# Patient Record
Sex: FEMALE | Race: WHITE | NOT HISPANIC OR LATINO | Employment: OTHER | URBAN - METROPOLITAN AREA
[De-identification: names, ages, dates, MRNs, and addresses within clinical notes are randomized per-mention and may not be internally consistent; named-entity substitution may affect disease eponyms.]

---

## 2019-10-15 ENCOUNTER — OFFICE VISIT (OUTPATIENT)
Dept: PULMONOLOGY | Facility: CLINIC | Age: 64
End: 2019-10-15
Payer: MEDICAID

## 2019-10-15 VITALS
OXYGEN SATURATION: 95 % | DIASTOLIC BLOOD PRESSURE: 81 MMHG | BODY MASS INDEX: 37.61 KG/M2 | HEIGHT: 61 IN | WEIGHT: 199.19 LBS | SYSTOLIC BLOOD PRESSURE: 143 MMHG | HEART RATE: 62 BPM

## 2019-10-15 DIAGNOSIS — J45.40 MODERATE PERSISTENT ASTHMA WITHOUT COMPLICATION: ICD-10-CM

## 2019-10-15 DIAGNOSIS — J44.89 ASTHMA-COPD OVERLAP SYNDROME: Primary | ICD-10-CM

## 2019-10-15 PROCEDURE — 99999 PR PBB SHADOW E&M-NEW PATIENT-LVL IV: CPT | Mod: PBBFAC,,, | Performed by: INTERNAL MEDICINE

## 2019-10-15 PROCEDURE — 99204 OFFICE O/P NEW MOD 45 MIN: CPT | Mod: PBBFAC,PO | Performed by: INTERNAL MEDICINE

## 2019-10-15 PROCEDURE — 99205 OFFICE O/P NEW HI 60 MIN: CPT | Mod: S$PBB,,, | Performed by: INTERNAL MEDICINE

## 2019-10-15 PROCEDURE — 99205 PR OFFICE/OUTPT VISIT, NEW, LEVL V, 60-74 MIN: ICD-10-PCS | Mod: S$PBB,,, | Performed by: INTERNAL MEDICINE

## 2019-10-15 PROCEDURE — 99999 PR PBB SHADOW E&M-NEW PATIENT-LVL IV: ICD-10-PCS | Mod: PBBFAC,,, | Performed by: INTERNAL MEDICINE

## 2019-10-15 RX ORDER — ISOSORBIDE MONONITRATE 30 MG/1
30 TABLET, EXTENDED RELEASE ORAL DAILY
Refills: 2 | Status: ON HOLD | COMMUNITY
Start: 2019-10-01 | End: 2020-01-03 | Stop reason: SDUPTHER

## 2019-10-15 RX ORDER — MAGNESIUM HYDROXIDE 400 MG/5ML
1 SUSPENSION, ORAL (FINAL DOSE FORM) ORAL DAILY
Status: ON HOLD | COMMUNITY
End: 2021-02-15 | Stop reason: HOSPADM

## 2019-10-15 RX ORDER — OXYCODONE AND ACETAMINOPHEN 10; 325 MG/1; MG/1
1 TABLET ORAL EVERY 6 HOURS PRN
Refills: 0 | COMMUNITY
Start: 2019-10-01 | End: 2022-08-08

## 2019-10-15 RX ORDER — MONTELUKAST SODIUM 10 MG/1
10 TABLET ORAL NIGHTLY
Qty: 30 TABLET | Refills: 11 | Status: SHIPPED | OUTPATIENT
Start: 2019-10-15 | End: 2021-04-15

## 2019-10-15 RX ORDER — BUDESONIDE AND FORMOTEROL FUMARATE DIHYDRATE 160; 4.5 UG/1; UG/1
2 AEROSOL RESPIRATORY (INHALATION) EVERY 12 HOURS
Qty: 1 INHALER | Refills: 11 | Status: SHIPPED | OUTPATIENT
Start: 2019-10-15 | End: 2020-10-01

## 2019-10-15 RX ORDER — OMEPRAZOLE 40 MG/1
40 CAPSULE, DELAYED RELEASE ORAL EVERY MORNING
COMMUNITY
Start: 2019-08-05 | End: 2021-12-31

## 2019-10-15 RX ORDER — OMEPRAZOLE 20 MG/1
TABLET, DELAYED RELEASE ORAL
COMMUNITY
End: 2020-01-03 | Stop reason: CLARIF

## 2019-10-15 RX ORDER — AZITHROMYCIN 500 MG/1
TABLET, FILM COATED ORAL
Qty: 3 TABLET | Refills: 3 | Status: ON HOLD | OUTPATIENT
Start: 2019-10-15 | End: 2020-01-03 | Stop reason: HOSPADM

## 2019-10-15 RX ORDER — ALLOPURINOL 300 MG/1
300 TABLET ORAL DAILY
Refills: 5 | COMMUNITY
Start: 2019-10-01 | End: 2024-02-26

## 2019-10-15 RX ORDER — PREDNISONE 20 MG/1
TABLET ORAL
Qty: 36 TABLET | Refills: 1 | Status: SHIPPED | OUTPATIENT
Start: 2019-10-15 | End: 2019-11-29 | Stop reason: SDUPTHER

## 2019-10-15 RX ORDER — ALBUTEROL SULFATE 90 UG/1
AEROSOL, METERED RESPIRATORY (INHALATION)
Qty: 1 INHALER | Refills: 11 | Status: SHIPPED | OUTPATIENT
Start: 2019-10-15 | End: 2022-01-10

## 2019-10-15 RX ORDER — ACETAMINOPHEN 500 MG
500 TABLET ORAL EVERY 4 HOURS PRN
Status: ON HOLD | COMMUNITY
End: 2024-02-29

## 2019-10-15 RX ORDER — LEVALBUTEROL INHALATION SOLUTION 0.63 MG/3ML
1 SOLUTION RESPIRATORY (INHALATION) EVERY 6 HOURS PRN
Qty: 270 ML | Refills: 11 | Status: ON HOLD | OUTPATIENT
Start: 2019-10-15 | End: 2021-02-15 | Stop reason: HOSPADM

## 2019-10-15 RX ORDER — MULTIVIT WITH MINERALS/HERBS
1 TABLET ORAL DAILY
COMMUNITY
End: 2021-12-31

## 2019-10-15 NOTE — PATIENT INSTRUCTIONS
Asthma, copd with chronic disease?    Preventive - use regular- sinuglair daily, symbicort 2 twice daily    Rescue medication - use albuterol inhaler or xopenex nebulizer up to every 4 hrs or so.    Action plan - prednisone one daily for 3 May be adequate, may need 3 for 3 and taper if severe.  Er if sickly    If yellow mucous - use azithromycin.    Spacer will help inhaled medication.    Breathing test and follow up a yr.

## 2019-10-15 NOTE — PROGRESS NOTES
"10/15/2019    Sakshi Hess  New Patient Consult    Chief Complaint   Patient presents with    COPD       HPI: pt had cough/wheezes as child, never outgrew.  Had cough all life- mucous clear. occ green mucous, uses combivent. Has neb with xopenex- albuterol ppt shakes.  Uses steroids once every 5 yrs- had to 3 wks prednisone.  Er visits once yr - may get steroid shot.  Lives [place],  No sinus, has dog and cat.  Breathing seems stable- breathing gets bad 4 month a yr    Uses oxycodone tid for last 1.5 yrs.      Has sleep apnea - cpap broke and not followed up.      The chief compliant  problem is new to me",    PFSH:  Past Medical History:   Diagnosis Date    Arthritis     Coronary artery disease     Hypertension          Past Surgical History:   Procedure Laterality Date    HYSTERECTOMY       Social History     Tobacco Use    Smoking status: Never Smoker   Substance Use Topics    Alcohol use: No    Drug use: No     History reviewed. No pertinent family history.  Review of patient's allergies indicates:   Allergen Reactions    Metronidazole Other (See Comments)     DISORIENTED       Statins-hmg-coa reductase inhibitors Tinitus     Joint pain       Performance Status:The patient's activity level is no limits with regular activity.      Review of Systems:  a review of eleven systems covering constitutional, Eye, HEENT, Psych, Respiratory, Cardiac, GI, , Musculoskeletal, Endocrine, Dermatologic was negative except for pertinent findings as listed ABOVE and below: night sweats,   pertinent positive as above, rest is good      Exam:Comprehensive exam done. BP (!) 143/81 (BP Location: Right arm, Patient Position: Sitting)   Pulse 62   Ht 5' 1" (1.549 m)   Wt 90.4 kg (199 lb 3 oz)   LMP 09/01/1986   SpO2 95% Comment: on room air  BMI 37.64 kg/m²   Exam included Vitals as listed, and patient's appearance and affect and alertness and mood, oral exam for yeast and hygiene and pharynx lesions and " Mallapatti (M) score, neck with inspection for jvd and masses and thyroid abnormalities and lymph nodes (supraclavicular and infraclavicular nodes and axillary also examined and noted if abn), chest exam included symmetry and effort and fremitus and percussion and auscultation, cardiac exam included rhythm and gallops and murmur and rubs and jvd and edema, abdominal exam for mass and hepatosplenomegaly and tenderness and hernias and bowel sounds, Musculoskeletal exam with muscle tone and posture and mobility/gait and  strength, and skin for rashes and cyanosis and pallor and turgor, extremity for clubbing.  Findings were normal except for pertinent findings listed below:   M2, chest is symmetric, no distress, normal percussion, normal fremitus and good normal breath sounds  No clubbing nor edema    Radiographs (ct chest and cxr) reviewed: view by direct vision  March 15 , 2019 nad, post cabg    Labs reviewed            PFT will be done and results to be reviewed         Results for SAKSHI BALLARD (MRN [id number]) as of 10/15/2019 09:59   Ref. Range 6/11/2012 04:51   Eosinophils Relative Latest Ref Range: 0.0 - 3.0 % 5.5 (H)   Basophil% Latest Ref Range: 0.0 - 3.0 % 0.5       Plan:  Clinical impression is apparently straight forward and impression with management as below.     Sakshi was seen today for copd.    Diagnoses and all orders for this visit:    Asthma-COPD overlap syndrome  -     Complete PFT with bronchodilator; Future    Moderate persistent asthma without complication  -     montelukast (SINGULAIR) 10 mg tablet; Take 1 tablet (10 mg total) by mouth every evening.  -     budesonide-formoterol 160-4.5 mcg (SYMBICORT) 160-4.5 mcg/actuation HFAA; Inhale 2 puffs into the lungs every 12 (twelve) hours. Controller  -     predniSONE (DELTASONE) 20 MG tablet; 3 for 3 days then 2 for 3 days then one for 3 days and repeat for breathing problems  -     levalbuterol (XOPENEX) 0.63 mg/3 mL nebulizer solution; Take 3  mLs (0.63 mg total) by nebulization every 6 (six) hours as needed.  -     albuterol (PROVENTIL/VENTOLIN HFA) 90 mcg/actuation inhaler; 2 puffs every 4 hours as needed for cough, wheeze, or shortness of breath  -     azithromycin (ZITHROMAX) 500 MG tablet; One daily for yellow mucous, repeat if needed  -     Complete PFT with bronchodilator; Future        Follow up in about 1 year (around 10/15/2020), or if symptoms worsen or fail to improve.    Discussed with patient above for education the following:      Patient Instructions   Asthma, copd with chronic disease?    Preventive - use regular- sinuglair daily, symbicort 2 twice daily    Rescue medication - use albuterol inhaler or xopenex nebulizer up to every 4 hrs or so.    Action plan - prednisone one daily for 3 May be adequate, may need 3 for 3 and taper if severe.  Er if sickly    If yellow mucous - use azithromycin.    Spacer will help inhaled medication.    Breathing test and follow up a yr.

## 2019-11-29 DIAGNOSIS — J45.40 MODERATE PERSISTENT ASTHMA WITHOUT COMPLICATION: ICD-10-CM

## 2019-11-29 RX ORDER — PREDNISONE 20 MG/1
TABLET ORAL
Qty: 36 TABLET | Refills: 1 | Status: ON HOLD | OUTPATIENT
Start: 2019-11-29 | End: 2020-01-03 | Stop reason: HOSPADM

## 2020-01-03 ENCOUNTER — HOSPITAL ENCOUNTER (OUTPATIENT)
Facility: HOSPITAL | Age: 65
Discharge: HOME | End: 2020-01-03
Attending: EMERGENCY MEDICINE | Admitting: INTERNAL MEDICINE
Payer: MEDICAID

## 2020-01-03 VITALS
SYSTOLIC BLOOD PRESSURE: 159 MMHG | DIASTOLIC BLOOD PRESSURE: 74 MMHG | BODY MASS INDEX: 37.88 KG/M2 | HEIGHT: 61 IN | OXYGEN SATURATION: 96 % | WEIGHT: 200.63 LBS | HEART RATE: 72 BPM | TEMPERATURE: 98 F | RESPIRATION RATE: 21 BRPM

## 2020-01-03 DIAGNOSIS — R06.02 SOB (SHORTNESS OF BREATH): ICD-10-CM

## 2020-01-03 DIAGNOSIS — R07.9 CHEST PAIN: ICD-10-CM

## 2020-01-03 DIAGNOSIS — I20.0 UNSTABLE ANGINA: Primary | ICD-10-CM

## 2020-01-03 DIAGNOSIS — I10 ESSENTIAL HYPERTENSION: ICD-10-CM

## 2020-01-03 PROBLEM — R07.89 MUSCULOSKELETAL CHEST PAIN: Status: ACTIVE | Noted: 2020-01-03

## 2020-01-03 PROBLEM — N18.30 CKD (CHRONIC KIDNEY DISEASE), STAGE III: Chronic | Status: RESOLVED | Noted: 2020-01-03 | Resolved: 2020-01-03

## 2020-01-03 PROBLEM — N18.30 CKD (CHRONIC KIDNEY DISEASE), STAGE III: Chronic | Status: ACTIVE | Noted: 2020-01-03

## 2020-01-03 PROBLEM — J44.9 COPD (CHRONIC OBSTRUCTIVE PULMONARY DISEASE): Status: ACTIVE | Noted: 2020-01-03

## 2020-01-03 PROBLEM — Z91.199 NON-COMPLIANCE: Chronic | Status: ACTIVE | Noted: 2020-01-03

## 2020-01-03 LAB
ALBUMIN SERPL BCP-MCNC: 3.9 G/DL (ref 3.5–5.2)
ALP SERPL-CCNC: 59 U/L (ref 55–135)
ALT SERPL W/O P-5'-P-CCNC: 11 U/L (ref 10–44)
ANION GAP SERPL CALC-SCNC: 11 MMOL/L (ref 8–16)
APTT PPP: 25.8 SEC (ref 23.6–33.3)
AST SERPL-CCNC: 15 U/L (ref 10–40)
BASOPHILS # BLD AUTO: 0.07 K/UL (ref 0–0.2)
BASOPHILS NFR BLD: 0.8 % (ref 0–1.9)
BILIRUB SERPL-MCNC: 0.6 MG/DL (ref 0.1–1)
BNP SERPL-MCNC: 79 PG/ML (ref 0–99)
BUN SERPL-MCNC: 22 MG/DL (ref 8–23)
CALCIUM SERPL-MCNC: 9.2 MG/DL (ref 8.7–10.5)
CHLORIDE SERPL-SCNC: 102 MMOL/L (ref 95–110)
CO2 SERPL-SCNC: 27 MMOL/L (ref 23–29)
CREAT SERPL-MCNC: 1.1 MG/DL (ref 0.5–1.4)
DIFFERENTIAL METHOD: NORMAL
EOSINOPHIL # BLD AUTO: 0.2 K/UL (ref 0–0.5)
EOSINOPHIL NFR BLD: 1.8 % (ref 0–8)
ERYTHROCYTE [DISTWIDTH] IN BLOOD BY AUTOMATED COUNT: 14.3 % (ref 11.5–14.5)
EST. GFR  (AFRICAN AMERICAN): >60 ML/MIN/1.73 M^2
EST. GFR  (NON AFRICAN AMERICAN): 53.2 ML/MIN/1.73 M^2
GLUCOSE SERPL-MCNC: 115 MG/DL (ref 70–110)
HCT VFR BLD AUTO: 40 % (ref 37–48.5)
HGB BLD-MCNC: 13.2 G/DL (ref 12–16)
IMM GRANULOCYTES # BLD AUTO: 0.04 K/UL (ref 0–0.04)
IMM GRANULOCYTES NFR BLD AUTO: 0.4 % (ref 0–0.5)
INR PPP: 0.9
LYMPHOCYTES # BLD AUTO: 3.8 K/UL (ref 1–4.8)
LYMPHOCYTES NFR BLD: 42.8 % (ref 18–48)
MCH RBC QN AUTO: 29.5 PG (ref 27–31)
MCHC RBC AUTO-ENTMCNC: 33 G/DL (ref 32–36)
MCV RBC AUTO: 90 FL (ref 82–98)
MONOCYTES # BLD AUTO: 1 K/UL (ref 0.3–1)
MONOCYTES NFR BLD: 10.9 % (ref 4–15)
NEUTROPHILS # BLD AUTO: 3.9 K/UL (ref 1.8–7.7)
NEUTROPHILS NFR BLD: 43.3 % (ref 38–73)
NRBC BLD-RTO: 0 /100 WBC
PLATELET # BLD AUTO: 268 K/UL (ref 150–350)
PMV BLD AUTO: 9.3 FL (ref 9.2–12.9)
POTASSIUM SERPL-SCNC: 4 MMOL/L (ref 3.5–5.1)
PROT SERPL-MCNC: 6.8 G/DL (ref 6–8.4)
PROTHROMBIN TIME: 12.2 SEC (ref 10.6–14.8)
RBC # BLD AUTO: 4.47 M/UL (ref 4–5.4)
SODIUM SERPL-SCNC: 140 MMOL/L (ref 136–145)
TROPONIN I SERPL DL<=0.01 NG/ML-MCNC: <0.03 NG/ML
WBC # BLD AUTO: 8.92 K/UL (ref 3.9–12.7)

## 2020-01-03 PROCEDURE — 84484 ASSAY OF TROPONIN QUANT: CPT | Mod: 91

## 2020-01-03 PROCEDURE — 80053 COMPREHEN METABOLIC PANEL: CPT

## 2020-01-03 PROCEDURE — 25000003 PHARM REV CODE 250: Performed by: STUDENT IN AN ORGANIZED HEALTH CARE EDUCATION/TRAINING PROGRAM

## 2020-01-03 PROCEDURE — 93005 ELECTROCARDIOGRAM TRACING: CPT

## 2020-01-03 PROCEDURE — G0378 HOSPITAL OBSERVATION PER HR: HCPCS

## 2020-01-03 PROCEDURE — 94640 AIRWAY INHALATION TREATMENT: CPT

## 2020-01-03 PROCEDURE — 25000003 PHARM REV CODE 250: Performed by: NURSE PRACTITIONER

## 2020-01-03 PROCEDURE — 84484 ASSAY OF TROPONIN QUANT: CPT

## 2020-01-03 PROCEDURE — 63600175 PHARM REV CODE 636 W HCPCS: Performed by: NURSE PRACTITIONER

## 2020-01-03 PROCEDURE — 83880 ASSAY OF NATRIURETIC PEPTIDE: CPT

## 2020-01-03 PROCEDURE — 85610 PROTHROMBIN TIME: CPT

## 2020-01-03 PROCEDURE — 99900035 HC TECH TIME PER 15 MIN (STAT)

## 2020-01-03 PROCEDURE — 96374 THER/PROPH/DIAG INJ IV PUSH: CPT

## 2020-01-03 PROCEDURE — 99285 EMERGENCY DEPT VISIT HI MDM: CPT | Mod: 25

## 2020-01-03 PROCEDURE — 85025 COMPLETE CBC W/AUTO DIFF WBC: CPT

## 2020-01-03 PROCEDURE — 36415 COLL VENOUS BLD VENIPUNCTURE: CPT

## 2020-01-03 PROCEDURE — 25000003 PHARM REV CODE 250: Performed by: INTERNAL MEDICINE

## 2020-01-03 PROCEDURE — 94761 N-INVAS EAR/PLS OXIMETRY MLT: CPT

## 2020-01-03 PROCEDURE — 85730 THROMBOPLASTIN TIME PARTIAL: CPT

## 2020-01-03 PROCEDURE — 25000242 PHARM REV CODE 250 ALT 637 W/ HCPCS: Performed by: STUDENT IN AN ORGANIZED HEALTH CARE EDUCATION/TRAINING PROGRAM

## 2020-01-03 RX ORDER — ONDANSETRON 2 MG/ML
4 INJECTION INTRAMUSCULAR; INTRAVENOUS EVERY 8 HOURS PRN
Status: DISCONTINUED | OUTPATIENT
Start: 2020-01-03 | End: 2020-01-03 | Stop reason: HOSPADM

## 2020-01-03 RX ORDER — ISOSORBIDE MONONITRATE 30 MG/1
30 TABLET, EXTENDED RELEASE ORAL DAILY
Qty: 30 TABLET | Refills: 0 | Status: SHIPPED | OUTPATIENT
Start: 2020-01-03 | End: 2022-04-07

## 2020-01-03 RX ORDER — BUDESONIDE AND FORMOTEROL FUMARATE DIHYDRATE 160; 4.5 UG/1; UG/1
2 AEROSOL RESPIRATORY (INHALATION) EVERY 12 HOURS
Status: DISCONTINUED | OUTPATIENT
Start: 2020-01-03 | End: 2020-01-03 | Stop reason: HOSPADM

## 2020-01-03 RX ORDER — NAPROXEN SODIUM 220 MG/1
81 TABLET, FILM COATED ORAL DAILY
Status: DISCONTINUED | OUTPATIENT
Start: 2020-01-03 | End: 2020-01-03 | Stop reason: SDUPTHER

## 2020-01-03 RX ORDER — NAPROXEN SODIUM 220 MG/1
324 TABLET, FILM COATED ORAL
Status: COMPLETED | OUTPATIENT
Start: 2020-01-03 | End: 2020-01-03

## 2020-01-03 RX ORDER — ISOSORBIDE MONONITRATE 60 MG/1
60 TABLET, EXTENDED RELEASE ORAL DAILY
Status: DISCONTINUED | OUTPATIENT
Start: 2020-01-04 | End: 2020-01-03 | Stop reason: HOSPADM

## 2020-01-03 RX ORDER — ALBUTEROL SULFATE 90 UG/1
1 AEROSOL, METERED RESPIRATORY (INHALATION) EVERY 6 HOURS PRN
Status: DISCONTINUED | OUTPATIENT
Start: 2020-01-03 | End: 2020-01-03 | Stop reason: HOSPADM

## 2020-01-03 RX ORDER — HYDRALAZINE HYDROCHLORIDE 20 MG/ML
10 INJECTION INTRAMUSCULAR; INTRAVENOUS EVERY 6 HOURS PRN
Status: DISCONTINUED | OUTPATIENT
Start: 2020-01-03 | End: 2020-01-03 | Stop reason: HOSPADM

## 2020-01-03 RX ORDER — IPRATROPIUM BROMIDE AND ALBUTEROL SULFATE 2.5; .5 MG/3ML; MG/3ML
3 SOLUTION RESPIRATORY (INHALATION)
Status: COMPLETED | OUTPATIENT
Start: 2020-01-03 | End: 2020-01-03

## 2020-01-03 RX ORDER — ASPIRIN 81 MG/1
81 TABLET ORAL DAILY
Qty: 30 TABLET | Refills: 0 | Status: ON HOLD | OUTPATIENT
Start: 2020-01-03 | End: 2024-02-29

## 2020-01-03 RX ORDER — BISACODYL 10 MG
10 SUPPOSITORY, RECTAL RECTAL DAILY PRN
Status: DISCONTINUED | OUTPATIENT
Start: 2020-01-03 | End: 2020-01-03 | Stop reason: HOSPADM

## 2020-01-03 RX ORDER — MONTELUKAST SODIUM 10 MG/1
10 TABLET ORAL NIGHTLY
Status: DISCONTINUED | OUTPATIENT
Start: 2020-01-03 | End: 2020-01-03 | Stop reason: HOSPADM

## 2020-01-03 RX ORDER — IBUPROFEN 400 MG/1
400 TABLET ORAL 3 TIMES DAILY
Qty: 15 TABLET | Refills: 0 | Status: SHIPPED | OUTPATIENT
Start: 2020-01-03 | End: 2020-01-08

## 2020-01-03 RX ORDER — ISOSORBIDE MONONITRATE 30 MG/1
30 TABLET, EXTENDED RELEASE ORAL DAILY
Status: DISCONTINUED | OUTPATIENT
Start: 2020-01-03 | End: 2020-01-03

## 2020-01-03 RX ORDER — SODIUM CHLORIDE 0.9 % (FLUSH) 0.9 %
10 SYRINGE (ML) INJECTION
Status: DISCONTINUED | OUTPATIENT
Start: 2020-01-03 | End: 2020-01-03 | Stop reason: HOSPADM

## 2020-01-03 RX ORDER — ACETAMINOPHEN 325 MG/1
650 TABLET ORAL EVERY 4 HOURS PRN
Status: DISCONTINUED | OUTPATIENT
Start: 2020-01-03 | End: 2020-01-03 | Stop reason: HOSPADM

## 2020-01-03 RX ORDER — ALBUTEROL SULFATE 0.83 MG/ML
1.25 SOLUTION RESPIRATORY (INHALATION) EVERY 6 HOURS PRN
Status: DISCONTINUED | OUTPATIENT
Start: 2020-01-03 | End: 2020-01-03 | Stop reason: HOSPADM

## 2020-01-03 RX ORDER — NITROGLYCERIN 0.4 MG/1
0.4 TABLET SUBLINGUAL EVERY 5 MIN PRN
Status: DISCONTINUED | OUTPATIENT
Start: 2020-01-03 | End: 2020-01-03 | Stop reason: HOSPADM

## 2020-01-03 RX ORDER — ALLOPURINOL 300 MG/1
300 TABLET ORAL DAILY
Status: DISCONTINUED | OUTPATIENT
Start: 2020-01-03 | End: 2020-01-03 | Stop reason: HOSPADM

## 2020-01-03 RX ORDER — PREDNISONE 20 MG/1
20 TABLET ORAL DAILY
Status: DISCONTINUED | OUTPATIENT
Start: 2020-01-03 | End: 2020-01-03 | Stop reason: HOSPADM

## 2020-01-03 RX ORDER — ASPIRIN 81 MG/1
81 TABLET ORAL DAILY
Status: DISCONTINUED | OUTPATIENT
Start: 2020-01-04 | End: 2020-01-03 | Stop reason: HOSPADM

## 2020-01-03 RX ORDER — OXYCODONE AND ACETAMINOPHEN 10; 325 MG/1; MG/1
1 TABLET ORAL EVERY 6 HOURS PRN
Status: DISCONTINUED | OUTPATIENT
Start: 2020-01-03 | End: 2020-01-03 | Stop reason: HOSPADM

## 2020-01-03 RX ORDER — NITROGLYCERIN 0.4 MG/1
0.4 TABLET SUBLINGUAL EVERY 5 MIN PRN
Qty: 30 TABLET | Refills: 0 | Status: SHIPPED | OUTPATIENT
Start: 2020-01-03 | End: 2024-02-26 | Stop reason: ALTCHOICE

## 2020-01-03 RX ORDER — CARVEDILOL 6.25 MG/1
6.25 TABLET ORAL 2 TIMES DAILY
Qty: 60 TABLET | Refills: 0 | Status: SHIPPED | OUTPATIENT
Start: 2020-01-03 | End: 2021-12-31

## 2020-01-03 RX ORDER — ISOSORBIDE MONONITRATE 30 MG/1
30 TABLET, EXTENDED RELEASE ORAL DAILY
Qty: 30 TABLET | Refills: 0 | Status: SHIPPED | OUTPATIENT
Start: 2020-01-03 | End: 2020-01-03 | Stop reason: SDUPTHER

## 2020-01-03 RX ORDER — ENOXAPARIN SODIUM 100 MG/ML
40 INJECTION SUBCUTANEOUS EVERY 24 HOURS
Status: DISCONTINUED | OUTPATIENT
Start: 2020-01-03 | End: 2020-01-03 | Stop reason: HOSPADM

## 2020-01-03 RX ORDER — PANTOPRAZOLE SODIUM 40 MG/1
40 TABLET, DELAYED RELEASE ORAL DAILY
Status: DISCONTINUED | OUTPATIENT
Start: 2020-01-03 | End: 2020-01-03 | Stop reason: HOSPADM

## 2020-01-03 RX ADMIN — PANTOPRAZOLE SODIUM 40 MG: 40 TABLET, DELAYED RELEASE ORAL at 06:01

## 2020-01-03 RX ADMIN — IPRATROPIUM BROMIDE AND ALBUTEROL SULFATE 3 ML: .5; 3 SOLUTION RESPIRATORY (INHALATION) at 03:01

## 2020-01-03 RX ADMIN — ASPIRIN 81 MG 81 MG: 81 TABLET ORAL at 04:01

## 2020-01-03 RX ADMIN — OXYCODONE HYDROCHLORIDE AND ACETAMINOPHEN 1 TABLET: 10; 325 TABLET ORAL at 08:01

## 2020-01-03 RX ADMIN — HYDRALAZINE HYDROCHLORIDE 10 MG: 20 INJECTION INTRAMUSCULAR; INTRAVENOUS at 08:01

## 2020-01-03 RX ADMIN — ALLOPURINOL 300 MG: 300 TABLET ORAL at 08:01

## 2020-01-03 RX ADMIN — ISOSORBIDE MONONITRATE 30 MG: 30 TABLET, EXTENDED RELEASE ORAL at 08:01

## 2020-01-03 RX ADMIN — PREDNISONE 20 MG: 20 TABLET ORAL at 08:01

## 2020-01-03 RX ADMIN — NITROGLYCERIN 0.4 MG: 0.4 TABLET, ORALLY DISINTEGRATING SUBLINGUAL at 08:01

## 2020-01-03 RX ADMIN — OXYCODONE HYDROCHLORIDE AND ACETAMINOPHEN 1 TABLET: 10; 325 TABLET ORAL at 03:01

## 2020-01-03 RX ADMIN — ASPIRIN 81 MG 324 MG: 81 TABLET ORAL at 03:01

## 2020-01-03 NOTE — NURSING
Discharge instructions given to patient and daughter. Patient verbalized understanding of follow up appointments and medication changes. Patient IV removed without difficulty, catheter tip intact. Tele removed. Patient discharged home via private vehicle.

## 2020-01-03 NOTE — ED NOTES
Attempted to call report.  Nurse will call back. VS stable.  SR on monitor.   Resps regular and not labored

## 2020-01-03 NOTE — ASSESSMENT & PLAN NOTE
Expiratory wheezes as reported by a respiratory therapist  Few crackles in bases after respiratory treatment  Continue inhalers/nebulizers  Continue oral steroids

## 2020-01-03 NOTE — SUBJECTIVE & OBJECTIVE
Past Medical History:   Diagnosis Date    Arthritis     Coronary artery disease     Hypertension        Past Surgical History:   Procedure Laterality Date    HYSTERECTOMY         Review of patient's allergies indicates:   Allergen Reactions    Metronidazole Other (See Comments)     DISORIENTED       Statins-hmg-coa reductase inhibitors Tinitus     Joint pain       No current facility-administered medications on file prior to encounter.      Current Outpatient Medications on File Prior to Encounter   Medication Sig    acetaminophen (TYLENOL) 500 MG tablet Take 500 mg by mouth.    albuterol (ACCUNEB) 1.25 mg/3 mL nebulizer solution Take 1 ampule by nebulization every 8 (eight) hours as needed.      albuterol (PROVENTIL/VENTOLIN HFA) 90 mcg/actuation inhaler 2 puffs every 4 hours as needed for cough, wheeze, or shortness of breath    allopurinol (ZYLOPRIM) 300 MG tablet Take 300 mg by mouth once daily.    aspirin (ECOTRIN) 81 MG EC tablet Take 81 mg by mouth once daily.      azithromycin (ZITHROMAX) 500 MG tablet One daily for yellow mucous, repeat if needed    b complex vitamins (B COMPLEX 1) tablet Take 1 tablet by mouth.    budesonide-formoterol 160-4.5 mcg (SYMBICORT) 160-4.5 mcg/actuation HFAA Inhale 2 puffs into the lungs every 12 (twelve) hours. Controller    carvedilol (COREG) 6.25 MG tablet Take 6.25 mg by mouth once daily.      clopidogrel (PLAVIX) 75 mg tablet Take 75 mg by mouth once daily.      diphenhydrAMINE (BENADRYL) 25 mg capsule Take 25 mg by mouth every evening.      evolocumab (REPATHA SURECLICK) 140 mg/mL PnIj 140 mg.    furosemide (LASIX) 20 MG tablet Take 10 mg by mouth daily as needed.      isosorbide dinitrate (ISORDIL) 30 MG tablet Take 60 mg by mouth once daily.      isosorbide mononitrate (IMDUR) 30 MG 24 hr tablet Take 30 mg by mouth once daily.    levalbuterol (XOPENEX) 0.63 mg/3 mL nebulizer solution Take 3 mLs (0.63 mg total) by nebulization every 6 (six) hours as  needed.    lisinopril 10 MG tablet Take 10 mg by mouth once daily.      montelukast (SINGULAIR) 10 mg tablet Take 1 tablet (10 mg total) by mouth every evening.    omeprazole (PRILOSEC OTC) 20 MG tablet esomeprazole magnesium 20 mg capsule,delayed release    omeprazole (PRILOSEC) 40 MG capsule omeprazole 40 mg capsule,delayed release    oxyCODONE-acetaminophen (PERCOCET)  mg per tablet TAKE ONE TABLET BY MOUTH 4 TIMES A DAY (1 TABLET AT 6AM, 1 TABLET AT 12 NOON, 1 TABLET AT 6 PM, AND 1 TABLET AT 12 MIDNIGHT)    potassium gluconate 595 mg (99 mg) Tab Take 1 tablet by mouth.    predniSONE (DELTASONE) 20 MG tablet 3 for 3 days then 2 for 3 days then one for 3 days and repeat for breathing problems    simvastatin (ZOCOR) 40 MG tablet Take 40 mg by mouth every evening.       Family History     None        Tobacco Use    Smoking status: Never Smoker   Substance and Sexual Activity    Alcohol use: No    Drug use: No    Sexual activity: Not on file     Review of Systems   All other systems reviewed and are negative.    Objective:     Vital Signs (Most Recent):  Temp: 98.2 °F (36.8 °C) (01/03/20 0300)  Pulse: 65 (01/03/20 0325)  Resp: 18 (01/03/20 0325)  BP: (!) 199/94 (01/03/20 0300)  SpO2: 97 % (01/03/20 0325) Vital Signs (24h Range):  Temp:  [98.2 °F (36.8 °C)-98.4 °F (36.9 °C)] 98.2 °F (36.8 °C)  Pulse:  [61-74] 65  Resp:  [16-28] 18  SpO2:  [94 %-97 %] 97 %  BP: (158-199)/(58-94) 199/94     Weight: 93 kg (205 lb)  Body mass index is 38.73 kg/m².    Physical Exam   Constitutional: She is oriented to person, place, and time. No distress.   obese   HENT:   Head: Normocephalic and atraumatic.   Eyes: Right eye exhibits no discharge. Left eye exhibits no discharge. No scleral icterus.   Neck: Neck supple.   Cardiovascular: Normal rate and regular rhythm. Exam reveals no gallop.   No murmur heard.  Pulmonary/Chest: Effort normal. Wheezes: at bases. She has rales (at bases). She exhibits tenderness (Left  chest).   Abdominal: Soft. Bowel sounds are normal.   Genitourinary:   Genitourinary Comments: No CVA tenderness   Musculoskeletal: She exhibits no edema, tenderness or deformity.   Neurological: She is alert and oriented to person, place, and time.   Skin: Skin is warm and dry. Capillary refill takes less than 2 seconds. She is not diaphoretic.   Vitals reviewed.          Significant Labs:   CBC:   Recent Labs   Lab 01/03/20 0113   WBC 8.92   HGB 13.2   HCT 40.0        CMP:   Recent Labs   Lab 01/03/20 0113      K 4.0      CO2 27   *   BUN 22   CREATININE 1.1   CALCIUM 9.2   PROT 6.8   ALBUMIN 3.9   BILITOT 0.6   ALKPHOS 59   AST 15   ALT 11   ANIONGAP 11   EGFRNONAA 53.2*     Coagulation:   Recent Labs   Lab 01/03/20 0113   INR 0.9   APTT 25.8     Troponin:   Recent Labs   Lab 01/03/20 0113   TROPONINI <0.030        Significant Imaging: I have reviewed all pertinent imaging results/findings within the past 24 hours.

## 2020-01-03 NOTE — CARE UPDATE
01/03/20 0320   Patient Assessment/Suction   Level of Consciousness (AVPU) alert   Respiratory Effort Unlabored   Expansion/Accessory Muscles/Retractions expansion symmetric;no retractions;no use of accessory muscles   LLL Breath Sounds wheezes, expiratory   RLL Breath Sounds wheezes, expiratory   Cough Type good;nonproductive   PRE-TX-O2   O2 Device (Oxygen Therapy) room air   SpO2 96 %   Pulse Oximetry Type Intermittent   Pulse 61   Resp 20   Aerosol Therapy   $ Aerosol Therapy Charges Aerosol Treatment;Mouth rinsed post treatment   Daily Review of Necessity (SVN) completed   Respiratory Treatment Status (SVN) given   Treatment Route (SVN) air;mask   Patient Position (SVN) Portillo's   Post Treatment Assessment (SVN) increased aeration   Signs of Intolerance (SVN) none   Breath Sounds Post-Respiratory Treatment   Throughout All Fields Post-Treatment All Fields   Throughout All Fields Post-Treatment aeration increased   Post-treatment Heart Rate (beats/min) 65   Post-treatment Resp Rate (breaths/min) 18   Respiratory Interventions   Cough And Deep Breathing done with encouragement   Breathing Techniques/Airway Clearance deep/controlled cough encouraged;diaphragmatic breathing promoted

## 2020-01-03 NOTE — H&P
[facility]  History & Physical  Date of Service:1/3/2020  At 0320    Patient Name: Sakshi Hess  MRN: [id number]  Admission Date: 1/3/2020  Attending Physician: Russel Oneal MD   Primary Care Provider: Kirill Boyle MD (Inactive)         Patient information was obtained from patient, past medical records and ER records.     Subjective:     Principal Problem:Chest pain    Chief Complaint:   Chief Complaint   Patient presents with    Chest Pain        HPI: The patient is a 64 year old female with multiple medical problems including coronary disease - s/p PCI and s/p CABG in , hypertension, dyslipidemia, COPD, and obesity. She presented to the ED with chest pain. She reports intermittent moderate to severe pressure/squeezing/heaviness in the left chest, associated with shortness of breath/dry heaves/diaphoresis, lasting 15 minutes to an hour, several times/day for the past 2 weeks. Pain occurs at rest and not related to meals. She states that pain usually resolved after resting. There are no aggravating factors. She reports persistent pain, not relieved after resting since last night. She reports subjective fever with occasional cough with white mucus. She denies abdominal pain or urinary symptoms.    Work up in the ED was unremarkable. Troponin was negative. EKG, personally reviewed, showed SR with no ST elevation. Chest radiograph, personally reviewed, showed no obvious infiltrates or overt heart failure.    Family history: Mother  at age 46 with PE. She does not know her father's medical history.      Past Medical History:   Diagnosis Date    Arthritis     Coronary artery disease     Hypertension        Past Surgical History:   Procedure Laterality Date    HYSTERECTOMY         Review of patient's allergies indicates:   Allergen Reactions    Metronidazole Other (See Comments)     DISORIENTED       Statins-hmg-coa reductase inhibitors Tinitus     Joint pain        No current facility-administered medications on file prior to encounter.      Current Outpatient Medications on File Prior to Encounter   Medication Sig    acetaminophen (TYLENOL) 500 MG tablet Take 500 mg by mouth.    albuterol (ACCUNEB) 1.25 mg/3 mL nebulizer solution Take 1 ampule by nebulization every 8 (eight) hours as needed.      albuterol (PROVENTIL/VENTOLIN HFA) 90 mcg/actuation inhaler 2 puffs every 4 hours as needed for cough, wheeze, or shortness of breath    allopurinol (ZYLOPRIM) 300 MG tablet Take 300 mg by mouth once daily.    aspirin (ECOTRIN) 81 MG EC tablet Take 81 mg by mouth once daily.      azithromycin (ZITHROMAX) 500 MG tablet One daily for yellow mucous, repeat if needed    b complex vitamins (B COMPLEX 1) tablet Take 1 tablet by mouth.    budesonide-formoterol 160-4.5 mcg (SYMBICORT) 160-4.5 mcg/actuation HFAA Inhale 2 puffs into the lungs every 12 (twelve) hours. Controller    carvedilol (COREG) 6.25 MG tablet Take 6.25 mg by mouth once daily.      clopidogrel (PLAVIX) 75 mg tablet Take 75 mg by mouth once daily.      diphenhydrAMINE (BENADRYL) 25 mg capsule Take 25 mg by mouth every evening.      evolocumab (REPATHA SURECLICK) 140 mg/mL PnIj 140 mg.    furosemide (LASIX) 20 MG tablet Take 10 mg by mouth daily as needed.      isosorbide dinitrate (ISORDIL) 30 MG tablet Take 60 mg by mouth once daily.      isosorbide mononitrate (IMDUR) 30 MG 24 hr tablet Take 30 mg by mouth once daily.    levalbuterol (XOPENEX) 0.63 mg/3 mL nebulizer solution Take 3 mLs (0.63 mg total) by nebulization every 6 (six) hours as needed.    lisinopril 10 MG tablet Take 10 mg by mouth once daily.      montelukast (SINGULAIR) 10 mg tablet Take 1 tablet (10 mg total) by mouth every evening.    omeprazole (PRILOSEC OTC) 20 MG tablet esomeprazole magnesium 20 mg capsule,delayed release    omeprazole (PRILOSEC) 40 MG capsule omeprazole 40 mg capsule,delayed release    oxyCODONE-acetaminophen  (PERCOCET)  mg per tablet TAKE ONE TABLET BY MOUTH 4 TIMES A DAY (1 TABLET AT 6AM, 1 TABLET AT 12 NOON, 1 TABLET AT 6 PM, AND 1 TABLET AT 12 MIDNIGHT)    potassium gluconate 595 mg (99 mg) Tab Take 1 tablet by mouth.    predniSONE (DELTASONE) 20 MG tablet 3 for 3 days then 2 for 3 days then one for 3 days and repeat for breathing problems    simvastatin (ZOCOR) 40 MG tablet Take 40 mg by mouth every evening.       Family History     None        Tobacco Use    Smoking status: Never Smoker   Substance and Sexual Activity    Alcohol use: No    Drug use: No    Sexual activity: Not on file     Review of Systems   All other systems reviewed and are negative.    Objective:     Vital Signs (Most Recent):  Temp: 98.2 °F (36.8 °C) (01/03/20 0300)  Pulse: 65 (01/03/20 0325)  Resp: 18 (01/03/20 0325)  BP: (!) 199/94 (01/03/20 0300)  SpO2: 97 % (01/03/20 0325) Vital Signs (24h Range):  Temp:  [98.2 °F (36.8 °C)-98.4 °F (36.9 °C)] 98.2 °F (36.8 °C)  Pulse:  [61-74] 65  Resp:  [16-28] 18  SpO2:  [94 %-97 %] 97 %  BP: (158-199)/(58-94) 199/94     Weight: 93 kg (205 lb)  Body mass index is 38.73 kg/m².    Physical Exam   Constitutional: She is oriented to person, place, and time. No distress.   obese   HENT:   Head: Normocephalic and atraumatic.   Eyes: Right eye exhibits no discharge. Left eye exhibits no discharge. No scleral icterus.   Neck: Neck supple.   Cardiovascular: Normal rate and regular rhythm. Exam reveals no gallop.   No murmur heard.  Pulmonary/Chest: Effort normal. Wheezes: at bases. She has rales (at bases). She exhibits tenderness (Left chest).   Abdominal: Soft. Bowel sounds are normal.   Genitourinary:   Genitourinary Comments: No CVA tenderness   Musculoskeletal: She exhibits no edema, tenderness or deformity.   Neurological: She is alert and oriented to person, place, and time.   Skin: Skin is warm and dry. Capillary refill takes less than 2 seconds. She is not diaphoretic.   Vitals reviewed.           Significant Labs:   CBC:   Recent Labs   Lab 01/03/20 0113   WBC 8.92   HGB 13.2   HCT 40.0        CMP:   Recent Labs   Lab 01/03/20 0113      K 4.0      CO2 27   *   BUN 22   CREATININE 1.1   CALCIUM 9.2   PROT 6.8   ALBUMIN 3.9   BILITOT 0.6   ALKPHOS 59   AST 15   ALT 11   ANIONGAP 11   EGFRNONAA 53.2*     Coagulation:   Recent Labs   Lab 01/03/20 0113   INR 0.9   APTT 25.8     Troponin:   Recent Labs   Lab 01/03/20 0113   TROPONINI <0.030        Significant Imaging: I have reviewed all pertinent imaging results/findings within the past 24 hours.    Assessment/Plan:     * Chest pain  History of PCI and CABG  EKG showed no ST elevation  States that she had OP stress test recently  Cardiac monitor  Trend troponin  Aspirin and NTG  Continue nitrates  Consult cardiology      COPD (chronic obstructive pulmonary disease)  Expiratory wheezes as reported by a respiratory therapist  Few crackles in bases after respiratory treatment  Continue inhalers/nebulizers  Continue oral steroids      Hypertension  Monitor  Resume home medication  Hydralazine IV PRN      VTE Risk Mitigation (From admission, onward)         Ordered     enoxaparin injection 40 mg  Daily      01/03/20 0355     IP VTE HIGH RISK PATIENT  Once      01/03/20 0355                   GILSON Vergara  Department of Hospital Medicine   [facility]

## 2020-01-03 NOTE — CONSULTS
[facility]  Cardiology  Consult Note    Patient Name: Sakshi Hess  MRN: [id number]  Admission Date: 1/3/2020  Hospital Length of Stay: 0 days  Code Status: Full Code   Attending Provider: Randa Tejada MD   Consulting Provider: CATHY Zapata  Primary Care Physician: Kirill Boyle MD (Inactive)  Principal Problem:Chest pain    Patient information was obtained from patient, past medical records and ER records.     Inpatient consult to Cardiology  Consult performed by: CATHY Zapata  Consult ordered by: GILSON Sorto        Subjective:     Chief Complaint:  cp     HPI: 63 y/o female PMHx CAD MI PCI CABG 2012 admitted with chest pain.  Pt. Has had constant left side chest pressure/soreness radiates to left shoulder lasting hours.  Different from prior angina.  Ming and EKGs negative.     Past Medical History:   Diagnosis Date    Arthritis     Coronary artery disease     Hypertension        Past Surgical History:   Procedure Laterality Date    HYSTERECTOMY         Review of patient's allergies indicates:   Allergen Reactions    Metronidazole Other (See Comments)     DISORIENTED       Statins-hmg-coa reductase inhibitors Tinitus     Joint pain       No current facility-administered medications on file prior to encounter.      Current Outpatient Medications on File Prior to Encounter   Medication Sig    acetaminophen (TYLENOL) 500 MG tablet Take 500 mg by mouth.    albuterol (ACCUNEB) 1.25 mg/3 mL nebulizer solution Take 1 ampule by nebulization every 8 (eight) hours as needed.      albuterol (PROVENTIL/VENTOLIN HFA) 90 mcg/actuation inhaler 2 puffs every 4 hours as needed for cough, wheeze, or shortness of breath    aspirin (ECOTRIN) 81 MG EC tablet Take 81 mg by mouth once daily.      b complex vitamins (B COMPLEX 1) tablet Take 1 tablet by mouth.    budesonide-formoterol 160-4.5 mcg (SYMBICORT) 160-4.5 mcg/actuation HFAA Inhale 2 puffs into the lungs every 12  (twelve) hours. Controller    diphenhydrAMINE (BENADRYL) 25 mg capsule Take 25 mg by mouth every evening.      evolocumab (REPATHA SURECLICK) 140 mg/mL PnIj 140 mg.    isosorbide mononitrate (IMDUR) 30 MG 24 hr tablet Take 30 mg by mouth once daily.    levalbuterol (XOPENEX) 0.63 mg/3 mL nebulizer solution Take 3 mLs (0.63 mg total) by nebulization every 6 (six) hours as needed.    montelukast (SINGULAIR) 10 mg tablet Take 1 tablet (10 mg total) by mouth every evening.    omeprazole (PRILOSEC) 40 MG capsule omeprazole 40 mg capsule,delayed release    oxyCODONE-acetaminophen (PERCOCET)  mg per tablet TAKE ONE TABLET BY MOUTH 4 TIMES A DAY (1 TABLET AT 6AM, 1 TABLET AT 12 NOON, 1 TABLET AT 6 PM, AND 1 TABLET AT 12 MIDNIGHT)    potassium gluconate 595 mg (99 mg) Tab Take 1 tablet by mouth.    predniSONE (DELTASONE) 20 MG tablet 3 for 3 days then 2 for 3 days then one for 3 days and repeat for breathing problems    [DISCONTINUED] isosorbide dinitrate (ISORDIL) 30 MG tablet Take 60 mg by mouth once daily.      allopurinol (ZYLOPRIM) 300 MG tablet Take 300 mg by mouth once daily.    azithromycin (ZITHROMAX) 500 MG tablet One daily for yellow mucous, repeat if needed    carvedilol (COREG) 6.25 MG tablet Take 6.25 mg by mouth once daily.      [DISCONTINUED] clopidogrel (PLAVIX) 75 mg tablet Take 75 mg by mouth once daily.      [DISCONTINUED] furosemide (LASIX) 20 MG tablet Take 10 mg by mouth daily as needed.      [DISCONTINUED] lisinopril 10 MG tablet Take 10 mg by mouth once daily.      [DISCONTINUED] omeprazole (PRILOSEC OTC) 20 MG tablet esomeprazole magnesium 20 mg capsule,delayed release    [DISCONTINUED] simvastatin (ZOCOR) 40 MG tablet Take 40 mg by mouth every evening.       Family History     Problem Relation (Age of Onset)    Pulmonary embolism Mother        Tobacco Use    Smoking status: Never Smoker   Substance and Sexual Activity    Alcohol use: No    Drug use: No    Sexual  activity: Not on file     Review of Systems   Cardiovascular: Positive for chest pain and dyspnea on exertion. Negative for claudication, cyanosis, irregular heartbeat, leg swelling, near-syncope, orthopnea, palpitations, paroxysmal nocturnal dyspnea and syncope.     Objective:     Vital Signs (Most Recent):  Temp: 98.4 °F (36.9 °C) (01/03/20 0747)  Pulse: 76 (01/03/20 0800)  Resp: 20 (01/03/20 0800)  BP: (!) 179/86 (01/03/20 0747)  SpO2: 96 % (01/03/20 0800) Vital Signs (24h Range):  Temp:  [98 °F (36.7 °C)-98.4 °F (36.9 °C)] 98.4 °F (36.9 °C)  Pulse:  [61-90] 76  Resp:  [16-28] 20  SpO2:  [94 %-98 %] 96 %  BP: (134-199)/(58-94) 179/86     Weight: 91 kg (200 lb 9.9 oz)  Body mass index is 37.91 kg/m².    SpO2: 96 %  O2 Device (Oxygen Therapy): room air    No intake or output data in the 24 hours ending 01/03/20 1021    Lines/Drains/Airways     Peripheral Intravenous Line                 Peripheral IV - Single Lumen 01/03/20 0113 20 G Right Forearm less than 1 day                Physical Exam   Cardiovascular: Normal rate, regular rhythm and normal heart sounds. Exam reveals no gallop and no friction rub.   No murmur heard.      Significant Labs:   Recent Lab Results       01/03/20  0730   01/03/20  0113        Albumin   3.9     Alkaline Phosphatase   59     ALT   11     Anion Gap   11     aPTT   25.8  Comment:  Therapeutic Range of 67.5-105.1 secs.  Equivalent to Heparin Concentration of anti-Xa 0.3-0.7 IU/ml.       AST   15     Baso #   0.07     Basophil%   0.8     BILIRUBIN TOTAL   0.6  Comment:  For infants and newborns, interpretation of results should be based  on gestational age, weight and in agreement with clinical  observations.  Premature Infant recommended reference ranges:  Up to 24 hours.............<8.0 mg/dL  Up to 48 hours............<12.0 mg/dL  3-5 days..................<15.0 mg/dL  6-29 days.................<15.0 mg/dL       BNP   79  Comment:  Values of less than 100 pg/ml are consistent with  non-CHF populations.     BUN, Bld   22     Calcium   9.2     Chloride   102     CO2   27     Creatinine   1.1     Differential Method   Automated     eGFR if    >60.0     eGFR if non    53.2  Comment:  Calculation used to obtain the estimated glomerular filtration  rate (eGFR) is the CKD-EPI equation.        Eos #   0.2     Eosinophil%   1.8     Glucose   115     Gran # (ANC)   3.9     Gran%   43.3     Hematocrit   40.0     Hemoglobin   13.2     Immature Grans (Abs)   0.04  Comment:  Mild elevation in immature granulocytes is non specific and   can be seen in a variety of conditions including stress response,   acute inflammation, trauma and pregnancy. Correlation with other   laboratory and clinical findings is essential.       Immature Granulocytes   0.4     Coumadin Monitoring INR   0.9  Comment:  Coumadin Therapy:  INR: 2.0-3.0 conventional anticoagulation  INR: 2.5-3.5 intensive anticoagulation       Lymph #   3.8     Lymph%   42.8     MCH   29.5     MCHC   33.0     MCV   90     Mono #   1.0     Mono%   10.9     MPV   9.3     nRBC   0     Platelets   268     Potassium   4.0     PROTEIN TOTAL   6.8     PT   12.2     RBC   4.47     RDW   14.3     Sodium   140     Troponin I <0.030 <0.030     WBC   8.92           Significant Imaging:   Assessment and Plan:     Active Diagnoses:    Diagnosis Date Noted POA    PRINCIPAL PROBLEM:  Chest pain [R07.9] 01/03/2020 Yes    COPD (chronic obstructive pulmonary disease) [J44.9] 01/03/2020 Yes    Hypertension [I10]  Yes      Problems Resolved During this Admission:       VTE Risk Mitigation (From admission, onward)         Ordered     enoxaparin injection 40 mg  Daily      01/03/20 0355     IP VTE HIGH RISK PATIENT  Once      01/03/20 0355              Plan    Lexiscan myoview if nuclear portion is negative for ischemia then OK for discharge from a cardiac stand point.     Thank you for your consult.     CATHY Zapata  Cardiology    [facility]

## 2020-01-03 NOTE — ASSESSMENT & PLAN NOTE
Was not compliant with medical care as well as outpatient follow-up.  Extensive counseling provided during this admission.

## 2020-01-03 NOTE — HOSPITAL COURSE
Patient was admitted under observation status to cardiac telemetry for further evaluation of chest pain and possible ACS.  She was monitored on cardiac telemetry and maintained sinus rhythm. EKG without any ST elevation.  Will serial cardiac biomarkers were negative.  Stated she had recent stress test which was reported as normal.  She admits she was not compliant with any of her chronic medications besides amlodipine and did have high blood pressures at home.  She states Imdur was giving her headache.  Previously on Ranexa however due to insurance coverage this was discontinued.  Denies any history of chest wall trauma.  Reproducible chest wall tenderness to palpation on exam concerning for musculoskeletal etiology.  Seen by Cardiology in consultation and cleared for discharge. Counseled patient and family member extensively at bedside about need to be compliant with all of her medications as it was noted blood pressure was elevated initially on arrival, better controlled on resuming home medications.  She stated also she did not follow up with Cardiology as previously recommended but is now going to schedule a follow-up appointment.  Medically stable for discharge. Discussed musculoskeletal etiology of chest pain, possible trial of short course NSAIDs versus lidocaine patch given she stated Percocet was not effective.  She opted for the former, counseled about the potential risk.  Discharge plan including medication changes, follow-up as well as return precautions explained to the patient.  Prescriptions for her cardiac medications were given on discharge.    Discharge examination  Obese female patient, sitting in bed, no apparent distress, cooperative  Reproducible left-sided chest wall tenderness to palpation  Regular with few extra beats, no significant lower extremity edema  Not on supplemental oxygen

## 2020-01-03 NOTE — PROGRESS NOTES
Unable to do stress test - pt. Ate and had caffeine.  Check 1 more troponin if negative and pt. Asymptomatic she can be d/savage home and f/u next week in office.

## 2020-01-03 NOTE — HPI
The patient is a 64 year old female with multiple medical problems including coronary disease - s/p PCI and s/p CABG in 2012, hypertension, dyslipidemia, COPD, and obesity. She presented to the ED with chest pain. She reports intermittent moderate to severe pressure/squeezing/heaviness in the left chest, associated with shortness of breath/dry heaves/diaphoresis, lasting 15 minutes to an hour, several times/day for the past 2 weeks. Pain occurs at rest and not related to meals. She states that pain usually resolved after resting. There are no aggravating factors. She reports persistent pain, not relieved after resting since last night. She reports subjective fever with occasional cough with white mucus. She denies abdominal pain or urinary symptoms.    Work up in the ED was unremarkable. Troponin was negative. EKG, personally reviewed, showed SR with no ST elevation. Chest radiograph, personally reviewed, showed no obvious infiltrates or overt heart failure.    Family history: Mother  at age 46 with PE. She does not know her father's medical history.     Detail Level: Zone

## 2020-01-03 NOTE — CARE UPDATE
01/03/20 0315   Patient Assessment/Suction   Level of Consciousness (AVPU) alert   Respiratory Effort Unlabored   Expansion/Accessory Muscles/Retractions expansion symmetric;no retractions;no use of accessory muscles   LLL Breath Sounds wheezes, expiratory   RLL Breath Sounds wheezes, expiratory   Cough Type good;nonproductive   PRE-TX-O2   O2 Device (Oxygen Therapy) room air   SpO2 96 %   Pulse Oximetry Type Intermittent   $ Pulse Oximetry - Multiple Charge Pulse Oximetry - Multiple   Pulse 66   Resp 20   Aerosol Therapy   $ Aerosol Therapy Charges Aerosol Treatment;Mouth rinsed post treatment   Daily Review of Necessity (SVN) completed   Respiratory Treatment Status (SVN) given   Treatment Route (SVN) air;mask   Patient Position (SVN) Portillo's   Post Treatment Assessment (SVN) breath sounds unchanged   Signs of Intolerance (SVN) none   Breath Sounds Post-Respiratory Treatment   Throughout All Fields Post-Treatment All Fields   Throughout All Fields Post-Treatment no change   Post-treatment Heart Rate (beats/min) 61   Post-treatment Resp Rate (breaths/min) 18   Equipment Change   $ RT Equipment Continuous nebulizer   Respiratory Interventions   Cough And Deep Breathing done with encouragement   Breathing Techniques/Airway Clearance deep/controlled cough encouraged;diaphragmatic breathing promoted   Respiratory Evaluation   $ Care Plan Tech Time 15 min   Evaluation For New Orders   Admitting Diagnosis SOB   Cardiac Diagnosis CHF   Pulmonary Diagnosis COPD   Home Oxygen   Has Home Oxygen? No   Home Aerosol, MDI, DPI, and Other Treatments/Therapies   Home Respiratory Therapy Per Patient/Review of Chart Yes   Aerosol Home Meds/Freq Xopenex/prn   DPI Home Meds/Freq Symbicort/bid

## 2020-01-03 NOTE — ASSESSMENT & PLAN NOTE
Elevated on admission likely secondary to noncompliance with medication.  Counseling provided.  Prescriptions done.

## 2020-01-03 NOTE — CARE UPDATE
01/03/20 0325   Patient Assessment/Suction   Level of Consciousness (AVPU) alert   Respiratory Effort Unlabored   Expansion/Accessory Muscles/Retractions expansion symmetric;no retractions;no use of accessory muscles   LLL Breath Sounds crackles, fine   RLL Breath Sounds crackles, fine   Cough Type good;nonproductive   PRE-TX-O2   O2 Device (Oxygen Therapy) room air   SpO2 97 %   Pulse 65   Resp 18   Aerosol Therapy   $ Aerosol Therapy Charges Aerosol Treatment;Mouth rinsed post treatment   Daily Review of Necessity (SVN) completed   Respiratory Treatment Status (SVN) given   Treatment Route (SVN) air;mask   Patient Position (SVN) Portillo's   Post Treatment Assessment (SVN) breath sounds unchanged   Signs of Intolerance (SVN) none   Breath Sounds Post-Respiratory Treatment   Throughout All Fields Post-Treatment All Fields   Throughout All Fields Post-Treatment no change   Post-treatment Heart Rate (beats/min) 66   Post-treatment Resp Rate (breaths/min) 18   Respiratory Interventions   Cough And Deep Breathing done with encouragement   Breathing Techniques/Airway Clearance deep/controlled cough encouraged;diaphragmatic breathing promoted

## 2020-01-03 NOTE — PLAN OF CARE
01/03/20 0800   Patient Assessment/Suction   Level of Consciousness (AVPU) alert   Respiratory Effort Normal;Unlabored   Expansion/Accessory Muscles/Retractions no use of accessory muscles   PRE-TX-O2   O2 Device (Oxygen Therapy) room air   SpO2 96 %   Pulse Oximetry Type Intermittent   $ Pulse Oximetry - Multiple Charge Pulse Oximetry - Multiple   Pulse 76   Resp 20

## 2020-01-03 NOTE — ASSESSMENT & PLAN NOTE
History of PCI and CABG  EKG showed no ST elevation  States that she had OP stress test recently  Cardiac monitor  Trend troponin  Aspirin and NTG  Continue nitrates  Consult cardiology

## 2020-01-03 NOTE — PROGRESS NOTES
Cardiac Rehab     Sakshi Hess   [id number]   1/3/2020         Cardiac Rehab Phase Taught: Phase 1    Teaching Method: Verbal    Handouts: Pulmonary Rehab Tear Sheet    Educational Videos: None    Understanding:  Learning indicated by feedback and Verbalize understanding    Comments: Education on COPD and pulmonary rehab provided. Questions answered.    Total Time Spent: 15 mins            Amarilys Bazzi RN

## 2020-01-03 NOTE — CARE UPDATE
01/03/20 0315   Patient Assessment/Suction   Level of Consciousness (AVPU) alert   Respiratory Effort Unlabored   Expansion/Accessory Muscles/Retractions expansion symmetric;no retractions;no use of accessory muscles   LLL Breath Sounds wheezes, expiratory   RLL Breath Sounds wheezes, expiratory   Cough Type good;nonproductive   PRE-TX-O2   O2 Device (Oxygen Therapy) room air   SpO2 96 %   Pulse Oximetry Type Intermittent   $ Pulse Oximetry - Multiple Charge Pulse Oximetry - Multiple   Pulse 66   Resp 20   Aerosol Therapy   $ Aerosol Therapy Charges Aerosol Treatment;Mouth rinsed post treatment   Daily Review of Necessity (SVN) completed   Respiratory Treatment Status (SVN) given   Treatment Route (SVN) air;mask   Patient Position (SVN) Portillo's   Post Treatment Assessment (SVN) breath sounds unchanged   Signs of Intolerance (SVN) none   Breath Sounds Post-Respiratory Treatment   Throughout All Fields Post-Treatment All Fields   Throughout All Fields Post-Treatment no change   Post-treatment Heart Rate (beats/min) 61   Post-treatment Resp Rate (breaths/min) 18   Respiratory Interventions   Cough And Deep Breathing done with encouragement   Breathing Techniques/Airway Clearance deep/controlled cough encouraged;diaphragmatic breathing promoted   Respiratory Evaluation   $ Care Plan Tech Time 15 min   Evaluation For New Orders   Admitting Diagnosis SOB   Cardiac Diagnosis CHF   Pulmonary Diagnosis COPD

## 2020-01-03 NOTE — DISCHARGE SUMMARY
Formerly [facility], [facility] Medicine  Discharge Summary      Patient Name: Sakshi Hess  MRN: [id number]  Admission Date: 1/3/2020  Hospital Length of Stay: 0 days  Discharge Date and Time:  2020 3:07 PM  Attending Physician: Randa Tejada MD   Discharging Provider: Randa Tejada MD  Primary Care Provider: Kirill Boyle MD (Inactive)      HPI:   The patient is a 64 year old female with multiple medical problems including coronary disease - s/p PCI and s/p CABG in 2012, hypertension, dyslipidemia, COPD, and obesity. She presented to the ED with chest pain. She reports intermittent moderate to severe pressure/squeezing/heaviness in the left chest, associated with shortness of breath/dry heaves/diaphoresis, lasting 15 minutes to an hour, several times/day for the past 2 weeks. Pain occurs at rest and not related to meals. She states that pain usually resolved after resting. There are no aggravating factors. She reports persistent pain, not relieved after resting since last night. She reports subjective fever with occasional cough with white mucus. She denies abdominal pain or urinary symptoms.    Work up in the ED was unremarkable. Troponin was negative. EKG, personally reviewed, showed SR with no ST elevation. Chest radiograph, personally reviewed, showed no obvious infiltrates or overt heart failure.    Family history: Mother  at age 46 with PE. She does not know her father's medical history.      * No surgery found *      Hospital Course:   Patient was admitted under observation status to cardiac telemetry for further evaluation of chest pain and possible ACS.  She was monitored on cardiac telemetry and maintained sinus rhythm. EKG without any ST elevation.  Will serial cardiac biomarkers were negative.  Stated she had recent stress test which was reported as normal.  She admits she was not compliant with any of her chronic medications besides amlodipine and did have high blood  pressures at home.  She states Imdur was giving her headache.  Previously on Ranexa however due to insurance coverage this was discontinued.  Denies any history of chest wall trauma.  Reproducible chest wall tenderness to palpation on exam concerning for musculoskeletal etiology.  Seen by Cardiology in consultation and cleared for discharge. Counseled patient and family member extensively at bedside about need to be compliant with all of her medications as it was noted blood pressure was elevated initially on arrival, better controlled on resuming home medications.  She stated also she did not follow up with Cardiology as previously recommended but is now going to schedule a follow-up appointment.  Medically stable for discharge. Discussed musculoskeletal etiology of chest pain, possible trial of short course NSAIDs versus lidocaine patch given she stated Percocet was not effective.  She opted for the former, counseled about the potential risk.  Discharge plan including medication changes, follow-up as well as return precautions explained to the patient.  Prescriptions for her cardiac medications were given on discharge.    Discharge examination  Obese female patient, sitting in bed, no apparent distress, cooperative  Reproducible left-sided chest wall tenderness to palpation  Regular with few extra beats, no significant lower extremity edema  Not on supplemental oxygen     Consults:   Consults (From admission, onward)        Status Ordering Provider     Inpatient consult to Cardiology  Once     Provider:  Remigio Bourne MD    Completed LUCINDA KAT          * Musculoskeletal chest pain  Reproducible on examination.      Non-compliance  Was not compliant with medical care as well as outpatient follow-up.  Extensive counseling provided during this admission.    COPD (chronic obstructive pulmonary disease)  No evidence of acute exacerbation.  Continue home inhalers.    Hypertension  Elevated on admission  likely secondary to noncompliance with medication.  Counseling provided.  Prescriptions done.    Coronary artery disease  History of remote CABG.        Final Active Diagnoses:    Diagnosis Date Noted POA    PRINCIPAL PROBLEM:  Musculoskeletal chest pain [R07.89] 01/03/2020 Yes    COPD (chronic obstructive pulmonary disease) [J44.9] 01/03/2020 Yes    Non-compliance [Z91.19] 01/03/2020 Not Applicable     Chronic    Hypertension [I10]  Yes    Coronary artery disease [I25.10]  Yes      Problems Resolved During this Admission:    Diagnosis Date Noted Date Resolved POA    CKD (chronic kidney disease), stage III [N18.3] 01/03/2020 01/03/2020 Yes     Chronic       Discharged Condition: good    Disposition: Home or Self Care    Follow Up:  Follow-up Information     Remigio Bourne MD In 2 weeks.    Specialty:  Cardiology  Why:  CAD, hypertension  Contact information:  [address] [place] [address]  [address] [facility]  [place] [address]  [phone]                 Patient Instructions:      Diet Cardiac     Activity as tolerated       Significant Diagnostic Studies: Labs:   BMP:   Recent Labs   Lab 01/03/20  0113   *      K 4.0      CO2 27   BUN 22   CREATININE 1.1   CALCIUM 9.2   , CMP   Recent Labs   Lab 01/03/20  0113      K 4.0      CO2 27   *   BUN 22   CREATININE 1.1   CALCIUM 9.2   PROT 6.8   ALBUMIN 3.9   BILITOT 0.6   ALKPHOS 59   AST 15   ALT 11   ANIONGAP 11   ESTGFRAFRICA >60.0   EGFRNONAA 53.2*   , CBC   Recent Labs   Lab 01/03/20  0113   WBC 8.92   HGB 13.2   HCT 40.0      , Lipid Panel   Lab Results   Component Value Date    HDL 35 (L) 06/11/2012    LDLCALC 134 06/11/2012   , Troponin   Recent Labs   Lab 01/03/20  1407   TROPONINI <0.030   , A1C: No results for input(s): HGBA1C in the last 4320 hours. and All labs within the past 24 hours have been reviewed    Pending Diagnostic Studies:     Procedure Component Value Units Date/Time    Troponin I [[id number]]      Order Status:  Sent Lab Status:  No result     Specimen:  Blood       X-ray Chest Ap Portable    Result Date: 1/3/2020  EXAMINATION: XR CHEST AP PORTABLE CLINICAL HISTORY: Acute chest pain, hypertension, coronary artery disease. FINDINGS: Portable chest radiograph at 01:09 hours compared to 03/15/2019 shows median sternotomy wires and mediastinal surgical clips from prior CABG, with the cardiomediastinal silhouette and pulmonary vasculature stable and within normal limits.  There are aortic vascular calcifications. The lungs are normally expanded, with no consolidation, large pleural effusion, evidence of pulmonary edema, or pneumothorax.  There is no acute osseous abnormality.     No evidence of acute cardiopulmonary disease. Electronically signed by: Shaquille Stringer MD Date:    01/03/2020 Time:    07:33  ECG Results          EKG 12-lead (Final result)  Result time 01/03/20 10:52:02    Final result by Interface, Lab In [facility] (01/03/20 10:52:02)                 Narrative:    Test Reason : R07.9,    Vent. Rate : 067 BPM     Atrial Rate : 067 BPM     P-R Int : 156 ms          QRS Dur : 078 ms      QT Int : 356 ms       P-R-T Axes : 032 -13 084 degrees     QTc Int : 376 ms    Normal sinus rhythm  Moderate voltage criteria for LVH, may be normal variant  Cannot rule out Septal infarct (cited on or before 09-JUN-2012)  Abnormal ECG  When compared with ECG of 03-JAN-2020 00:45,  No significant change was found  Confirmed by Sd Schaeffer MD [id number]) on 1/3/2020 10:51:54 AM    Referred By: [email or web address]   SELF           Confirmed By:Sd Schaeffer MD                             EKG 12-lead (Final result)  Result time 01/03/20 10:52:00    Final result by Interface, Lab In [facility] (01/03/20 10:52:00)                 Narrative:    Test Reason : R07.9,    Vent. Rate : 070 BPM     Atrial Rate : 070 BPM     P-R Int : 160 ms          QRS Dur : 078 ms      QT Int : 380 ms       P-R-T Axes : 034 -02 083 degrees     QTc Int : 410  ms    Normal sinus rhythm  Minimal voltage criteria for LVH, may be normal variant  Septal infarct (cited on or before 09-JUN-2012)  Abnormal ECG  When compared with ECG of 10-JAIRO-2012 11:34,  Questionable change in initial forces of Anterior-septal leads  T wave inversion less evident in Anterior leads  Confirmed by Sd Schaeffer MD ([id number]) on 1/3/2020 10:51:52 AM    Referred By: [email or web address]   SELF           Confirmed By:Sd Schaeffer MD                                Medications:  Reconciled Home Medications:      Medication List      START taking these medications    ibuprofen 400 MG tablet  Commonly known as:  ADVIL,MOTRIN  Take 1 tablet (400 mg total) by mouth 3 (three) times daily. for 5 days     isosorbide mononitrate 30 MG 24 hr tablet  Commonly known as:  IMDUR  Take 1 tablet (30 mg total) by mouth once daily.     nitroGLYCERIN 0.4 MG SL tablet  Commonly known as:  NITROSTAT  Place 1 tablet (0.4 mg total) under the tongue every 5 (five) minutes as needed for Chest pain.        CHANGE how you take these medications    carvedilol 6.25 MG tablet  Commonly known as:  COREG  Take 1 tablet (6.25 mg total) by mouth 2 (two) times daily.  What changed:  when to take this        CONTINUE taking these medications    acetaminophen 500 MG tablet  Commonly known as:  TYLENOL  Take 500 mg by mouth.     * albuterol 1.25 mg/3 mL Nebu  Commonly known as:  ACCUNEB  Take 1 ampule by nebulization every 8 (eight) hours as needed.     * albuterol 90 mcg/actuation inhaler  Commonly known as:  PROVENTIL/VENTOLIN HFA  2 puffs every 4 hours as needed for cough, wheeze, or shortness of breath     allopurinol 300 MG tablet  Commonly known as:  ZYLOPRIM  Take 300 mg by mouth once daily.     aspirin 81 MG EC tablet  Commonly known as:  ECOTRIN  Take 1 tablet (81 mg total) by mouth once daily.     B Complex 1 tablet  Generic drug:  b complex vitamins  Take 1 tablet by mouth.     budesonide-formoterol 160-4.5 mcg 160-4.5 mcg/actuation  Hfaa  Commonly known as:  Symbicort  Inhale 2 puffs into the lungs every 12 (twelve) hours. Controller     diphenhydrAMINE 25 mg capsule  Commonly known as:  BENADRYL  Take 25 mg by mouth every evening.     levalbuterol 0.63 mg/3 mL nebulizer solution  Commonly known as:  XOPENEX  Take 3 mLs (0.63 mg total) by nebulization every 6 (six) hours as needed.     montelukast 10 mg tablet  Commonly known as:  SINGULAIR  Take 1 tablet (10 mg total) by mouth every evening.     omeprazole 40 MG capsule  Commonly known as:  PRILOSEC  omeprazole 40 mg capsule,delayed release     oxyCODONE-acetaminophen  mg per tablet  Commonly known as:  PERCOCET  TAKE ONE TABLET BY MOUTH 4 TIMES A DAY (1 TABLET AT 6AM, 1 TABLET AT 12 NOON, 1 TABLET AT 6 PM, AND 1 TABLET AT 12 MIDNIGHT)     potassium gluconate 595 mg (99 mg) Tab  Take 1 tablet by mouth.     Repatha SureClick 140 mg/mL Pnij  Generic drug:  evolocumab  140 mg.         * This list has 2 medication(s) that are the same as other medications prescribed for you. Read the directions carefully, and ask your doctor or other care provider to review them with you.            STOP taking these medications    azithromycin 500 MG tablet  Commonly known as:  ZITHROMAX     predniSONE 20 MG tablet  Commonly known as:  DELTASONE            Indwelling Lines/Drains at time of discharge:   Lines/Drains/Airways     None                 Time spent on the discharge of patient: 31 minutes  Patient was seen and examined on the date of discharge and determined to be suitable for discharge.         Randa Tejada MD  Department of Hospital Medicine  [facility]

## 2020-03-05 RX ORDER — AZITHROMYCIN 500 MG/1
TABLET, FILM COATED ORAL
COMMUNITY
Start: 2020-01-25 | End: 2020-03-05 | Stop reason: SDUPTHER

## 2020-03-05 RX ORDER — AZITHROMYCIN 500 MG/1
TABLET, FILM COATED ORAL
Qty: 3 TABLET | Refills: 2 | Status: SHIPPED | OUTPATIENT
Start: 2020-03-05 | End: 2020-06-29 | Stop reason: SDUPTHER

## 2020-05-29 RX ORDER — PREDNISONE 20 MG/1
TABLET ORAL
COMMUNITY
Start: 2020-03-05 | End: 2020-05-29 | Stop reason: SDUPTHER

## 2020-05-30 RX ORDER — PREDNISONE 20 MG/1
20 TABLET ORAL DAILY
Qty: 36 TABLET | Refills: 0 | Status: SHIPPED | OUTPATIENT
Start: 2020-05-30 | End: 2020-06-29 | Stop reason: SDUPTHER

## 2020-06-29 ENCOUNTER — OFFICE VISIT (OUTPATIENT)
Dept: PULMONOLOGY | Facility: CLINIC | Age: 65
End: 2020-06-29
Payer: MEDICARE

## 2020-06-29 ENCOUNTER — TELEPHONE (OUTPATIENT)
Dept: PULMONOLOGY | Facility: CLINIC | Age: 65
End: 2020-06-29

## 2020-06-29 VITALS
BODY MASS INDEX: 39.73 KG/M2 | SYSTOLIC BLOOD PRESSURE: 142 MMHG | WEIGHT: 210.44 LBS | HEART RATE: 77 BPM | HEIGHT: 61 IN | OXYGEN SATURATION: 96 % | DIASTOLIC BLOOD PRESSURE: 75 MMHG

## 2020-06-29 DIAGNOSIS — G47.33 OBSTRUCTIVE SLEEP APNEA SYNDROME: Primary | ICD-10-CM

## 2020-06-29 DIAGNOSIS — J45.40 MODERATE PERSISTENT ASTHMA WITHOUT COMPLICATION: ICD-10-CM

## 2020-06-29 DIAGNOSIS — G47.33 OBSTRUCTIVE SLEEP APNEA SYNDROME: ICD-10-CM

## 2020-06-29 DIAGNOSIS — Z01.818 OTHER SPECIFIED PRE-OPERATIVE EXAMINATION: Primary | ICD-10-CM

## 2020-06-29 PROCEDURE — 99214 OFFICE O/P EST MOD 30 MIN: CPT | Mod: S$PBB,,, | Performed by: INTERNAL MEDICINE

## 2020-06-29 PROCEDURE — 99999 PR PBB SHADOW E&M-EST. PATIENT-LVL IV: CPT | Mod: PBBFAC,,, | Performed by: INTERNAL MEDICINE

## 2020-06-29 PROCEDURE — 99999 PR PBB SHADOW E&M-EST. PATIENT-LVL IV: ICD-10-PCS | Mod: PBBFAC,,, | Performed by: INTERNAL MEDICINE

## 2020-06-29 PROCEDURE — 99214 PR OFFICE/OUTPT VISIT, EST, LEVL IV, 30-39 MIN: ICD-10-PCS | Mod: S$PBB,,, | Performed by: INTERNAL MEDICINE

## 2020-06-29 PROCEDURE — 99214 OFFICE O/P EST MOD 30 MIN: CPT | Mod: PBBFAC,PO | Performed by: INTERNAL MEDICINE

## 2020-06-29 RX ORDER — LEVALBUTEROL TARTRATE 45 UG/1
1-2 AEROSOL, METERED ORAL EVERY 4 HOURS PRN
Qty: 1 INHALER | Refills: 11 | Status: ON HOLD | OUTPATIENT
Start: 2020-06-29 | End: 2021-02-15 | Stop reason: HOSPADM

## 2020-06-29 RX ORDER — AZITHROMYCIN 500 MG/1
TABLET, FILM COATED ORAL
Qty: 3 TABLET | Refills: 2 | Status: ON HOLD | OUTPATIENT
Start: 2020-06-29 | End: 2021-02-15 | Stop reason: HOSPADM

## 2020-06-29 RX ORDER — TRAZODONE HYDROCHLORIDE 50 MG/1
50 TABLET ORAL NIGHTLY
COMMUNITY
Start: 2020-04-28 | End: 2022-08-08

## 2020-06-29 RX ORDER — PREDNISONE 20 MG/1
20 TABLET ORAL DAILY
Qty: 36 TABLET | Refills: 0 | Status: SHIPPED | OUTPATIENT
Start: 2020-06-29 | End: 2020-10-01 | Stop reason: SDUPTHER

## 2020-06-29 RX ORDER — BUDESONIDE AND FORMOTEROL FUMARATE DIHYDRATE 160; 4.5 UG/1; UG/1
2 AEROSOL RESPIRATORY (INHALATION) EVERY 12 HOURS
Qty: 1 INHALER | Refills: 11 | Status: SHIPPED | OUTPATIENT
Start: 2020-06-29 | End: 2021-04-15

## 2020-06-29 NOTE — TELEPHONE ENCOUNTER
----- Message from Sapna Alex sent at 6/29/2020 11:00 AM CDT -----  Regarding: COVID ORDERS NEEDED  Contact: [phone]  Dr. Bowen and Staff,    It has been decided that each in-lab Sleep Study Order should be accompanied with a COVID Screening Order.   I have an in-lab order for a PSG for this PT.  Please add the screening order to Epic.  Use the [id number]/Covid Screening & DX code: Z01.818 to order the Covid Screen.  Home Sleep Studies do not require the Covid Screen order, only in-lab Sleep Studies, such as PSG; CPAP & SPLIT nights.      Thank you,Joy

## 2020-06-29 NOTE — PROGRESS NOTES
"6/29/2020    Sakshi Hess  New Patient Consult    Chief Complaint   Patient presents with    Apnea    Snoring       HPI:       6/29/2020 - no prednisone last 3 wks, takes once every 3 wks, was not using symbicort less than daily. Brittaney's mom.  Sinuses good.      You had prior sleep apnea, up to 40/hr?  Used cpap for 3 yrs but stopped 10 yrs ago for multiple reasons.    10/15/2019pt had cough/wheezes as child, never outgrew.  Had cough all life- mucous clear. occ green mucous, uses combivent. Has neb with xopenex- albuterol ppt shakes.  Uses steroids once every 5 yrs- had to 3 wks prednisone.  Er visits once yr - may get steroid shot.  [place],  No sinus, has dog and cat.  Breathing seems stable- breathing gets bad 4 month a yr    Uses oxycodone tid for last 1.5 yrs.      Has sleep apnea - cpap broke and not followed up.    Patient Instructions   Asthma, copd with chronic disease?    Preventive - use regular- sinuglair daily, symbicort 2 twice daily    Rescue medication - use albuterol inhaler or xopenex nebulizer up to every 4 hrs or so.    Action plan - prednisone one daily for 3 May be adequate, may need 3 for 3 and taper if severe.  Er if sickly    If yellow mucous - use azithromycin.    Spacer will help inhaled medication.    Breathing test and follow up a yr.  The chief compliant  problem is new to me",    PFSH:  Past Medical History:   Diagnosis Date    Arthritis     Coronary artery disease     Hypertension          Past Surgical History:   Procedure Laterality Date    HYSTERECTOMY       Social History     Tobacco Use    Smoking status: Never Smoker   Substance Use Topics    Alcohol use: No    Drug use: No     Family History   Problem Relation Age of Onset    Pulmonary embolism Mother      Review of patient's allergies indicates:   Allergen Reactions    Metronidazole Other (See Comments)     DISORIENTED       Statins-hmg-coa reductase inhibitors Tinitus     Joint pain       Performance " "Status:The patient's activity level is no limits with regular activity.      Review of Systems:  a review of eleven systems covering constitutional, Eye, HEENT, Psych, Respiratory, Cardiac, GI, , Musculoskeletal, Endocrine, Dermatologic was negative except for pertinent findings as listed ABOVE and below: night sweats,   pertinent positive as above, rest is good      Exam:Comprehensive exam done. BP (!) 142/75 (BP Location: Right arm, Patient Position: Sitting)   Pulse 77   Ht 5' 1" (1.549 m)   Wt 95.5 kg (210 lb 6.9 oz)   LMP 09/01/1986   SpO2 96% Comment: on room air  BMI 39.76 kg/m²   Exam included Vitals as listed, and patient's appearance and affect and alertness and mood, oral exam for yeast and hygiene and pharynx lesions and Mallapatti (M) score, neck with inspection for jvd and masses and thyroid abnormalities and lymph nodes (supraclavicular and infraclavicular nodes and axillary also examined and noted if abn), chest exam included symmetry and effort and fremitus and percussion and auscultation, cardiac exam included rhythm and gallops and murmur and rubs and jvd and edema, abdominal exam for mass and hepatosplenomegaly and tenderness and hernias and bowel sounds, Musculoskeletal exam with muscle tone and posture and mobility/gait and  strength, and skin for rashes and cyanosis and pallor and turgor, extremity for clubbing.  Findings were normal except for pertinent findings listed below:   M2, chest is symmetric, no distress, normal percussion, normal fremitus and good normal breath sounds  No clubbing nor edema    Radiographs (ct chest and cxr) reviewed: view by direct vision  March 15 , 2019 nad, post cabg    Labs reviewed            PFT will be done and results to be reviewed         Results for SCOOBYANGELA RASHAWN IRVIN (MRN [id number]) as of 10/15/2019 09:59   Ref. Range 6/11/2012 04:51   Eosinophils Relative Latest Ref Range: 0.0 - 3.0 % 5.5 (H)   Basophil% Latest Ref Range: 0.0 - 3.0 % 0.5 "       Plan:  Clinical impression is apparently straight forward and impression with management as below.     Saskhi was seen today for apnea and snoring.    Diagnoses and all orders for this visit:    Obstructive sleep apnea syndrome  -     Polysomnogram (CPAP will be added if patient meets diagnostic criteria.); Future    Moderate persistent asthma without complication  -     predniSONE (DELTASONE) 20 MG tablet; Take 1 tablet (20 mg total) by mouth once daily.  -     budesonide-formoterol 160-4.5 mcg (SYMBICORT) 160-4.5 mcg/actuation HFAA; Inhale 2 puffs into the lungs every 12 (twelve) hours. Controller  -     levalbuterol (XOPENEX HFA) 45 mcg/actuation inhaler; Inhale 1-2 puffs into the lungs every 4 (four) hours as needed for Wheezing. This is a rescue inhaler medication and should be used as needed  -     azithromycin (ZITHROMAX) 500 MG tablet; TAKE ONE TABLET BY MOUTH DAILY FOR YELLOW MUCOS; REPEAT IF NECESSARY        Follow up in about 1 year (around 6/29/2021), or if symptoms worsen or fail to improve.    Discussed with patient above for education the following:      Patient Instructions   symbicort is preventive - use regular 2 puffs twice daily,   singulair daily also.      Use xopenex inhaler, albuterol not as effective for you- as rescue therapy, use 2 puff up to every 4-6 as needed. May use nebulizer.    Use prednisone if needed.  20 mg daily for 2-3 should be adequate.  symbicort should decrease prednisone need.    Sleep apnea - up to 5 /hour is within normal range, study may apply cpap if can diagnose sleep apnea 1st part of night.   May need to see to discuss cpap tolerance post study.

## 2020-06-29 NOTE — TELEPHONE ENCOUNTER
Placed covid order ----- Message from Sapna Alex sent at 6/29/2020 11:00 AM CDT -----  Regarding: COVID ORDERS NEEDED  Contact: [phone]  Dr. Bowen and Staff,    It has been decided that each in-lab Sleep Study Order should be accompanied with a COVID Screening Order.   I have an in-lab order for a PSG for this PT.  Please add the screening order to Epic.  Use the [id number]/Covid Screening & DX code: Z01.818 to order the Covid Screen.  Home Sleep Studies do not require the Covid Screen order, only in-lab Sleep Studies, such as PSG; CPAP & SPLIT nights.      Thank you,Joy

## 2020-06-30 ENCOUNTER — TELEPHONE (OUTPATIENT)
Dept: PULMONOLOGY | Facility: CLINIC | Age: 65
End: 2020-06-30

## 2020-06-30 NOTE — TELEPHONE ENCOUNTER
Informed the patient that someone from [place] will be calling her to schedule her sleep study because she has [place] Medicaid.

## 2020-07-13 ENCOUNTER — TELEPHONE (OUTPATIENT)
Dept: PULMONOLOGY | Facility: CLINIC | Age: 65
End: 2020-07-13

## 2020-08-06 ENCOUNTER — TELEPHONE (OUTPATIENT)
Dept: PULMONOLOGY | Facility: CLINIC | Age: 65
End: 2020-08-06

## 2020-08-06 NOTE — TELEPHONE ENCOUNTER
Pt informed she will here from [facility] [place] as we faxed the signed cpap order back to [place]----- Message from Jo Martinez sent at 8/6/2020  3:41 PM CDT -----  Regarding: call for Shabnam  Contact: Sakshi brewer  Type: Needs Medical Advice  Who Called:  Sakshi brewer  Best Call Back Number: [phone]  Additional Information: Pls call pt regarding the cpap machine

## 2020-08-14 ENCOUNTER — TELEPHONE (OUTPATIENT)
Dept: PULMONOLOGY | Facility: CLINIC | Age: 65
End: 2020-08-14

## 2020-08-14 NOTE — TELEPHONE ENCOUNTER
Spoke with pt and set up 30 day compliance appt  ----- Message from Shoshana Blount sent at 8/14/2020 12:13 PM CDT -----  Contact: Patient [phone]  Patient stated that she picked up her CPAP today and will let you know how it works.    Thank you

## 2020-08-17 ENCOUNTER — TELEPHONE (OUTPATIENT)
Dept: PULMONOLOGY | Facility: CLINIC | Age: 65
End: 2020-08-17

## 2020-08-17 NOTE — TELEPHONE ENCOUNTER
Patient will try the nasal pillow with the chinstrap.      ----- Message from Jacque Neely sent at 8/17/2020 12:35 PM CDT -----  Contact: alberto BLANCO calling wanting to speak to nurse Haque regarding the pt....[phone]

## 2020-10-01 ENCOUNTER — OFFICE VISIT (OUTPATIENT)
Dept: PULMONOLOGY | Facility: CLINIC | Age: 65
End: 2020-10-01
Payer: MEDICARE

## 2020-10-01 VITALS
DIASTOLIC BLOOD PRESSURE: 82 MMHG | HEIGHT: 61 IN | RESPIRATION RATE: 18 BRPM | WEIGHT: 219.69 LBS | OXYGEN SATURATION: 95 % | BODY MASS INDEX: 41.48 KG/M2 | SYSTOLIC BLOOD PRESSURE: 173 MMHG | HEART RATE: 71 BPM

## 2020-10-01 DIAGNOSIS — G47.33 OBSTRUCTIVE SLEEP APNEA SYNDROME: Primary | ICD-10-CM

## 2020-10-01 DIAGNOSIS — J45.40 MODERATE PERSISTENT ASTHMA WITHOUT COMPLICATION: ICD-10-CM

## 2020-10-01 PROCEDURE — 99215 OFFICE O/P EST HI 40 MIN: CPT | Mod: PBBFAC,PO | Performed by: INTERNAL MEDICINE

## 2020-10-01 PROCEDURE — 99213 PR OFFICE/OUTPT VISIT, EST, LEVL III, 20-29 MIN: ICD-10-PCS | Mod: S$PBB,,, | Performed by: INTERNAL MEDICINE

## 2020-10-01 PROCEDURE — 99999 PR PBB SHADOW E&M-EST. PATIENT-LVL V: ICD-10-PCS | Mod: PBBFAC,,, | Performed by: INTERNAL MEDICINE

## 2020-10-01 PROCEDURE — 99999 PR PBB SHADOW E&M-EST. PATIENT-LVL V: CPT | Mod: PBBFAC,,, | Performed by: INTERNAL MEDICINE

## 2020-10-01 PROCEDURE — 99213 OFFICE O/P EST LOW 20 MIN: CPT | Mod: S$PBB,,, | Performed by: INTERNAL MEDICINE

## 2020-10-01 RX ORDER — LEVALBUTEROL TARTRATE 45 UG/1
1-2 AEROSOL, METERED ORAL EVERY 4 HOURS PRN
Qty: 1 INHALER | Refills: 11 | Status: ON HOLD | OUTPATIENT
Start: 2020-10-01 | End: 2021-02-15 | Stop reason: HOSPADM

## 2020-10-01 RX ORDER — PREDNISONE 20 MG/1
20 TABLET ORAL DAILY
Qty: 36 TABLET | Refills: 0 | Status: SHIPPED | OUTPATIENT
Start: 2020-10-01 | End: 2020-12-09 | Stop reason: SDUPTHER

## 2020-10-01 NOTE — PROGRESS NOTES
10/1/2020    Sakshi Hess  New Patient Consult    Chief Complaint   Patient presents with    Follow-up     CPAP compliance follow up       HPI:   10/1/2020 pt uses cpap 6 hrs nightly, has problems with mask, went to nasal with chin strap - needed tape to keep mouth closed.   Pt having good response when mask tolerated well.  No prednisone - stable    6/29/2020 - no prednisone last 3 wks, takes once every 3 wks, was not using symbicort less than daily. Brittaney's mom.  Sinuses good.      You had prior sleep apnea, up to 40/hr?  Used cpap for 3 yrs but stopped 10 yrs ago for multiple reasons.  Patient Instructions   symbicort is preventive - use regular 2 puffs twice daily,   singulair daily also.      Use xopenex inhaler, albuterol not as effective for you- as rescue therapy, use 2 puff up to every 4-6 as needed. May use nebulizer.    Use prednisone if needed.  20 mg daily for 2-3 should be adequate.  symbicort should decrease prednisone need.    Sleep apnea - up to 5 /hour is within normal range, study may apply cpap if can diagnose sleep apnea 1st part of night.   May need to see to discuss cpap tolerance post study.  10/15/2019pt had cough/wheezes as child, never outgrew.  Had cough all life- mucous clear. occ green mucous, uses combivent. Has neb with xopenex- albuterol ppt shakes.  Uses steroids once every 5 yrs- had to 3 wks prednisone.  Er visits once yr - may get steroid shot.  [place],  No sinus, has dog and cat.  Breathing seems stable- breathing gets bad 4 month a yr    Uses oxycodone tid for last 1.5 yrs.      Has sleep apnea - cpap broke and not followed up.    Patient Instructions   Asthma, copd with chronic disease?    Preventive - use regular- sinuglair daily, symbicort 2 twice daily    Rescue medication - use albuterol inhaler or xopenex nebulizer up to every 4 hrs or so.    Action plan - prednisone one daily for 3 May be adequate, may need 3 for 3 and taper if severe.  Er if sickly    If  "yellow mucous - use azithromycin.    Spacer will help inhaled medication.    Breathing test and follow up a yr.  The chief compliant  problem is new to me",    PFSH:  Past Medical History:   Diagnosis Date    Arthritis     Coronary artery disease     Hypertension          Past Surgical History:   Procedure Laterality Date    HYSTERECTOMY       Social History     Tobacco Use    Smoking status: Never Smoker   Substance Use Topics    Alcohol use: No    Drug use: No     Family History   Problem Relation Age of Onset    Pulmonary embolism Mother      Review of patient's allergies indicates:   Allergen Reactions    Metronidazole Other (See Comments)     DISORIENTED       Statins-hmg-coa reductase inhibitors Tinitus     Joint pain       Performance Status:The patient's activity level is no limits with regular activity.      Review of Systems:  a review of eleven systems covering constitutional, Eye, HEENT, Psych, Respiratory, Cardiac, GI, , Musculoskeletal, Endocrine, Dermatologic was negative except for pertinent findings as listed ABOVE and below: night sweats,   pertinent positive as above, rest is good      Exam:Comprehensive exam done. BP (!) 173/82   Pulse 71   Resp 18   Ht 5' 1" (1.549 m)   Wt 99.6 kg (219 lb 11 oz)   LMP 09/01/1986   SpO2 95%   BMI 41.51 kg/m²   Exam included Vitals as listed, and patient's appearance and affect and alertness and mood, oral exam for yeast and hygiene and pharynx lesions and Mallapatti (M) score, neck with inspection for jvd and masses and thyroid abnormalities and lymph nodes (supraclavicular and infraclavicular nodes and axillary also examined and noted if abn), chest exam included symmetry and effort and fremitus and percussion and auscultation, cardiac exam included rhythm and gallops and murmur and rubs and jvd and edema, abdominal exam for mass and hepatosplenomegaly and tenderness and hernias and bowel sounds, Musculoskeletal exam with muscle tone and " posture and mobility/gait and  strength, and skin for rashes and cyanosis and pallor and turgor, extremity for clubbing.  Findings were normal except for pertinent findings listed below:   M2, chest is symmetric, no distress, normal percussion, normal fremitus and good normal breath sounds  No clubbing nor edema    Radiographs (ct chest and cxr) reviewed: view by direct vision  March 15 , 2019 nad, post cabg    Labs reviewed            PFT will be done and results to be reviewed         Results for SAKSHI BALLARD (MRN [id number]) as of 10/15/2019 09:59   Ref. Range 6/11/2012 04:51   Eosinophils Relative Latest Ref Range: 0.0 - 3.0 % 5.5 (H)   Basophil% Latest Ref Range: 0.0 - 3.0 % 0.5       Plan:  Clinical impression is apparently straight forward and impression with management as below.     Sakshi was seen today for follow-up.    Diagnoses and all orders for this visit:    Obstructive sleep apnea syndrome  -     CPAP/BIPAP SUPPLIES    Moderate persistent asthma without complication  -     predniSONE (DELTASONE) 20 MG tablet; Take 1 tablet (20 mg total) by mouth once daily.  -     levalbuterol (XOPENEX HFA) 45 mcg/actuation inhaler; Inhale 1-2 puffs into the lungs every 4 (four) hours as needed for Wheezing or Shortness of Breath. This is a rescue inhaler medication and should be used as needed        Follow up in about 6 months (around 4/1/2021), or if symptoms worsen or fail to improve.    Discussed with patient above for education the following:      Patient Instructions   You are tolerating cpap well - you should do better with airfit f30 mask than nasal mask.    Continue symbicort as control lungs has been good.      You should use xopenex in place albuterol as you have palpitations with albuterol.

## 2020-10-01 NOTE — PATIENT INSTRUCTIONS
You are tolerating cpap well - you should do better with airfit f30 mask than nasal mask.    Continue symbicort as control lungs has been good.      You should use xopenex in place albuterol as you have palpitations with albuterol.

## 2020-11-12 ENCOUNTER — TELEPHONE (OUTPATIENT)
Dept: PULMONOLOGY | Facility: CLINIC | Age: 65
End: 2020-11-12

## 2020-11-12 NOTE — TELEPHONE ENCOUNTER
Refaxed       ----- Message from Lisa Kendrick sent at 11/12/2020 10:26 AM CST -----  Contact: [employer]  Type: Needs Medical Advice  Who Called:  Temitope with [employer]  Symptoms (please be specific):    How long has patient had these symptoms:    Pharmacy name and phone #:  3  Best Call Back Number:  option 3 ref#[phone]  Additional Information: Stated request was faxed over request need clinical questions answered fax back to

## 2020-11-13 DIAGNOSIS — J44.9 CHRONIC OBSTRUCTIVE PULMONARY DISEASE, UNSPECIFIED COPD TYPE: Primary | ICD-10-CM

## 2020-11-13 RX ORDER — FLUTICASONE PROPIONATE AND SALMETEROL XINAFOATE 230; 21 UG/1; UG/1
2 AEROSOL, METERED RESPIRATORY (INHALATION) 2 TIMES DAILY
Qty: 12 G | Refills: 11 | Status: SHIPPED | OUTPATIENT
Start: 2020-11-13 | End: 2021-11-22 | Stop reason: SDUPTHER

## 2020-11-13 RX ORDER — METOPROLOL SUCCINATE 25 MG/1
25 TABLET, EXTENDED RELEASE ORAL DAILY
Status: ON HOLD | COMMUNITY
Start: 2020-11-09 | End: 2024-02-29

## 2020-11-13 RX ORDER — HYDROCHLOROTHIAZIDE 12.5 MG/1
12.5 CAPSULE ORAL DAILY
COMMUNITY
Start: 2020-11-09 | End: 2024-02-26

## 2020-11-13 RX ORDER — NITROGLYCERIN 40 MG/1
PATCH TRANSDERMAL
Status: ON HOLD | COMMUNITY
Start: 2020-11-09 | End: 2021-02-15 | Stop reason: HOSPADM

## 2020-12-09 DIAGNOSIS — J45.40 MODERATE PERSISTENT ASTHMA WITHOUT COMPLICATION: ICD-10-CM

## 2020-12-09 RX ORDER — PREDNISONE 20 MG/1
20 TABLET ORAL DAILY
Qty: 36 TABLET | Refills: 2 | Status: ON HOLD | OUTPATIENT
Start: 2020-12-09 | End: 2021-02-15 | Stop reason: HOSPADM

## 2021-02-11 DIAGNOSIS — Z95.1 PRESENCE OF AORTOCORONARY BYPASS GRAFT: Primary | ICD-10-CM

## 2021-02-14 ENCOUNTER — HOSPITAL ENCOUNTER (OUTPATIENT)
Facility: HOSPITAL | Age: 66
Discharge: HOME | End: 2021-02-15
Attending: EMERGENCY MEDICINE | Admitting: INTERNAL MEDICINE
Payer: MEDICARE

## 2021-02-14 DIAGNOSIS — I25.10 CORONARY ARTERY DISEASE, ANGINA PRESENCE UNSPECIFIED, UNSPECIFIED VESSEL OR LESION TYPE, UNSPECIFIED WHETHER NATIVE OR TRANSPLANTED HEART: ICD-10-CM

## 2021-02-14 DIAGNOSIS — R07.9 CHEST PAIN: Primary | ICD-10-CM

## 2021-02-14 PROBLEM — E66.812 CLASS 2 OBESITY IN ADULT: Chronic | Status: ACTIVE | Noted: 2021-02-14

## 2021-02-14 PROBLEM — R55 SYNCOPE: Status: ACTIVE | Noted: 2021-02-14

## 2021-02-14 PROBLEM — K21.9 GERD (GASTROESOPHAGEAL REFLUX DISEASE): Chronic | Status: ACTIVE | Noted: 2021-02-14

## 2021-02-14 PROBLEM — N18.30 CKD (CHRONIC KIDNEY DISEASE), STAGE III: Chronic | Status: ACTIVE | Noted: 2021-02-14

## 2021-02-14 PROBLEM — E66.9 CLASS 2 OBESITY IN ADULT: Chronic | Status: ACTIVE | Noted: 2021-02-14

## 2021-02-14 LAB
ALBUMIN SERPL BCP-MCNC: 4.5 G/DL (ref 3.5–5.2)
ALP SERPL-CCNC: 67 U/L (ref 55–135)
ALT SERPL W/O P-5'-P-CCNC: 15 U/L (ref 10–44)
ANION GAP SERPL CALC-SCNC: 13 MMOL/L (ref 8–16)
AST SERPL-CCNC: 18 U/L (ref 10–40)
BASOPHILS # BLD AUTO: 0.07 K/UL (ref 0–0.2)
BASOPHILS NFR BLD: 0.9 % (ref 0–1.9)
BILIRUB SERPL-MCNC: 0.6 MG/DL (ref 0.1–1)
BNP SERPL-MCNC: 49 PG/ML (ref 0–99)
BUN SERPL-MCNC: 23 MG/DL (ref 8–23)
CALCIUM SERPL-MCNC: 9.8 MG/DL (ref 8.7–10.5)
CHLORIDE SERPL-SCNC: 99 MMOL/L (ref 95–110)
CO2 SERPL-SCNC: 28 MMOL/L (ref 23–29)
CREAT SERPL-MCNC: 1.1 MG/DL (ref 0.5–1.4)
DIFFERENTIAL METHOD: ABNORMAL
EOSINOPHIL # BLD AUTO: 0.6 K/UL (ref 0–0.5)
EOSINOPHIL NFR BLD: 6.9 % (ref 0–8)
ERYTHROCYTE [DISTWIDTH] IN BLOOD BY AUTOMATED COUNT: 13.8 % (ref 11.5–14.5)
EST. GFR  (AFRICAN AMERICAN): >60 ML/MIN/1.73 M^2
EST. GFR  (NON AFRICAN AMERICAN): 52.8 ML/MIN/1.73 M^2
GLUCOSE SERPL-MCNC: 121 MG/DL (ref 70–110)
HCT VFR BLD AUTO: 42.7 % (ref 37–48.5)
HGB BLD-MCNC: 14 G/DL (ref 12–16)
IMM GRANULOCYTES # BLD AUTO: 0.02 K/UL (ref 0–0.04)
IMM GRANULOCYTES NFR BLD AUTO: 0.2 % (ref 0–0.5)
INR PPP: 1
LYMPHOCYTES # BLD AUTO: 2.8 K/UL (ref 1–4.8)
LYMPHOCYTES NFR BLD: 34.5 % (ref 18–48)
MAGNESIUM SERPL-MCNC: 1.8 MG/DL (ref 1.6–2.6)
MCH RBC QN AUTO: 29.5 PG (ref 27–31)
MCHC RBC AUTO-ENTMCNC: 32.8 G/DL (ref 32–36)
MCV RBC AUTO: 90 FL (ref 82–98)
MONOCYTES # BLD AUTO: 0.9 K/UL (ref 0.3–1)
MONOCYTES NFR BLD: 11.1 % (ref 4–15)
NEUTROPHILS # BLD AUTO: 3.7 K/UL (ref 1.8–7.7)
NEUTROPHILS NFR BLD: 46.4 % (ref 38–73)
NRBC BLD-RTO: 0 /100 WBC
PLATELET # BLD AUTO: 249 K/UL (ref 150–350)
PMV BLD AUTO: 9.5 FL (ref 9.2–12.9)
POTASSIUM SERPL-SCNC: 4 MMOL/L (ref 3.5–5.1)
PROT SERPL-MCNC: 7.1 G/DL (ref 6–8.4)
PROTHROMBIN TIME: 12.8 SEC (ref 10.6–14.8)
RBC # BLD AUTO: 4.74 M/UL (ref 4–5.4)
SARS-COV-2 RDRP RESP QL NAA+PROBE: NEGATIVE
SODIUM SERPL-SCNC: 140 MMOL/L (ref 136–145)
TROPONIN I SERPL DL<=0.01 NG/ML-MCNC: <0.03 NG/ML
WBC # BLD AUTO: 8.08 K/UL (ref 3.9–12.7)

## 2021-02-14 PROCEDURE — G0378 HOSPITAL OBSERVATION PER HR: HCPCS

## 2021-02-14 PROCEDURE — 25000003 PHARM REV CODE 250: Performed by: INTERNAL MEDICINE

## 2021-02-14 PROCEDURE — U0002 COVID-19 LAB TEST NON-CDC: HCPCS

## 2021-02-14 PROCEDURE — 93010 ELECTROCARDIOGRAM REPORT: CPT | Mod: ,,, | Performed by: INTERNAL MEDICINE

## 2021-02-14 PROCEDURE — 63600175 PHARM REV CODE 636 W HCPCS: Performed by: INTERNAL MEDICINE

## 2021-02-14 PROCEDURE — 93005 ELECTROCARDIOGRAM TRACING: CPT | Performed by: INTERNAL MEDICINE

## 2021-02-14 PROCEDURE — 83735 ASSAY OF MAGNESIUM: CPT

## 2021-02-14 PROCEDURE — 96375 TX/PRO/DX INJ NEW DRUG ADDON: CPT

## 2021-02-14 PROCEDURE — 25000242 PHARM REV CODE 250 ALT 637 W/ HCPCS: Performed by: EMERGENCY MEDICINE

## 2021-02-14 PROCEDURE — 93010 EKG 12-LEAD: ICD-10-PCS | Mod: ,,, | Performed by: INTERNAL MEDICINE

## 2021-02-14 PROCEDURE — 84484 ASSAY OF TROPONIN QUANT: CPT

## 2021-02-14 PROCEDURE — 25000003 PHARM REV CODE 250: Performed by: EMERGENCY MEDICINE

## 2021-02-14 PROCEDURE — 80053 COMPREHEN METABOLIC PANEL: CPT

## 2021-02-14 PROCEDURE — 96374 THER/PROPH/DIAG INJ IV PUSH: CPT

## 2021-02-14 PROCEDURE — 83880 ASSAY OF NATRIURETIC PEPTIDE: CPT

## 2021-02-14 PROCEDURE — 84484 ASSAY OF TROPONIN QUANT: CPT | Mod: 91

## 2021-02-14 PROCEDURE — 85610 PROTHROMBIN TIME: CPT

## 2021-02-14 PROCEDURE — 36415 COLL VENOUS BLD VENIPUNCTURE: CPT

## 2021-02-14 PROCEDURE — 85025 COMPLETE CBC W/AUTO DIFF WBC: CPT

## 2021-02-14 PROCEDURE — 99285 EMERGENCY DEPT VISIT HI MDM: CPT | Mod: 25

## 2021-02-14 RX ORDER — ONDANSETRON 2 MG/ML
4 INJECTION INTRAMUSCULAR; INTRAVENOUS EVERY 8 HOURS PRN
Status: DISCONTINUED | OUTPATIENT
Start: 2021-02-14 | End: 2021-02-15 | Stop reason: HOSPADM

## 2021-02-14 RX ORDER — ALBUTEROL SULFATE 90 UG/1
2 AEROSOL, METERED RESPIRATORY (INHALATION) EVERY 6 HOURS PRN
Status: DISCONTINUED | OUTPATIENT
Start: 2021-02-14 | End: 2021-02-15 | Stop reason: HOSPADM

## 2021-02-14 RX ORDER — HYDROCHLOROTHIAZIDE 12.5 MG/1
12.5 TABLET ORAL DAILY
Status: DISCONTINUED | OUTPATIENT
Start: 2021-02-15 | End: 2021-02-15 | Stop reason: HOSPADM

## 2021-02-14 RX ORDER — OXYCODONE AND ACETAMINOPHEN 10; 325 MG/1; MG/1
1 TABLET ORAL EVERY 6 HOURS PRN
Status: DISCONTINUED | OUTPATIENT
Start: 2021-02-14 | End: 2021-02-15 | Stop reason: HOSPADM

## 2021-02-14 RX ORDER — TALC
6 POWDER (GRAM) TOPICAL NIGHTLY PRN
Status: DISCONTINUED | OUTPATIENT
Start: 2021-02-14 | End: 2021-02-15 | Stop reason: HOSPADM

## 2021-02-14 RX ORDER — ALLOPURINOL 300 MG/1
300 TABLET ORAL DAILY
Status: DISCONTINUED | OUTPATIENT
Start: 2021-02-15 | End: 2021-02-15 | Stop reason: HOSPADM

## 2021-02-14 RX ORDER — METHYLPREDNISOLONE SOD SUCC 125 MG
60 VIAL (EA) INJECTION ONCE
Status: COMPLETED | OUTPATIENT
Start: 2021-02-14 | End: 2021-02-14

## 2021-02-14 RX ORDER — ISOSORBIDE MONONITRATE 30 MG/1
30 TABLET, EXTENDED RELEASE ORAL DAILY
Status: DISCONTINUED | OUTPATIENT
Start: 2021-02-15 | End: 2021-02-15 | Stop reason: HOSPADM

## 2021-02-14 RX ORDER — PANTOPRAZOLE SODIUM 40 MG/1
40 TABLET, DELAYED RELEASE ORAL DAILY
Status: DISCONTINUED | OUTPATIENT
Start: 2021-02-15 | End: 2021-02-15 | Stop reason: HOSPADM

## 2021-02-14 RX ORDER — ASPIRIN 325 MG
325 TABLET ORAL
Status: COMPLETED | OUTPATIENT
Start: 2021-02-14 | End: 2021-02-14

## 2021-02-14 RX ORDER — FLUTICASONE FUROATE AND VILANTEROL 100; 25 UG/1; UG/1
1 POWDER RESPIRATORY (INHALATION) DAILY
Status: DISCONTINUED | OUTPATIENT
Start: 2021-02-15 | End: 2021-02-15 | Stop reason: HOSPADM

## 2021-02-14 RX ORDER — AMOXICILLIN 250 MG
1 CAPSULE ORAL 2 TIMES DAILY PRN
Status: DISCONTINUED | OUTPATIENT
Start: 2021-02-14 | End: 2021-02-15 | Stop reason: HOSPADM

## 2021-02-14 RX ORDER — SODIUM CHLORIDE 0.9 % (FLUSH) 0.9 %
10 SYRINGE (ML) INJECTION EVERY 6 HOURS PRN
Status: DISCONTINUED | OUTPATIENT
Start: 2021-02-14 | End: 2021-02-15 | Stop reason: HOSPADM

## 2021-02-14 RX ORDER — MONTELUKAST SODIUM 10 MG/1
10 TABLET ORAL NIGHTLY
Status: DISCONTINUED | OUTPATIENT
Start: 2021-02-14 | End: 2021-02-15 | Stop reason: HOSPADM

## 2021-02-14 RX ORDER — ASPIRIN 81 MG/1
81 TABLET ORAL DAILY
Status: DISCONTINUED | OUTPATIENT
Start: 2021-02-15 | End: 2021-02-15 | Stop reason: HOSPADM

## 2021-02-14 RX ORDER — TRAZODONE HYDROCHLORIDE 50 MG/1
50 TABLET ORAL NIGHTLY
Status: DISCONTINUED | OUTPATIENT
Start: 2021-02-14 | End: 2021-02-15 | Stop reason: HOSPADM

## 2021-02-14 RX ORDER — NITROGLYCERIN 0.4 MG/1
0.4 TABLET SUBLINGUAL
Status: COMPLETED | OUTPATIENT
Start: 2021-02-14 | End: 2021-02-14

## 2021-02-14 RX ORDER — NITROGLYCERIN 0.4 MG/1
0.4 TABLET SUBLINGUAL EVERY 5 MIN PRN
Status: DISCONTINUED | OUTPATIENT
Start: 2021-02-14 | End: 2021-02-15 | Stop reason: HOSPADM

## 2021-02-14 RX ORDER — METOPROLOL SUCCINATE 25 MG/1
25 TABLET, EXTENDED RELEASE ORAL DAILY
Status: DISCONTINUED | OUTPATIENT
Start: 2021-02-15 | End: 2021-02-15 | Stop reason: HOSPADM

## 2021-02-14 RX ORDER — ACETAMINOPHEN 325 MG/1
650 TABLET ORAL EVERY 4 HOURS PRN
Status: DISCONTINUED | OUTPATIENT
Start: 2021-02-14 | End: 2021-02-15 | Stop reason: HOSPADM

## 2021-02-14 RX ADMIN — NITROGLYCERIN 0.4 MG: 0.4 TABLET SUBLINGUAL at 05:02

## 2021-02-14 RX ADMIN — ASPIRIN 325 MG ORAL TABLET 325 MG: 325 PILL ORAL at 05:02

## 2021-02-14 RX ADMIN — METHYLPREDNISOLONE SODIUM SUCCINATE 60 MG: 125 INJECTION, POWDER, FOR SOLUTION INTRAMUSCULAR; INTRAVENOUS at 09:02

## 2021-02-14 RX ADMIN — ONDANSETRON 4 MG: 2 INJECTION INTRAMUSCULAR; INTRAVENOUS at 09:02

## 2021-02-14 RX ADMIN — OXYCODONE HYDROCHLORIDE AND ACETAMINOPHEN 1 TABLET: 10; 325 TABLET ORAL at 09:02

## 2021-02-14 RX ADMIN — MONTELUKAST 10 MG: 10 TABLET, FILM COATED ORAL at 09:02

## 2021-02-14 RX ADMIN — TRAZODONE HYDROCHLORIDE 50 MG: 50 TABLET ORAL at 09:02

## 2021-02-15 VITALS
HEIGHT: 61 IN | HEART RATE: 64 BPM | BODY MASS INDEX: 39.59 KG/M2 | SYSTOLIC BLOOD PRESSURE: 149 MMHG | WEIGHT: 209.69 LBS | TEMPERATURE: 98 F | OXYGEN SATURATION: 98 % | DIASTOLIC BLOOD PRESSURE: 84 MMHG | RESPIRATION RATE: 17 BRPM

## 2021-02-15 LAB
ANION GAP SERPL CALC-SCNC: 9 MMOL/L (ref 8–16)
BUN SERPL-MCNC: 20 MG/DL (ref 8–23)
CALCIUM SERPL-MCNC: 9.3 MG/DL (ref 8.7–10.5)
CATH EF ESTIMATED: 60 %
CHLORIDE SERPL-SCNC: 101 MMOL/L (ref 95–110)
CO2 SERPL-SCNC: 28 MMOL/L (ref 23–29)
CREAT SERPL-MCNC: 0.9 MG/DL (ref 0.5–1.4)
ERYTHROCYTE [DISTWIDTH] IN BLOOD BY AUTOMATED COUNT: 13.6 % (ref 11.5–14.5)
EST. GFR  (AFRICAN AMERICAN): >60 ML/MIN/1.73 M^2
EST. GFR  (NON AFRICAN AMERICAN): >60 ML/MIN/1.73 M^2
GLUCOSE SERPL-MCNC: 198 MG/DL (ref 70–110)
HCT VFR BLD AUTO: 43.3 % (ref 37–48.5)
HGB BLD-MCNC: 14.3 G/DL (ref 12–16)
MAGNESIUM SERPL-MCNC: 1.8 MG/DL (ref 1.6–2.6)
MCH RBC QN AUTO: 29.3 PG (ref 27–31)
MCHC RBC AUTO-ENTMCNC: 33 G/DL (ref 32–36)
MCV RBC AUTO: 89 FL (ref 82–98)
PHOSPHATE SERPL-MCNC: 3 MG/DL (ref 2.7–4.5)
PLATELET # BLD AUTO: 237 K/UL (ref 150–350)
PMV BLD AUTO: 9.3 FL (ref 9.2–12.9)
POTASSIUM SERPL-SCNC: 4.2 MMOL/L (ref 3.5–5.1)
RBC # BLD AUTO: 4.88 M/UL (ref 4–5.4)
SODIUM SERPL-SCNC: 138 MMOL/L (ref 136–145)
TROPONIN I SERPL DL<=0.01 NG/ML-MCNC: <0.03 NG/ML
WBC # BLD AUTO: 6.08 K/UL (ref 3.9–12.7)

## 2021-02-15 PROCEDURE — 25500020 PHARM REV CODE 255: Performed by: INTERNAL MEDICINE

## 2021-02-15 PROCEDURE — C1894 INTRO/SHEATH, NON-LASER: HCPCS | Performed by: INTERNAL MEDICINE

## 2021-02-15 PROCEDURE — 25000003 PHARM REV CODE 250: Performed by: INTERNAL MEDICINE

## 2021-02-15 PROCEDURE — 94640 AIRWAY INHALATION TREATMENT: CPT

## 2021-02-15 PROCEDURE — 36415 COLL VENOUS BLD VENIPUNCTURE: CPT

## 2021-02-15 PROCEDURE — 99900035 HC TECH TIME PER 15 MIN (STAT)

## 2021-02-15 PROCEDURE — 93459 L HRT ART/GRFT ANGIO: CPT

## 2021-02-15 PROCEDURE — 85027 COMPLETE CBC AUTOMATED: CPT

## 2021-02-15 PROCEDURE — 84484 ASSAY OF TROPONIN QUANT: CPT | Mod: 91

## 2021-02-15 PROCEDURE — 80048 BASIC METABOLIC PNL TOTAL CA: CPT

## 2021-02-15 PROCEDURE — C1769 GUIDE WIRE: HCPCS | Performed by: INTERNAL MEDICINE

## 2021-02-15 PROCEDURE — 63600175 PHARM REV CODE 636 W HCPCS: Performed by: INTERNAL MEDICINE

## 2021-02-15 PROCEDURE — 83735 ASSAY OF MAGNESIUM: CPT

## 2021-02-15 PROCEDURE — 99152 MOD SED SAME PHYS/QHP 5/>YRS: CPT | Performed by: INTERNAL MEDICINE

## 2021-02-15 PROCEDURE — 25000242 PHARM REV CODE 250 ALT 637 W/ HCPCS: Performed by: INTERNAL MEDICINE

## 2021-02-15 PROCEDURE — C1725 CATH, TRANSLUMIN NON-LASER: HCPCS | Performed by: INTERNAL MEDICINE

## 2021-02-15 PROCEDURE — C1887 CATHETER, GUIDING: HCPCS | Performed by: INTERNAL MEDICINE

## 2021-02-15 PROCEDURE — 27201423 OPTIME MED/SURG SUP & DEVICES STERILE SUPPLY: Performed by: INTERNAL MEDICINE

## 2021-02-15 PROCEDURE — 94761 N-INVAS EAR/PLS OXIMETRY MLT: CPT | Mod: 59

## 2021-02-15 PROCEDURE — 99153 MOD SED SAME PHYS/QHP EA: CPT | Performed by: INTERNAL MEDICINE

## 2021-02-15 PROCEDURE — C1760 CLOSURE DEV, VASC: HCPCS | Performed by: INTERNAL MEDICINE

## 2021-02-15 PROCEDURE — 25000003 PHARM REV CODE 250: Performed by: PHYSICIAN ASSISTANT

## 2021-02-15 PROCEDURE — 84484 ASSAY OF TROPONIN QUANT: CPT

## 2021-02-15 PROCEDURE — 84100 ASSAY OF PHOSPHORUS: CPT

## 2021-02-15 PROCEDURE — G0378 HOSPITAL OBSERVATION PER HR: HCPCS

## 2021-02-15 DEVICE — IMPLANTABLE DEVICE: Type: IMPLANTABLE DEVICE | Site: GROIN | Status: FUNCTIONAL

## 2021-02-15 RX ORDER — SODIUM CHLORIDE 9 MG/ML
75 INJECTION, SOLUTION INTRAVENOUS CONTINUOUS
Status: ACTIVE | OUTPATIENT
Start: 2021-02-15 | End: 2021-02-15

## 2021-02-15 RX ORDER — DIPHENHYDRAMINE HCL 25 MG
50 CAPSULE ORAL
Status: DISCONTINUED | OUTPATIENT
Start: 2021-02-15 | End: 2021-02-15 | Stop reason: HOSPADM

## 2021-02-15 RX ORDER — MIDAZOLAM HYDROCHLORIDE 1 MG/ML
INJECTION INTRAMUSCULAR; INTRAVENOUS
Status: DISCONTINUED | OUTPATIENT
Start: 2021-02-15 | End: 2021-02-15 | Stop reason: HOSPADM

## 2021-02-15 RX ORDER — SODIUM CHLORIDE 9 MG/ML
INJECTION, SOLUTION INTRAVENOUS ONCE
Status: DISCONTINUED | OUTPATIENT
Start: 2021-02-15 | End: 2021-02-15 | Stop reason: HOSPADM

## 2021-02-15 RX ORDER — IODIXANOL 320 MG/ML
INJECTION, SOLUTION INTRAVASCULAR
Status: DISCONTINUED | OUTPATIENT
Start: 2021-02-15 | End: 2021-02-15 | Stop reason: HOSPADM

## 2021-02-15 RX ORDER — LIDOCAINE HYDROCHLORIDE 10 MG/ML
INJECTION, SOLUTION EPIDURAL; INFILTRATION; INTRACAUDAL; PERINEURAL
Status: DISCONTINUED | OUTPATIENT
Start: 2021-02-15 | End: 2021-02-15 | Stop reason: HOSPADM

## 2021-02-15 RX ORDER — FENTANYL CITRATE 50 UG/ML
INJECTION, SOLUTION INTRAMUSCULAR; INTRAVENOUS
Status: DISCONTINUED | OUTPATIENT
Start: 2021-02-15 | End: 2021-02-15 | Stop reason: HOSPADM

## 2021-02-15 RX ADMIN — HYDROCHLOROTHIAZIDE 12.5 MG: 12.5 TABLET ORAL at 10:02

## 2021-02-15 RX ADMIN — ISOSORBIDE MONONITRATE 30 MG: 30 TABLET, EXTENDED RELEASE ORAL at 09:02

## 2021-02-15 RX ADMIN — FLUTICASONE FUROATE AND VILANTEROL TRIFENATATE 1 PUFF: 100; 25 POWDER RESPIRATORY (INHALATION) at 07:02

## 2021-02-15 RX ADMIN — METOPROLOL SUCCINATE 25 MG: 25 TABLET, EXTENDED RELEASE ORAL at 09:02

## 2021-02-15 RX ADMIN — ASPIRIN 81 MG: 81 TABLET, DELAYED RELEASE ORAL at 09:02

## 2021-02-15 RX ADMIN — ONDANSETRON 4 MG: 2 INJECTION INTRAMUSCULAR; INTRAVENOUS at 05:02

## 2021-02-15 RX ADMIN — PANTOPRAZOLE SODIUM 40 MG: 40 TABLET, DELAYED RELEASE ORAL at 05:02

## 2021-02-15 RX ADMIN — OXYCODONE HYDROCHLORIDE AND ACETAMINOPHEN 1 TABLET: 10; 325 TABLET ORAL at 04:02

## 2021-02-15 RX ADMIN — OXYCODONE HYDROCHLORIDE AND ACETAMINOPHEN 1 TABLET: 10; 325 TABLET ORAL at 10:02

## 2021-02-15 RX ADMIN — OXYCODONE HYDROCHLORIDE AND ACETAMINOPHEN 1 TABLET: 10; 325 TABLET ORAL at 03:02

## 2021-02-15 RX ADMIN — SODIUM CHLORIDE: 0.9 INJECTION, SOLUTION INTRAVENOUS at 11:02

## 2021-04-15 ENCOUNTER — OFFICE VISIT (OUTPATIENT)
Dept: PULMONOLOGY | Facility: CLINIC | Age: 66
End: 2021-04-15
Payer: MEDICARE

## 2021-04-15 VITALS
OXYGEN SATURATION: 95 % | HEART RATE: 82 BPM | HEIGHT: 61 IN | WEIGHT: 220.44 LBS | SYSTOLIC BLOOD PRESSURE: 170 MMHG | DIASTOLIC BLOOD PRESSURE: 85 MMHG | BODY MASS INDEX: 41.62 KG/M2

## 2021-04-15 DIAGNOSIS — J45.40 MODERATE PERSISTENT ASTHMA WITHOUT COMPLICATION: ICD-10-CM

## 2021-04-15 DIAGNOSIS — G47.33 OBSTRUCTIVE SLEEP APNEA SYNDROME: Primary | ICD-10-CM

## 2021-04-15 PROCEDURE — 99213 OFFICE O/P EST LOW 20 MIN: CPT | Mod: PBBFAC,PO | Performed by: INTERNAL MEDICINE

## 2021-04-15 PROCEDURE — 99213 OFFICE O/P EST LOW 20 MIN: CPT | Mod: S$GLB,,, | Performed by: INTERNAL MEDICINE

## 2021-04-15 PROCEDURE — 99999 PR PBB SHADOW E&M-EST. PATIENT-LVL III: ICD-10-PCS | Mod: PBBFAC,,, | Performed by: INTERNAL MEDICINE

## 2021-04-15 PROCEDURE — 99213 PR OFFICE/OUTPT VISIT, EST, LEVL III, 20-29 MIN: ICD-10-PCS | Mod: S$GLB,,, | Performed by: INTERNAL MEDICINE

## 2021-04-15 PROCEDURE — 99999 PR PBB SHADOW E&M-EST. PATIENT-LVL III: CPT | Mod: PBBFAC,,, | Performed by: INTERNAL MEDICINE

## 2021-04-15 RX ORDER — LEVALBUTEROL TARTRATE 45 UG/1
1-2 AEROSOL, METERED ORAL EVERY 4 HOURS PRN
Qty: 15 G | Refills: 11 | Status: SHIPPED | OUTPATIENT
Start: 2021-04-15 | End: 2022-11-29 | Stop reason: SDUPTHER

## 2021-04-15 RX ORDER — HYDROGEN PEROXIDE 3 %
20 SOLUTION, NON-ORAL MISCELLANEOUS
COMMUNITY
End: 2022-04-07

## 2021-04-15 RX ORDER — PREDNISONE 20 MG/1
TABLET ORAL
Qty: 36 TABLET | Refills: 1 | Status: SHIPPED | OUTPATIENT
Start: 2021-04-15 | End: 2021-08-16 | Stop reason: SDUPTHER

## 2021-04-15 RX ORDER — LEVALBUTEROL TARTRATE 45 UG/1
1-2 AEROSOL, METERED ORAL
COMMUNITY
End: 2021-04-15 | Stop reason: SDUPTHER

## 2021-04-15 RX ORDER — BUSPIRONE HYDROCHLORIDE 7.5 MG/1
7.5 TABLET ORAL 2 TIMES DAILY
COMMUNITY
Start: 2021-04-09 | End: 2024-02-26

## 2021-04-15 RX ORDER — VALSARTAN 160 MG/1
160 TABLET ORAL DAILY
COMMUNITY
Start: 2021-04-05 | End: 2021-12-31

## 2021-04-15 RX ORDER — EVOLOCUMAB 140 MG/ML
140 INJECTION, SOLUTION SUBCUTANEOUS
Status: ON HOLD | COMMUNITY
Start: 2021-04-05 | End: 2024-02-29

## 2021-04-15 RX ORDER — CLOPIDOGREL BISULFATE 75 MG/1
75 TABLET ORAL DAILY
COMMUNITY
Start: 2021-04-05 | End: 2022-04-07

## 2021-05-28 ENCOUNTER — TELEPHONE (OUTPATIENT)
Dept: PULMONOLOGY | Facility: CLINIC | Age: 66
End: 2021-05-28

## 2021-06-14 DIAGNOSIS — J45.40 MODERATE PERSISTENT ASTHMA WITHOUT COMPLICATION: ICD-10-CM

## 2021-06-14 RX ORDER — AZITHROMYCIN 500 MG/1
TABLET, FILM COATED ORAL
Qty: 3 TABLET | Refills: 2 | Status: ON HOLD | OUTPATIENT
Start: 2021-06-14 | End: 2022-01-02 | Stop reason: HOSPADM

## 2021-07-01 RX ORDER — BUDESONIDE AND FORMOTEROL FUMARATE DIHYDRATE 160; 4.5 UG/1; UG/1
2 AEROSOL RESPIRATORY (INHALATION) EVERY 12 HOURS
COMMUNITY
Start: 2021-06-02 | End: 2022-01-10

## 2021-08-16 DIAGNOSIS — J45.40 MODERATE PERSISTENT ASTHMA WITHOUT COMPLICATION: ICD-10-CM

## 2021-08-16 RX ORDER — PREDNISONE 20 MG/1
TABLET ORAL
Qty: 36 TABLET | Refills: 1 | Status: SHIPPED | OUTPATIENT
Start: 2021-08-16 | End: 2022-04-07 | Stop reason: SDUPTHER

## 2021-09-17 DIAGNOSIS — J20.5: ICD-10-CM

## 2021-09-17 DIAGNOSIS — R06.2 WHEEZING: Primary | ICD-10-CM

## 2021-10-29 ENCOUNTER — HOSPITAL ENCOUNTER (OUTPATIENT)
Dept: RADIOLOGY | Facility: HOSPITAL | Age: 66
Discharge: HOME | End: 2021-10-29
Attending: INTERNAL MEDICINE
Payer: MEDICARE

## 2021-10-29 DIAGNOSIS — J20.5: Primary | ICD-10-CM

## 2021-10-29 DIAGNOSIS — J20.5: ICD-10-CM

## 2021-10-29 PROCEDURE — 71046 X-RAY EXAM CHEST 2 VIEWS: CPT | Mod: TC,PO

## 2021-11-22 DIAGNOSIS — J44.9 CHRONIC OBSTRUCTIVE PULMONARY DISEASE, UNSPECIFIED COPD TYPE: ICD-10-CM

## 2021-11-22 RX ORDER — FLUTICASONE PROPIONATE AND SALMETEROL XINAFOATE 230; 21 UG/1; UG/1
2 AEROSOL, METERED RESPIRATORY (INHALATION) 2 TIMES DAILY
Qty: 12 G | Refills: 11 | Status: SHIPPED | OUTPATIENT
Start: 2021-11-22 | End: 2022-01-10

## 2021-12-31 ENCOUNTER — HOSPITAL ENCOUNTER (INPATIENT)
Facility: HOSPITAL | Age: 66
LOS: 2 days | Discharge: HOME | DRG: 871 | End: 2022-01-02
Attending: EMERGENCY MEDICINE | Admitting: FAMILY MEDICINE
Payer: MEDICARE

## 2021-12-31 DIAGNOSIS — R07.9 CHEST PAIN: ICD-10-CM

## 2021-12-31 DIAGNOSIS — R06.02 SHORTNESS OF BREATH: ICD-10-CM

## 2021-12-31 DIAGNOSIS — R50.9 FEVER: ICD-10-CM

## 2021-12-31 DIAGNOSIS — N39.0 URINARY TRACT INFECTION WITHOUT HEMATURIA, SITE UNSPECIFIED: Primary | ICD-10-CM

## 2021-12-31 DIAGNOSIS — R09.02 HYPOXIA: ICD-10-CM

## 2021-12-31 DIAGNOSIS — J18.9 PNEUMONIA DUE TO INFECTIOUS ORGANISM, UNSPECIFIED LATERALITY, UNSPECIFIED PART OF LUNG: ICD-10-CM

## 2021-12-31 PROBLEM — E66.812 CLASS 2 OBESITY IN ADULT: Chronic | Status: RESOLVED | Noted: 2021-02-14 | Resolved: 2021-12-31

## 2021-12-31 PROBLEM — E66.9 CLASS 2 OBESITY IN ADULT: Chronic | Status: RESOLVED | Noted: 2021-02-14 | Resolved: 2021-12-31

## 2021-12-31 PROBLEM — D72.829 LEUKOCYTOSIS: Status: ACTIVE | Noted: 2021-12-31

## 2021-12-31 PROBLEM — R55 SYNCOPE: Status: RESOLVED | Noted: 2021-02-14 | Resolved: 2021-12-31

## 2021-12-31 PROBLEM — J96.01 ACUTE HYPOXEMIC RESPIRATORY FAILURE: Status: ACTIVE | Noted: 2021-12-31

## 2021-12-31 PROBLEM — A41.9 SEPSIS: Status: ACTIVE | Noted: 2021-12-31

## 2021-12-31 LAB
ALBUMIN SERPL BCP-MCNC: 3.8 G/DL (ref 3.5–5.2)
ALP SERPL-CCNC: 73 U/L (ref 55–135)
ALT SERPL W/O P-5'-P-CCNC: 9 U/L (ref 10–44)
ANION GAP SERPL CALC-SCNC: 13 MMOL/L (ref 8–16)
AST SERPL-CCNC: 18 U/L (ref 10–40)
BACTERIA #/AREA URNS HPF: ABNORMAL /HPF
BASOPHILS # BLD AUTO: 0.03 K/UL (ref 0–0.2)
BASOPHILS NFR BLD: 0.2 % (ref 0–1.9)
BILIRUB SERPL-MCNC: 1.4 MG/DL (ref 0.1–1)
BILIRUB UR QL STRIP: NEGATIVE
BNP SERPL-MCNC: 176 PG/ML (ref 0–99)
BUN SERPL-MCNC: 15 MG/DL (ref 8–23)
CALCIUM SERPL-MCNC: 9 MG/DL (ref 8.7–10.5)
CHLORIDE SERPL-SCNC: 102 MMOL/L (ref 95–110)
CLARITY UR: ABNORMAL
CO2 SERPL-SCNC: 23 MMOL/L (ref 23–29)
COLOR UR: YELLOW
CREAT SERPL-MCNC: 0.9 MG/DL (ref 0.5–1.4)
CRP SERPL-MCNC: 18.12 MG/DL
DIFFERENTIAL METHOD: ABNORMAL
EOSINOPHIL # BLD AUTO: 0 K/UL (ref 0–0.5)
EOSINOPHIL NFR BLD: 0.2 % (ref 0–8)
ERYTHROCYTE [DISTWIDTH] IN BLOOD BY AUTOMATED COUNT: 13 % (ref 11.5–14.5)
ERYTHROCYTE [SEDIMENTATION RATE] IN BLOOD BY WESTERGREN METHOD: 35 MM/HR (ref 0–20)
EST. GFR  (AFRICAN AMERICAN): >60 ML/MIN/1.73 M^2
EST. GFR  (NON AFRICAN AMERICAN): >60 ML/MIN/1.73 M^2
FERRITIN SERPL-MCNC: 84 NG/ML (ref 20–300)
GLUCOSE SERPL-MCNC: 144 MG/DL (ref 70–110)
GLUCOSE SERPL-MCNC: 149 MG/DL (ref 70–110)
GLUCOSE SERPL-MCNC: 149 MG/DL (ref 70–110)
GLUCOSE UR QL STRIP: NEGATIVE
HCT VFR BLD AUTO: 37.5 % (ref 37–48.5)
HGB BLD-MCNC: 12.4 G/DL (ref 12–16)
HGB UR QL STRIP: ABNORMAL
HYALINE CASTS #/AREA URNS LPF: 5 /LPF
IMM GRANULOCYTES # BLD AUTO: 0.07 K/UL (ref 0–0.04)
IMM GRANULOCYTES NFR BLD AUTO: 0.5 % (ref 0–0.5)
INFLUENZA A, MOLECULAR: NEGATIVE
INFLUENZA B, MOLECULAR: NEGATIVE
KETONES UR QL STRIP: NEGATIVE
LACTATE SERPL-SCNC: 1 MMOL/L (ref 0.5–1.9)
LDH SERPL L TO P-CCNC: 279 U/L (ref 110–260)
LEUKOCYTE ESTERASE UR QL STRIP: ABNORMAL
LYMPHOCYTES # BLD AUTO: 0.5 K/UL (ref 1–4.8)
LYMPHOCYTES NFR BLD: 4.2 % (ref 18–48)
MCH RBC QN AUTO: 28.6 PG (ref 27–31)
MCHC RBC AUTO-ENTMCNC: 33.1 G/DL (ref 32–36)
MCV RBC AUTO: 87 FL (ref 82–98)
MICROSCOPIC COMMENT: ABNORMAL
MONOCYTES # BLD AUTO: 1.1 K/UL (ref 0.3–1)
MONOCYTES NFR BLD: 8.5 % (ref 4–15)
NEUTROPHILS # BLD AUTO: 11 K/UL (ref 1.8–7.7)
NEUTROPHILS NFR BLD: 86.4 % (ref 38–73)
NITRITE UR QL STRIP: POSITIVE
NRBC BLD-RTO: 0 /100 WBC
PH UR STRIP: 6 [PH] (ref 5–8)
PLATELET # BLD AUTO: 254 K/UL (ref 150–450)
PMV BLD AUTO: 9.5 FL (ref 9.2–12.9)
POTASSIUM SERPL-SCNC: 3.8 MMOL/L (ref 3.5–5.1)
PROCALCITONIN SERPL IA-MCNC: 1.45 NG/ML (ref 0–0.5)
PROT SERPL-MCNC: 6.9 G/DL (ref 6–8.4)
PROT UR QL STRIP: ABNORMAL
RBC # BLD AUTO: 4.33 M/UL (ref 4–5.4)
RBC #/AREA URNS HPF: 6 /HPF (ref 0–4)
SARS-COV-2 RDRP RESP QL NAA+PROBE: NEGATIVE
SODIUM SERPL-SCNC: 138 MMOL/L (ref 136–145)
SP GR UR STRIP: 1.01 (ref 1–1.03)
SPECIMEN SOURCE: NORMAL
SQUAMOUS #/AREA URNS HPF: 0 /HPF
URN SPEC COLLECT METH UR: ABNORMAL
UROBILINOGEN UR STRIP-ACNC: NEGATIVE EU/DL
WBC # BLD AUTO: 12.76 K/UL (ref 3.9–12.7)
WBC #/AREA URNS HPF: >100 /HPF (ref 0–5)

## 2021-12-31 PROCEDURE — 86140 C-REACTIVE PROTEIN: CPT | Performed by: EMERGENCY MEDICINE

## 2021-12-31 PROCEDURE — 94761 N-INVAS EAR/PLS OXIMETRY MLT: CPT

## 2021-12-31 PROCEDURE — 94660 CPAP INITIATION&MGMT: CPT

## 2021-12-31 PROCEDURE — 25000003 PHARM REV CODE 250: Performed by: FAMILY MEDICINE

## 2021-12-31 PROCEDURE — 25000003 PHARM REV CODE 250: Performed by: NURSE PRACTITIONER

## 2021-12-31 PROCEDURE — 93010 EKG 12-LEAD: ICD-10-PCS | Mod: ,,, | Performed by: INTERNAL MEDICINE

## 2021-12-31 PROCEDURE — 25000242 PHARM REV CODE 250 ALT 637 W/ HCPCS: Performed by: NURSE PRACTITIONER

## 2021-12-31 PROCEDURE — 83615 LACTATE (LD) (LDH) ENZYME: CPT | Performed by: NURSE PRACTITIONER

## 2021-12-31 PROCEDURE — 94799 UNLISTED PULMONARY SVC/PX: CPT

## 2021-12-31 PROCEDURE — 96374 THER/PROPH/DIAG INJ IV PUSH: CPT

## 2021-12-31 PROCEDURE — 94640 AIRWAY INHALATION TREATMENT: CPT

## 2021-12-31 PROCEDURE — 25000003 PHARM REV CODE 250: Performed by: EMERGENCY MEDICINE

## 2021-12-31 PROCEDURE — 27000221 HC OXYGEN, UP TO 24 HOURS

## 2021-12-31 PROCEDURE — 87077 CULTURE AEROBIC IDENTIFY: CPT | Performed by: NURSE PRACTITIONER

## 2021-12-31 PROCEDURE — 85651 RBC SED RATE NONAUTOMATED: CPT | Performed by: EMERGENCY MEDICINE

## 2021-12-31 PROCEDURE — 84145 PROCALCITONIN (PCT): CPT | Performed by: EMERGENCY MEDICINE

## 2021-12-31 PROCEDURE — 36415 COLL VENOUS BLD VENIPUNCTURE: CPT | Performed by: NURSE PRACTITIONER

## 2021-12-31 PROCEDURE — 82728 ASSAY OF FERRITIN: CPT | Performed by: EMERGENCY MEDICINE

## 2021-12-31 PROCEDURE — 87040 BLOOD CULTURE FOR BACTERIA: CPT | Performed by: NURSE PRACTITIONER

## 2021-12-31 PROCEDURE — 63600175 PHARM REV CODE 636 W HCPCS: Performed by: NURSE PRACTITIONER

## 2021-12-31 PROCEDURE — 85025 COMPLETE CBC W/AUTO DIFF WBC: CPT | Performed by: EMERGENCY MEDICINE

## 2021-12-31 PROCEDURE — 99285 EMERGENCY DEPT VISIT HI MDM: CPT | Mod: 25

## 2021-12-31 PROCEDURE — 87502 INFLUENZA DNA AMP PROBE: CPT | Performed by: EMERGENCY MEDICINE

## 2021-12-31 PROCEDURE — 80053 COMPREHEN METABOLIC PANEL: CPT | Performed by: EMERGENCY MEDICINE

## 2021-12-31 PROCEDURE — 12000002 HC ACUTE/MED SURGE SEMI-PRIVATE ROOM

## 2021-12-31 PROCEDURE — 87086 URINE CULTURE/COLONY COUNT: CPT | Performed by: NURSE PRACTITIONER

## 2021-12-31 PROCEDURE — 83605 ASSAY OF LACTIC ACID: CPT | Performed by: EMERGENCY MEDICINE

## 2021-12-31 PROCEDURE — 81001 URINALYSIS AUTO W/SCOPE: CPT | Performed by: NURSE PRACTITIONER

## 2021-12-31 PROCEDURE — 93010 ELECTROCARDIOGRAM REPORT: CPT | Mod: ,,, | Performed by: INTERNAL MEDICINE

## 2021-12-31 PROCEDURE — 36415 COLL VENOUS BLD VENIPUNCTURE: CPT | Performed by: EMERGENCY MEDICINE

## 2021-12-31 PROCEDURE — 93005 ELECTROCARDIOGRAM TRACING: CPT | Performed by: INTERNAL MEDICINE

## 2021-12-31 PROCEDURE — 99900035 HC TECH TIME PER 15 MIN (STAT)

## 2021-12-31 PROCEDURE — 99900031 HC PATIENT EDUCATION (STAT)

## 2021-12-31 PROCEDURE — 63600175 PHARM REV CODE 636 W HCPCS: Performed by: EMERGENCY MEDICINE

## 2021-12-31 PROCEDURE — 83880 ASSAY OF NATRIURETIC PEPTIDE: CPT | Performed by: EMERGENCY MEDICINE

## 2021-12-31 PROCEDURE — 63600175 PHARM REV CODE 636 W HCPCS: Performed by: FAMILY MEDICINE

## 2021-12-31 PROCEDURE — 87186 SC STD MICRODIL/AGAR DIL: CPT | Performed by: NURSE PRACTITIONER

## 2021-12-31 PROCEDURE — U0002 COVID-19 LAB TEST NON-CDC: HCPCS | Performed by: EMERGENCY MEDICINE

## 2021-12-31 RX ORDER — NITROGLYCERIN 0.4 MG/1
0.4 TABLET SUBLINGUAL EVERY 5 MIN PRN
Status: DISCONTINUED | OUTPATIENT
Start: 2021-12-31 | End: 2022-01-02 | Stop reason: HOSPADM

## 2021-12-31 RX ORDER — SODIUM CHLORIDE, SODIUM LACTATE, POTASSIUM CHLORIDE, CALCIUM CHLORIDE 600; 310; 30; 20 MG/100ML; MG/100ML; MG/100ML; MG/100ML
INJECTION, SOLUTION INTRAVENOUS CONTINUOUS
Status: DISCONTINUED | OUTPATIENT
Start: 2021-12-31 | End: 2022-01-01

## 2021-12-31 RX ORDER — LOSARTAN POTASSIUM 25 MG/1
25 TABLET ORAL NIGHTLY
Status: ON HOLD | COMMUNITY
End: 2024-02-29

## 2021-12-31 RX ORDER — FLUTICASONE FUROATE AND VILANTEROL 200; 25 UG/1; UG/1
1 POWDER RESPIRATORY (INHALATION) DAILY
Status: DISCONTINUED | OUTPATIENT
Start: 2021-12-31 | End: 2022-01-02 | Stop reason: HOSPADM

## 2021-12-31 RX ORDER — POTASSIUM CHLORIDE 20 MEQ/1
20 TABLET, EXTENDED RELEASE ORAL
Status: DISCONTINUED | OUTPATIENT
Start: 2021-12-31 | End: 2022-01-02 | Stop reason: HOSPADM

## 2021-12-31 RX ORDER — ONDANSETRON 4 MG/1
1 TABLET, FILM COATED ORAL EVERY 8 HOURS PRN
COMMUNITY
Start: 2021-12-10 | End: 2022-12-10

## 2021-12-31 RX ORDER — POTASSIUM CHLORIDE 7.45 MG/ML
40 INJECTION INTRAVENOUS
Status: DISCONTINUED | OUTPATIENT
Start: 2021-12-31 | End: 2022-01-02 | Stop reason: HOSPADM

## 2021-12-31 RX ORDER — DICLOFENAC SODIUM 10 MG/G
1 GEL TOPICAL 4 TIMES DAILY
COMMUNITY
End: 2022-08-08

## 2021-12-31 RX ORDER — BUSPIRONE HYDROCHLORIDE 7.5 MG/1
7.5 TABLET ORAL 2 TIMES DAILY
Status: DISCONTINUED | OUTPATIENT
Start: 2021-12-31 | End: 2021-12-31

## 2021-12-31 RX ORDER — METFORMIN HYDROCHLORIDE 500 MG/1
500 TABLET, EXTENDED RELEASE ORAL DAILY
COMMUNITY
Start: 2021-08-03 | End: 2022-11-30

## 2021-12-31 RX ORDER — ISOSORBIDE MONONITRATE 30 MG/1
30 TABLET, EXTENDED RELEASE ORAL DAILY
Status: DISCONTINUED | OUTPATIENT
Start: 2021-12-31 | End: 2022-01-02 | Stop reason: HOSPADM

## 2021-12-31 RX ORDER — ASPIRIN 81 MG/1
81 TABLET ORAL NIGHTLY
Status: DISCONTINUED | OUTPATIENT
Start: 2021-12-31 | End: 2022-01-02 | Stop reason: HOSPADM

## 2021-12-31 RX ORDER — POTASSIUM CHLORIDE 7.45 MG/ML
20 INJECTION INTRAVENOUS
Status: DISCONTINUED | OUTPATIENT
Start: 2021-12-31 | End: 2022-01-02 | Stop reason: HOSPADM

## 2021-12-31 RX ORDER — LANOLIN ALCOHOL/MO/W.PET/CERES
800 CREAM (GRAM) TOPICAL
Status: DISCONTINUED | OUTPATIENT
Start: 2021-12-31 | End: 2022-01-02 | Stop reason: HOSPADM

## 2021-12-31 RX ORDER — LEVALBUTEROL 1.25 MG/.5ML
1.25 SOLUTION, CONCENTRATE RESPIRATORY (INHALATION)
Status: DISCONTINUED | OUTPATIENT
Start: 2021-12-31 | End: 2022-01-02 | Stop reason: HOSPADM

## 2021-12-31 RX ORDER — OXYCODONE AND ACETAMINOPHEN 10; 325 MG/1; MG/1
1 TABLET ORAL EVERY 6 HOURS PRN
Status: DISCONTINUED | OUTPATIENT
Start: 2021-12-31 | End: 2021-12-31

## 2021-12-31 RX ORDER — METHOCARBAMOL 750 MG/1
1500 TABLET, FILM COATED ORAL 3 TIMES DAILY
COMMUNITY
End: 2023-08-03

## 2021-12-31 RX ORDER — AMOXICILLIN 250 MG
1 CAPSULE ORAL 2 TIMES DAILY
Status: DISCONTINUED | OUTPATIENT
Start: 2021-12-31 | End: 2022-01-02 | Stop reason: HOSPADM

## 2021-12-31 RX ORDER — IBUPROFEN 400 MG/1
400 TABLET ORAL EVERY 6 HOURS PRN
Status: DISCONTINUED | OUTPATIENT
Start: 2021-12-31 | End: 2022-01-02 | Stop reason: HOSPADM

## 2021-12-31 RX ORDER — IBUPROFEN 200 MG
16 TABLET ORAL
Status: DISCONTINUED | OUTPATIENT
Start: 2021-12-31 | End: 2022-01-02 | Stop reason: HOSPADM

## 2021-12-31 RX ORDER — POLYETHYLENE GLYCOL 3350 17 G/17G
17 POWDER, FOR SOLUTION ORAL 2 TIMES DAILY PRN
Status: DISCONTINUED | OUTPATIENT
Start: 2021-12-31 | End: 2022-01-02 | Stop reason: HOSPADM

## 2021-12-31 RX ORDER — ONDANSETRON 2 MG/ML
4 INJECTION INTRAMUSCULAR; INTRAVENOUS EVERY 8 HOURS PRN
Status: DISCONTINUED | OUTPATIENT
Start: 2021-12-31 | End: 2022-01-02 | Stop reason: HOSPADM

## 2021-12-31 RX ORDER — CLOPIDOGREL BISULFATE 75 MG/1
75 TABLET ORAL DAILY
Status: DISCONTINUED | OUTPATIENT
Start: 2021-12-31 | End: 2022-01-02 | Stop reason: HOSPADM

## 2021-12-31 RX ORDER — INSULIN ASPART 100 [IU]/ML
0-5 INJECTION, SOLUTION INTRAVENOUS; SUBCUTANEOUS
Status: DISCONTINUED | OUTPATIENT
Start: 2021-12-31 | End: 2022-01-02 | Stop reason: HOSPADM

## 2021-12-31 RX ORDER — METOPROLOL SUCCINATE 25 MG/1
25 TABLET, EXTENDED RELEASE ORAL DAILY
Status: DISCONTINUED | OUTPATIENT
Start: 2021-12-31 | End: 2022-01-02 | Stop reason: HOSPADM

## 2021-12-31 RX ORDER — SODIUM CHLORIDE 9 MG/ML
INJECTION, SOLUTION INTRAVENOUS
Status: COMPLETED | OUTPATIENT
Start: 2021-12-31 | End: 2021-12-31

## 2021-12-31 RX ORDER — CYCLOBENZAPRINE HCL 5 MG
5 TABLET ORAL 2 TIMES DAILY PRN
COMMUNITY
Start: 2021-09-27 | End: 2021-12-31

## 2021-12-31 RX ORDER — MORPHINE SULFATE 4 MG/ML
4 INJECTION, SOLUTION INTRAMUSCULAR; INTRAVENOUS ONCE
Status: COMPLETED | OUTPATIENT
Start: 2021-12-31 | End: 2021-12-31

## 2021-12-31 RX ORDER — OXYCODONE HYDROCHLORIDE 15 MG/1
15 TABLET ORAL EVERY 6 HOURS PRN
Status: ON HOLD | COMMUNITY
Start: 2021-10-23 | End: 2024-02-29

## 2021-12-31 RX ORDER — TRAZODONE HYDROCHLORIDE 50 MG/1
50 TABLET ORAL NIGHTLY
Status: DISCONTINUED | OUTPATIENT
Start: 2021-12-31 | End: 2022-01-02 | Stop reason: HOSPADM

## 2021-12-31 RX ORDER — PANTOPRAZOLE SODIUM 40 MG/1
40 TABLET, DELAYED RELEASE ORAL DAILY
Status: DISCONTINUED | OUTPATIENT
Start: 2022-01-01 | End: 2022-01-02 | Stop reason: HOSPADM

## 2021-12-31 RX ORDER — TALC
6 POWDER (GRAM) TOPICAL NIGHTLY PRN
Status: DISCONTINUED | OUTPATIENT
Start: 2021-12-31 | End: 2022-01-02 | Stop reason: HOSPADM

## 2021-12-31 RX ORDER — ISOPROPYL ALCOHOL IN GLYCERIN 95 %-5 %
1 DROPS OTIC (EAR)
Status: ON HOLD | COMMUNITY
End: 2024-02-29

## 2021-12-31 RX ORDER — IBUPROFEN 200 MG
24 TABLET ORAL
Status: DISCONTINUED | OUTPATIENT
Start: 2021-12-31 | End: 2022-01-02 | Stop reason: HOSPADM

## 2021-12-31 RX ORDER — METHOCARBAMOL 500 MG/1
1000 TABLET, FILM COATED ORAL 3 TIMES DAILY PRN
Status: DISCONTINUED | OUTPATIENT
Start: 2021-12-31 | End: 2022-01-02 | Stop reason: HOSPADM

## 2021-12-31 RX ORDER — MAGNESIUM SULFATE HEPTAHYDRATE 40 MG/ML
2 INJECTION, SOLUTION INTRAVENOUS
Status: DISCONTINUED | OUTPATIENT
Start: 2021-12-31 | End: 2022-01-02 | Stop reason: HOSPADM

## 2021-12-31 RX ORDER — MORPHINE SULFATE 2 MG/ML
2 INJECTION, SOLUTION INTRAMUSCULAR; INTRAVENOUS
Status: COMPLETED | OUTPATIENT
Start: 2021-12-31 | End: 2021-12-31

## 2021-12-31 RX ORDER — OXYCODONE HYDROCHLORIDE 5 MG/1
15 TABLET ORAL EVERY 4 HOURS PRN
Status: DISCONTINUED | OUTPATIENT
Start: 2021-12-31 | End: 2022-01-02 | Stop reason: HOSPADM

## 2021-12-31 RX ORDER — POTASSIUM CHLORIDE 20 MEQ/1
40 TABLET, EXTENDED RELEASE ORAL
Status: DISCONTINUED | OUTPATIENT
Start: 2021-12-31 | End: 2022-01-02 | Stop reason: HOSPADM

## 2021-12-31 RX ORDER — MAGNESIUM SULFATE HEPTAHYDRATE 40 MG/ML
4 INJECTION, SOLUTION INTRAVENOUS
Status: DISCONTINUED | OUTPATIENT
Start: 2021-12-31 | End: 2022-01-02 | Stop reason: HOSPADM

## 2021-12-31 RX ORDER — NALOXONE HCL 0.4 MG/ML
0.02 VIAL (ML) INJECTION
Status: DISCONTINUED | OUTPATIENT
Start: 2021-12-31 | End: 2022-01-02 | Stop reason: HOSPADM

## 2021-12-31 RX ORDER — ACETAMINOPHEN 325 MG/1
650 TABLET ORAL EVERY 4 HOURS PRN
Status: DISCONTINUED | OUTPATIENT
Start: 2021-12-31 | End: 2022-01-02 | Stop reason: HOSPADM

## 2021-12-31 RX ORDER — GLUCAGON 1 MG
1 KIT INJECTION
Status: DISCONTINUED | OUTPATIENT
Start: 2021-12-31 | End: 2022-01-02 | Stop reason: HOSPADM

## 2021-12-31 RX ORDER — ACETAMINOPHEN 325 MG/1
650 TABLET ORAL
Status: COMPLETED | OUTPATIENT
Start: 2021-12-31 | End: 2021-12-31

## 2021-12-31 RX ORDER — SODIUM CHLORIDE 0.9 % (FLUSH) 0.9 %
10 SYRINGE (ML) INJECTION EVERY 12 HOURS PRN
Status: DISCONTINUED | OUTPATIENT
Start: 2021-12-31 | End: 2022-01-02 | Stop reason: HOSPADM

## 2021-12-31 RX ORDER — MAGNESIUM SULFATE 1 G/100ML
1 INJECTION INTRAVENOUS
Status: DISCONTINUED | OUTPATIENT
Start: 2021-12-31 | End: 2022-01-02 | Stop reason: HOSPADM

## 2021-12-31 RX ORDER — LOSARTAN POTASSIUM 25 MG/1
25 TABLET ORAL NIGHTLY
Status: DISCONTINUED | OUTPATIENT
Start: 2021-12-31 | End: 2022-01-01

## 2021-12-31 RX ORDER — BUSPIRONE HYDROCHLORIDE 5 MG/1
5 TABLET ORAL 2 TIMES DAILY
Status: DISCONTINUED | OUTPATIENT
Start: 2021-12-31 | End: 2022-01-02 | Stop reason: HOSPADM

## 2021-12-31 RX ORDER — ALLOPURINOL 300 MG/1
300 TABLET ORAL DAILY
Status: DISCONTINUED | OUTPATIENT
Start: 2021-12-31 | End: 2022-01-02 | Stop reason: HOSPADM

## 2021-12-31 RX ADMIN — BUSPIRONE HYDROCHLORIDE 2.5 MG: 5 TABLET ORAL at 09:12

## 2021-12-31 RX ADMIN — ASPIRIN 81 MG: 81 TABLET, COATED ORAL at 08:12

## 2021-12-31 RX ADMIN — LOSARTAN POTASSIUM 25 MG: 25 TABLET, FILM COATED ORAL at 08:12

## 2021-12-31 RX ADMIN — BUSPIRONE HYDROCHLORIDE 5 MG: 5 TABLET ORAL at 08:12

## 2021-12-31 RX ADMIN — SODIUM CHLORIDE: 0.9 INJECTION, SOLUTION INTRAVENOUS at 11:12

## 2021-12-31 RX ADMIN — PIPERACILLIN AND TAZOBACTAM 4.5 G: 4; .5 INJECTION, POWDER, LYOPHILIZED, FOR SOLUTION INTRAVENOUS; PARENTERAL at 02:12

## 2021-12-31 RX ADMIN — TRAZODONE HYDROCHLORIDE 50 MG: 50 TABLET ORAL at 08:12

## 2021-12-31 RX ADMIN — CEFTRIAXONE 2 G: 2 INJECTION, SOLUTION INTRAVENOUS at 08:12

## 2021-12-31 RX ADMIN — SENNOSIDES AND DOCUSATE SODIUM 1 TABLET: 8.6; 5 TABLET ORAL at 09:12

## 2021-12-31 RX ADMIN — BUSPIRONE HYDROCHLORIDE 2.5 MG: 5 TABLET ORAL at 08:12

## 2021-12-31 RX ADMIN — MORPHINE SULFATE 2 MG: 2 INJECTION, SOLUTION INTRAMUSCULAR; INTRAVENOUS at 01:12

## 2021-12-31 RX ADMIN — MORPHINE SULFATE 4 MG: 4 INJECTION, SOLUTION INTRAMUSCULAR; INTRAVENOUS at 08:12

## 2021-12-31 RX ADMIN — METOPROLOL SUCCINATE 25 MG: 25 TABLET, EXTENDED RELEASE ORAL at 04:12

## 2021-12-31 RX ADMIN — OXYCODONE HYDROCHLORIDE 15 MG: 5 TABLET ORAL at 04:12

## 2021-12-31 RX ADMIN — AZITHROMYCIN 500 MG: 500 INJECTION, POWDER, LYOPHILIZED, FOR SOLUTION INTRAVENOUS at 08:12

## 2021-12-31 RX ADMIN — ACETAMINOPHEN 650 MG: 325 TABLET, FILM COATED ORAL at 11:12

## 2021-12-31 RX ADMIN — CEFTRIAXONE 2 G: 2 INJECTION, SOLUTION INTRAVENOUS at 07:12

## 2021-12-31 RX ADMIN — LEVALBUTEROL HYDROCHLORIDE 1.25 MG: 1.25 SOLUTION, CONCENTRATE RESPIRATORY (INHALATION) at 08:12

## 2021-12-31 RX ADMIN — SODIUM CHLORIDE, SODIUM LACTATE, POTASSIUM CHLORIDE, AND CALCIUM CHLORIDE: .6; .31; .03; .02 INJECTION, SOLUTION INTRAVENOUS at 06:12

## 2021-12-31 NOTE — ED PROVIDER NOTES
Encounter Date: 12/31/2021       History     Chief Complaint   Patient presents with    Fever    Cough    COVID-19 Concerns     Patient presents complaining of fever, cough, congestion.  Symptoms started yesterday.  At the worst symptoms are moderate.  Nothing really makes it better or worse.        Review of patient's allergies indicates:   Allergen Reactions    Metronidazole Other (See Comments)     DISORIENTED       Statins-hmg-coa reductase inhibitors Tinitus     Joint pain     Past Medical History:   Diagnosis Date    Arthritis     COPD (chronic obstructive pulmonary disease)     Coronary artery disease     Hypertension     Sleep apnea     use CPAP at night      Past Surgical History:   Procedure Laterality Date    CORONARY ANGIOGRAPHY N/A 2/15/2021    Procedure: ANGIOGRAM, CORONARY ARTERY;  Surgeon: Aidan Buchanan MD;  Location: [facility] CATH/EP LAB;  Service: Cardiology;  Laterality: N/A;    CORONARY ARTERY BYPASS GRAFT      CORONARY BYPASS GRAFT ANGIOGRAPHY  2/15/2021    Procedure: Bypass graft study;  Surgeon: Aidan Buchanan MD;  Location: [facility] CATH/EP LAB;  Service: Cardiology;;    HYSTERECTOMY      JOINT REPLACEMENT      bilateral knee replacement     LEFT HEART CATHETERIZATION Left 2/15/2021    Procedure: Left heart cath;  Surgeon: Aidan Buchanan MD;  Location: [facility] CATH/EP LAB;  Service: Cardiology;  Laterality: Left;     Family History   Problem Relation Age of Onset    Pulmonary embolism Mother      Social History     Tobacco Use    Smoking status: Never Smoker   Substance Use Topics    Alcohol use: No    Drug use: No     Review of Systems   All other systems reviewed and are negative.      Physical Exam     Initial Vitals [12/31/21 1131]   BP Pulse Resp Temp SpO2   (!) 142/72 108 20 (!) 102.9 °F (39.4 °C) (!) 91 %      MAP       --         Physical Exam    Nursing note and vitals reviewed.  Constitutional: She appears well-developed and well-nourished.   Pleasant, polite, increased  BMI   HENT:   Head: Normocephalic and atraumatic.   Eyes: EOM are normal.   Neck: Neck supple.   Normal range of motion.  Cardiovascular: Normal rate, regular rhythm, normal heart sounds and intact distal pulses.   Pulmonary/Chest: Breath sounds normal. No respiratory distress.   Abdominal: Abdomen is soft.   Musculoskeletal:      Cervical back: Normal range of motion and neck supple.      Comments: Patient holds the right shoulder in a sling secondary to recent surgery     Neurological: She is alert and oriented to person, place, and time.   Skin: Skin is warm and dry. Capillary refill takes less than 2 seconds.   Psychiatric: She has a normal mood and affect. Her behavior is normal. Judgment and thought content normal.         ED Course   Procedures  Labs Reviewed   CBC W/ AUTO DIFFERENTIAL - Abnormal; Notable for the following components:       Result Value    WBC 12.76 (*)     Gran # (ANC) 11.0 (*)     Immature Grans (Abs) 0.07 (*)     Lymph # 0.5 (*)     Mono # 1.1 (*)     Gran % 86.4 (*)     Lymph % 4.2 (*)     All other components within normal limits   URINALYSIS, REFLEX TO URINE CULTURE - Abnormal; Notable for the following components:    Appearance, UA Hazy (*)     Protein, UA 2+ (*)     Occult Blood UA 2+ (*)     Nitrite, UA Positive (*)     Leukocytes, UA 3+ (*)     All other components within normal limits    Narrative:     Specimen Source->Urine   COMPREHENSIVE METABOLIC PANEL - Abnormal; Notable for the following components:    Glucose 149 (*)     Total Bilirubin 1.4 (*)     ALT 9 (*)     All other components within normal limits   B-TYPE NATRIURETIC PEPTIDE - Abnormal; Notable for the following components:     (*)     All other components within normal limits   URINALYSIS MICROSCOPIC - Abnormal; Notable for the following components:    RBC, UA 6 (*)     WBC, UA >100 (*)     Bacteria Many (*)     Hyaline Casts, UA 5 (*)     All other components within normal limits    Narrative:     Specimen  Source->Urine   SEDIMENTATION RATE - Abnormal; Notable for the following components:    Sed Rate 35 (*)     All other components within normal limits   C-REACTIVE PROTEIN - Abnormal; Notable for the following components:    CRP 18.12 (*)     All other components within normal limits   CULTURE, BLOOD   CULTURE, BLOOD   RESPIRATORY INFECTION PANEL (PCR), NASOPHARYNGEAL   CULTURE, RESPIRATORY   CULTURE, URINE   SARS-COV-2 RNA AMPLIFICATION, QUAL   INFLUENZA A AND B ANTIGEN    Narrative:     Specimen Source->Nasopharyngeal Swab   LACTIC ACID, PLASMA   FERRITIN   PROCALCITONIN   SEDIMENTATION RATE   C-REACTIVE PROTEIN   FERRITIN   URINALYSIS, REFLEX TO URINE CULTURE   POCT GLUCOSE MONITORING CONTINUOUS        ECG Results          EKG 12-lead (In process)  Result time 12/31/21 13:19:36    In process by Interface, Lab In [facility] (12/31/21 13:19:36)                 Narrative:    Test Reason : R50.9,    Vent. Rate : 089 BPM     Atrial Rate : 089 BPM     P-R Int : 186 ms          QRS Dur : 082 ms      QT Int : 344 ms       P-R-T Axes : 042 -27 083 degrees     QTc Int : 418 ms    Normal sinus rhythm  Possible Left atrial enlargement  LVH  T wave abnormality, consider lateral ischemia  Abnormal ECG  When compared with ECG of 14-FEB-2021 16:25,  No significant change was found    Referred By: [email or web address]   SELF           Confirmed By:                             Imaging Results          X-Ray Chest AP Portable (Final result)  Result time 12/31/21 12:08:50    Final result by Armaan Quezada MD (12/31/21 12:08:50)                 Narrative:    XR CHEST 1 VIEW    CLINICAL HISTORY:  66 years Female R/O COVID . COUGH ,SOB. PATIENT STATED UNABLE TO MOVE RIGHT ARM ,IN SLING DUE TO ROTATOR CUFF SURGERY.    COMPARISON: October 29, 2021    FINDINGS: Cardiomediastinal silhouette is stable from prior. Atherosclerotic calcification in the aorta. Status post median sternotomy with several fractured sternotomy wires.    Faint groundglass  opacities involving the lower lung zones bilaterally. No large pleural effusion. No pneumothorax. No acute osseous abnormality.    IMPRESSION:    Faint groundglass opacities involving the lower lung zones bilaterally.  Please correlate with Covid testing.    Electronically signed by:  Armaan Quezada MD  12/31/2021 12:08 PM [facility] Workstation: [id number]                               Medications   lactated ringers infusion (has no administration in time range)   allopurinoL tablet 300 mg (300 mg Oral Not Given 12/31/21 1652)   aspirin EC tablet 81 mg (has no administration in time range)   pantoprazole EC tablet 40 mg (has no administration in time range)   clopidogreL tablet 75 mg (75 mg Oral Not Given 12/31/21 1653)   fluticasone furoate-vilanteroL 200-25 mcg/dose diskus inhaler 1 puff ( Inhalation Canceled Entry 12/31/21 1500)   isosorbide mononitrate 24 hr tablet 30 mg (30 mg Oral Not Given 12/31/21 1652)   losartan tablet 25 mg (has no administration in time range)   methocarbamoL tablet 1,000 mg (has no administration in time range)   metoprolol succinate (TOPROL-XL) 24 hr tablet 25 mg (25 mg Oral Given 12/31/21 1652)   nitroGLYCERIN SL tablet 0.4 mg (has no administration in time range)   traZODone tablet 50 mg (has no administration in time range)   sodium chloride 0.9% flush 10 mL (has no administration in time range)   naloxone 0.4 mg/mL injection 0.02 mg (has no administration in time range)   glucose chewable tablet 16 g (has no administration in time range)   glucose chewable tablet 24 g (has no administration in time range)   dextrose 50% injection 12.5 g (has no administration in time range)   dextrose 50% injection 25 g (has no administration in time range)   glucagon (human recombinant) injection 1 mg (has no administration in time range)   acetaminophen tablet 650 mg (has no administration in time range)   ondansetron injection 4 mg (has no administration in time range)   senna-docusate 8.6-50 mg per  tablet 1 tablet (has no administration in time range)   polyethylene glycol packet 17 g (has no administration in time range)   insulin aspart U-100 pen 0-5 Units (has no administration in time range)   ibuprofen tablet 400 mg (has no administration in time range)   melatonin tablet 6 mg (has no administration in time range)   potassium chloride SA CR tablet 20 mEq (has no administration in time range)   potassium chloride SA CR tablet 40 mEq (has no administration in time range)   potassium chloride SA CR tablet 20 mEq (has no administration in time range)   potassium chloride SA CR tablet 40 mEq (has no administration in time range)   potassium chloride 10 mEq in 100 mL IVPB (has no administration in time range)   potassium chloride 10 mEq in 100 mL IVPB (has no administration in time range)   potassium chloride 10 mEq in 100 mL IVPB (has no administration in time range)   potassium chloride 10 mEq in 100 mL IVPB (has no administration in time range)   magnesium oxide tablet 800 mg (has no administration in time range)   magnesium sulfate 2g in water 50mL IVPB (premix) (has no administration in time range)   magnesium sulfate in dextrose IVPB (premix) 1 g (has no administration in time range)   magnesium sulfate 2g in water 50mL IVPB (premix) (has no administration in time range)   magnesium sulfate 2g in water 50mL IVPB (premix) (has no administration in time range)   calcium chloride 1 g in dextrose 5 % 100 mL IVPB (has no administration in time range)   calcium chloride 1 g in dextrose 5 % 100 mL IVPB (has no administration in time range)   calcium chloride 1 g in dextrose 5 % 100 mL IVPB (has no administration in time range)   levalbuterol nebulizer solution 1.25 mg ( Nebulization Canceled Entry 12/31/21 1415)   cefTRIAXone (ROCEPHIN) 2 g/50 mL D5W IVPB (has no administration in time range)   azithromycin 500 mg in dextrose 5 % 250 mL IVPB (ready to mix system) (has no administration in time range)   busPIRone  split tablet 2.5 mg (has no administration in time range)     And   busPIRone tablet 5 mg (has no administration in time range)   oxyCODONE immediate release tablet 15 mg (15 mg Oral Given 12/31/21 1648)   acetaminophen tablet 650 mg (650 mg Oral Given 12/31/21 1157)   0.9%  NaCl infusion ( Intravenous New Bag 12/31/21 1158)   morphine injection 2 mg (2 mg Intravenous Given 12/31/21 1305)     Medical Decision Making:   Initial Assessment:   The patient appears uncomfortable  Differential Diagnosis:   Considerations include infection, sepsis, influenza, COVID, pneumonia  Independently Interpreted Test(s):   I have ordered and independently interpreted EKG Reading(s) - see summary below       <> Summary of EKG Reading(s): Regular rate and rhythm without ST elevation  Clinical Tests:   Lab Tests: Reviewed and Ordered  Radiological Study: Ordered and Reviewed  Medical Tests: Reviewed and Ordered  ED Management:  EKG is without acute abnormality, patient is febrile the emergency department.  Pulse oximetry satting 91%.  Patient has been on chronic steroid therapy for the last few weeks and is immunocompromised.  She indeed is at risk for sepsis.  She did receive sepsis fluid protocol as well as broad-spectrum antibiotics in the emergency department.  Chest x-ray does show some evidence of pneumonia.  Patient's COVID test is negative.  Patient has been consulted to Hospital Medicine.  She remains clinically stable.                      Clinical Impression:   Final diagnoses:  [R06.02] Shortness of breath  [R50.9] Fever  [J18.9] Pneumonia due to infectious organism, unspecified laterality, unspecified part of lung (Primary)  [R09.02] Hypoxia          ED Disposition Condition    Admit               Clarence Rae MD  12/31/21 2538

## 2021-12-31 NOTE — HPI
"Ms. Hess is a 66 year old female with a history of HTN, CAD, NIDDM2, COPD, MIRA with CPAP, and recent rt shoulder rotator cuff repair about 2 weeks ago who presents today with complaints of fever, Tmax 102.9, nonproductive cough, headache, myalgias, arthralgias, nausea, urinary frequency, and flank pain. She denies sore throat, sinus congestion, chest pain, V/D, dizziness, or LOC. She states she has been taking azo as she feels a UTI coming on. She is unsure if she required a santana during her recent surgery. She has been doing PT at home d/t insurance and transportation restraints. She states her surgeon recommended she stop drinking sodas and start drinking more water to encourage better healing of her shoulder. She then tells me that the last time she drank a lot of water "I went into kidney failure. I feel like like my kidneys and lungs hurt, and I know it's because I've been drinking a lot of water." She reports her oxygen level has been running in the mid to upper 80s at home. She is not vaccinated for covid and has never tested positive for covid in the past. She lives with her daughter who is a nurse. In the ED, she was febrile to 102.9, Tachycardic 105, and mildly hypoxic at 91% on room air that improved with O2@2L PNC. She was negative for flu and covid, but CXR shows bilat ground glass opacities typical of viral vs. Atypical pna.   "

## 2021-12-31 NOTE — SUBJECTIVE & OBJECTIVE
Past Medical History:   Diagnosis Date    Arthritis     COPD (chronic obstructive pulmonary disease)     Coronary artery disease     Hypertension     Sleep apnea     use CPAP at night        Past Surgical History:   Procedure Laterality Date    CORONARY ANGIOGRAPHY N/A 2/15/2021    Procedure: ANGIOGRAM, CORONARY ARTERY;  Surgeon: Aidan Buchanan MD;  Location: [facility] CATH/EP LAB;  Service: Cardiology;  Laterality: N/A;    CORONARY ARTERY BYPASS GRAFT      CORONARY BYPASS GRAFT ANGIOGRAPHY  2/15/2021    Procedure: Bypass graft study;  Surgeon: Aidan Buchanan MD;  Location: [facility] CATH/EP LAB;  Service: Cardiology;;    HYSTERECTOMY      JOINT REPLACEMENT      bilateral knee replacement     LEFT HEART CATHETERIZATION Left 2/15/2021    Procedure: Left heart cath;  Surgeon: Aidan Buchanan MD;  Location: [facility] CATH/EP LAB;  Service: Cardiology;  Laterality: Left;       Review of patient's allergies indicates:   Allergen Reactions    Metronidazole Other (See Comments)     DISORIENTED       Statins-hmg-coa reductase inhibitors Tinitus     Joint pain       No current facility-administered medications on file prior to encounter.     Current Outpatient Medications on File Prior to Encounter   Medication Sig    acetaminophen (TYLENOL) 500 MG tablet Take 500 mg by mouth as needed.     allopurinol (ZYLOPRIM) 300 MG tablet Take 300 mg by mouth once daily.    aspirin (ECOTRIN) 81 MG EC tablet Take 1 tablet (81 mg total) by mouth once daily.    aspirin-acetaminophen-caffeine (GOODY'S EXTRA STRENGTH) 500-325-65 mg PwPk Take 1 Dose by mouth as needed.    diclofenac sodium (VOLTAREN) 1 % Gel Apply 1 application topically 4 (four) times daily.    esomeprazole (NEXIUM) 20 MG capsule Take 20 mg by mouth before breakfast.    hydroCHLOROthiazide (MICROZIDE) 12.5 mg capsule Take 12.5 mg by mouth once daily.    losartan (COZAAR) 25 MG tablet Take 25 mg by mouth once daily.    metFORMIN (GLUCOPHAGE-XR) 500 MG ER 24hr tablet  Take 500 mg by mouth once daily.    methocarbamoL (ROBAXIN) 750 MG Tab Take 1,500 mg by mouth 3 (three) times daily.    ondansetron (ZOFRAN) 4 MG tablet Take 1 tablet by mouth every 8 (eight) hours as needed.    oxyCODONE (ROXICODONE) 15 MG Tab Take 15 mg by mouth every 6 (six) hours as needed.    traZODone (DESYREL) 50 MG tablet Take 50 mg by mouth nightly.    albuterol (ACCUNEB) 1.25 mg/3 mL nebulizer solution Take 1 ampule by nebulization every 8 (eight) hours as needed.    albuterol (PROVENTIL/VENTOLIN HFA) 90 mcg/actuation inhaler 2 puffs every 4 hours as needed for cough, wheeze, or shortness of breath (Patient taking differently: Inhale 2 puffs into the lungs every 4 (four) hours as needed. 2 puffs every 4 hours as needed for cough, wheeze, or shortness of breath)    azithromycin (ZITHROMAX) 500 MG tablet TAKE ONE TABLET BY MOUTH DAILY FOR YELLOW MUCOS; REPEAT IF NECESSARY (Patient taking differently: Take 500 mg by mouth once daily. TAKE ONE TABLET BY MOUTH DAILY FOR YELLOW MUCOS; REPEAT IF NECESSARY)    busPIRone (BUSPAR) 7.5 MG tablet Take 7.5 mg by mouth 2 (two) times a day.    clopidogreL (PLAVIX) 75 mg tablet Take 75 mg by mouth once daily.    fluticasone-salmeterol 230-21 mcg/dose (ADVAIR HFA) 230-21 mcg/actuation HFAA inhaler Inhale 2 puffs into the lungs 2 (two) times daily. Controller    isosorbide mononitrate (IMDUR) 30 MG 24 hr tablet Take 1 tablet (30 mg total) by mouth once daily.    levalbuterol (XOPENEX HFA) 45 mcg/actuation inhaler Inhale 1-2 puffs into the lungs every 4 (four) hours as needed for Wheezing or Shortness of Breath.    metoprolol succinate (TOPROL-XL) 25 MG 24 hr tablet Take 25 mg by mouth once daily.    nitroGLYCERIN (NITROSTAT) 0.4 MG SL tablet Place 1 tablet (0.4 mg total) under the tongue every 5 (five) minutes as needed for Chest pain.    oxyCODONE-acetaminophen (PERCOCET)  mg per tablet Take 1 tablet by mouth every 6 (six) hours as needed.      predniSONE (DELTASONE) 20 MG tablet 3 for 3 days then 2 for 3 days then one for 3 days and repeat for breathing problems (Patient taking differently: Take by mouth once daily. 3 for 3 days then 2 for 3 days then one for 3 days and repeat for breathing problems)    REPATHA SURECLICK 140 mg/mL PnIj Inject 140 mg into the skin every 14 (fourteen) days.    SYMBICORT 160-4.5 mcg/actuation HFAA Inhale 2 puffs into the lungs every 12 (twelve) hours.    [DISCONTINUED] b complex vitamins (B COMPLEX 1) tablet Take 1 tablet by mouth once daily.     [DISCONTINUED] carvedilol (COREG) 6.25 MG tablet Take 1 tablet (6.25 mg total) by mouth 2 (two) times daily.    [DISCONTINUED] cyclobenzaprine (FLEXERIL) 5 MG tablet Take 5 mg by mouth 2 (two) times daily as needed.    [DISCONTINUED] omeprazole (PRILOSEC) 40 MG capsule Take 40 mg by mouth every morning.     [DISCONTINUED] valsartan (DIOVAN) 160 MG tablet Take 160 mg by mouth once daily.     Family History     Problem Relation (Age of Onset)    Pulmonary embolism Mother        Tobacco Use    Smoking status: Never Smoker    Smokeless tobacco: Not on file   Substance and Sexual Activity    Alcohol use: No    Drug use: No    Sexual activity: Not Currently     Review of Systems   Constitutional: Positive for chills, diaphoresis, fatigue and fever. Negative for activity change, appetite change and unexpected weight change.   HENT: Negative for congestion, ear pain, facial swelling, hearing loss, sore throat and trouble swallowing.    Eyes: Negative for pain and discharge.   Respiratory: Positive for cough, chest tightness and shortness of breath. Negative for wheezing.    Cardiovascular: Negative for chest pain, palpitations and leg swelling.   Gastrointestinal: Positive for nausea. Negative for abdominal pain, blood in stool, diarrhea and vomiting.   Endocrine: Negative for polydipsia, polyphagia and polyuria.   Genitourinary: Positive for frequency. Negative for difficulty  urinating, dysuria, flank pain and urgency.   Musculoskeletal: Positive for back pain. Negative for arthralgias, joint swelling, neck pain and neck stiffness.   Skin: Negative for rash and wound.   Allergic/Immunologic: Negative for environmental allergies and immunocompromised state.   Neurological: Positive for headaches. Negative for dizziness, seizures, syncope, speech difficulty, weakness, light-headedness and numbness.   Hematological: Negative for adenopathy.   Psychiatric/Behavioral: Negative for sleep disturbance and suicidal ideas. The patient is not nervous/anxious.    All other systems reviewed and are negative.    Objective:     Vital Signs (Most Recent):  Temp: (!) 102.9 °F (39.4 °C) (12/31/21 1157)  Pulse: 102 (12/31/21 1201)  Resp: 19 (12/31/21 1305)  BP: 136/62 (12/31/21 1201)  SpO2: 97 % (12/31/21 1201) Vital Signs (24h Range):  Temp:  [102.9 °F (39.4 °C)] 102.9 °F (39.4 °C)  Pulse:  [102-108] 102  Resp:  [19-20] 19  SpO2:  [91 %-97 %] 97 %  BP: (136-142)/(62-72) 136/62        There is no height or weight on file to calculate BMI.    Physical Exam  Vitals and nursing note reviewed.   Constitutional:       Appearance: She is obese.   HENT:      Head: Normocephalic and atraumatic.      Nose: Nose normal.      Mouth/Throat:      Mouth: Mucous membranes are moist.      Pharynx: Oropharynx is clear.   Eyes:      Extraocular Movements: Extraocular movements intact.      Pupils: Pupils are equal, round, and reactive to light.   Cardiovascular:      Rate and Rhythm: Normal rate and regular rhythm.      Pulses: Normal pulses.   Pulmonary:      Effort: Pulmonary effort is normal.      Breath sounds: Wheezing and rhonchi present.      Comments: Mild inspiratory wheezes throughout with rhonchi  Abdominal:      General: Bowel sounds are normal.      Palpations: Abdomen is soft.   Musculoskeletal:      Cervical back: Normal range of motion and neck supple.      Comments: Rt shoulder immobilized    Skin:      General: Skin is warm and dry.      Capillary Refill: Capillary refill takes less than 2 seconds.   Neurological:      General: No focal deficit present.      Mental Status: She is alert and oriented to person, place, and time.   Psychiatric:         Mood and Affect: Mood normal.         Behavior: Behavior normal.           CRANIAL NERVES     CN III, IV, VI   Pupils are equal, round, and reactive to light.       Significant Labs:   All pertinent labs within the past 24 hours have been reviewed.  CBC:   Recent Labs   Lab 12/31/21  1200   WBC 12.76*   HGB 12.4   HCT 37.5        CMP:   Recent Labs   Lab 12/31/21  1200      K 3.8      CO2 23   *   BUN 15   CREATININE 0.9   CALCIUM 9.0   PROT 6.9   ALBUMIN 3.8   BILITOT 1.4*   ALKPHOS 73   AST 18   ALT 9*   ANIONGAP 13   EGFRNONAA >60.0     Cardiac Markers:   Recent Labs   Lab 12/31/21  1200   *     Lactic Acid:   Recent Labs   Lab 12/31/21  1340   LACTATE 1.0     Urine Studies:   Recent Labs   Lab 12/31/21  1420   COLORU Yellow   APPEARANCEUA Hazy*   PHUR 6.0   SPECGRAV 1.010   PROTEINUA 2+*   GLUCUA Negative   KETONESU Negative   BILIRUBINUA Negative   OCCULTUA 2+*   NITRITE Positive*   UROBILINOGEN Negative   LEUKOCYTESUR 3+*   RBCUA 6*   WBCUA >100*   BACTERIA Many*   SQUAMEPITHEL 0   HYALINECASTS 5*       Significant Imaging: I have reviewed all pertinent imaging results/findings within the past 24 hours.  EKG: I have reviewed all pertinent results/findings within the past 24 hours and my personal findings are: NSR, LVH, Lateral T wave abnormality, no acute changes from previous ekg

## 2021-12-31 NOTE — H&P
"[facility] Medicine  History & Physical    DOS: 12/31/2021  2:17 PM      Patient Name: Sakshi Hess  MRN: [id number]  Patient Class: IP- Inpatient  Admission Date: 12/31/2021  Attending Physician: Dr. Perez  Primary Care Provider: Wenceslao Deras NP         Patient information was obtained from patient and ER records.     Subjective:     Principal Problem:Acute hypoxemic respiratory failure    Chief Complaint:   Chief Complaint   Patient presents with    Fever    Cough    COVID-19 Concerns        HPI: Ms. Hess is a 66 year old female with a history of HTN, CAD, NIDDM2, COPD, MIRA with CPAP, and recent rt shoulder rotator cuff repair about 2 weeks ago who presents today with complaints of fever, Tmax 102.9, nonproductive cough, headache, myalgias, arthralgias, nausea, urinary frequency, and flank pain. She denies sore throat, sinus congestion, chest pain, V/D, dizziness, or LOC. She states she has been taking azo as she feels a UTI coming on. She is unsure if she required a santana during her recent surgery. She has been doing PT at home d/t insurance and transportation restraints. She states her surgeon recommended she stop drinking sodas and start drinking more water to encourage better healing of her shoulder. She then tells me that the last time she drank a lot of water "I went into kidney failure. I feel like like my kidneys and lungs hurt, and I know it's because I've been drinking a lot of water." She reports her oxygen level has been running in the mid to upper 80s at home. She is not vaccinated for covid and has never tested positive for covid in the past. She lives with her daughter who is a nurse. In the ED, she was febrile to 102.9, Tachycardic 105, and mildly hypoxic at 91% on room air that improved with O2@2L PNC. She was negative for flu and covid, but CXR shows bilat ground glass opacities typical of viral vs. Atypical pna.       Past Medical History:   Diagnosis " Date    Arthritis     COPD (chronic obstructive pulmonary disease)     Coronary artery disease     Hypertension     Sleep apnea     use CPAP at night        Past Surgical History:   Procedure Laterality Date    CORONARY ANGIOGRAPHY N/A 2/15/2021    Procedure: ANGIOGRAM, CORONARY ARTERY;  Surgeon: Aidan Buchanan MD;  Location: [facility] CATH/EP LAB;  Service: Cardiology;  Laterality: N/A;    CORONARY ARTERY BYPASS GRAFT      CORONARY BYPASS GRAFT ANGIOGRAPHY  2/15/2021    Procedure: Bypass graft study;  Surgeon: Aidan Buchanan MD;  Location: [facility] CATH/EP LAB;  Service: Cardiology;;    HYSTERECTOMY      JOINT REPLACEMENT      bilateral knee replacement     LEFT HEART CATHETERIZATION Left 2/15/2021    Procedure: Left heart cath;  Surgeon: Aidan Buchanan MD;  Location: [facility] CATH/EP LAB;  Service: Cardiology;  Laterality: Left;       Review of patient's allergies indicates:   Allergen Reactions    Metronidazole Other (See Comments)     DISORIENTED       Statins-hmg-coa reductase inhibitors Tinitus     Joint pain       No current facility-administered medications on file prior to encounter.     Current Outpatient Medications on File Prior to Encounter   Medication Sig    acetaminophen (TYLENOL) 500 MG tablet Take 500 mg by mouth as needed.     allopurinol (ZYLOPRIM) 300 MG tablet Take 300 mg by mouth once daily.    aspirin (ECOTRIN) 81 MG EC tablet Take 1 tablet (81 mg total) by mouth once daily.    aspirin-acetaminophen-caffeine (GOODY'S EXTRA STRENGTH) 500-325-65 mg PwPk Take 1 Dose by mouth as needed.    diclofenac sodium (VOLTAREN) 1 % Gel Apply 1 application topically 4 (four) times daily.    esomeprazole (NEXIUM) 20 MG capsule Take 20 mg by mouth before breakfast.    hydroCHLOROthiazide (MICROZIDE) 12.5 mg capsule Take 12.5 mg by mouth once daily.    losartan (COZAAR) 25 MG tablet Take 25 mg by mouth once daily.    metFORMIN (GLUCOPHAGE-XR) 500 MG ER 24hr tablet Take 500 mg by mouth once daily.     methocarbamoL (ROBAXIN) 750 MG Tab Take 1,500 mg by mouth 3 (three) times daily.    ondansetron (ZOFRAN) 4 MG tablet Take 1 tablet by mouth every 8 (eight) hours as needed.    oxyCODONE (ROXICODONE) 15 MG Tab Take 15 mg by mouth every 6 (six) hours as needed.    traZODone (DESYREL) 50 MG tablet Take 50 mg by mouth nightly.    albuterol (ACCUNEB) 1.25 mg/3 mL nebulizer solution Take 1 ampule by nebulization every 8 (eight) hours as needed.    albuterol (PROVENTIL/VENTOLIN HFA) 90 mcg/actuation inhaler 2 puffs every 4 hours as needed for cough, wheeze, or shortness of breath (Patient taking differently: Inhale 2 puffs into the lungs every 4 (four) hours as needed. 2 puffs every 4 hours as needed for cough, wheeze, or shortness of breath)    azithromycin (ZITHROMAX) 500 MG tablet TAKE ONE TABLET BY MOUTH DAILY FOR YELLOW MUCOS; REPEAT IF NECESSARY (Patient taking differently: Take 500 mg by mouth once daily. TAKE ONE TABLET BY MOUTH DAILY FOR YELLOW MUCOS; REPEAT IF NECESSARY)    busPIRone (BUSPAR) 7.5 MG tablet Take 7.5 mg by mouth 2 (two) times a day.    clopidogreL (PLAVIX) 75 mg tablet Take 75 mg by mouth once daily.    fluticasone-salmeterol 230-21 mcg/dose (ADVAIR HFA) 230-21 mcg/actuation HFAA inhaler Inhale 2 puffs into the lungs 2 (two) times daily. Controller    isosorbide mononitrate (IMDUR) 30 MG 24 hr tablet Take 1 tablet (30 mg total) by mouth once daily.    levalbuterol (XOPENEX HFA) 45 mcg/actuation inhaler Inhale 1-2 puffs into the lungs every 4 (four) hours as needed for Wheezing or Shortness of Breath.    metoprolol succinate (TOPROL-XL) 25 MG 24 hr tablet Take 25 mg by mouth once daily.    nitroGLYCERIN (NITROSTAT) 0.4 MG SL tablet Place 1 tablet (0.4 mg total) under the tongue every 5 (five) minutes as needed for Chest pain.    oxyCODONE-acetaminophen (PERCOCET)  mg per tablet Take 1 tablet by mouth every 6 (six) hours as needed.     predniSONE (DELTASONE) 20 MG tablet 3  for 3 days then 2 for 3 days then one for 3 days and repeat for breathing problems (Patient taking differently: Take by mouth once daily. 3 for 3 days then 2 for 3 days then one for 3 days and repeat for breathing problems)    REPATHA SURECLICK 140 mg/mL PnIj Inject 140 mg into the skin every 14 (fourteen) days.    SYMBICORT 160-4.5 mcg/actuation HFAA Inhale 2 puffs into the lungs every 12 (twelve) hours.    [DISCONTINUED] b complex vitamins (B COMPLEX 1) tablet Take 1 tablet by mouth once daily.     [DISCONTINUED] carvedilol (COREG) 6.25 MG tablet Take 1 tablet (6.25 mg total) by mouth 2 (two) times daily.    [DISCONTINUED] cyclobenzaprine (FLEXERIL) 5 MG tablet Take 5 mg by mouth 2 (two) times daily as needed.    [DISCONTINUED] omeprazole (PRILOSEC) 40 MG capsule Take 40 mg by mouth every morning.     [DISCONTINUED] valsartan (DIOVAN) 160 MG tablet Take 160 mg by mouth once daily.     Family History     Problem Relation (Age of Onset)    Pulmonary embolism Mother        Tobacco Use    Smoking status: Never Smoker    Smokeless tobacco: Not on file   Substance and Sexual Activity    Alcohol use: No    Drug use: No    Sexual activity: Not Currently     Review of Systems   Constitutional: Positive for chills, diaphoresis, fatigue and fever. Negative for activity change, appetite change and unexpected weight change.   HENT: Negative for congestion, ear pain, facial swelling, hearing loss, sore throat and trouble swallowing.    Eyes: Negative for pain and discharge.   Respiratory: Positive for cough, chest tightness and shortness of breath. Negative for wheezing.    Cardiovascular: Negative for chest pain, palpitations and leg swelling.   Gastrointestinal: Positive for nausea. Negative for abdominal pain, blood in stool, diarrhea and vomiting.   Endocrine: Negative for polydipsia, polyphagia and polyuria.   Genitourinary: Positive for frequency. Negative for difficulty urinating, dysuria, flank pain and  urgency.   Musculoskeletal: Positive for back pain. Negative for arthralgias, joint swelling, neck pain and neck stiffness.   Skin: Negative for rash and wound.   Allergic/Immunologic: Negative for environmental allergies and immunocompromised state.   Neurological: Positive for headaches. Negative for dizziness, seizures, syncope, speech difficulty, weakness, light-headedness and numbness.   Hematological: Negative for adenopathy.   Psychiatric/Behavioral: Negative for sleep disturbance and suicidal ideas. The patient is not nervous/anxious.    All other systems reviewed and are negative.    Objective:     Vital Signs (Most Recent):  Temp: (!) 102.9 °F (39.4 °C) (12/31/21 1157)  Pulse: 102 (12/31/21 1201)  Resp: 19 (12/31/21 1305)  BP: 136/62 (12/31/21 1201)  SpO2: 97 % (12/31/21 1201) Vital Signs (24h Range):  Temp:  [102.9 °F (39.4 °C)] 102.9 °F (39.4 °C)  Pulse:  [102-108] 102  Resp:  [19-20] 19  SpO2:  [91 %-97 %] 97 %  BP: (136-142)/(62-72) 136/62        There is no height or weight on file to calculate BMI.    Physical Exam  Vitals and nursing note reviewed.   Constitutional:       Appearance: She is obese.   HENT:      Head: Normocephalic and atraumatic.      Nose: Nose normal.      Mouth/Throat:      Mouth: Mucous membranes are moist.      Pharynx: Oropharynx is clear.   Eyes:      Extraocular Movements: Extraocular movements intact.      Pupils: Pupils are equal, round, and reactive to light.   Cardiovascular:      Rate and Rhythm: Normal rate and regular rhythm.      Pulses: Normal pulses.   Pulmonary:      Effort: Pulmonary effort is normal.      Breath sounds: Wheezing and rhonchi present.      Comments: Mild inspiratory wheezes throughout with rhonchi  Abdominal:      General: Bowel sounds are normal.      Palpations: Abdomen is soft.   Musculoskeletal:      Cervical back: Normal range of motion and neck supple.      Comments: Rt shoulder immobilized    Skin:     General: Skin is warm and dry.       Capillary Refill: Capillary refill takes less than 2 seconds.   Neurological:      General: No focal deficit present.      Mental Status: She is alert and oriented to person, place, and time.   Psychiatric:         Mood and Affect: Mood normal.         Behavior: Behavior normal.           CRANIAL NERVES     CN III, IV, VI   Pupils are equal, round, and reactive to light.       Significant Labs:   All pertinent labs within the past 24 hours have been reviewed.  CBC:   Recent Labs   Lab 12/31/21  1200   WBC 12.76*   HGB 12.4   HCT 37.5        CMP:   Recent Labs   Lab 12/31/21  1200      K 3.8      CO2 23   *   BUN 15   CREATININE 0.9   CALCIUM 9.0   PROT 6.9   ALBUMIN 3.8   BILITOT 1.4*   ALKPHOS 73   AST 18   ALT 9*   ANIONGAP 13   EGFRNONAA >60.0     Cardiac Markers:   Recent Labs   Lab 12/31/21  1200   *     Lactic Acid:   Recent Labs   Lab 12/31/21  1340   LACTATE 1.0     Urine Studies:   Recent Labs   Lab 12/31/21  1420   COLORU Yellow   APPEARANCEUA Hazy*   PHUR 6.0   SPECGRAV 1.010   PROTEINUA 2+*   GLUCUA Negative   KETONESU Negative   BILIRUBINUA Negative   OCCULTUA 2+*   NITRITE Positive*   UROBILINOGEN Negative   LEUKOCYTESUR 3+*   RBCUA 6*   WBCUA >100*   BACTERIA Many*   SQUAMEPITHEL 0   HYALINECASTS 5*       Significant Imaging: I have reviewed all pertinent imaging results/findings within the past 24 hours.  EKG: I have reviewed all pertinent results/findings within the past 24 hours and my personal findings are: NSR, LVH, Lateral T wave abnormality, no acute changes from previous ekg    Assessment/Plan:     Active Hospital Problems    Diagnosis    *Acute hypoxemic respiratory failure    Multifocal pneumonia    Sepsis    UTI (urinary tract infection)    Leukocytosis    Obstructive sleep apnea syndrome    CKD (chronic kidney disease), stage III    GERD (gastroesophageal reflux disease)    COPD (chronic obstructive pulmonary disease)    Moderate persistent asthma  without complication    Coronary artery disease    Hypertension     PLAN:  Admit to med/tele  Rocephin/azithro for now  Check procal   Resp infection panel PCR  Get inflammatory markers, she is negative for covid, but CXR is suspicious for covid pna   Nebs  IS  Blood/urine/resp culture  IV fluids at 130mL/hr, no hypotension at this time  Antipyretics   CPAP qhs  Med rec with multiple duplicates from the ED, I have held meds that are in the same class.   Hold metformin   accuchecks ACHS with low dose ISS  Consider adding steroids if procal negative/blood cultures negative given COPD/asthma  VTE Risk Mitigation (From admission, onward)         Ordered     IP VTE HIGH RISK PATIENT  Once         12/31/21 1404     Place sequential compression device  Until discontinued         12/31/21 1404                   Alicia Lyons NP  Department of Hospital Medicine   [facility] [place]

## 2022-01-01 PROBLEM — D72.829 LEUKOCYTOSIS: Status: RESOLVED | Noted: 2021-12-31 | Resolved: 2022-01-01

## 2022-01-01 PROBLEM — J96.01 ACUTE HYPOXEMIC RESPIRATORY FAILURE: Status: RESOLVED | Noted: 2021-12-31 | Resolved: 2022-01-01

## 2022-01-01 LAB
ANION GAP SERPL CALC-SCNC: 12 MMOL/L (ref 8–16)
BASOPHILS # BLD AUTO: 0.03 K/UL (ref 0–0.2)
BASOPHILS NFR BLD: 0.3 % (ref 0–1.9)
BUN SERPL-MCNC: 17 MG/DL (ref 8–23)
CALCIUM SERPL-MCNC: 8.6 MG/DL (ref 8.7–10.5)
CHLORIDE SERPL-SCNC: 104 MMOL/L (ref 95–110)
CO2 SERPL-SCNC: 26 MMOL/L (ref 23–29)
CREAT SERPL-MCNC: 1 MG/DL (ref 0.5–1.4)
DIFFERENTIAL METHOD: ABNORMAL
EOSINOPHIL # BLD AUTO: 0.1 K/UL (ref 0–0.5)
EOSINOPHIL NFR BLD: 0.6 % (ref 0–8)
ERYTHROCYTE [DISTWIDTH] IN BLOOD BY AUTOMATED COUNT: 13.3 % (ref 11.5–14.5)
EST. GFR  (AFRICAN AMERICAN): >60 ML/MIN/1.73 M^2
EST. GFR  (NON AFRICAN AMERICAN): 58.8 ML/MIN/1.73 M^2
GLUCOSE SERPL-MCNC: 121 MG/DL (ref 70–110)
GLUCOSE SERPL-MCNC: 131 MG/DL (ref 70–110)
GLUCOSE SERPL-MCNC: 143 MG/DL (ref 70–110)
GLUCOSE SERPL-MCNC: 145 MG/DL (ref 70–110)
GLUCOSE SERPL-MCNC: 150 MG/DL (ref 70–110)
HCT VFR BLD AUTO: 33.3 % (ref 37–48.5)
HGB BLD-MCNC: 10.6 G/DL (ref 12–16)
IMM GRANULOCYTES # BLD AUTO: 0.04 K/UL (ref 0–0.04)
IMM GRANULOCYTES NFR BLD AUTO: 0.3 % (ref 0–0.5)
LYMPHOCYTES # BLD AUTO: 1.9 K/UL (ref 1–4.8)
LYMPHOCYTES NFR BLD: 16.4 % (ref 18–48)
MAGNESIUM SERPL-MCNC: 1.7 MG/DL (ref 1.6–2.6)
MCH RBC QN AUTO: 27.9 PG (ref 27–31)
MCHC RBC AUTO-ENTMCNC: 31.8 G/DL (ref 32–36)
MCV RBC AUTO: 88 FL (ref 82–98)
MONOCYTES # BLD AUTO: 1.3 K/UL (ref 0.3–1)
MONOCYTES NFR BLD: 11.3 % (ref 4–15)
NEUTROPHILS # BLD AUTO: 8.2 K/UL (ref 1.8–7.7)
NEUTROPHILS NFR BLD: 71.1 % (ref 38–73)
NRBC BLD-RTO: 0 /100 WBC
PLATELET # BLD AUTO: 206 K/UL (ref 150–450)
PMV BLD AUTO: 9.4 FL (ref 9.2–12.9)
POTASSIUM SERPL-SCNC: 3.3 MMOL/L (ref 3.5–5.1)
RBC # BLD AUTO: 3.8 M/UL (ref 4–5.4)
SODIUM SERPL-SCNC: 142 MMOL/L (ref 136–145)
WBC # BLD AUTO: 11.51 K/UL (ref 3.9–12.7)

## 2022-01-01 PROCEDURE — 25000242 PHARM REV CODE 250 ALT 637 W/ HCPCS: Performed by: NURSE PRACTITIONER

## 2022-01-01 PROCEDURE — 83735 ASSAY OF MAGNESIUM: CPT | Performed by: NURSE PRACTITIONER

## 2022-01-01 PROCEDURE — 99900031 HC PATIENT EDUCATION (STAT)

## 2022-01-01 PROCEDURE — 63600175 PHARM REV CODE 636 W HCPCS: Performed by: NURSE PRACTITIONER

## 2022-01-01 PROCEDURE — 94660 CPAP INITIATION&MGMT: CPT

## 2022-01-01 PROCEDURE — 36415 COLL VENOUS BLD VENIPUNCTURE: CPT | Performed by: NURSE PRACTITIONER

## 2022-01-01 PROCEDURE — 25000003 PHARM REV CODE 250: Performed by: NURSE PRACTITIONER

## 2022-01-01 PROCEDURE — 94799 UNLISTED PULMONARY SVC/PX: CPT

## 2022-01-01 PROCEDURE — 12000002 HC ACUTE/MED SURGE SEMI-PRIVATE ROOM

## 2022-01-01 PROCEDURE — 63600175 PHARM REV CODE 636 W HCPCS: Performed by: HOSPITALIST

## 2022-01-01 PROCEDURE — 94761 N-INVAS EAR/PLS OXIMETRY MLT: CPT

## 2022-01-01 PROCEDURE — 94640 AIRWAY INHALATION TREATMENT: CPT

## 2022-01-01 PROCEDURE — 25000003 PHARM REV CODE 250: Performed by: FAMILY MEDICINE

## 2022-01-01 PROCEDURE — 99900035 HC TECH TIME PER 15 MIN (STAT)

## 2022-01-01 PROCEDURE — 25000003 PHARM REV CODE 250: Performed by: HOSPITALIST

## 2022-01-01 PROCEDURE — 80048 BASIC METABOLIC PNL TOTAL CA: CPT | Performed by: NURSE PRACTITIONER

## 2022-01-01 PROCEDURE — 85025 COMPLETE CBC W/AUTO DIFF WBC: CPT | Performed by: NURSE PRACTITIONER

## 2022-01-01 PROCEDURE — 63700000 PHARM REV CODE 250 ALT 637 W/O HCPCS: Performed by: HOSPITALIST

## 2022-01-01 RX ORDER — HYDROCODONE BITARTRATE AND ACETAMINOPHEN 5; 325 MG/1; MG/1
1 TABLET ORAL ONCE
Status: COMPLETED | OUTPATIENT
Start: 2022-01-01 | End: 2022-01-01

## 2022-01-01 RX ORDER — AZITHROMYCIN 250 MG/1
250 TABLET, FILM COATED ORAL DAILY
Status: DISCONTINUED | OUTPATIENT
Start: 2022-01-01 | End: 2022-01-02 | Stop reason: HOSPADM

## 2022-01-01 RX ORDER — HYDROCODONE BITARTRATE AND ACETAMINOPHEN 5; 325 MG/1; MG/1
1 TABLET ORAL ONCE
Status: DISCONTINUED | OUTPATIENT
Start: 2022-01-01 | End: 2022-01-02 | Stop reason: HOSPADM

## 2022-01-01 RX ORDER — MORPHINE SULFATE 2 MG/ML
2 INJECTION, SOLUTION INTRAMUSCULAR; INTRAVENOUS EVERY 6 HOURS PRN
Status: DISCONTINUED | OUTPATIENT
Start: 2022-01-01 | End: 2022-01-02

## 2022-01-01 RX ORDER — POTASSIUM CHLORIDE 20 MEQ/1
40 TABLET, EXTENDED RELEASE ORAL ONCE
Status: COMPLETED | OUTPATIENT
Start: 2022-01-01 | End: 2022-01-01

## 2022-01-01 RX ORDER — LANOLIN ALCOHOL/MO/W.PET/CERES
800 CREAM (GRAM) TOPICAL ONCE
Status: DISCONTINUED | OUTPATIENT
Start: 2022-01-01 | End: 2022-01-02 | Stop reason: HOSPADM

## 2022-01-01 RX ADMIN — ASPIRIN 81 MG: 81 TABLET, COATED ORAL at 09:01

## 2022-01-01 RX ADMIN — HYDROCODONE BITARTRATE AND ACETAMINOPHEN 1 TABLET: 5; 325 TABLET ORAL at 03:01

## 2022-01-01 RX ADMIN — OXYCODONE HYDROCHLORIDE 15 MG: 5 TABLET ORAL at 06:01

## 2022-01-01 RX ADMIN — ONDANSETRON 4 MG: 2 INJECTION INTRAMUSCULAR; INTRAVENOUS at 03:01

## 2022-01-01 RX ADMIN — LEVALBUTEROL HYDROCHLORIDE 1.25 MG: 1.25 SOLUTION, CONCENTRATE RESPIRATORY (INHALATION) at 01:01

## 2022-01-01 RX ADMIN — TRAZODONE HYDROCHLORIDE 50 MG: 50 TABLET ORAL at 09:01

## 2022-01-01 RX ADMIN — BUSPIRONE HYDROCHLORIDE 2.5 MG: 5 TABLET ORAL at 09:01

## 2022-01-01 RX ADMIN — CEFTRIAXONE 2 G: 2 INJECTION, SOLUTION INTRAVENOUS at 09:01

## 2022-01-01 RX ADMIN — MORPHINE SULFATE 2 MG: 2 INJECTION, SOLUTION INTRAMUSCULAR; INTRAVENOUS at 04:01

## 2022-01-01 RX ADMIN — ACETAMINOPHEN 650 MG: 325 TABLET, FILM COATED ORAL at 03:01

## 2022-01-01 RX ADMIN — OXYCODONE HYDROCHLORIDE 15 MG: 5 TABLET ORAL at 12:01

## 2022-01-01 RX ADMIN — LEVALBUTEROL HYDROCHLORIDE 1.25 MG: 1.25 SOLUTION, CONCENTRATE RESPIRATORY (INHALATION) at 07:01

## 2022-01-01 RX ADMIN — PANTOPRAZOLE SODIUM 40 MG: 40 TABLET, DELAYED RELEASE ORAL at 05:01

## 2022-01-01 RX ADMIN — BUSPIRONE HYDROCHLORIDE 5 MG: 5 TABLET ORAL at 09:01

## 2022-01-01 RX ADMIN — POTASSIUM CHLORIDE 40 MEQ: 20 TABLET, EXTENDED RELEASE ORAL at 09:01

## 2022-01-01 RX ADMIN — SENNOSIDES AND DOCUSATE SODIUM 1 TABLET: 8.6; 5 TABLET ORAL at 09:01

## 2022-01-01 RX ADMIN — FLUTICASONE FUROATE AND VILANTEROL TRIFENATATE 1 PUFF: 200; 25 POWDER RESPIRATORY (INHALATION) at 07:01

## 2022-01-01 RX ADMIN — MORPHINE SULFATE 2 MG: 2 INJECTION, SOLUTION INTRAMUSCULAR; INTRAVENOUS at 10:01

## 2022-01-01 RX ADMIN — ISOSORBIDE MONONITRATE 30 MG: 30 TABLET, EXTENDED RELEASE ORAL at 09:01

## 2022-01-01 RX ADMIN — AZITHROMYCIN MONOHYDRATE 250 MG: 250 TABLET ORAL at 09:01

## 2022-01-01 NOTE — RESPIRATORY THERAPY
12/31/21 2028   Patient Assessment/Suction   Level of Consciousness (AVPU) alert   Respiratory Effort Normal;Unlabored   Expansion/Accessory Muscles/Retractions no use of accessory muscles   All Lung Fields Breath Sounds equal bilaterally;clear   Rhythm/Pattern, Respiratory unlabored;pattern regular   Cough Frequency no cough   PRE-TX-O2   O2 Device (Oxygen Therapy) nasal cannula   $ Is the patient on Low Flow Oxygen? Yes   Oxygen Concentration (%) 2   SpO2 98 %   Pulse Oximetry Type Intermittent   $ Pulse Oximetry - Multiple Charge Pulse Oximetry - Multiple   Pulse 101   Resp 18   Aerosol Therapy   $ Aerosol Therapy Charges Aerosol Treatment   Daily Review of Necessity (SVN) completed   Respiratory Treatment Status (SVN) given   Treatment Route (SVN) mask   Patient Position (SVN) HOB elevated   Post Treatment Assessment (SVN) breath sounds unchanged   Signs of Intolerance (SVN) none   Breath Sounds Post-Respiratory Treatment   Post-treatment Heart Rate (beats/min) 105   Post-treatment Resp Rate (breaths/min) 18   Ready to Wean/Extubation Screen   FIO2<=50 (chart decimal) 0.02   Education   $ Education Bronchodilator;15 min   Respiratory Evaluation   $ Care Plan Tech Time 15 min   $ Eval/Re-eval Charges Evaluation   Evaluation For New Orders

## 2022-01-01 NOTE — CARE UPDATE
01/01/22 0713   Patient Assessment/Suction   Level of Consciousness (AVPU) alert   All Lung Fields Breath Sounds wheezes, expiratory   PRE-TX-O2   O2 Device (Oxygen Therapy) room air   SpO2 95 %   Pulse Oximetry Type Intermittent   $ Pulse Oximetry - Multiple Charge Pulse Oximetry - Multiple   Pulse 61   Resp 15   Aerosol Therapy   $ Aerosol Therapy Charges Aerosol Treatment   Daily Review of Necessity (SVN) completed   Respiratory Treatment Status (SVN) given   Treatment Route (SVN) mask   Patient Position (SVN) semi-Portillo's   Post Treatment Assessment (SVN) increased aeration   Signs of Intolerance (SVN) none   Inhaler   $ Inhaler Charges MDI (Metered Dose Inahler) Treatment   Daily Review of Necessity (Inhaler) completed   Respiratory Treatment Status (Inhaler) given   Treatment Route (Inhaler) mouthpiece   Patient Position (Inhaler) semi-Portillo's   Post Treatment Assessment (Inhaler) breath sounds unchanged   Signs of Intolerance (Inhaler) none   Breath Sounds Post-Respiratory Treatment   Post-treatment Heart Rate (beats/min) 60   Post-treatment Resp Rate (breaths/min) 15   Skin Integrity   $ Wound Care Tech Time 15 min   Area Observed Bridge of nose   Skin Appearance without discoloration   Preset CPAP/BiPAP Settings   $ CPAP/BiPAP Daily Charge BiPAP/CPAP Daily   Education   $ Education Bronchodilator;15 min   Respiratory Evaluation   $ Care Plan Tech Time 15 min

## 2022-01-01 NOTE — PLAN OF CARE
[facility]  Initial Discharge Assessment       Primary Care Provider: Wenceslao Deras NP    Admission Diagnosis: Pneumonia due to infectious organism, unspecified laterality, unspecified part of lung [J18.9]    Admission Date: 12/31/2021  Expected Discharge Date: 1/2/2022    Discharge Barriers Identified: None    Payor: [employer] MANAGED MEDICARE / Plan: [employer] MEDICARE PPO / Product Type: Medicare Advantage /     Extended Emergency Contact Information  Primary Emergency Contact: Gabriella Valladares   [place]  Mobile Phone: [phone]  Relation: Daughter    Discharge Plan A: Home with family  Discharge Plan B: Home with family      [facility]. - [place], [place] - [address]  [address]  [place] [address]  Phone: [phone] Fax: [phone]      Initial Assessment (most recent)     Adult Discharge Assessment - 01/01/22 1120        Discharge Assessment    Assessment Type Discharge Planning Assessment     Confirmed/corrected address, phone number and insurance Yes     Confirmed Demographics Correct on Facesheet     Source of Information patient;health record     Reason For Admission Acute hypoxemic respiratory failure     Lives With sibling(s)     Facility Arrived From: Home     Do you expect to return to your current living situation? Yes     Do you have help at home or someone to help you manage your care at home? Yes     Who are your caregiver(s) and their phone number(s)? Gabriella Valladares (Daughter)   [phone] (Mobile)     Prior to hospitilization cognitive status: Alert/Oriented     Current cognitive status: Alert/Oriented     Walking or Climbing Stairs Difficulty ambulation difficulty, requires equipment     Mobility Management cane     Dressing/Bathing Difficulty none     Home Accessibility wheelchair accessible     Home Layout Able to live on 1st floor     Equipment Currently Used at Home cane, quad;walker, rolling;CPAP;raised toilet;grab bar     Readmission  within 30 days? No     Patient currently being followed by outpatient case management? No     Do you currently have service(s) that help you manage your care at home? No     Do you take prescription medications? Yes     Do you have prescription coverage? Yes     Coverage [employer]     Do you have any problems affording any of your prescribed medications? No     Is the patient taking medications as prescribed? yes     Who is going to help you get home at discharge? Gabriella Valladares (Daughter)   [phone] (Mobile)     How do you get to doctors appointments? car, drives self     Are you on dialysis? No     Do you take coumadin? No     Discharge Plan A Home with family     Discharge Plan B Home with family     DME Needed Upon Discharge  none     Discharge Plan discussed with: Patient     Discharge Barriers Identified None

## 2022-01-01 NOTE — PROGRESS NOTES
[facility] Medicine  Progress Note    Patient name: Sakshi Hess  MRN: [id number]  Admit Date: 12/31/2021   LOS: 1 day     SUBJECTIVE:     Principal problem: UTI (urinary tract infection)    Interval History:  Patient was seen and examined bedside.  Reviewed admitting provider's note and plan.  She complains of bilateral lower back pain and pain at recent surgical site.  Afebrile for last 24 hours.  Urine culture is growing Gram-negative organism.  Denies any cough, shortness of breath.    Scheduled Meds:   allopurinoL  300 mg Oral Daily    aspirin  81 mg Oral QHS    azithromycin  250 mg Oral Daily    busPIRone  2.5 mg Oral BID    And    busPIRone  5 mg Oral BID    cefTRIAXone (ROCEPHIN) IVPB  2 g Intravenous Q24H    clopidogreL  75 mg Oral Daily    fluticasone furoate-vilanteroL  1 puff Inhalation Daily    HYDROcodone-acetaminophen  1 tablet Oral Once    isosorbide mononitrate  30 mg Oral Daily    levalbuterol  1.25 mg Nebulization TID WAKE    magnesium oxide  800 mg Oral Once    metoprolol succinate  25 mg Oral Daily    pantoprazole  40 mg Oral Daily    senna-docusate 8.6-50 mg  1 tablet Oral BID    traZODone  50 mg Oral Nightly     Continuous Infusions:    PRN Meds:acetaminophen, calcium chloride IVPB, calcium chloride IVPB, calcium chloride IVPB, dextrose 50%, dextrose 50%, glucagon (human recombinant), glucose, glucose, ibuprofen, insulin aspart U-100, magnesium oxide, magnesium sulfate IVPB, magnesium sulfate IVPB, magnesium sulfate IVPB, magnesium sulfate IVPB, melatonin, methocarbamoL, morphine, naloxone, nitroGLYCERIN, ondansetron, oxyCODONE, polyethylene glycol, potassium chloride in water, potassium chloride in water, potassium chloride in water, potassium chloride in water, potassium chloride, potassium chloride, potassium chloride, potassium chloride, sodium chloride 0.9%    Review of patient's allergies indicates:   Allergen Reactions    Metronidazole Other  (See Comments)     DISORIENTED       Statins-hmg-coa reductase inhibitors Tinitus     Joint pain       Review of Systems: As per interval history    OBJECTIVE:     Vital Signs (Most Recent)  Temp: 97.9 °F (36.6 °C) (01/01/22 1130)  Pulse: 82 (01/01/22 1329)  Resp: 15 (01/01/22 1329)  BP: 107/61 (01/01/22 1130)  SpO2: 96 % (01/01/22 1329)    Vital Signs Range (Last 24H):  Temp:  [97.9 °F (36.6 °C)-98.7 °F (37.1 °C)]   Pulse:  []   Resp:  [15-20]   BP: ()/(59-85)   SpO2:  [94 %-98 %]     I & O (Last 24H):No intake or output data in the 24 hours ending 01/01/22 1434    Physical Exam:  General: Patient resting comfortably in no acute distress. Appears as stated age. Calm  Eyes: No conjunctival injection. No scleral icterus.  ENT: Hearing grossly intact. No discharge from ears. No nasal discharge.   Neck: Supple, trachea midline. No JVD  CVS: RRR. No LE edema BL  Lungs: CTA BL, no wheezing or crackles. Good breath sounds. No accessory muscle use. No acute respiratory distress  Abdomen:  Soft, nontender and nondistended.  No organomegaly  Neuro: AOx3. Moves all extremities. Follows commands. Responds appropriately   Skin:  No rash or erythema noted  Musculoskeletal:  Right shoulder immobilized    Laboratory:  I have reviewed all pertinent lab results within the past 24 hours.    Diagnostic Results:  Reviewed all imaging    ASSESSMENT/PLAN:     66-year-old lady with numerous medical problems and recent right shoulder rotator cuff repair 2 weeks ago presented with fever, myalgia and urinary frequency found to have UTI.  Clinically patient does not appear to have pneumonia.     Assessment:  Active Hospital Problems    Diagnosis  POA    *UTI (urinary tract infection) [N39.0]  Yes    Sepsis [A41.9]  Yes    Obstructive sleep apnea syndrome [G47.33]  Yes    CKD (chronic kidney disease), stage III [N18.30]  Yes     Chronic    GERD (gastroesophageal reflux disease) [K21.9]  Yes     Chronic    COPD (chronic  obstructive pulmonary disease) [J44.9]  Yes    Moderate persistent asthma without complication [J45.40]  Yes    Coronary artery disease [I25.10]  Yes    Hypertension [I10]  Yes      Resolved Hospital Problems    Diagnosis Date Resolved POA    Acute hypoxemic respiratory failure [J96.01] 01/01/2022 Yes    Leukocytosis [D72.829] 01/01/2022 Yes         Plan:   Overall presentation is concerning for UTI.  Patient has back pain.  Will obtain CT to rule out pyelonephritis.  Urine culture is growing Gram-negative organism  Deescalate antibiotics to Rocephin as clinically patient does not appear to have pneumonia  Stop IV fluids, encourage p.o. hydration  P.r.n. analgesia  Out of bed to chair  Incentive spirometer  Electrolyte replacement protocol  Continue current orders  Follow-up on cultures      VTE Risk Mitigation (From admission, onward)         Ordered     IP VTE HIGH RISK PATIENT  Once         12/31/21 1404     Place sequential compression device  Until discontinued         12/31/21 1404                  Department Hospital Medicine  [facility]  Matilda Aaron MD  Date of service: 01/01/2022

## 2022-01-01 NOTE — PLAN OF CARE
01/01/22 1120   Discharge Assessment   Assessment Type Discharge Planning Assessment   Confirmed/corrected address, phone number and insurance Yes   Confirmed Demographics Correct on Facesheet   Source of Information patient;health record   Reason For Admission Acute hypoxemic respiratory failure   Lives With sibling(s)   Facility Arrived From: Home   Do you expect to return to your current living situation? Yes   Do you have help at home or someone to help you manage your care at home? Yes   Who are your caregiver(s) and their phone number(s)? Gabriella Valladares (Daughter)   [phone] (Mobile)   Prior to hospitilization cognitive status: Alert/Oriented   Current cognitive status: Alert/Oriented   Walking or Climbing Stairs Difficulty ambulation difficulty, requires equipment   Mobility Management cane   Dressing/Bathing Difficulty none   Home Accessibility wheelchair accessible   Home Layout Able to live on 1st floor   Equipment Currently Used at Home cane, quad;walker, rolling;CPAP;raised toilet;grab bar   Readmission within 30 days? No   Patient currently being followed by outpatient case management? No   Do you currently have service(s) that help you manage your care at home? No   Do you take prescription medications? Yes   Do you have prescription coverage? Yes   Coverage [employer]   Do you have any problems affording any of your prescribed medications? No   Is the patient taking medications as prescribed? yes   Who is going to help you get home at discharge? Gabriella Valladares (Daughter)   [phone] (Mobile)   How do you get to doctors appointments? car, drives self   Are you on dialysis? No   Do you take coumadin? No   Discharge Plan A Home with family   Discharge Plan B Home with family   DME Needed Upon Discharge  none   Discharge Plan discussed with: Patient   Discharge Barriers Identified None

## 2022-01-01 NOTE — PLAN OF CARE
Problem: Adult Inpatient Plan of Care  Goal: Plan of Care Review  Outcome: Ongoing, Progressing  Goal: Patient-Specific Goal (Individualized)  Outcome: Ongoing, Progressing  Goal: Absence of Hospital-Acquired Illness or Injury  Outcome: Ongoing, Progressing  Goal: Optimal Comfort and Wellbeing  Outcome: Ongoing, Progressing  Goal: Readiness for Transition of Care  Outcome: Ongoing, Progressing     Problem: Bariatric Environmental Safety  Goal: Safety Maintained with Care  Outcome: Ongoing, Progressing     Problem: Adjustment to Illness (Sepsis/Septic Shock)  Goal: Optimal Coping  Outcome: Ongoing, Progressing     Problem: Bleeding (Sepsis/Septic Shock)  Goal: Absence of Bleeding  Outcome: Ongoing, Progressing     Problem: Glycemic Control Impaired (Sepsis/Septic Shock)  Goal: Blood Glucose Level Within Desired Range  Outcome: Ongoing, Progressing     Problem: Infection Progression (Sepsis/Septic Shock)  Goal: Absence of Infection Signs and Symptoms  Outcome: Ongoing, Progressing     Problem: Nutrition Impaired (Sepsis/Septic Shock)  Goal: Optimal Nutrition Intake  Outcome: Ongoing, Progressing     Problem: Fluid Imbalance (Pneumonia)  Goal: Fluid Balance  Outcome: Ongoing, Progressing     Problem: Infection (Pneumonia)  Goal: Resolution of Infection Signs and Symptoms  Outcome: Ongoing, Progressing     Problem: Respiratory Compromise (Pneumonia)  Goal: Effective Oxygenation and Ventilation  Outcome: Ongoing, Progressing

## 2022-01-02 VITALS
OXYGEN SATURATION: 97 % | TEMPERATURE: 99 F | RESPIRATION RATE: 18 BRPM | BODY MASS INDEX: 38.71 KG/M2 | HEART RATE: 78 BPM | SYSTOLIC BLOOD PRESSURE: 147 MMHG | HEIGHT: 61 IN | WEIGHT: 205 LBS | DIASTOLIC BLOOD PRESSURE: 94 MMHG

## 2022-01-02 PROBLEM — A41.9 SEPSIS: Status: RESOLVED | Noted: 2021-12-31 | Resolved: 2022-01-02

## 2022-01-02 PROBLEM — J45.40 MODERATE PERSISTENT ASTHMA WITHOUT COMPLICATION: Status: RESOLVED | Noted: 2019-10-15 | Resolved: 2022-01-02

## 2022-01-02 LAB
ANION GAP SERPL CALC-SCNC: 13 MMOL/L (ref 8–16)
BACTERIA UR CULT: ABNORMAL
BASOPHILS # BLD AUTO: 0.02 K/UL (ref 0–0.2)
BASOPHILS NFR BLD: 0.3 % (ref 0–1.9)
BUN SERPL-MCNC: 16 MG/DL (ref 8–23)
CALCIUM SERPL-MCNC: 8.8 MG/DL (ref 8.7–10.5)
CHLORIDE SERPL-SCNC: 104 MMOL/L (ref 95–110)
CO2 SERPL-SCNC: 24 MMOL/L (ref 23–29)
CREAT SERPL-MCNC: 1.1 MG/DL (ref 0.5–1.4)
DIFFERENTIAL METHOD: ABNORMAL
EOSINOPHIL # BLD AUTO: 0.1 K/UL (ref 0–0.5)
EOSINOPHIL NFR BLD: 1.6 % (ref 0–8)
ERYTHROCYTE [DISTWIDTH] IN BLOOD BY AUTOMATED COUNT: 13.3 % (ref 11.5–14.5)
EST. GFR  (AFRICAN AMERICAN): >60 ML/MIN/1.73 M^2
EST. GFR  (NON AFRICAN AMERICAN): 52.4 ML/MIN/1.73 M^2
GLUCOSE SERPL-MCNC: 115 MG/DL (ref 70–110)
GLUCOSE SERPL-MCNC: 123 MG/DL (ref 70–110)
GLUCOSE SERPL-MCNC: 125 MG/DL (ref 70–110)
HCT VFR BLD AUTO: 34.1 % (ref 37–48.5)
HGB BLD-MCNC: 11 G/DL (ref 12–16)
IMM GRANULOCYTES # BLD AUTO: 0.03 K/UL (ref 0–0.04)
IMM GRANULOCYTES NFR BLD AUTO: 0.4 % (ref 0–0.5)
LYMPHOCYTES # BLD AUTO: 1.2 K/UL (ref 1–4.8)
LYMPHOCYTES NFR BLD: 15.5 % (ref 18–48)
MAGNESIUM SERPL-MCNC: 1.8 MG/DL (ref 1.6–2.6)
MCH RBC QN AUTO: 28.7 PG (ref 27–31)
MCHC RBC AUTO-ENTMCNC: 32.3 G/DL (ref 32–36)
MCV RBC AUTO: 89 FL (ref 82–98)
MONOCYTES # BLD AUTO: 0.9 K/UL (ref 0.3–1)
MONOCYTES NFR BLD: 12.6 % (ref 4–15)
NEUTROPHILS # BLD AUTO: 5.2 K/UL (ref 1.8–7.7)
NEUTROPHILS NFR BLD: 69.6 % (ref 38–73)
NRBC BLD-RTO: 0 /100 WBC
PLATELET # BLD AUTO: 206 K/UL (ref 150–450)
PMV BLD AUTO: 9.5 FL (ref 9.2–12.9)
POTASSIUM SERPL-SCNC: 4.2 MMOL/L (ref 3.5–5.1)
RBC # BLD AUTO: 3.83 M/UL (ref 4–5.4)
SODIUM SERPL-SCNC: 141 MMOL/L (ref 136–145)
WBC # BLD AUTO: 7.49 K/UL (ref 3.9–12.7)

## 2022-01-02 PROCEDURE — 25000242 PHARM REV CODE 250 ALT 637 W/ HCPCS: Performed by: NURSE PRACTITIONER

## 2022-01-02 PROCEDURE — 85025 COMPLETE CBC W/AUTO DIFF WBC: CPT | Performed by: NURSE PRACTITIONER

## 2022-01-02 PROCEDURE — 25000003 PHARM REV CODE 250: Performed by: FAMILY MEDICINE

## 2022-01-02 PROCEDURE — 63700000 PHARM REV CODE 250 ALT 637 W/O HCPCS: Performed by: HOSPITALIST

## 2022-01-02 PROCEDURE — 83735 ASSAY OF MAGNESIUM: CPT | Performed by: NURSE PRACTITIONER

## 2022-01-02 PROCEDURE — 99900031 HC PATIENT EDUCATION (STAT)

## 2022-01-02 PROCEDURE — 63600175 PHARM REV CODE 636 W HCPCS: Performed by: HOSPITALIST

## 2022-01-02 PROCEDURE — 36415 COLL VENOUS BLD VENIPUNCTURE: CPT | Performed by: NURSE PRACTITIONER

## 2022-01-02 PROCEDURE — 94761 N-INVAS EAR/PLS OXIMETRY MLT: CPT

## 2022-01-02 PROCEDURE — 25500020 PHARM REV CODE 255: Performed by: HOSPITALIST

## 2022-01-02 PROCEDURE — 94640 AIRWAY INHALATION TREATMENT: CPT

## 2022-01-02 PROCEDURE — 27000221 HC OXYGEN, UP TO 24 HOURS

## 2022-01-02 PROCEDURE — 25000003 PHARM REV CODE 250: Performed by: NURSE PRACTITIONER

## 2022-01-02 PROCEDURE — 99900035 HC TECH TIME PER 15 MIN (STAT)

## 2022-01-02 PROCEDURE — 80048 BASIC METABOLIC PNL TOTAL CA: CPT | Performed by: NURSE PRACTITIONER

## 2022-01-02 PROCEDURE — 94660 CPAP INITIATION&MGMT: CPT

## 2022-01-02 RX ORDER — LEVOFLOXACIN 500 MG/1
500 TABLET, FILM COATED ORAL DAILY
Qty: 5 TABLET | Refills: 0 | Status: SHIPPED | OUTPATIENT
Start: 2022-01-03 | End: 2022-01-08

## 2022-01-02 RX ORDER — LEVOFLOXACIN 500 MG/1
500 TABLET, FILM COATED ORAL DAILY
Status: DISCONTINUED | OUTPATIENT
Start: 2022-01-02 | End: 2022-01-02 | Stop reason: HOSPADM

## 2022-01-02 RX ADMIN — AZITHROMYCIN MONOHYDRATE 250 MG: 250 TABLET ORAL at 10:01

## 2022-01-02 RX ADMIN — BUSPIRONE HYDROCHLORIDE 5 MG: 5 TABLET ORAL at 10:01

## 2022-01-02 RX ADMIN — LEVALBUTEROL HYDROCHLORIDE 1.25 MG: 1.25 SOLUTION, CONCENTRATE RESPIRATORY (INHALATION) at 07:01

## 2022-01-02 RX ADMIN — LEVALBUTEROL HYDROCHLORIDE 1.25 MG: 1.25 SOLUTION, CONCENTRATE RESPIRATORY (INHALATION) at 01:01

## 2022-01-02 RX ADMIN — BUSPIRONE HYDROCHLORIDE 2.5 MG: 5 TABLET ORAL at 10:01

## 2022-01-02 RX ADMIN — MORPHINE SULFATE 2 MG: 2 INJECTION, SOLUTION INTRAMUSCULAR; INTRAVENOUS at 11:01

## 2022-01-02 RX ADMIN — OXYCODONE HYDROCHLORIDE 15 MG: 5 TABLET ORAL at 07:01

## 2022-01-02 RX ADMIN — IOHEXOL 100 ML: 350 INJECTION, SOLUTION INTRAVENOUS at 09:01

## 2022-01-02 RX ADMIN — SENNOSIDES AND DOCUSATE SODIUM 1 TABLET: 8.6; 5 TABLET ORAL at 10:01

## 2022-01-02 RX ADMIN — OXYCODONE HYDROCHLORIDE 15 MG: 5 TABLET ORAL at 12:01

## 2022-01-02 RX ADMIN — MORPHINE SULFATE 2 MG: 2 INJECTION, SOLUTION INTRAMUSCULAR; INTRAVENOUS at 05:01

## 2022-01-02 RX ADMIN — FLUTICASONE FUROATE AND VILANTEROL TRIFENATATE 1 PUFF: 200; 25 POWDER RESPIRATORY (INHALATION) at 07:01

## 2022-01-02 RX ADMIN — CLOPIDOGREL BISULFATE 75 MG: 75 TABLET, FILM COATED ORAL at 10:01

## 2022-01-02 NOTE — CARE UPDATE
01/02/22 0717   Patient Assessment/Suction   Level of Consciousness (AVPU) alert   Respiratory Effort Normal;Unlabored   Expansion/Accessory Muscles/Retractions no use of accessory muscles;no retractions;expansion symmetric   All Lung Fields Breath Sounds clear;equal bilaterally   Rhythm/Pattern, Respiratory unlabored;pattern regular;depth regular;chest wiggle adequate;no shortness of breath reported   Cough Frequency no cough   PRE-TX-O2   O2 Device (Oxygen Therapy) nasal cannula   $ Is the patient on Low Flow Oxygen? Yes   Oxygen Concentration (%) 2   SpO2 97 %   Pulse Oximetry Type Intermittent   $ Pulse Oximetry - Multiple Charge Pulse Oximetry - Multiple   Pulse 78   Resp 18   Aerosol Therapy   $ Aerosol Therapy Charges Aerosol Treatment   Daily Review of Necessity (SVN) completed   Respiratory Treatment Status (SVN) given   Treatment Route (SVN) oxygen;mask   Patient Position (SVN) HOB elevated   Post Treatment Assessment (SVN) increased aeration   Signs of Intolerance (SVN) none   Inhaler   $ Inhaler Charges MDI (Metered Dose Inahler) Treatment   Daily Review of Necessity (Inhaler) completed   Respiratory Treatment Status (Inhaler) given   Treatment Route (Inhaler) mouthpiece   Patient Position (Inhaler) semi-Portillo's   Post Treatment Assessment (Inhaler) increased aeration   Signs of Intolerance (Inhaler) none   Breath Sounds Post-Respiratory Treatment   Throughout All Fields Post-Treatment All Fields   Throughout All Fields Post-Treatment aeration increased   Post-treatment Heart Rate (beats/min) 85   Post-treatment Resp Rate (breaths/min) 18   Education   $ Education 15 min;Bronchodilator   Respiratory Evaluation   $ Care Plan Tech Time 15 min

## 2022-01-02 NOTE — PLAN OF CARE
Discharge orders reviewed. Pt discharged home with no needs.     Jina Villalobos LMSW  Case Management  Ext [phone]       01/02/22 1138   Final Note   Assessment Type Final Discharge Note   Anticipated Discharge Disposition Home   What phone number can be called within the next 1-3 days to see how you are doing after discharge? [phone]   Post-Acute Status   Discharge Delays None known at this time

## 2022-01-02 NOTE — RESPIRATORY THERAPY
01/01/22 1937   Patient Assessment/Suction   Level of Consciousness (AVPU) alert   Respiratory Effort Normal;Unlabored   Expansion/Accessory Muscles/Retractions no use of accessory muscles   All Lung Fields Breath Sounds equal bilaterally;clear   Rhythm/Pattern, Respiratory unlabored   Cough Frequency no cough   PRE-TX-O2   O2 Device (Oxygen Therapy) room air   SpO2 95 %   Pulse Oximetry Type Intermittent   $ Pulse Oximetry - Multiple Charge Pulse Oximetry - Multiple   Pulse 90   Resp 18   Aerosol Therapy   $ Aerosol Therapy Charges Aerosol Treatment   Daily Review of Necessity (SVN) completed   Respiratory Treatment Status (SVN) given   Treatment Route (SVN) mask   Patient Position (SVN) HOB elevated   Post Treatment Assessment (SVN) breath sounds unchanged   Signs of Intolerance (SVN) none   Breath Sounds Post-Respiratory Treatment   Post-treatment Heart Rate (beats/min) 83   Post-treatment Resp Rate (breaths/min) 18   Preset CPAP/BiPAP Settings   Mode Of Delivery Standby   Education   $ Education Bronchodilator;15 min   Respiratory Evaluation   $ Care Plan Tech Time 15 min   $ Eval/Re-eval Charges Re-evaluation   Evaluation For Re-Eval 3 day

## 2022-01-02 NOTE — DISCHARGE SUMMARY
"[facility] Medicine  Discharge Summary      Patient Name: Sakshi Hess  MRN: [id number]  Patient Class: IP- Inpatient  Admission Date: 12/31/2021  Hospital Length of Stay: 2 days  Discharge Date and Time:  01/02/2022 4:14 PM  Attending Physician: Matilda Aaron MD   Discharging Provider: Matilda Aaron MD  Primary Care Provider: Wenceslao Deras NP      HPI:   Ms. Hess is a 66 year old female with a history of HTN, CAD, NIDDM2, COPD, MIRA with CPAP, and recent rt shoulder rotator cuff repair about 2 weeks ago who presents today with complaints of fever, Tmax 102.9, nonproductive cough, headache, myalgias, arthralgias, nausea, urinary frequency, and flank pain. She denies sore throat, sinus congestion, chest pain, V/D, dizziness, or LOC. She states she has been taking azo as she feels a UTI coming on. She is unsure if she required a santana during her recent surgery. She has been doing PT at home d/t insurance and transportation restraints. She states her surgeon recommended she stop drinking sodas and start drinking more water to encourage better healing of her shoulder. She then tells me that the last time she drank a lot of water "I went into kidney failure. I feel like like my kidneys and lungs hurt, and I know it's because I've been drinking a lot of water." She reports her oxygen level has been running in the mid to upper 80s at home. She is not vaccinated for covid and has never tested positive for covid in the past. She lives with her daughter who is a nurse. In the ED, she was febrile to 102.9, Tachycardic 105, and mildly hypoxic at 91% on room air that improved with O2@2L PNC. She was negative for flu and covid, but CXR shows bilat ground glass opacities typical of viral vs. Atypical pna.       * No surgery found *      Hospital Course:   During her hospital stay patient was treated for UTI and possible atypical pneumonia.  She improved remarkably with IV antibiotics, " urine cultures finalize with pansensitive E coli.  She was keep complaining of lower back pain and we obtained CT abdomen and pelvis today which ruled out any pyelonephritis.  She was discharged home in hemodynamically stable condition with prescription of 5 more days of Levaquin.  She was advised to follow-up with her PCP.    Instructions provided to follow up with primary care physician as outpatient. Patient verbalized understanding and is aware to contact primary care physician or return to ED if new or worsening symptoms.    Physical exam on the day of discharge:  General: Patient resting comfortably in no acute distress.  Lungs: CTA. Good air entry.  CVS: Regular rate and rhythm. No murmurs. No pedal edema.  Abd: Soft. Nontender. Non-distended.  Neuro: A&O x3. Moving all 4 extremities equally    Vital signs reviewed.  Nursing notes reviewed         Goals of Care Treatment Preferences:  Code Status: Full Code      Consults:     No new Assessment & Plan notes have been filed under this hospital service since the last note was generated.  Service: Hospital Medicine    Final Active Diagnoses:    Diagnosis Date Noted POA    PRINCIPAL PROBLEM:  UTI (urinary tract infection) [N39.0] 12/31/2021 Yes    Obstructive sleep apnea syndrome [G47.33] 04/15/2021 Yes    CKD (chronic kidney disease), stage III [N18.30] 02/14/2021 Yes     Chronic    GERD (gastroesophageal reflux disease) [K21.9] 02/14/2021 Yes     Chronic    COPD (chronic obstructive pulmonary disease) [J44.9] 01/03/2020 Yes    Coronary artery disease [I25.10]  Yes    Hypertension [I10]  Yes      Problems Resolved During this Admission:    Diagnosis Date Noted Date Resolved POA    Acute hypoxemic respiratory failure [J96.01] 12/31/2021 01/01/2022 Yes    Sepsis [A41.9] 12/31/2021 01/02/2022 Yes    Leukocytosis [D72.829] 12/31/2021 01/01/2022 Yes    Moderate persistent asthma without complication [J45.40] 10/15/2019 01/02/2022 Yes       Discharged  Condition: good    Disposition: Home or Self Care    Follow Up:   Follow-up Information     Wenceslao Deras NP In 1 week.    Specialty: Family Medicine  Contact information:  [address] [place] [id number] [phone]                       Patient Instructions:      Diet Cardiac     Notify your health care provider if you experience any of the following:  temperature >100.4     Activity as tolerated       Significant Diagnostic Studies: Labs: All labs within the past 24 hours have been reviewed    Pending Diagnostic Studies:     None         Medications:  Reconciled Home Medications:      Medication List      START taking these medications    levoFLOXacin 500 MG tablet  Commonly known as: LEVAQUIN  Take 1 tablet (500 mg total) by mouth once daily. for 5 days  Start taking on: January 3, 2022        CHANGE how you take these medications    * albuterol 1.25 mg/3 mL Nebu  Commonly known as: ACCUNEB  Take 1 ampule by nebulization every 8 (eight) hours as needed.  What changed: Another medication with the same name was changed. Make sure you understand how and when to take each.     * albuterol 90 mcg/actuation inhaler  Commonly known as: PROVENTIL/VENTOLIN HFA  2 puffs every 4 hours as needed for cough, wheeze, or shortness of breath  What changed:   · how much to take  · how to take this  · when to take this  · reasons to take this     predniSONE 20 MG tablet  Commonly known as: DELTASONE  3 for 3 days then 2 for 3 days then one for 3 days and repeat for breathing problems  What changed:   · how to take this  · when to take this         * This list has 2 medication(s) that are the same as other medications prescribed for you. Read the directions carefully, and ask your doctor or other care provider to review them with you.            CONTINUE taking these medications    acetaminophen 500 MG tablet  Commonly known as: TYLENOL  Take 500 mg by mouth as needed.     allopurinoL 300 MG tablet  Commonly known as:  ZYLOPRIM  Take 300 mg by mouth once daily.     aspirin 81 MG EC tablet  Commonly known as: ECOTRIN  Take 1 tablet (81 mg total) by mouth once daily.     busPIRone 7.5 MG tablet  Commonly known as: BUSPAR  Take 7.5 mg by mouth 2 (two) times a day.     clopidogreL 75 mg tablet  Commonly known as: PLAVIX  Take 75 mg by mouth once daily.     diclofenac sodium 1 % Gel  Commonly known as: VOLTAREN  Apply 1 application topically 4 (four) times daily.     esomeprazole 20 MG capsule  Commonly known as: NEXIUM  Take 20 mg by mouth before breakfast.     fluticasone-salmeterol 230-21 mcg/dose 230-21 mcg/actuation Hfaa inhaler  Commonly known as: ADVAIR HFA  Inhale 2 puffs into the lungs 2 (two) times daily. Controller     GOODY'S EXTRA STRENGTH 500-325-65 mg Pwpk  Generic drug: aspirin-acetaminophen-caffeine  Take 1 Dose by mouth as needed.     hydroCHLOROthiazide 12.5 mg capsule  Commonly known as: MICROZIDE  Take 12.5 mg by mouth once daily.     isosorbide mononitrate 30 MG 24 hr tablet  Commonly known as: IMDUR  Take 1 tablet (30 mg total) by mouth once daily.     levalbuterol 45 mcg/actuation inhaler  Commonly known as: XOPENEX HFA  Inhale 1-2 puffs into the lungs every 4 (four) hours as needed for Wheezing or Shortness of Breath.     losartan 25 MG tablet  Commonly known as: COZAAR  Take 25 mg by mouth once daily.     metFORMIN 500 MG ER 24hr tablet  Commonly known as: GLUCOPHAGE-XR  Take 500 mg by mouth once daily.     methocarbamoL 750 MG Tab  Commonly known as: ROBAXIN  Take 1,500 mg by mouth 3 (three) times daily.     metoprolol succinate 25 MG 24 hr tablet  Commonly known as: TOPROL-XL  Take 25 mg by mouth once daily.     nitroGLYCERIN 0.4 MG SL tablet  Commonly known as: NITROSTAT  Place 1 tablet (0.4 mg total) under the tongue every 5 (five) minutes as needed for Chest pain.     ondansetron 4 MG tablet  Commonly known as: ZOFRAN  Take 1 tablet by mouth every 8 (eight) hours as needed.     oxyCODONE 15 MG  Tab  Commonly known as: ROXICODONE  Take 15 mg by mouth every 6 (six) hours as needed.     oxyCODONE-acetaminophen  mg per tablet  Commonly known as: PERCOCET  Take 1 tablet by mouth every 6 (six) hours as needed.     REPATHA SURECLICK 140 mg/mL Pnij  Generic drug: evolocumab  Inject 140 mg into the skin every 14 (fourteen) days.     SYMBICORT 160-4.5 mcg/actuation Hfaa  Generic drug: budesonide-formoterol 160-4.5 mcg  Inhale 2 puffs into the lungs every 12 (twelve) hours.     traZODone 50 MG tablet  Commonly known as: DESYREL  Take 50 mg by mouth nightly.        STOP taking these medications    azithromycin 500 MG tablet  Commonly known as: ZITHROMAX            Indwelling Lines/Drains at time of discharge:   Lines/Drains/Airways     None                 Time spent on the discharge of patient: 32 minutes         Matilda Aaron MD  Department of Hospital Medicine  [facility]

## 2022-01-02 NOTE — HOSPITAL COURSE
During her hospital stay patient was treated for UTI and possible atypical pneumonia.  She improved remarkably with IV antibiotics, urine cultures finalize with pansensitive E coli.  She was keep complaining of lower back pain and we obtained CT abdomen and pelvis today which ruled out any pyelonephritis.  She was discharged home in hemodynamically stable condition with prescription of 5 more days of Levaquin.  She was advised to follow-up with her PCP.    Instructions provided to follow up with primary care physician as outpatient. Patient verbalized understanding and is aware to contact primary care physician or return to ED if new or worsening symptoms.    Physical exam on the day of discharge:  General: Patient resting comfortably in no acute distress.  Lungs: CTA. Good air entry.  CVS: Regular rate and rhythm. No murmurs. No pedal edema.  Abd: Soft. Nontender. Non-distended.  Neuro: A&O x3. Moving all 4 extremities equally    Vital signs reviewed.  Nursing notes reviewed

## 2022-01-06 LAB
BACTERIA BLD CULT: NORMAL
BACTERIA BLD CULT: NORMAL

## 2022-01-10 ENCOUNTER — OFFICE VISIT (OUTPATIENT)
Dept: PULMONOLOGY | Facility: CLINIC | Age: 67
End: 2022-01-10
Payer: MEDICARE

## 2022-01-10 VITALS
WEIGHT: 210 LBS | DIASTOLIC BLOOD PRESSURE: 80 MMHG | HEIGHT: 61 IN | SYSTOLIC BLOOD PRESSURE: 183 MMHG | BODY MASS INDEX: 39.65 KG/M2 | OXYGEN SATURATION: 94 % | HEART RATE: 73 BPM

## 2022-01-10 DIAGNOSIS — J44.9 CHRONIC OBSTRUCTIVE PULMONARY DISEASE, UNSPECIFIED COPD TYPE: Primary | ICD-10-CM

## 2022-01-10 DIAGNOSIS — J84.9 ILD (INTERSTITIAL LUNG DISEASE): ICD-10-CM

## 2022-01-10 DIAGNOSIS — G47.33 OBSTRUCTIVE SLEEP APNEA SYNDROME: ICD-10-CM

## 2022-01-10 PROCEDURE — 3079F PR MOST RECENT DIASTOLIC BLOOD PRESSURE 80-89 MM HG: ICD-10-PCS | Mod: CPTII,S$GLB,, | Performed by: INTERNAL MEDICINE

## 2022-01-10 PROCEDURE — 1101F PR PT FALLS ASSESS DOC 0-1 FALLS W/OUT INJ PAST YR: ICD-10-PCS | Mod: CPTII,S$GLB,, | Performed by: INTERNAL MEDICINE

## 2022-01-10 PROCEDURE — 1159F PR MEDICATION LIST DOCUMENTED IN MEDICAL RECORD: ICD-10-PCS | Mod: CPTII,S$GLB,, | Performed by: INTERNAL MEDICINE

## 2022-01-10 PROCEDURE — 3288F PR FALLS RISK ASSESSMENT DOCUMENTED: ICD-10-PCS | Mod: CPTII,S$GLB,, | Performed by: INTERNAL MEDICINE

## 2022-01-10 PROCEDURE — 3077F SYST BP >= 140 MM HG: CPT | Mod: CPTII,S$GLB,, | Performed by: INTERNAL MEDICINE

## 2022-01-10 PROCEDURE — 3008F BODY MASS INDEX DOCD: CPT | Mod: CPTII,S$GLB,, | Performed by: INTERNAL MEDICINE

## 2022-01-10 PROCEDURE — 1125F PR PAIN SEVERITY QUANTIFIED, PAIN PRESENT: ICD-10-PCS | Mod: CPTII,S$GLB,, | Performed by: INTERNAL MEDICINE

## 2022-01-10 PROCEDURE — 3288F FALL RISK ASSESSMENT DOCD: CPT | Mod: CPTII,S$GLB,, | Performed by: INTERNAL MEDICINE

## 2022-01-10 PROCEDURE — 99999 PR PBB SHADOW E&M-EST. PATIENT-LVL IV: ICD-10-PCS | Mod: PBBFAC,,, | Performed by: INTERNAL MEDICINE

## 2022-01-10 PROCEDURE — 3008F PR BODY MASS INDEX (BMI) DOCUMENTED: ICD-10-PCS | Mod: CPTII,S$GLB,, | Performed by: INTERNAL MEDICINE

## 2022-01-10 PROCEDURE — 3077F PR MOST RECENT SYSTOLIC BLOOD PRESSURE >= 140 MM HG: ICD-10-PCS | Mod: CPTII,S$GLB,, | Performed by: INTERNAL MEDICINE

## 2022-01-10 PROCEDURE — 1111F PR DISCHARGE MEDS RECONCILED W/ CURRENT OUTPATIENT MED LIST: ICD-10-PCS | Mod: CPTII,S$GLB,, | Performed by: INTERNAL MEDICINE

## 2022-01-10 PROCEDURE — 99214 PR OFFICE/OUTPT VISIT, EST, LEVL IV, 30-39 MIN: ICD-10-PCS | Mod: S$GLB,,, | Performed by: INTERNAL MEDICINE

## 2022-01-10 PROCEDURE — 1125F AMNT PAIN NOTED PAIN PRSNT: CPT | Mod: CPTII,S$GLB,, | Performed by: INTERNAL MEDICINE

## 2022-01-10 PROCEDURE — 1111F DSCHRG MED/CURRENT MED MERGE: CPT | Mod: CPTII,S$GLB,, | Performed by: INTERNAL MEDICINE

## 2022-01-10 PROCEDURE — 3079F DIAST BP 80-89 MM HG: CPT | Mod: CPTII,S$GLB,, | Performed by: INTERNAL MEDICINE

## 2022-01-10 PROCEDURE — 1101F PT FALLS ASSESS-DOCD LE1/YR: CPT | Mod: CPTII,S$GLB,, | Performed by: INTERNAL MEDICINE

## 2022-01-10 PROCEDURE — 99214 OFFICE O/P EST MOD 30 MIN: CPT | Mod: S$GLB,,, | Performed by: INTERNAL MEDICINE

## 2022-01-10 PROCEDURE — 99999 PR PBB SHADOW E&M-EST. PATIENT-LVL IV: CPT | Mod: PBBFAC,,, | Performed by: INTERNAL MEDICINE

## 2022-01-10 PROCEDURE — 1159F MED LIST DOCD IN RCRD: CPT | Mod: CPTII,S$GLB,, | Performed by: INTERNAL MEDICINE

## 2022-01-10 RX ORDER — AMOXICILLIN 875 MG/1
875 TABLET, FILM COATED ORAL 2 TIMES DAILY
Qty: 20 TABLET | Refills: 2 | Status: SHIPPED | OUTPATIENT
Start: 2022-01-10 | End: 2022-04-07

## 2022-01-10 NOTE — PROGRESS NOTES
1/10/2022    Sakshi Hess  New Patient Consult    Chief Complaint   Patient presents with    Follow-up     Hospital stay  [facility]       HPI:   1/10/2022 uses cpap well -- uses nasal pillars.  Uses over 4 hrs /night.  Had admit [facility]     Sinuses ok    Pt  Reports occasional low ox 90 or so.      Has boxes of inhalers at home-- took prednisone in fall.  No fh fibrosis but mom  44 yo.       4/15/2021 uses cpap - uses over 4 hrs nightly, got call from  [employer] saying needed to purchase or something?  Uses high compliance with great benefit.      10/1/2020 pt uses cpap 6 hrs nightly, has problems with mask, went to nasal with chin strap - needed tape to keep mouth closed.   Pt having good response when mask tolerated well.  No prednisone - stable  Patient Instructions   You are tolerating cpap well - you should do better with airfit f30 mask than nasal mask.    Continue symbicort as control lungs has been good.      You should use xopenex in place albuterol as you have palpitations with albuterol.  2020 - no prednisone last 3 wks, takes once every 3 wks, was not using symbicort less than daily. Brittaney's mom.  Sinuses good.      You had prior sleep apnea, up to 40/hr?  Used cpap for 3 yrs but stopped 10 yrs ago for multiple reasons.  Patient Instructions   symbicort is preventive - use regular 2 puffs twice daily,   singulair daily also.      Use xopenex inhaler, albuterol not as effective for you- as rescue therapy, use 2 puff up to every 4-6 as needed. May use nebulizer.    Use prednisone if needed.  20 mg daily for 2-3 should be adequate.  symbicort should decrease prednisone need.    Sleep apnea - up to 5 /hour is within normal range, study may apply cpap if can diagnose sleep apnea 1st part of night.   May need to see to discuss cpap tolerance post study.  10/15/2019pt had cough/wheezes as child, never outgrew.  Had cough all life- mucous clear. occ green mucous, uses combivent. Has neb with  "xopenex- albuterol ppt shakes.  Uses steroids once every 5 yrs- had to 3 wks prednisone.  Er visits once yr - may get steroid shot.  Lives [place],  No sinus, has dog and cat.  Breathing seems stable- breathing gets bad 4 month a yr    Uses oxycodone tid for last 1.5 yrs.      Has sleep apnea - cpap broke and not followed up.    Patient Instructions   Asthma, copd with chronic disease?    Preventive - use regular- sinuglair daily, symbicort 2 twice daily    Rescue medication - use albuterol inhaler or xopenex nebulizer up to every 4 hrs or so.    Action plan - prednisone one daily for 3 May be adequate, may need 3 for 3 and taper if severe.  Er if sickly    If yellow mucous - use azithromycin.    Spacer will help inhaled medication.    Breathing test and follow up a yr.  The chief compliant  problem is new to me",    PFSH:  Past Medical History:   Diagnosis Date    Arthritis     COPD (chronic obstructive pulmonary disease)     Coronary artery disease     Hypertension     Sleep apnea     use CPAP at night          Past Surgical History:   Procedure Laterality Date    CORONARY ANGIOGRAPHY N/A 2/15/2021    Procedure: ANGIOGRAM, CORONARY ARTERY;  Surgeon: Aidan Buchanan MD;  Location: [facility] CATH/EP LAB;  Service: Cardiology;  Laterality: N/A;    CORONARY ARTERY BYPASS GRAFT      CORONARY BYPASS GRAFT ANGIOGRAPHY  2/15/2021    Procedure: Bypass graft study;  Surgeon: Aidan Buchanan MD;  Location: [facility] CATH/EP LAB;  Service: Cardiology;;    HYSTERECTOMY      JOINT REPLACEMENT      bilateral knee replacement     LEFT HEART CATHETERIZATION Left 2/15/2021    Procedure: Left heart cath;  Surgeon: Aidan Buchanan MD;  Location: [facility] CATH/EP LAB;  Service: Cardiology;  Laterality: Left;     Social History     Tobacco Use    Smoking status: Never Smoker    Smokeless tobacco: Never Used   Substance Use Topics    Alcohol use: No    Drug use: No     Family History   Problem Relation Age of Onset    Pulmonary " "embolism Mother      Review of patient's allergies indicates:   Allergen Reactions    Metronidazole Other (See Comments)     DISORIENTED       Statins-hmg-coa reductase inhibitors Tinitus     Joint pain       Performance Status:The patient's activity level is no limits with regular activity.      Review of Systems:  a review of eleven systems covering constitutional, Eye, HEENT, Psych, Respiratory, Cardiac, GI, , Musculoskeletal, Endocrine, Dermatologic was negative except for pertinent findings as listed ABOVE and below: night sweats,   pertinent positive as above, rest is good      Exam:Comprehensive exam done. BP (!) 183/80 (BP Location: Left arm, Patient Position: Sitting, BP Method: Medium (Automatic))   Pulse 73   Ht 5' 1" (1.549 m)   Wt 95.3 kg (210 lb)   LMP 09/01/1986   SpO2 (!) 94% Comment: on room air at rest  BMI 39.68 kg/m²   Exam included Vitals as listed, and patient's appearance and affect and alertness and mood, oral exam for yeast and hygiene and pharynx lesions and Mallapatti (M) score, neck with inspection for jvd and masses and thyroid abnormalities and lymph nodes (supraclavicular and infraclavicular nodes and axillary also examined and noted if abn), chest exam included symmetry and effort and fremitus and percussion and auscultation, cardiac exam included rhythm and gallops and murmur and rubs and jvd and edema, abdominal exam for mass and hepatosplenomegaly and tenderness and hernias and bowel sounds, Musculoskeletal exam with muscle tone and posture and mobility/gait and  strength, and skin for rashes and cyanosis and pallor and turgor, extremity for clubbing.  Findings were normal except for pertinent findings listed below:   M2, chest is symmetric, no distress, normal percussion, normal fremitus and good normal breath sounds  No clubbing nor edema    Radiographs (ct chest and cxr) reviewed: view by direct vision  March 15 , 2019 nad, post cabg    Ct abd 1/2/2022 ild seen " lower lungs with ? Honeycombing.      Labs reviewed            PFT will be done and results to be reviewed         Results for SAKSHI BALLARD (MRN [id number]) as of 10/15/2019 09:59   Ref. Range 6/11/2012 04:51   Eosinophils Relative Latest Ref Range: 0.0 - 3.0 % 5.5 (H)   Basophil% Latest Ref Range: 0.0 - 3.0 % 0.5       Plan:  Clinical impression is apparently straight forward and impression with management as below.     Sakshi was seen today for follow-up.    Diagnoses and all orders for this visit:    Chronic obstructive pulmonary disease, unspecified COPD type    ILD (interstitial lung disease)  -     amoxicillin (AMOXIL) 875 MG tablet; Take 1 tablet (875 mg total) by mouth 2 (two) times daily.  -     Complete PFT with bronchodilator; Future  -     Stress test, pulmonary; Future  -     Rheumatoid Factor; Future  -     Cyclic Citrullinated Peptide Antibody, IgG; Future  -     BIPIN; Future    Obstructive sleep apnea syndrome        Follow up in about 3 months (around 4/10/2022), or if symptoms worsen or fail to improve.    Discussed with patient above for education the following:      Patient Instructions   You have mild scarring of lungs seen on ct of abdomen.   Rales are heard on exam suggesting scarring of lungs.      Would check oxygen level walking and breathing test.    Ct chest would be good to check-- could do in 6-12 months.    Use amoxil for sinus/lung infection    Will screen joint disease that may be associated with lung disease.

## 2022-01-10 NOTE — PATIENT INSTRUCTIONS
You have mild scarring of lungs seen on ct of abdomen.   Rales are heard on exam suggesting scarring of lungs.      Would check oxygen level walking and breathing test.    Ct chest would be good to check-- could do in 6-12 months.    Use amoxil for sinus/lung infection    Will screen joint disease that may be associated with lung disease.

## 2022-02-04 DIAGNOSIS — Z98.890 S/P ARTHROSCOPY OF RIGHT SHOULDER: Primary | ICD-10-CM

## 2022-02-08 ENCOUNTER — CLINICAL SUPPORT (OUTPATIENT)
Dept: REHABILITATION | Facility: HOSPITAL | Age: 67
End: 2022-02-08
Payer: MEDICARE

## 2022-02-08 DIAGNOSIS — R29.898 SHOULDER WEAKNESS: ICD-10-CM

## 2022-02-08 DIAGNOSIS — Z98.890 S/P ARTHROSCOPY OF RIGHT SHOULDER: Primary | ICD-10-CM

## 2022-02-08 PROCEDURE — 97161 PT EVAL LOW COMPLEX 20 MIN: CPT | Mod: PN

## 2022-02-08 NOTE — PLAN OF CARE
[facility]   Physical Therapy Initial Evaluation     Date: 2/8/2022   Name: Sakshi Hess  [facility] Number: [id number]    Therapy Diagnosis:   Encounter Diagnoses   Name Primary?    S/P arthroscopy of right shoulder Yes    Shoulder weakness      Physician: Tejas Lemon Jr., MD    Physician Orders: PT Eval and Treat   Medical Diagnosis from Referral: S/P arthroscopy of right shoulder.12/10/21  Evaluation Date: 2/8/2022  Authorization Period Expiration: 12/31/22  Plan of Care Expiration: 4/15/22  Progress Note Due: 4/15/22  Visit # / Visits authorized: 1/ 1   FOTO: 36/100    Precautions: Standard and Diabetes     Time In: 1515  Time Out: 1555  Total Appointment Time (timed & untimed codes): 40 minutes      SUBJECTIVE     Date of onset: 2/3/21 DOI, 12/10/21 DOS.    History of current condition - Sakshi reports: she fell at home 2/3/21 and suffered right shoulder injury.She underwent right shoulder arthroscopy,subacromial decompression biceps tenotomy with extensive debridement,RC repair 12/10/21.Pt reports difficulties with some ADLs,self care,home making    Falls: not since injury.    Imaging, none:     Prior Therapy: None.  Social History: In [address] lives with their family  Occupation: Not working  Prior Level of Function: Independent.  Current Level of Function: Requires some assistance with some ADLs, homemaking,self care.    Pain:  Current 5/10, worst 9/10, best 2/10   Location: right shoulder    Description: Aching, Dull, Burning, Throbbing, Sharp and Variable  Aggravating Factors: Bending, Walking, Extension, Flexing, Lifting and Getting out of bed/chair  Easing Factors: relaxation, pain medication, ice and rest    Patients goals: Restore previous VICTOR MANUEL.     Medical History:   Past Medical History:   Diagnosis Date    Arthritis     COPD (chronic obstructive pulmonary disease)     Coronary artery disease     Hypertension     Sleep apnea     use CPAP at night         Surgical History:   Sakshi Hess  has a past surgical history that includes Hysterectomy; Coronary artery bypass graft; Joint replacement; Coronary angiography (N/A, 2/15/2021); Coronary bypass graft angiography (2/15/2021); and Left heart catheterization (Left, 2/15/2021).    Medications:   Sakshi has a current medication list which includes the following prescription(s): acetaminophen, allopurinol, amoxicillin, aspirin, goody's extra strength, buspirone, clopidogrel, diclofenac sodium, esomeprazole, hydrochlorothiazide, isosorbide mononitrate, levalbuterol, losartan, metformin, methocarbamol, metoprolol succinate, nitroglycerin, ondansetron, oxycodone, oxycodone-acetaminophen, prednisone, repatha sureclick, and trazodone.    Allergies:   Review of patient's allergies indicates:   Allergen Reactions    Metronidazole Other (See Comments)     DISORIENTED       Statins-hmg-coa reductase inhibitors Tinitus     Joint pain          OBJECTIVE       Shoulder  Right   Left  Pain/Dysfunction with Movement    AROM PROM MMT AROM PROM MMT    flexion  110 2       extension  35 2       abduction  90 2       adduction          Internal rotation  50 2       ER at 90° abd  45 2       ER at 0° abd             (2) RT;25, LT;43#the patient is right handed.  Reach back test 25 cm.  Palpation;Anterior and lateral aspect of right shoulder joint tender.    Limitation/Restriction for FOTO shoulder Survey    Therapist reviewed FOTO scores for Sakshi Hess on 2/8/2022.   FOTO documents entered into [facility] - see Media section.    Limitation Score: 64%         TREATMENT     Total Treatment time (time-based codes) separate from Evaluation: 0 minutes     PATIENT EDUCATION AND HOME EXERCISES     Education provided:   - Role of PT.POC.    ASSESSMENT     Sakshi is a 66 y.o. female referred to outpatient Physical Therapy with a medical diagnosis of s/p right shoulder arthroscopy. Patient presents with ROM and strength  deficits,decreased functional status due to pain and above listed deficits.    Patient prognosis is Good.   Patientt will benefit from skilled outpatient Physical Therapy to address the deficits stated above and in the chart below, provide patient /family education, and to maximize patientt's level of independence.     Plan of care discussed with patient: Yes  Patient's spiritual, cultural and educational needs considered and patient is agreeable to the plan of care and goals as stated below:     Anticipated Barriers for therapy: NO    Medical Necessity is demonstrated by the following  History  Co-morbidities and personal factors that may impact the plan of care Co-morbidities:   high BMI    Personal Factors:   no deficits     low   Examination  Body Structures and Functions, activity limitations and participation restrictions that may impact the plan of care Body Regions:   upper extremities    Body Systems:    gross symmetry  ROM  strength    Participation Restrictions:   NO    Activity limitations:   Learning and applying knowledge  no deficits    General Tasks and Commands  no deficits    Communication  no deficits    Mobility  lifting and carrying objects    Self care  washing oneself (bathing, drying, washing hands)    Domestic Life  no deficits    Interactions/Relationships  no deficits    Life Areas  no deficits    Community and Social Life  no deficits         low   Clinical Presentation stable and uncomplicated low   Decision Making/ Complexity Score: low     Goals:  Short Term Goals (STG) # weeks Goal Review Date Reviewed Date Met   Pt will demonstrate independence with initial HEP to facilitate therex progression and improvements in functional mobility 4 Initial 2/8/2022    The patient will increase ROM x 20 deg in all planes in order for pt to don and doff belt/bra with more ease 4 Initial 2/8/2022           The patient will increase right upper extremity strength to greater than or equal to 1/3 manual  muscle grade for all deficient areas in order to facilitate placing object on overhead shelf 4 Initial 2/8/2022 2/8/2022 2/8/2022 2/8/2022 2/8/2022 2/8/2022 2/8/2022      Long Term Goals (STG) # weeks Goal Review Date Reviewed Date Met   The patient will improve the Upper Extremity Functional Scale to less than 40% Disability 8 Initial 2/8/2022    The patient will demonstrate increased right shoulder flexion active range of motion to greater than 110 degrees in order to improve the patient's ability to reach into overhead cabinets at home 8 Initial 2/8/2022    The patient will increase right upper extremity strength to greater than or equal to 1/2 manual muscle grade for all deficient areas in order to transfer food trays from counter to refrigerator. 8 Initial 2/8/2022    Pt will be able to wash her hair independently. 8 Initial 2/8/2022    Pt will restore previous LOF 8 Initial. 2/8/2022 2/8/2022 2/8/2022 2/8/2022 2/8/2022 2/8/2022        PLAN   Plan of care Certification: 2/8/2022 to 4/15/22.    Outpatient Physical Therapy 2 times weekly for 8 weeks to include the following interventions: Electrical Stimulation PRN, Moist Heat/ Ice, Patient Education, Therapeutic Activities and Therapeutic Exercise.     Dk Ribeiro, PT      I CERTIFY THE NEED FOR THESE SERVICES FURNISHED UNDER THIS PLAN OF TREATMENT AND WHILE UNDER MY CARE   Physician's comments:     Physician's Signature: ___________________________________________________

## 2022-02-16 ENCOUNTER — CLINICAL SUPPORT (OUTPATIENT)
Dept: REHABILITATION | Facility: HOSPITAL | Age: 67
End: 2022-02-16
Payer: MEDICARE

## 2022-02-16 DIAGNOSIS — R29.898 SHOULDER WEAKNESS: ICD-10-CM

## 2022-02-16 PROCEDURE — 97014 ELECTRIC STIMULATION THERAPY: CPT | Mod: PN

## 2022-02-16 PROCEDURE — 97110 THERAPEUTIC EXERCISES: CPT | Mod: PN

## 2022-02-16 NOTE — PROGRESS NOTES
[facility]   Physical Therapy Treatment Note     [facility]  Cl[facility] Number: [id number]    Therapy Diagnosis:   Encounter Diagnosis   Name Primary?    Shoulder weakness      Physician: Tejas Lemon Jr., MD    Visit Date: 2/16/2022    [copy and paste header from eval here]    Physician Orders: PT Eval and Treat   Medical Diagnosis from Referral: S/P arthroscopy of right shoulder.12/10/21  Evaluation Date: 2/8/2022  Authorization Period Expiration: 12/31/22  Plan of Care Expiration: 4/15/22  Progress Note Due: 4/15/22  Visit # / Visits authorized: 1/ 1   FOTO: 36/100     Precautions: Standard and Diabetes      PTA Visit #: 0/5     Time In: 1545  Time Out: 1630  Total Billable Time: 38 minutes    SUBJECTIVE     Pt reports: increased pain today.  She was compliant with home exercise program.  Response to previous treatment: eval only  Functional change: no    Pain: 7/10  Location: right shoulder      OBJECTIVE     Objective Measures updated at progress report unless specified.     Treatment     Sakshi received the treatments listed below:      therapeutic exercises to develop strength, endurance, ROM, flexibility and posture for 23 minutes including:  OH pulley 5'  Shoulder shrugs/rolls/retr 30 each  Wrist flex/ext,pron/supin 1# 30 each  PROM 3'  cold pack for 15 minutes to right shoulder.    Patient Education and Home Exercises     Home Exercises Provided and Patient Education Provided     Education provided:   - shoulder shrugs/rolls/retr HEP BID.    ASSESSMENT     Pain increasing with movement.  Relief following IFC  Sakshi Is progressing well towards her goals.   Pt prognosis is Good.     Pt will continue to benefit from skilled outpatient physical therapy to address the deficits listed in the problem list box on initial evaluation, provide pt/family education and to maximize pt's level of independence in the home and community environment.     Pt's spiritual, cultural and  educational needs considered and pt agreeable to plan of care and goals.     Anticipated barriers to physical therapy: NO    Goals:   Short Term Goals (STG) # weeks Goal Review Date Reviewed Date Met   Pt will demonstrate independence with initial HEP to facilitate therex progression and improvements in functional mobility 4 Initial 2/8/2022     The patient will increase ROM x 20 deg in all planes in order for pt to don and doff belt/bra with more ease 4 Initial 2/8/2022                 The patient will increase right upper extremity strength to greater than or equal to 1/3 manual muscle grade for all deficient areas in order to facilitate placing object on overhead shelf 4 Initial 2/8/2022           2/8/2022           2/8/2022/8/2022 2/8/2022 2/8/2022 2/8/2022 2/8/2022        Long Term Goals (STG) # weeks Goal Review Date Reviewed Date Met   The patient will improve the Upper Extremity Functional Scale to less than 40% Disability 8 Initial 2/8/2022     The patient will demonstrate increased right shoulder flexion active range of motion to greater than 110 degrees in order to improve the patient's ability to reach into overhead cabinets at home 8 Initial 2/8/2022     The patient will increase right upper extremity strength to greater than or equal to 1/2 manual muscle grade for all deficient areas in order to transfer food trays from counter to refrigerator. 8 Initial 2/8/2022     Pt will be able to wash her hair independently. 8 Initial 2/8/2022     Pt will restore previous LOF 8 Initial. 2/8/2022 2/8/2022                 PLAN     Per POC.    Dk Ribeiro, PT

## 2022-02-18 ENCOUNTER — CLINICAL SUPPORT (OUTPATIENT)
Dept: REHABILITATION | Facility: HOSPITAL | Age: 67
End: 2022-02-18
Payer: MEDICARE

## 2022-02-18 DIAGNOSIS — R29.898 SHOULDER WEAKNESS: ICD-10-CM

## 2022-02-18 PROCEDURE — 97110 THERAPEUTIC EXERCISES: CPT | Mod: PN,CQ

## 2022-02-18 PROCEDURE — 97140 MANUAL THERAPY 1/> REGIONS: CPT | Mod: PN,CQ

## 2022-02-18 NOTE — PROGRESS NOTES
[facility]   Physical Therapy Treatment Note     Name: Sakshi Hess  Clinic Number: [id number]    Therapy Diagnosis:   Encounter Diagnosis   Name Primary?    Shoulder weakness      Physician: Tejas Lemon Jr., MD    Visit Date: 2/18/2022    [copy and paste header from eval here]    Physician Orders: PT Eval and Treat   Medical Diagnosis from Referral: S/P arthroscopy of right shoulder.12/10/21  Evaluation Date: 2/8/2022  Authorization Period Expiration: 12/31/22  Plan of Care Expiration: 4/15/22  Progress Note Due: 4/15/22  Visit # / Visits authorized: 2/20   FOTO: 36/100     Precautions: Standard and Diabetes       Time In: 0915  Time Out: 1013  Total Billable Time: 58 minutes    SUBJECTIVE     Pt reports: pain with active shoulder  movements  She was compliant with home exercise program.  Response to previous treatment: no complaints  Functional change: none    Pain: 7/10  Location: right shoulder      OBJECTIVE     Objective Measures updated at progress report unless specified.     Treatment     Sakshi received the treatments listed below:      therapeutic exercises to develop strength, endurance, ROM, flexibility and posture for 43 minutes including:    Overhead Pulleys flexion, scaption x 3 minutes each    Table wipes flexion, ER x 20 each    Supine shoulder flexion w/dowel x 20  Supine SA punch with dowel x 20  Supine ER with dowel x 20  SL ER w/scapular setting x 20    Corner pec stretch 3 x 30 sec - gentle    Manual therapy x 15 minutes:  Superior/inferior clavicular mobs  Soft Tissue Massage to pec major origin  IASTM R UT region    NOT PERFORMED   Shoulder shrugs/rolls/retr 30 each  Wrist flex/ext  ,pron/supin 1# 30 each  cold pack for 15 minutes to right shoulder.      Patient Education and Home Exercises     Home Exercises Provided and Patient Education Provided     Education provided:   - pec stretch    ASSESSMENT     Sakshi presents with decreased R shoulder pain and  dysfunction.  She reported several points of pain with clavicular mobs and Soft Tissue Mobilization to superior portion of pec major, which decreased after MT was performed.  Increased AAROM shoulder flexion with supine therex.  Pt will benefit from continued therapy to improve posture, decrease pain, improve R UE mobility.  Sakshi Is progressing well towards her goals.     Pt prognosis is Good.     Pt will continue to benefit from skilled outpatient physical therapy to address the deficits listed in the problem list box on initial evaluation, provide pt/family education and to maximize pt's level of independence in the home and community environment.     Pt's spiritual, cultural and educational needs considered and pt agreeable to plan of care and goals.     Anticipated barriers to physical therapy: NO    Goals:   Short Term Goals (STG) # weeks Goal Review Date Reviewed Date Met   Pt will demonstrate independence with initial HEP to facilitate therex progression and improvements in functional mobility 4 progressing 2/18/2022       The patient will increase ROM x 20 deg in all planes in order for pt to don and doff belt/bra with more ease 4 progressing 2/18/2022                   The patient will increase right upper extremity strength to greater than or equal to 1/3 manual muscle grade for all deficient areas in order to facilitate placing object on overhead shelf 4 progressing 2/18/2022          Long Term Goals (STG) # weeks Goal Review Date Reviewed Date Met   The patient will improve the Upper Extremity Functional Scale to less than 40% Disability 8 progressing 2/18/2022       The patient will demonstrate increased right shoulder flexion active range of motion to greater than 110 degrees in order to improve the patient's ability to reach into overhead cabinets at home 8 progressing 2/18/2022       The patient will increase right upper extremity strength to greater than or equal to 1/2 manual muscle grade for all  deficient areas in order to transfer food trays from counter to refrigerator. 8 progressing 2/18/2022       Pt will be able to wash her hair independently. 8 progressing 2/18/2022       Pt will restore previous LOF 8 progressing 2/18/2022           PLAN     Per POC.    Rebekah Oliveros, PTA

## 2022-02-21 ENCOUNTER — CLINICAL SUPPORT (OUTPATIENT)
Dept: REHABILITATION | Facility: HOSPITAL | Age: 67
End: 2022-02-21
Payer: MEDICARE

## 2022-02-21 DIAGNOSIS — R29.898 SHOULDER WEAKNESS: Primary | ICD-10-CM

## 2022-02-21 PROCEDURE — 97110 THERAPEUTIC EXERCISES: CPT | Mod: PN,CQ

## 2022-02-21 PROCEDURE — 97140 MANUAL THERAPY 1/> REGIONS: CPT | Mod: PN,CQ

## 2022-02-21 NOTE — PROGRESS NOTES
"[facility]   Physical Therapy Treatment Note     Name: Sakshi Hess  Clinic Number: [id number]    Therapy Diagnosis:   Encounter Diagnosis   Name Primary?    Shoulder weakness Yes     Physician: Tejas Lemon Jr., MD    Visit Date: 2/21/2022    [copy and paste header from heidi here]    Physician Orders: PT Eval and Treat   Medical Diagnosis from Referral: S/P arthroscopy of right shoulder.12/10/21  Evaluation Date: 2/8/2022  Authorization Period Expiration: 12/31/22  Plan of Care Expiration: 4/15/22  Progress Note Due: 4/15/22  Visit # / Visits authorized: 3/20   FOTO: 36/100     Precautions: Standard and Diabetes       Time In: 1130  Time Out: 1226  Total Billable Time: 56 minutes    SUBJECTIVE     Pt reports: she is feeling much better today, she has improved shoulder ROM and decreased pain, "I am so pleased!"  She was compliant with home exercise program  Response to previous treatment: positive  Functional change: decreased pain, minimal increase in shoulder ROM    Pain: 4/10  Location: right shoulder      OBJECTIVE     Objective Measures updated at progress report unless specified.     Treatment     Sakshi received the treatments listed below:      therapeutic exercises to develop strength, endurance, ROM, flexibility and posture for 48 minutes including:    Overhead Pulleys flexion, scaption x 3 minutes each    Rail wipes flexion x 20  Table wipes flexion, ER x 20 each NOT PERFORMED     Supine shoulder flexion w/dowel x 20  Supine SA punch with dowel x 20  Supine ER with dowel x 20  SL ER w/scapular setting x 20  IR behind back x 10    Corner pec stretch 3 x 30 sec - gentle NOT PERFORMED     Manual therapy x 8 minutes:  PROM all planes  Grade II posterior, inferior GH mobs  Superior/inferior clavicular mobs  Soft Tissue Massage to pec major origin NOT PERFORMED   IASTM R UT region NOT PERFORMED     NOT PERFORMED   Shoulder shrugs/rolls/retr 30 each  Wrist flex/ext  ,pron/supin 1# " 30 each  cold pack for 15 minutes to right shoulder.      Patient Education and Home Exercises     Home Exercises Provided and Patient Education Provided     Education provided:   - HEP issued and reviewed, pt verbalized understanding    ASSESSMENT     Sakshi demonstrates improved AAROM shoulder motions.  She reported she was able to get more motion with brushing her hair this past weekend.  Pt with decreased TP's along pec major region today.  Good tolerance for activity and improvement in ROM today.  Continued posture education, ROM, strength.  Sakshi Is progressing well towards her goals.     Pt prognosis is Good.     Pt will continue to benefit from skilled outpatient physical therapy to address the deficits listed in the problem list box on initial evaluation, provide pt/family education and to maximize pt's level of independence in the home and community environment.     Pt's spiritual, cultural and educational needs considered and pt agreeable to plan of care and goals.     Anticipated barriers to physical therapy: NO    Goals:   Short Term Goals (STG) # weeks Goal Review Date Reviewed Date Met   Pt will demonstrate independence with initial HEP to facilitate therex progression and improvements in functional mobility 4 progressing 2/21/2022       The patient will increase ROM x 20 deg in all planes in order for pt to don and doff belt/bra with more ease 4 progressing 2/21/2022                   The patient will increase right upper extremity strength to greater than or equal to 1/3 manual muscle grade for all deficient areas in order to facilitate placing object on overhead shelf 4 progressing 2/21/2022          Long Term Goals (STG) # weeks Goal Review Date Reviewed Date Met   The patient will improve the Upper Extremity Functional Scale to less than 40% Disability 8 progressing 2/21/2022       The patient will demonstrate increased right shoulder flexion active range of motion to greater than 110 degrees in  order to improve the patient's ability to reach into overhead cabinets at home 8 progressing 2/21/2022       The patient will increase right upper extremity strength to greater than or equal to 1/2 manual muscle grade for all deficient areas in order to transfer food trays from counter to refrigerator. 8 progressing 2/21/2022       Pt will be able to wash her hair independently. 8 progressing 2/21/2022       Pt will restore previous LOF 8 progressing 2/21/2022           PLAN     Per POC.    Rebekah Oliveros, PTA

## 2022-02-24 ENCOUNTER — CLINICAL SUPPORT (OUTPATIENT)
Dept: REHABILITATION | Facility: HOSPITAL | Age: 67
End: 2022-02-24
Payer: MEDICARE

## 2022-02-24 DIAGNOSIS — R29.898 SHOULDER WEAKNESS: Primary | ICD-10-CM

## 2022-02-24 PROCEDURE — 97110 THERAPEUTIC EXERCISES: CPT | Mod: PN

## 2022-02-24 NOTE — PROGRESS NOTES
[facility]   Physical Therapy Treatment Note     [id number]    Therapy Diagnosis:   Encounter Diagnosis   Name Primary?    Shoulder weakness Yes     Physician: Tejas Lemon Jr., MD    Visit Date: 2/24/2022    [copy and paste header from heidi here]    Physician Orders: PT Eval and Treat   Medical Diagnosis from Referral: S/P arthroscopy of right shoulder.12/10/21  Evaluation Date: 2/8/2022  Authorization Period Expiration: 12/31/22  Plan of Care Expiration: 4/15/22  Progress Note Due: 4/15/22  Visit # / Visits authorized: 4/20   FOTO: 36/100     Precautions: Standard and Diabetes       Time In: 0850  Time Out: 0913  Total Billable Time: 23 minutes    SUBJECTIVE     Pt reports: Increased pain in last  She was compliant with home exercise program  Response to previous treatment: positive  Functional change: decreased pain, minimal increase in shoulder ROM    Pain: 6/10  Location: right shoulder      OBJECTIVE     Objective Measures updated at progress report unless specified.     Treatment     Sakshi received the treatments listed below:      therapeutic exercises to develop strength, endurance, ROM, flexibility and posture for 48 minutes including:    Overhead Pulleys flexion, scaption x 4 minutes each  Shoulder shrugs/rolls/retr 30 each  Rail wipes flexion x 30 B  PROM 6' all plane  Table wipes flexion, ER x 20 each NOT PERFORMED   NP  Supine shoulder flexion w/dowel x 20  Supine SA punch with dowel x 20  Supine ER with dowel x 20  SL ER w/scapular setting x 20  IR behind back x 10    Corner pec stretch 3 x 30 sec - gentle NOT PERFORMED     Manual therapy x 8 minutes:  PROM all planes  Grade II posterior, inferior GH mobs  Superior/inferior clavicular mobs  Soft Tissue Massage to pec major origin NOT PERFORMED   IASTM R UT region NOT PERFORMED     NOT PERFORMED     Wrist flex/ext  ,pron/supin 1# 30 each  cold pack for 15 minutes to right shoulder.       Patient Education and Home Exercises     Home Exercises Provided and Patient Education Provided     Education provided:   - HEP issued and reviewed, pt verbalized understanding    ASSESSMENT     Short session today d/t pt late 19 min.  Sakshi demonstrates improved AAROM shoulder motions.  She reported she was able to get more motion with brushing her hair this past weekend.  Continued posture education, ROM, strength.  Sakshi Is progressing well towards her goals.     Pt prognosis is Good.     Pt will continue to benefit from skilled outpatient physical therapy to address the deficits listed in the problem list box on initial evaluation, provide pt/family education and to maximize pt's level of independence in the home and community environment.     Pt's spiritual, cultural and educational needs considered and pt agreeable to plan of care and goals.     Anticipated barriers to physical therapy: NO    Goals:   Short Term Goals (STG) # weeks Goal Review Date Reviewed Date Met   Pt will demonstrate independence with initial HEP to facilitate therex progression and improvements in functional mobility 4 progressing 2/24/2022       The patient will increase ROM x 20 deg in all planes in order for pt to don and doff belt/bra with more ease 4 progressing 2/24/2022                   The patient will increase right upper extremity strength to greater than or equal to 1/3 manual muscle grade for all deficient areas in order to facilitate placing object on overhead shelf 4 progressing 2/24/2022          Long Term Goals (STG) # weeks Goal Review Date Reviewed Date Met   The patient will improve the Upper Extremity Functional Scale to less than 40% Disability 8 progressing 2/24/2022       The patient will demonstrate increased right shoulder flexion active range of motion to greater than 110 degrees in order to improve the patient's ability to reach into overhead cabinets at home 8 progressing 2/24/2022       The patient will  increase right upper extremity strength to greater than or equal to 1/2 manual muscle grade for all deficient areas in order to transfer food trays from counter to refrigerator. 8 progressing 2/24/2022       Pt will be able to wash her hair independently. 8 progressing 2/24/2022       Pt will restore previous LOF 8 progressing 2/24/2022           PLAN     Per POC.    Dk Ribeiro, PT

## 2022-03-01 NOTE — PROGRESS NOTES
SEE POC     BUE/BLE sensation appears to be grossly intact pt able to nod his head yes to feeling sensation./Left UE/Right UE/Left LE/Right LE/Grossly Intact

## 2022-03-02 ENCOUNTER — CLINICAL SUPPORT (OUTPATIENT)
Dept: REHABILITATION | Facility: HOSPITAL | Age: 67
End: 2022-03-02
Payer: MEDICARE

## 2022-03-02 DIAGNOSIS — R29.898 SHOULDER WEAKNESS: Primary | ICD-10-CM

## 2022-03-02 PROCEDURE — 97110 THERAPEUTIC EXERCISES: CPT | Mod: PN

## 2022-03-02 NOTE — PROGRESS NOTES
[facility]   Physical Therapy Treatment Note     [id number]    Therapy Diagnosis:   Encounter Diagnosis   Name Primary?    Shoulder weakness Yes     Physician: Tejas Lemon Jr., MD    Visit Date: 3/2/2022    [copy and paste header from heidi here]    Physician Orders: PT Eval and Treat   Medical Diagnosis from Referral: S/P arthroscopy of right shoulder.12/10/21  Evaluation Date: 2/8/2022  Authorization Period Expiration: 12/31/22  Plan of Care Expiration: 4/15/22  Progress Note Due: 4/15/22  Visit # / Visits authorized: 4/20   FOTO: 36/100     Precautions: Standard and Diabetes       Time In: 1500  Time Out: 1550  Total Billable Time: 40 minutes    SUBJECTIVE     Pt reports: Increased pain after  last session  She was compliant with home exercise program  Response to previous treatment: positive  Functional change: decreased pain today    Pain: 4/10  Location: right shoulder      OBJECTIVE     Objective Measures updated at progress report unless specified.     Treatment     Sakshi received the treatments listed below:      therapeutic exercises to develop strength, endurance, ROM, flexibility and posture for 48 minutes including:    Overhead Pulleys flexion, scaption x 4 minutes each  Shoulder shrugs/rolls/retr 30 each  Isometrics;add,ext 30  Rail wipes flexion x 30 B  NP  PROM 6' all plane  Table wipes flexion, ER x 20 each NOT PERFORMED   Shoulder flexion/EXT/ABD 10 w/dowel   Corner pec stretch 3 x 30 sec - gentle NOT PERFORMED     Manual therapy x 6 minutes:  PROM all planes    Wrist flex/ext  ,pron/supin 1# 30 each  Digiflex;red 3'  cold pack for 10 minutes to right shoulder.      Patient Education and Home Exercises     Home Exercises Provided and Patient Education Provided     Education provided:   - HEP issued and reviewed, pt verbalized understanding    ASSESSMENT     Sakshi demonstrates improved AAROM shoulder motions.  She reported she was  able to get more motion with brushing her hair this past weekend.  Continued posture education, ROM, strength.  Sakshi Is progressing well towards her goals.     Pt prognosis is Good.     Pt will continue to benefit from skilled outpatient physical therapy to address the deficits listed in the problem list box on initial evaluation, provide pt/family education and to maximize pt's level of independence in the home and community environment.     Pt's spiritual, cultural and educational needs considered and pt agreeable to plan of care and goals.     Anticipated barriers to physical therapy: NO    Goals:   Short Term Goals (STG) # weeks Goal Review Date Reviewed Date Met   Pt will demonstrate independence with initial HEP to facilitate therex progression and improvements in functional mobility 4 progressing 3/2/2022       The patient will increase ROM x 20 deg in all planes in order for pt to don and doff belt/bra with more ease 4 progressing 3/2/2022                   The patient will increase right upper extremity strength to greater than or equal to 1/3 manual muscle grade for all deficient areas in order to facilitate placing object on overhead shelf 4 progressing 3/2/2022          Long Term Goals (STG) # weeks Goal Review Date Reviewed Date Met   The patient will improve the Upper Extremity Functional Scale to less than 40% Disability 8 progressing 3/2/2022       The patient will demonstrate increased right shoulder flexion active range of motion to greater than 110 degrees in order to improve the patient's ability to reach into overhead cabinets at home 8 progressing 3/2/2022       The patient will increase right upper extremity strength to greater than or equal to 1/2 manual muscle grade for all deficient areas in order to transfer food trays from counter to refrigerator. 8 progressing 3/2/2022       Pt will be able to wash her hair independently. 8 progressing 3/2/2022       Pt will restore previous LOF 8  progressing 3/2/2022           PLAN     Per POC.    Dk Ribeiro, PT

## 2022-03-08 ENCOUNTER — CLINICAL SUPPORT (OUTPATIENT)
Dept: REHABILITATION | Facility: HOSPITAL | Age: 67
End: 2022-03-08
Payer: MEDICARE

## 2022-03-08 DIAGNOSIS — R29.898 SHOULDER WEAKNESS: Primary | ICD-10-CM

## 2022-03-08 PROCEDURE — 97110 THERAPEUTIC EXERCISES: CPT | Mod: PN

## 2022-03-08 NOTE — PROGRESS NOTES
[facility]   Physical Therapy Treatment Note     [id number]    Therapy Diagnosis:   Encounter Diagnosis   Name Primary?    Shoulder weakness Yes     Physician: Tejas Lemon Jr., MD    Visit Date: 3/8/2022    [copy and paste header from eval here]    Physician Orders: PT Eval and Treat   Medical Diagnosis from Referral: S/P arthroscopy of right shoulder.12/10/21  Evaluation Date: 2/8/2022  Authorization Period Expiration: 12/31/22  Plan of Care Expiration: 4/15/22  Progress Note Due: 4/15/22  Visit # / Visits authorized: 6/20   FOTO: 36/100   FOTO VISIT 6;49/100  Precautions: Standard and Diabetes       Time In: 0933  Time Out: 1013  Total Billable Time: 40 minutes    SUBJECTIVE     Pt reports: Increased pain after  last session  She was compliant with home exercise program  Response to previous treatment: positive  Functional change: decreased pain today    Pain: 4/10  Location: right shoulder      OBJECTIVE     Objective Measures updated at progress report unless specified.     Treatment     Sakshi received the treatments listed below:      therapeutic exercises to develop strength, endurance, ROM, flexibility and posture for 48 minutes including:    Overhead Pulleys flexion, scaption x 4 minutes each  Shoulder shrugs/rolls/retr 30 each  Isometrics;add,ext 30  Rail wipes flexion x 30 B  NP  PROM 6' all plane  Table wipes flexion, ER x 20 each NOT PERFORMED   Shoulder flexion/EXT/ABD 10 w/dowel   Corner pec stretch 3 x 30 sec - gentle NOT PERFORMED     Manual therapy x 6 minutes:  PROM all planes    Wrist flex/ext  ,pron/supin 1# 30 each  Digiflex;red 3'  cold pack for 10 minutes to right shoulder.      Patient Education and Home Exercises     Home Exercises Provided and Patient Education Provided     Education provided:   - HEP issued and reviewed, pt verbalized understanding    ASSESSMENT     Sakshi demonstrates improved AAROM shoulder motions.  She  reported she was able to get more motion with brushing her hair this past weekend.  Continued posture education, ROM, strength.  Sakshi Is progressing well towards her goals.     Pt prognosis is Good.     Pt will continue to benefit from skilled outpatient physical therapy to address the deficits listed in the problem list box on initial evaluation, provide pt/family education and to maximize pt's level of independence in the home and community environment.     Pt's spiritual, cultural and educational needs considered and pt agreeable to plan of care and goals.     Anticipated barriers to physical therapy: NO    Goals:   Short Term Goals (STG) # weeks Goal Review Date Reviewed Date Met   Pt will demonstrate independence with initial HEP to facilitate therex progression and improvements in functional mobility 4 progressing 3/8/2022       The patient will increase ROM x 20 deg in all planes in order for pt to don and doff belt/bra with more ease 4 progressing 3/8/2022                   The patient will increase right upper extremity strength to greater than or equal to 1/3 manual muscle grade for all deficient areas in order to facilitate placing object on overhead shelf 4 progressing 3/8/2022          Long Term Goals (STG) # weeks Goal Review Date Reviewed Date Met   The patient will improve the Upper Extremity Functional Scale to less than 40% Disability 8 progressing 3/8/2022       The patient will demonstrate increased right shoulder flexion active range of motion to greater than 110 degrees in order to improve the patient's ability to reach into overhead cabinets at home 8 progressing 3/8/2022       The patient will increase right upper extremity strength to greater than or equal to 1/2 manual muscle grade for all deficient areas in order to transfer food trays from counter to refrigerator. 8 progressing 3/8/2022       Pt will be able to wash her hair independently. 8 progressing 3/8/2022       Pt will restore  previous LOF 8 progressing 3/8/2022           PLAN     Per POC.    Dk Ribeiro, PT

## 2022-03-15 ENCOUNTER — DOCUMENTATION ONLY (OUTPATIENT)
Dept: REHABILITATION | Facility: HOSPITAL | Age: 67
End: 2022-03-15

## 2022-03-15 ENCOUNTER — CLINICAL SUPPORT (OUTPATIENT)
Dept: REHABILITATION | Facility: HOSPITAL | Age: 67
End: 2022-03-15
Payer: MEDICARE

## 2022-03-15 DIAGNOSIS — R29.898 SHOULDER WEAKNESS: Primary | ICD-10-CM

## 2022-03-15 PROCEDURE — 97110 THERAPEUTIC EXERCISES: CPT | Mod: PN,CQ

## 2022-03-15 PROCEDURE — 97140 MANUAL THERAPY 1/> REGIONS: CPT | Mod: PN,CQ

## 2022-03-15 NOTE — PROGRESS NOTES
30 day visit PT-PTA face-face discussion with Dk irvin: patient status, POC, and plan for progression done 3/15/22.  Rebekah Oliveros, PTA

## 2022-03-15 NOTE — PROGRESS NOTES
[facility]   Physical Therapy Treatment Note     [id number]    Therapy Diagnosis:   Encounter Diagnosis   Name Primary?    Shoulder weakness Yes     Physician: Tejas Lemon Jr., MD    Visit Date: 3/15/2022      Physician Orders: PT Eval and Treat   Medical Diagnosis from Referral: S/P arthroscopy of right shoulder.12/10/21  Evaluation Date: 2/8/2022  Authorization Period Expiration: 12/31/22  Plan of Care Expiration: 4/15/22  Progress Note Due: 4/15/22  Visit # / Visits authorized: 7/20   FOTO: 36/100   FOTO VISIT 6;49/100  Precautions: Standard and Diabetes       Time In: 1046  Time Out: 1131  Total Billable Time: 45 minutes    SUBJECTIVE     Pt reports: she peeled crawfish the other day, she is sore in her wrist extensors and lateral shoulder  She was compliant with home exercise program  Response to previous treatment: positive  Functional change: decreased pain today    Pain: 6/10  Location: right shoulder      OBJECTIVE     Objective Measures updated at progress report unless specified.     Treatment     Sakshi received the treatments listed below:      therapeutic exercises to develop strength, endurance, ROM, flexibility and posture for 37 minutes including:    UBE 2/3    Overhead Pulleys flexion, scaption x 3 minutes each    Corner pec stretch 3 x 30 sec - gentle    Rail wipes flexion x 20    Posterior capsule stretch 3 x 20 sec R  B ER YTB x 20     Supine shoulder flexion w/dowel x 20  Supine SA punch with dowel x 20  Supine ER with dowel x 20  SL ER w/scapular setting x 20  IR behind back x 10    Manual therapy x 8 minutes:  Myofacial Release with therapy bar and ball to R lateral shoulder      Patient Education and Home Exercises     Home Exercises Provided and Patient Education Provided     Education provided:   - HEP issued and reviewed, pt verbalized understanding    ASSESSMENT     Sakshi presented with increased R shoulder pain, probably  from peeling crawfish this past weekend.  She has improved with her AROM shoulder motions, but has not yet achieved full ROM.  She is now able to wash/brush her hair and shift gears in her car, which was a goal of hers.   Continued posture education, balance, ROM.  Sakshi Is progressing well towards her goals.     Pt prognosis is Good.     Pt will continue to benefit from skilled outpatient physical therapy to address the deficits listed in the problem list box on initial evaluation, provide pt/family education and to maximize pt's level of independence in the home and community environment.     Pt's spiritual, cultural and educational needs considered and pt agreeable to plan of care and goals.     Anticipated barriers to physical therapy: NO    Goals:   Short Term Goals (STG) # weeks Goal Review Date Reviewed Date Met   Pt will demonstrate independence with initial HEP to facilitate therex progression and improvements in functional mobility 4 progressing 3/15/2022       The patient will increase ROM x 20 deg in all planes in order for pt to don and doff belt/bra with more ease 4 progressing 3/15/2022                   The patient will increase right upper extremity strength to greater than or equal to 1/3 manual muscle grade for all deficient areas in order to facilitate placing object on overhead shelf 4 progressing 3/15/2022          Long Term Goals (STG) # weeks Goal Review Date Reviewed Date Met   The patient will improve the Upper Extremity Functional Scale to less than 40% Disability 8 progressing 3/15/2022       The patient will demonstrate increased right shoulder flexion active range of motion to greater than 110 degrees in order to improve the patient's ability to reach into overhead cabinets at home 8 progressing 3/15/2022       The patient will increase right upper extremity strength to greater than or equal to 1/2 manual muscle grade for all deficient areas in order to transfer food trays from counter  to refrigerator. 8 progressing 3/15/2022       Pt will be able to wash her hair independently. 8 progressing 3/15/2022       Pt will restore previous LOF 8 progressing 3/15/2022           PLAN     Per POC.    Rebekah Oliveros, PTA

## 2022-03-17 ENCOUNTER — CLINICAL SUPPORT (OUTPATIENT)
Dept: REHABILITATION | Facility: HOSPITAL | Age: 67
End: 2022-03-17
Payer: MEDICARE

## 2022-03-17 DIAGNOSIS — R29.898 SHOULDER WEAKNESS: Primary | ICD-10-CM

## 2022-03-17 PROCEDURE — 97110 THERAPEUTIC EXERCISES: CPT | Mod: PN

## 2022-03-17 NOTE — PROGRESS NOTES
[facility]   Physical Therapy Treatment Note     [id number]    Therapy Diagnosis:   No diagnosis found.  Physician: Tejas Lemon Jr., MD    Visit Date: 3/17/2022      Physician Orders: PT Eval and Treat   Medical Diagnosis from Referral: S/P arthroscopy of right shoulder.12/10/21  Evaluation Date: 2/8/2022  Authorization Period Expiration: 12/31/22  Plan of Care Expiration: 4/15/22  Progress Note Due: 4/15/22  Visit # / Visits authorized: 7/20   FOTO: 36/100   FOTO VISIT 6;49/100  Precautions: Standard and Diabetes       Time In: 0919  Time Out: 0928  Total Billable Time: 39 minutes    SUBJECTIVE     Pt reports:Little sore today after last session  She was compliant with home exercise program  Response to previous treatment: positive  Functional change: decreased pain today    Pain: 5/10  Location: right shoulder      OBJECTIVE     Objective Measures     A/PROM FLEX; 120/150   ABD;115/145.    Treatment     Sakshi received the treatments listed below:      therapeutic exercises to develop strength, endurance, ROM, flexibility and posture for 39 minutes including:    UBE 4/4  WAND FLEX/ABD/EXT 30 each  Overhead Pulleys flexion, scaption x 4 minutes each  PROM 8'  Corner pec stretch 3 x 30 sec - gentle  NP  Rail wipes flexion x 20 NP  Shoulder shrugs/rolls/retr 30 each  Posterior capsule stretch 3 x 20 sec R  B ER YTB x 20     Supine shoulder flexion w/dowel x 20 NP  Supine SA punch with dowel x 20 NP  Supine ER with dowel x 20 NP  SL ER w/scapular setting x 20  IR behind back x 10    Manual therapy x 0 minutes:  Myofacial Release with therapy bar and ball to R lateral shoulder      Patient Education and Home Exercises     Home Exercises Provided and Patient Education Provided     Education provided:   - HEP issued and reviewed, pt verbalized understanding    ASSESSMENT     A/PROM improving  She has improved with her AROM shoulder motions, but has not  yet achieved full ROM.  She is now able to wash/brush her hair and shift gears in her car, which was a goal of hers.   Continued ROM,strength..  Sakshi Is progressing well towards her goals.     Pt prognosis is Good.     Pt will continue to benefit from skilled outpatient physical therapy to address the deficits listed in the problem list box on initial evaluation, provide pt/family education and to maximize pt's level of independence in the home and community environment.     Pt's spiritual, cultural and educational needs considered and pt agreeable to plan of care and goals.     Anticipated barriers to physical therapy: NO    Goals:   Short Term Goals (STG) # weeks Goal Review Date Reviewed Date Met   Pt will demonstrate independence with initial HEP to facilitate therex progression and improvements in functional mobility 4 progressing 3/17/2022       The patient will increase ROM x 20 deg in all planes in order for pt to don and doff belt/bra with more ease 4 progressing 3/17/2022                   The patient will increase right upper extremity strength to greater than or equal to 1/3 manual muscle grade for all deficient areas in order to facilitate placing object on overhead shelf 4 progressing 3/17/2022          Long Term Goals (STG) # weeks Goal Review Date Reviewed Date Met   The patient will improve the Upper Extremity Functional Scale to less than 40% Disability 8 progressing 3/17/2022       The patient will demonstrate increased right shoulder flexion active range of motion to greater than 110 degrees in order to improve the patient's ability to reach into overhead cabinets at home 8 progressing 3/17/2022       The patient will increase right upper extremity strength to greater than or equal to 1/2 manual muscle grade for all deficient areas in order to transfer food trays from counter to refrigerator. 8 progressing 3/17/2022       Pt will be able to wash her hair independently. 8 progressing 3/17/2022        Pt will restore previous LOF 8 progressing 3/17/2022           PLAN     Per POC.    Dk Ribeiro, PT

## 2022-04-07 ENCOUNTER — HOSPITAL ENCOUNTER (OUTPATIENT)
Dept: PULMONOLOGY | Facility: HOSPITAL | Age: 67
Discharge: HOME | End: 2022-04-07
Attending: INTERNAL MEDICINE
Payer: MEDICARE

## 2022-04-07 ENCOUNTER — OFFICE VISIT (OUTPATIENT)
Dept: PULMONOLOGY | Facility: CLINIC | Age: 67
End: 2022-04-07
Payer: MEDICARE

## 2022-04-07 VITALS
HEART RATE: 79 BPM | HEIGHT: 61 IN | BODY MASS INDEX: 37.76 KG/M2 | SYSTOLIC BLOOD PRESSURE: 142 MMHG | WEIGHT: 200 LBS | DIASTOLIC BLOOD PRESSURE: 71 MMHG | OXYGEN SATURATION: 97 %

## 2022-04-07 DIAGNOSIS — M19.90 ARTHRITIS: ICD-10-CM

## 2022-04-07 DIAGNOSIS — J84.9 ILD (INTERSTITIAL LUNG DISEASE): ICD-10-CM

## 2022-04-07 DIAGNOSIS — J45.20 MILD INTERMITTENT ASTHMA WITHOUT COMPLICATION: ICD-10-CM

## 2022-04-07 DIAGNOSIS — J84.9 ILD (INTERSTITIAL LUNG DISEASE): Primary | ICD-10-CM

## 2022-04-07 DIAGNOSIS — J84.112 IPF (IDIOPATHIC PULMONARY FIBROSIS): ICD-10-CM

## 2022-04-07 DIAGNOSIS — G47.33 OBSTRUCTIVE SLEEP APNEA SYNDROME: ICD-10-CM

## 2022-04-07 DIAGNOSIS — J96.11 CHRONIC HYPOXEMIC RESPIRATORY FAILURE: ICD-10-CM

## 2022-04-07 LAB
BR6MWT: NORMAL
BRPFT: NORMAL
DLCO ADJ PRE: 12.91 ML/(MIN*MMHG)
DLCO SINGLE BREATH LLN: 14.14
DLCO SINGLE BREATH PRE REF: 65 %
DLCO SINGLE BREATH REF: 19.87
DLCOC SBVA LLN: 2.84
DLCOC SBVA PRE REF: 99.9 %
DLCOC SBVA REF: 4.48
DLCOC SINGLE BREATH LLN: 14.14
DLCOC SINGLE BREATH PRE REF: 65 %
DLCOC SINGLE BREATH REF: 19.87
DLCOVA LLN: 2.84
DLCOVA PRE REF: 99.9 %
DLCOVA PRE: 4.47 ML/(MIN*MMHG*L)
DLCOVA REF: 4.48
DLVAADJ PRE: 4.47 ML/(MIN*MMHG*L)
ERVN2 LLN: -16449.34
ERVN2 PRE REF: 50.5 %
ERVN2 PRE: 0.33 L
ERVN2 REF: 0.66
FEF 25 75 CHG: -0.3 %
FEF 25 75 LLN: 0.88
FEF 25 75 POST REF: 110.3 %
FEF 25 75 PRE REF: 110.7 %
FEF 25 75 REF: 1.85
FET100 CHG: 18.1 %
FEV1 CHG: -0.7 %
FEV1 FVC CHG: -0.4 %
FEV1 FVC LLN: 66
FEV1 FVC POST REF: 102.3 %
FEV1 FVC PRE REF: 102.7 %
FEV1 FVC REF: 79
FEV1 LLN: 1.53
FEV1 POST REF: 94.6 %
FEV1 PRE REF: 95.3 %
FEV1 REF: 2.07
FRCN2 LLN: 1.72
FRCN2 PRE REF: 55.9 %
FRCN2 REF: 2.54
FVC CHG: -0.3 %
FVC LLN: 1.95
FVC POST REF: 92 %
FVC PRE REF: 92.3 %
FVC REF: 2.64
IVC PRE: 2.07 L
IVC SINGLE BREATH LLN: 1.95
IVC SINGLE BREATH PRE REF: 78.5 %
IVC SINGLE BREATH REF: 2.64
MVV LLN: 63
MVV PRE REF: 88 %
MVV REF: 74
PEF CHG: 0.6 %
PEF LLN: 3.86
PEF POST REF: 85.4 %
PEF PRE REF: 84.9 %
PEF REF: 5.42
POST FEF 25 75: 2.04 L/S
POST FET 100: 7.34 SEC
POST FEV1 FVC: 80.71 %
POST FEV1: 1.96 L
POST FVC: 2.43 L
POST PEF: 4.63 L/S
PRE DLCO: 12.91 ML/(MIN*MMHG)
PRE FEF 25 75: 2.05 L/S
PRE FET 100: 6.22 SEC
PRE FEV1 FVC: 81.01 %
PRE FEV1: 1.98 L
PRE FRC N2: 1.42 L
PRE FVC: 2.44 L
PRE MVV: 65 L/MIN
PRE PEF: 4.6 L/S
RVN2 LLN: 1.3
RVN2 PRE REF: 53.2 %
RVN2 PRE: 1 L
RVN2 REF: 1.88
RVN2TLCN2 LLN: 32.15
RVN2TLCN2 PRE REF: 69.6 %
RVN2TLCN2 PRE: 29.05 %
RVN2TLCN2 REF: 41.74
TLCN2 LLN: 3.45
TLCN2 PRE REF: 77.4 %
TLCN2 PRE: 3.44 L
TLCN2 REF: 4.44
VA PRE: 2.89 L
VA SINGLE BREATH LLN: 4.29
VA SINGLE BREATH PRE REF: 67.3 %
VA SINGLE BREATH REF: 4.29
VCMAXN2 LLN: 1.95
VCMAXN2 PRE REF: 92.3 %
VCMAXN2 PRE: 2.44 L
VCMAXN2 REF: 2.64

## 2022-04-07 PROCEDURE — 99214 OFFICE O/P EST MOD 30 MIN: CPT | Mod: 25,S$GLB,, | Performed by: INTERNAL MEDICINE

## 2022-04-07 PROCEDURE — 94727 PR PULM FUNCTION TEST BY GAS: ICD-10-PCS | Mod: 26,,, | Performed by: INTERNAL MEDICINE

## 2022-04-07 PROCEDURE — 94729 DIFFUSING CAPACITY: CPT | Mod: 26,,, | Performed by: INTERNAL MEDICINE

## 2022-04-07 PROCEDURE — 1159F MED LIST DOCD IN RCRD: CPT | Mod: CPTII,S$GLB,, | Performed by: INTERNAL MEDICINE

## 2022-04-07 PROCEDURE — 94060 PR EVAL OF BRONCHOSPASM: ICD-10-PCS | Mod: 26,,, | Performed by: INTERNAL MEDICINE

## 2022-04-07 PROCEDURE — 1159F PR MEDICATION LIST DOCUMENTED IN MEDICAL RECORD: ICD-10-PCS | Mod: CPTII,S$GLB,, | Performed by: INTERNAL MEDICINE

## 2022-04-07 PROCEDURE — 94729 PR C02/MEMBANE DIFFUSE CAPACITY: ICD-10-PCS | Mod: 26,,, | Performed by: INTERNAL MEDICINE

## 2022-04-07 PROCEDURE — 3008F PR BODY MASS INDEX (BMI) DOCUMENTED: ICD-10-PCS | Mod: CPTII,S$GLB,, | Performed by: INTERNAL MEDICINE

## 2022-04-07 PROCEDURE — 1125F AMNT PAIN NOTED PAIN PRSNT: CPT | Mod: CPTII,S$GLB,, | Performed by: INTERNAL MEDICINE

## 2022-04-07 PROCEDURE — 3008F BODY MASS INDEX DOCD: CPT | Mod: CPTII,S$GLB,, | Performed by: INTERNAL MEDICINE

## 2022-04-07 PROCEDURE — 3077F PR MOST RECENT SYSTOLIC BLOOD PRESSURE >= 140 MM HG: ICD-10-PCS | Mod: CPTII,S$GLB,, | Performed by: INTERNAL MEDICINE

## 2022-04-07 PROCEDURE — 94727 GAS DIL/WSHOT DETER LNG VOL: CPT

## 2022-04-07 PROCEDURE — 94618 PULMONARY STRESS TESTING: CPT

## 2022-04-07 PROCEDURE — 3078F PR MOST RECENT DIASTOLIC BLOOD PRESSURE < 80 MM HG: ICD-10-PCS | Mod: CPTII,S$GLB,, | Performed by: INTERNAL MEDICINE

## 2022-04-07 PROCEDURE — 94060 EVALUATION OF WHEEZING: CPT | Mod: 26,,, | Performed by: INTERNAL MEDICINE

## 2022-04-07 PROCEDURE — 94729 DIFFUSING CAPACITY: CPT

## 2022-04-07 PROCEDURE — 3288F FALL RISK ASSESSMENT DOCD: CPT | Mod: CPTII,S$GLB,, | Performed by: INTERNAL MEDICINE

## 2022-04-07 PROCEDURE — 1101F PR PT FALLS ASSESS DOC 0-1 FALLS W/OUT INJ PAST YR: ICD-10-PCS | Mod: CPTII,S$GLB,, | Performed by: INTERNAL MEDICINE

## 2022-04-07 PROCEDURE — 4010F ACE/ARB THERAPY RXD/TAKEN: CPT | Mod: CPTII,S$GLB,, | Performed by: INTERNAL MEDICINE

## 2022-04-07 PROCEDURE — 3078F DIAST BP <80 MM HG: CPT | Mod: CPTII,S$GLB,, | Performed by: INTERNAL MEDICINE

## 2022-04-07 PROCEDURE — 1101F PT FALLS ASSESS-DOCD LE1/YR: CPT | Mod: CPTII,S$GLB,, | Performed by: INTERNAL MEDICINE

## 2022-04-07 PROCEDURE — 99214 PR OFFICE/OUTPT VISIT, EST, LEVL IV, 30-39 MIN: ICD-10-PCS | Mod: 25,S$GLB,, | Performed by: INTERNAL MEDICINE

## 2022-04-07 PROCEDURE — 99999 PR PBB SHADOW E&M-EST. PATIENT-LVL IV: CPT | Mod: PBBFAC,,, | Performed by: INTERNAL MEDICINE

## 2022-04-07 PROCEDURE — 94618 PULMONARY STRESS TESTING: CPT | Mod: 26,,, | Performed by: INTERNAL MEDICINE

## 2022-04-07 PROCEDURE — 94060 EVALUATION OF WHEEZING: CPT

## 2022-04-07 PROCEDURE — 94618 PULMONARY STRESS TESTING: ICD-10-PCS | Mod: 26,,, | Performed by: INTERNAL MEDICINE

## 2022-04-07 PROCEDURE — 3077F SYST BP >= 140 MM HG: CPT | Mod: CPTII,S$GLB,, | Performed by: INTERNAL MEDICINE

## 2022-04-07 PROCEDURE — 99999 PR PBB SHADOW E&M-EST. PATIENT-LVL IV: ICD-10-PCS | Mod: PBBFAC,,, | Performed by: INTERNAL MEDICINE

## 2022-04-07 PROCEDURE — 4010F PR ACE/ARB THEARPY RXD/TAKEN: ICD-10-PCS | Mod: CPTII,S$GLB,, | Performed by: INTERNAL MEDICINE

## 2022-04-07 PROCEDURE — 1125F PR PAIN SEVERITY QUANTIFIED, PAIN PRESENT: ICD-10-PCS | Mod: CPTII,S$GLB,, | Performed by: INTERNAL MEDICINE

## 2022-04-07 PROCEDURE — 3288F PR FALLS RISK ASSESSMENT DOCUMENTED: ICD-10-PCS | Mod: CPTII,S$GLB,, | Performed by: INTERNAL MEDICINE

## 2022-04-07 PROCEDURE — 94727 GAS DIL/WSHOT DETER LNG VOL: CPT | Mod: 26,,, | Performed by: INTERNAL MEDICINE

## 2022-04-07 RX ORDER — OMEPRAZOLE 40 MG/1
40 CAPSULE, DELAYED RELEASE ORAL
COMMUNITY
Start: 2022-02-24 | End: 2022-11-30

## 2022-04-07 RX ORDER — AZITHROMYCIN 500 MG/1
TABLET, FILM COATED ORAL
Qty: 3 TABLET | Refills: 3 | Status: SHIPPED | OUTPATIENT
Start: 2022-04-07 | End: 2022-07-22 | Stop reason: SDUPTHER

## 2022-04-07 RX ORDER — PREDNISONE 20 MG/1
TABLET ORAL
Qty: 36 TABLET | Refills: 1 | Status: SHIPPED | OUTPATIENT
Start: 2022-04-07 | End: 2022-07-22 | Stop reason: SDUPTHER

## 2022-04-07 RX ORDER — NINTEDANIB 150 MG/1
1 CAPSULE ORAL 2 TIMES DAILY
Qty: 60 CAPSULE | Refills: 11 | Status: SHIPPED | OUTPATIENT
Start: 2022-04-07 | End: 2022-07-22 | Stop reason: SDUPTHER

## 2022-04-07 NOTE — PROGRESS NOTES
2022    Sakshi Hess  New Patient Consult    Chief Complaint   Patient presents with    Follow-up     3 month f/u        HPI:     2022 no abx and breathing stable.  cpap ok.  Has ahi 36 in , cpap auto 4-20.  Notes high pressure with removal mask at night.  Breathing roughly same but good and bad days.  Vague am stiffness of hands attributed to prior shoulder surgery.    No cough now wheezes.  Uses xopenex bid last month-- no help     Pt had 1st feb acute sob with wheezes, considered hospital, ended up reviewed her Facebook, found had prior Feb with similar symptoms and took inhalers with good control.      1/10/2022 uses cpap well -- uses nasal pillars.  Uses over 4 hrs /night.  Had admit h     Sinuses ok    Pt  Reports occasional low ox 90 or so.      Has boxes of inhalers at home-- took prednisone in fall.  No fh fibrosis but mom  46 yo.   Patient Instructions   You have mild scarring of lungs seen on ct of abdomen.   Rales are heard on exam suggesting scarring of lungs.      Would check oxygen level walking and breathing test.    Ct chest would be good to check-- could do in 6-12 months.    Use amoxil for sinus/lung infection    Will screen joint disease that may be associated with lung disease.    4/15/2021 uses cpap - uses over 4 hrs nightly, got call from  [facility] [place] saying needed to purchase or something?  Uses high compliance with great benefit.      10/1/2020 pt uses cpap 6 hrs nightly, has problems with mask, went to nasal with chin strap - needed tape to keep mouth closed.   Pt having good response when mask tolerated well.  No prednisone - stable  Patient Instructions   You are tolerating cpap well - you should do better with airfit f30 mask than nasal mask.    Continue symbicort as control lungs has been good.      You should use xopenex in place albuterol as you have palpitations with albuterol.  2020 - no prednisone last 3 wks, takes once every 3 wks, was not using  "symbicort less than daily. Brittaney's mom.  Sinuses good.      You had prior sleep apnea, up to 40/hr?  Used cpap for 3 yrs but stopped 10 yrs ago for multiple reasons.  Patient Instructions   symbicort is preventive - use regular 2 puffs twice daily,   singulair daily also.      Use xopenex inhaler, albuterol not as effective for you- as rescue therapy, use 2 puff up to every 4-6 as needed. May use nebulizer.    Use prednisone if needed.  20 mg daily for 2-3 should be adequate.  symbicort should decrease prednisone need.    Sleep apnea - up to 5 /hour is within normal range, study may apply cpap if can diagnose sleep apnea 1st part of night.   May need to see to discuss cpap tolerance post study.  10/15/2019pt had cough/wheezes as child, never outgrew.  Had cough all life- mucous clear. occ green mucous, uses combivent. Has neb with xopenex- albuterol ppt shakes.  Uses steroids once every 5 yrs- had to 3 wks prednisone.  Er visits once yr - may get steroid shot.  Lives [place],  No sinus, has dog and cat.  Breathing seems stable- breathing gets bad 4 month a yr    Uses oxycodone tid for last 1.5 yrs.      Has sleep apnea - cpap broke and not followed up.    Patient Instructions   Asthma, copd with chronic disease?    Preventive - use regular- sinuglair daily, symbicort 2 twice daily    Rescue medication - use albuterol inhaler or xopenex nebulizer up to every 4 hrs or so.    Action plan - prednisone one daily for 3 May be adequate, may need 3 for 3 and taper if severe.  Er if sickly    If yellow mucous - use azithromycin.    Spacer will help inhaled medication.    Breathing test and follow up a yr.  The chief compliant  problem is new to me",    PFSH:  Past Medical History:   Diagnosis Date    Arthritis     COPD (chronic obstructive pulmonary disease)     Coronary artery disease     Hypertension     Sleep apnea     use CPAP at night          Past Surgical History:   Procedure Laterality Date    CORONARY " "ANGIOGRAPHY N/A 2/15/2021    Procedure: ANGIOGRAM, CORONARY ARTERY;  Surgeon: Aidan Buchanan MD;  Location: [facility] CATH/EP LAB;  Service: Cardiology;  Laterality: N/A;    CORONARY ARTERY BYPASS GRAFT      CORONARY BYPASS GRAFT ANGIOGRAPHY  2/15/2021    Procedure: Bypass graft study;  Surgeon: Aidan Buchanan MD;  Location: [facility] CATH/EP LAB;  Service: Cardiology;;    HYSTERECTOMY      JOINT REPLACEMENT      bilateral knee replacement     LEFT HEART CATHETERIZATION Left 2/15/2021    Procedure: Left heart cath;  Surgeon: Aidan Buchanan MD;  Location: [facility] CATH/EP LAB;  Service: Cardiology;  Laterality: Left;     Social History     Tobacco Use    Smoking status: Never Smoker    Smokeless tobacco: Never Used   Substance Use Topics    Alcohol use: No    Drug use: No     Family History   Problem Relation Age of Onset    Pulmonary embolism Mother      Review of patient's allergies indicates:   Allergen Reactions    Metronidazole Other (See Comments)     DISORIENTED       Statins-hmg-coa reductase inhibitors Tinitus     Joint pain       Performance Status:The patient's activity level is no limits with regular activity.      Review of Systems:  a review of eleven systems covering constitutional, Eye, HEENT, Psych, Respiratory, Cardiac, GI, , Musculoskeletal, Endocrine, Dermatologic was negative except for pertinent findings as listed ABOVE and below: night sweats,   pertinent positive as above, rest is good      Exam:Comprehensive exam done. BP (!) 142/71 (BP Location: Left arm, Patient Position: Sitting, BP Method: Medium (Automatic)) Comment (BP Method): long cuff  Pulse 79   Ht 5' 1" (1.549 m)   Wt 90.7 kg (200 lb)   LMP 09/01/1986   SpO2 97% Comment: on room air at rest  BMI 37.79 kg/m²   Exam included Vitals as listed, and patient's appearance and affect and alertness and mood, oral exam for yeast and hygiene and pharynx lesions and Mallapatti (M) score, neck with inspection for jvd and masses and " thyroid abnormalities and lymph nodes (supraclavicular and infraclavicular nodes and axillary also examined and noted if abn), chest exam included symmetry and effort and fremitus and percussion and auscultation, cardiac exam included rhythm and gallops and murmur and rubs and jvd and edema, abdominal exam for mass and hepatosplenomegaly and tenderness and hernias and bowel sounds, Musculoskeletal exam with muscle tone and posture and mobility/gait and  strength, and skin for rashes and cyanosis and pallor and turgor, extremity for clubbing.  Findings were normal except for pertinent findings listed below:   M2, chest is symmetric, no distress, normal percussion, normal fremitus and bilat rales lung bases  No clubbing nor edema    Radiographs (ct chest and cxr) reviewed: view by direct vision  March 15 , 2019 nad, post cabg    Ct abd 1/2/2022 ild seen lower lungs with ? Honeycombing.      Labs reviewed            PFT   fvc 92%, tlc 77%, dlco 65% 4/7/2022  Six min walk 93% rest , 88% 2 minutes, and 94% end of walk.        Results for SAKSHI BALLARD (MRN [id number]) as of 10/15/2019 09:59   Ref. Range 6/11/2012 04:51   Eosinophils Relative Latest Ref Range: 0.0 - 3.0 % 5.5 (H)   Basophil% Latest Ref Range: 0.0 - 3.0 % 0.5       Plan:  Clinical impression is apparently straight forward and impression with management as below.     Sakshi was seen today for follow-up.    Diagnoses and all orders for this visit:    ILD (interstitial lung disease)  -     CT Chest Without Contrast; Future  -     OXYGEN FOR HOME USE  -     nintedanib (OFEV) 150 mg Cap; Take 1 capsule (150 mg total) by mouth 2 (two) times a day.  -     Comprehensive Metabolic Panel; Standing    IPF (idiopathic pulmonary fibrosis)  -     CT Chest Without Contrast; Future  -     nintedanib (OFEV) 150 mg Cap; Take 1 capsule (150 mg total) by mouth 2 (two) times a day.    Chronic hypoxemic respiratory failure  -     OXYGEN FOR HOME USE    Mild intermittent  asthma without complication  -     predniSONE (DELTASONE) 20 MG tablet; 3 for 3 days then 2 for 3 days then one for 3 days and repeat for breathing problems  -     azithromycin (ZITHROMAX) 500 MG tablet; One daily for yellow mucous, repeat if needed    Obstructive sleep apnea syndrome    Arthritis        Follow up in about 3 months (around 7/7/2022), or if symptoms worsen or fail to improve.    Discussed with patient above for education the following:      Patient Instructions   Need compliance report  for cpap-- may need to adjust cpap pressure if average high.      Will set up oxygen.    You have honeycombing of lung and subpleural reticular change seen on ct abd seen 1/2/2022.  You have prominent lower lung rales bilaterally.     Pulmonary functions with tlc 77% with low difussion.    Your oxygen saturation falls to 88% after walking 2 minutes-- would recommend using oxygen 24/7.  Bleed in 2 lpm oxygen into cpap.     Would recommend treatment for pulmonary fibrosis with ofev, should have blood work monthly x 3 once starting ofev.  May have loose stools and need imodium.  Dose would be one twice daily.      If prednisone needed-- try one daily for 3 days.  May need inhaler if acute short breath/wheeze.

## 2022-04-07 NOTE — PATIENT INSTRUCTIONS
Need compliance report  for cpap-- may need to adjust cpap pressure if average high.      Will set up oxygen.    You have honeycombing of lung and subpleural reticular change seen on ct abd seen 1/2/2022.  You have prominent lower lung rales bilaterally.     Pulmonary functions with tlc 77% with low difussion.    Your oxygen saturation falls to 88% after walking 2 minutes-- would recommend using oxygen 24/7.  Bleed in 2 lpm oxygen into cpap.     Would recommend treatment for pulmonary fibrosis with ofev, should have blood work monthly x 3 once starting ofev.  May have loose stools and need imodium.  Dose would be one twice daily.      If prednisone needed-- try one daily for 3 days.  May need inhaler if acute short breath/wheeze.

## 2022-04-08 ENCOUNTER — PATIENT MESSAGE (OUTPATIENT)
Dept: PULMONOLOGY | Facility: CLINIC | Age: 67
End: 2022-04-08
Payer: MEDICARE

## 2022-04-08 ENCOUNTER — TELEPHONE (OUTPATIENT)
Dept: PULMONOLOGY | Facility: CLINIC | Age: 67
End: 2022-04-08
Payer: MEDICARE

## 2022-04-12 ENCOUNTER — HOSPITAL ENCOUNTER (OUTPATIENT)
Dept: RADIOLOGY | Facility: HOSPITAL | Age: 67
Discharge: HOME | End: 2022-04-12
Attending: INTERNAL MEDICINE
Payer: MEDICARE

## 2022-04-12 ENCOUNTER — TELEPHONE (OUTPATIENT)
Dept: PULMONOLOGY | Facility: CLINIC | Age: 67
End: 2022-04-12
Payer: MEDICARE

## 2022-04-12 ENCOUNTER — PATIENT MESSAGE (OUTPATIENT)
Dept: PULMONOLOGY | Facility: CLINIC | Age: 67
End: 2022-04-12
Payer: MEDICARE

## 2022-04-12 DIAGNOSIS — E04.2 MULTIPLE THYROID NODULES: Primary | ICD-10-CM

## 2022-04-12 DIAGNOSIS — J84.112 IPF (IDIOPATHIC PULMONARY FIBROSIS): ICD-10-CM

## 2022-04-12 DIAGNOSIS — J84.9 ILD (INTERSTITIAL LUNG DISEASE): ICD-10-CM

## 2022-04-12 PROCEDURE — 71250 CT CHEST WITHOUT CONTRAST: ICD-10-PCS | Mod: 26,,, | Performed by: RADIOLOGY

## 2022-04-12 PROCEDURE — 71250 CT THORAX DX C-: CPT | Mod: TC

## 2022-04-12 PROCEDURE — 71250 CT THORAX DX C-: CPT | Mod: 26,,, | Performed by: RADIOLOGY

## 2022-05-02 ENCOUNTER — HOSPITAL ENCOUNTER (OUTPATIENT)
Dept: RADIOLOGY | Facility: HOSPITAL | Age: 67
Discharge: HOME | End: 2022-05-02
Attending: INTERNAL MEDICINE
Payer: MEDICARE

## 2022-05-02 ENCOUNTER — TELEPHONE (OUTPATIENT)
Dept: PULMONOLOGY | Facility: CLINIC | Age: 67
End: 2022-05-02
Payer: MEDICARE

## 2022-05-02 DIAGNOSIS — E04.2 MULTIPLE THYROID NODULES: ICD-10-CM

## 2022-05-02 PROCEDURE — 76536 US EXAM OF HEAD AND NECK: CPT | Mod: 26,,, | Performed by: RADIOLOGY

## 2022-05-02 PROCEDURE — 76536 US THYROID: ICD-10-PCS | Mod: 26,,, | Performed by: RADIOLOGY

## 2022-05-02 PROCEDURE — 76536 US EXAM OF HEAD AND NECK: CPT | Mod: TC

## 2022-05-03 ENCOUNTER — PATIENT MESSAGE (OUTPATIENT)
Dept: PULMONOLOGY | Facility: CLINIC | Age: 67
End: 2022-05-03
Payer: MEDICARE

## 2022-05-31 ENCOUNTER — TELEPHONE (OUTPATIENT)
Dept: PULMONOLOGY | Facility: CLINIC | Age: 67
End: 2022-05-31
Payer: MEDICARE

## 2022-07-18 ENCOUNTER — PATIENT MESSAGE (OUTPATIENT)
Dept: PULMONOLOGY | Facility: CLINIC | Age: 67
End: 2022-07-18
Payer: MEDICARE

## 2022-07-18 ENCOUNTER — TELEPHONE (OUTPATIENT)
Dept: PULMONOLOGY | Facility: CLINIC | Age: 67
End: 2022-07-18
Payer: MEDICARE

## 2022-07-18 NOTE — TELEPHONE ENCOUNTER
Attempted to contact to reschedule. Call won't connect. Busy signal 4 times.     Sent portal message.

## 2022-07-22 DIAGNOSIS — J45.20 MILD INTERMITTENT ASTHMA WITHOUT COMPLICATION: ICD-10-CM

## 2022-07-22 DIAGNOSIS — J84.112 IPF (IDIOPATHIC PULMONARY FIBROSIS): ICD-10-CM

## 2022-07-22 DIAGNOSIS — J84.9 ILD (INTERSTITIAL LUNG DISEASE): ICD-10-CM

## 2022-07-22 RX ORDER — NINTEDANIB 150 MG/1
1 CAPSULE ORAL 2 TIMES DAILY
Qty: 60 CAPSULE | Refills: 11 | Status: SHIPPED | OUTPATIENT
Start: 2022-07-22 | End: 2022-08-17 | Stop reason: SDUPTHER

## 2022-07-22 RX ORDER — AZITHROMYCIN 500 MG/1
TABLET, FILM COATED ORAL
Qty: 3 TABLET | Refills: 3 | Status: SHIPPED | OUTPATIENT
Start: 2022-07-22 | End: 2022-11-30 | Stop reason: SDUPTHER

## 2022-07-22 RX ORDER — PREDNISONE 20 MG/1
TABLET ORAL
Qty: 36 TABLET | Refills: 1 | Status: SHIPPED | OUTPATIENT
Start: 2022-07-22 | End: 2023-03-14

## 2022-08-08 ENCOUNTER — TELEPHONE (OUTPATIENT)
Dept: PULMONOLOGY | Facility: CLINIC | Age: 67
End: 2022-08-08

## 2022-08-08 ENCOUNTER — OFFICE VISIT (OUTPATIENT)
Dept: PULMONOLOGY | Facility: CLINIC | Age: 67
End: 2022-08-08
Payer: MEDICARE

## 2022-08-08 VITALS
SYSTOLIC BLOOD PRESSURE: 141 MMHG | HEIGHT: 61 IN | WEIGHT: 199.88 LBS | HEART RATE: 67 BPM | DIASTOLIC BLOOD PRESSURE: 67 MMHG | OXYGEN SATURATION: 96 % | BODY MASS INDEX: 37.74 KG/M2

## 2022-08-08 DIAGNOSIS — J96.11 CHRONIC HYPOXEMIC RESPIRATORY FAILURE: ICD-10-CM

## 2022-08-08 DIAGNOSIS — J44.9 CHRONIC OBSTRUCTIVE PULMONARY DISEASE, UNSPECIFIED COPD TYPE: ICD-10-CM

## 2022-08-08 DIAGNOSIS — G47.33 OBSTRUCTIVE SLEEP APNEA SYNDROME: ICD-10-CM

## 2022-08-08 DIAGNOSIS — J84.112 IPF (IDIOPATHIC PULMONARY FIBROSIS): Primary | ICD-10-CM

## 2022-08-08 DIAGNOSIS — G47.10 HYPERSOMNOLENCE DISORDER: ICD-10-CM

## 2022-08-08 DIAGNOSIS — J84.9 ILD (INTERSTITIAL LUNG DISEASE): ICD-10-CM

## 2022-08-08 PROCEDURE — 4010F PR ACE/ARB THEARPY RXD/TAKEN: ICD-10-PCS | Mod: CPTII,S$GLB,, | Performed by: INTERNAL MEDICINE

## 2022-08-08 PROCEDURE — 1126F AMNT PAIN NOTED NONE PRSNT: CPT | Mod: CPTII,S$GLB,, | Performed by: INTERNAL MEDICINE

## 2022-08-08 PROCEDURE — 3288F PR FALLS RISK ASSESSMENT DOCUMENTED: ICD-10-PCS | Mod: CPTII,S$GLB,, | Performed by: INTERNAL MEDICINE

## 2022-08-08 PROCEDURE — 99213 OFFICE O/P EST LOW 20 MIN: CPT | Mod: S$GLB,,, | Performed by: INTERNAL MEDICINE

## 2022-08-08 PROCEDURE — 4010F ACE/ARB THERAPY RXD/TAKEN: CPT | Mod: CPTII,S$GLB,, | Performed by: INTERNAL MEDICINE

## 2022-08-08 PROCEDURE — 99999 PR PBB SHADOW E&M-EST. PATIENT-LVL IV: CPT | Mod: PBBFAC,,, | Performed by: INTERNAL MEDICINE

## 2022-08-08 PROCEDURE — 3008F PR BODY MASS INDEX (BMI) DOCUMENTED: ICD-10-PCS | Mod: CPTII,S$GLB,, | Performed by: INTERNAL MEDICINE

## 2022-08-08 PROCEDURE — 1159F PR MEDICATION LIST DOCUMENTED IN MEDICAL RECORD: ICD-10-PCS | Mod: CPTII,S$GLB,, | Performed by: INTERNAL MEDICINE

## 2022-08-08 PROCEDURE — 3078F PR MOST RECENT DIASTOLIC BLOOD PRESSURE < 80 MM HG: ICD-10-PCS | Mod: CPTII,S$GLB,, | Performed by: INTERNAL MEDICINE

## 2022-08-08 PROCEDURE — 3077F SYST BP >= 140 MM HG: CPT | Mod: CPTII,S$GLB,, | Performed by: INTERNAL MEDICINE

## 2022-08-08 PROCEDURE — 99999 PR PBB SHADOW E&M-EST. PATIENT-LVL IV: ICD-10-PCS | Mod: PBBFAC,,, | Performed by: INTERNAL MEDICINE

## 2022-08-08 PROCEDURE — 99213 PR OFFICE/OUTPT VISIT, EST, LEVL III, 20-29 MIN: ICD-10-PCS | Mod: S$GLB,,, | Performed by: INTERNAL MEDICINE

## 2022-08-08 PROCEDURE — 1126F PR PAIN SEVERITY QUANTIFIED, NO PAIN PRESENT: ICD-10-PCS | Mod: CPTII,S$GLB,, | Performed by: INTERNAL MEDICINE

## 2022-08-08 PROCEDURE — 3077F PR MOST RECENT SYSTOLIC BLOOD PRESSURE >= 140 MM HG: ICD-10-PCS | Mod: CPTII,S$GLB,, | Performed by: INTERNAL MEDICINE

## 2022-08-08 PROCEDURE — 3008F BODY MASS INDEX DOCD: CPT | Mod: CPTII,S$GLB,, | Performed by: INTERNAL MEDICINE

## 2022-08-08 PROCEDURE — 3288F FALL RISK ASSESSMENT DOCD: CPT | Mod: CPTII,S$GLB,, | Performed by: INTERNAL MEDICINE

## 2022-08-08 PROCEDURE — 1101F PT FALLS ASSESS-DOCD LE1/YR: CPT | Mod: CPTII,S$GLB,, | Performed by: INTERNAL MEDICINE

## 2022-08-08 PROCEDURE — 3078F DIAST BP <80 MM HG: CPT | Mod: CPTII,S$GLB,, | Performed by: INTERNAL MEDICINE

## 2022-08-08 PROCEDURE — 1101F PR PT FALLS ASSESS DOC 0-1 FALLS W/OUT INJ PAST YR: ICD-10-PCS | Mod: CPTII,S$GLB,, | Performed by: INTERNAL MEDICINE

## 2022-08-08 PROCEDURE — 1159F MED LIST DOCD IN RCRD: CPT | Mod: CPTII,S$GLB,, | Performed by: INTERNAL MEDICINE

## 2022-08-08 RX ORDER — MODAFINIL 100 MG/1
200 TABLET ORAL DAILY PRN
Qty: 60 TABLET | Refills: 0 | Status: SHIPPED | OUTPATIENT
Start: 2022-08-08 | End: 2022-09-13 | Stop reason: SDUPTHER

## 2022-08-08 NOTE — PATIENT INSTRUCTIONS
Need to get ofev started.    Use prednisone and inhalers as needed.    Use cpap with oxygen for sleep    Trial modafinil for excessive lingering sleepiness to improve alertness and focus.  Try one daily in early morning.  Increase to 2 daily after 3 days if needed.  Call when medications getting low to explain benefit and order more if helpful.

## 2022-08-17 RX ORDER — NINTEDANIB 150 MG/1
1 CAPSULE ORAL 2 TIMES DAILY
Qty: 60 CAPSULE | Refills: 11 | Status: SHIPPED | OUTPATIENT
Start: 2022-08-17 | End: 2023-03-14

## 2022-08-18 ENCOUNTER — SPECIALTY PHARMACY (OUTPATIENT)
Dept: PHARMACY | Facility: CLINIC | Age: 67
End: 2022-08-18
Payer: MEDICARE

## 2022-08-18 NOTE — TELEPHONE ENCOUNTER
Test claim went through $4    Outgoing call to [employer], spoke with tosin Camacho on file:    PA Auth ID: [id number]  Approval Dates: 8/18/2022 - 12/31/2022    Ofev is LDD:   [facility]  (P): [phone]  (F): [phone]

## 2022-08-18 NOTE — TELEPHONE ENCOUNTER
Outgoing call to pt regarding Pratibhaev. PA on file. Medication is LDD, must fill at specific pharmacy. - [facility]  (P): [phone]  (F): [phone]          Julius, this is Dorene Dee with [facility].  We are working on your prescription that your doctor has sent us. We will be working with your insurance to get this approved for you. We will be calling you along the way with updates on your medication.  If you have any questions, you can reach us at [phone].    Welcome call outcome: Left voicemail

## 2022-08-18 NOTE — TELEPHONE ENCOUNTER
Benefit Investigation    Ofev  [employer]  LIS Level 2  Estimated copay: $4  LDD: [facility]

## 2022-08-19 ENCOUNTER — PATIENT MESSAGE (OUTPATIENT)
Dept: PULMONOLOGY | Facility: CLINIC | Age: 67
End: 2022-08-19
Payer: MEDICARE

## 2022-08-22 ENCOUNTER — TELEPHONE (OUTPATIENT)
Dept: PULMONOLOGY | Facility: CLINIC | Age: 67
End: 2022-08-22

## 2022-08-23 ENCOUNTER — TELEPHONE (OUTPATIENT)
Dept: PULMONOLOGY | Facility: CLINIC | Age: 67
End: 2022-08-23

## 2022-09-13 DIAGNOSIS — G47.10 HYPERSOMNOLENCE DISORDER: ICD-10-CM

## 2022-09-13 DIAGNOSIS — G47.33 OBSTRUCTIVE SLEEP APNEA SYNDROME: ICD-10-CM

## 2022-09-14 RX ORDER — MODAFINIL 200 MG/1
200 TABLET ORAL 2 TIMES DAILY PRN
Qty: 60 TABLET | Refills: 1 | Status: SHIPPED | OUTPATIENT
Start: 2022-09-14 | End: 2022-11-29 | Stop reason: SDUPTHER

## 2022-11-14 ENCOUNTER — OFFICE VISIT (OUTPATIENT)
Dept: PULMONOLOGY | Facility: CLINIC | Age: 67
End: 2022-11-14
Payer: MEDICARE

## 2022-11-14 VITALS
BODY MASS INDEX: 36.44 KG/M2 | SYSTOLIC BLOOD PRESSURE: 160 MMHG | OXYGEN SATURATION: 95 % | HEART RATE: 81 BPM | DIASTOLIC BLOOD PRESSURE: 80 MMHG | HEIGHT: 61 IN | WEIGHT: 193 LBS

## 2022-11-14 DIAGNOSIS — J84.112 IPF (IDIOPATHIC PULMONARY FIBROSIS): Primary | ICD-10-CM

## 2022-11-14 DIAGNOSIS — J96.11 CHRONIC HYPOXEMIC RESPIRATORY FAILURE: ICD-10-CM

## 2022-11-14 DIAGNOSIS — G47.33 OBSTRUCTIVE SLEEP APNEA SYNDROME: ICD-10-CM

## 2022-11-14 PROCEDURE — 1159F MED LIST DOCD IN RCRD: CPT | Mod: CPTII,S$GLB,, | Performed by: INTERNAL MEDICINE

## 2022-11-14 PROCEDURE — 1159F PR MEDICATION LIST DOCUMENTED IN MEDICAL RECORD: ICD-10-PCS | Mod: CPTII,S$GLB,, | Performed by: INTERNAL MEDICINE

## 2022-11-14 PROCEDURE — 3008F PR BODY MASS INDEX (BMI) DOCUMENTED: ICD-10-PCS | Mod: CPTII,S$GLB,, | Performed by: INTERNAL MEDICINE

## 2022-11-14 PROCEDURE — 99205 OFFICE O/P NEW HI 60 MIN: CPT | Mod: S$GLB,,, | Performed by: INTERNAL MEDICINE

## 2022-11-14 PROCEDURE — 99205 PR OFFICE/OUTPT VISIT, NEW, LEVL V, 60-74 MIN: ICD-10-PCS | Mod: S$GLB,,, | Performed by: INTERNAL MEDICINE

## 2022-11-14 PROCEDURE — 3077F PR MOST RECENT SYSTOLIC BLOOD PRESSURE >= 140 MM HG: ICD-10-PCS | Mod: CPTII,S$GLB,, | Performed by: INTERNAL MEDICINE

## 2022-11-14 PROCEDURE — 3079F PR MOST RECENT DIASTOLIC BLOOD PRESSURE 80-89 MM HG: ICD-10-PCS | Mod: CPTII,S$GLB,, | Performed by: INTERNAL MEDICINE

## 2022-11-14 PROCEDURE — 3077F SYST BP >= 140 MM HG: CPT | Mod: CPTII,S$GLB,, | Performed by: INTERNAL MEDICINE

## 2022-11-14 PROCEDURE — 4010F ACE/ARB THERAPY RXD/TAKEN: CPT | Mod: CPTII,S$GLB,, | Performed by: INTERNAL MEDICINE

## 2022-11-14 PROCEDURE — 3008F BODY MASS INDEX DOCD: CPT | Mod: CPTII,S$GLB,, | Performed by: INTERNAL MEDICINE

## 2022-11-14 PROCEDURE — 1126F PR PAIN SEVERITY QUANTIFIED, NO PAIN PRESENT: ICD-10-PCS | Mod: CPTII,S$GLB,, | Performed by: INTERNAL MEDICINE

## 2022-11-14 PROCEDURE — 1126F AMNT PAIN NOTED NONE PRSNT: CPT | Mod: CPTII,S$GLB,, | Performed by: INTERNAL MEDICINE

## 2022-11-14 PROCEDURE — 3079F DIAST BP 80-89 MM HG: CPT | Mod: CPTII,S$GLB,, | Performed by: INTERNAL MEDICINE

## 2022-11-14 PROCEDURE — 4010F PR ACE/ARB THEARPY RXD/TAKEN: ICD-10-PCS | Mod: CPTII,S$GLB,, | Performed by: INTERNAL MEDICINE

## 2022-11-14 RX ORDER — NYSTATIN 100000 [USP'U]/G
POWDER TOPICAL
COMMUNITY
Start: 2022-11-04 | End: 2023-11-04

## 2022-11-14 RX ORDER — TRAZODONE HYDROCHLORIDE 50 MG/1
25-50 TABLET ORAL NIGHTLY
Status: ON HOLD | COMMUNITY
Start: 2022-03-14 | End: 2024-02-29

## 2022-11-14 RX ORDER — NYSTATIN 100000 [USP'U]/ML
500000 SUSPENSION ORAL
COMMUNITY
Start: 2022-11-04 | End: 2022-11-18

## 2022-11-14 RX ORDER — FLUTICASONE PROPIONATE AND SALMETEROL XINAFOATE 230; 21 UG/1; UG/1
AEROSOL, METERED RESPIRATORY (INHALATION)
COMMUNITY
Start: 2022-07-30 | End: 2022-11-30

## 2022-11-14 NOTE — PROGRESS NOTES
SUBJECTIVE:    Patient ID: Sakshi Hess is a 67 y.o. female.    Chief Complaint: Establish Care (Second opinion)    HPI the patient is here for a second opinion.  She was told she has pulmonary fibrosis and was suggested Ofev and was told she had 2 years to live.  She was also prescribed oxygen which she is not wearing but she is bleeding it to her CPAP at night.  Past Medical History:   Diagnosis Date    Arthritis     COPD (chronic obstructive pulmonary disease)     Coronary artery disease     Hypertension     Sleep apnea     use CPAP at night      Past Surgical History:   Procedure Laterality Date    CORONARY ANGIOGRAPHY N/A 2/15/2021    Procedure: ANGIOGRAM, CORONARY ARTERY;  Surgeon: Aidan Buchanan MD;  Location: [facility] CATH/EP LAB;  Service: Cardiology;  Laterality: N/A;    CORONARY ARTERY BYPASS GRAFT      CORONARY BYPASS GRAFT ANGIOGRAPHY  2/15/2021    Procedure: Bypass graft study;  Surgeon: Aidan Buchanan MD;  Location: [facility] CATH/EP LAB;  Service: Cardiology;;    HYSTERECTOMY      JOINT REPLACEMENT      bilateral knee replacement     LEFT HEART CATHETERIZATION Left 2/15/2021    Procedure: Left heart cath;  Surgeon: Aidan Buchanan MD;  Location: [facility] CATH/EP LAB;  Service: Cardiology;  Laterality: Left;     Family History   Problem Relation Age of Onset    Pulmonary embolism Mother         Social History:   Marital Status: Legally   Occupation: Data Unavailable  Alcohol History:  reports no history of alcohol use.  Tobacco History:  reports that she has never smoked. She has been exposed to tobacco smoke. She has never used smokeless tobacco.  Drug History:  reports no history of drug use.    Review of patient's allergies indicates:   Allergen Reactions    Metronidazole Other (See Comments)     DISORIENTED       Statins-hmg-coa reductase inhibitors Tinitus     Joint pain       Current Outpatient Medications   Medication Sig Dispense Refill    acetaminophen (TYLENOL) 500 MG tablet Take 500 mg  by mouth as needed.       allopurinol (ZYLOPRIM) 300 MG tablet Take 300 mg by mouth once daily.  5    aspirin-acetaminophen-caffeine (GOODY'S EXTRA STRENGTH) 500-325-65 mg PwPk Take 1 Dose by mouth as needed.      azithromycin (ZITHROMAX) 500 MG tablet One daily for yellow mucous, repeat if needed 3 tablet 3    busPIRone (BUSPAR) 7.5 MG tablet Take 7.5 mg by mouth 2 (two) times a day.      fluticasone-salmeterol 230-21 mcg/dose (ADVAIR HFA) 230-21 mcg/actuation HFAA inhaler INHALE 2 PUFFS BY MOUTH INTO THE LUNGS TWICE DAILY (CONTROLLER)      hydroCHLOROthiazide (MICROZIDE) 12.5 mg capsule Take 12.5 mg by mouth once daily.      levalbuterol (XOPENEX HFA) 45 mcg/actuation inhaler Inhale 1-2 puffs into the lungs every 4 (four) hours as needed for Wheezing or Shortness of Breath. 15 g 11    losartan (COZAAR) 25 MG tablet Take 25 mg by mouth once daily.      metFORMIN (GLUCOPHAGE-XR) 500 MG ER 24hr tablet Take 500 mg by mouth once daily.      methocarbamoL (ROBAXIN) 750 MG Tab Take 1,500 mg by mouth 3 (three) times daily.      modafiniL (PROVIGIL) 200 MG Tab Take 1 tablet (200 mg total) by mouth 2 (two) times daily as needed (excess sleepiness). 60 tablet 1    nitroGLYCERIN (NITROSTAT) 0.4 MG SL tablet Place 1 tablet (0.4 mg total) under the tongue every 5 (five) minutes as needed for Chest pain. 30 tablet 0    nystatin (MYCOSTATIN) 100,000 unit/mL suspension 500,000 Units.      nystatin (MYCOSTATIN) powder Apply sparingly to affected area twice daily for up to 14 days.      omeprazole (PRILOSEC) 40 MG capsule Take 40 mg by mouth.      oxyCODONE (ROXICODONE) 15 MG Tab Take 15 mg by mouth every 6 (six) hours as needed.      predniSONE (DELTASONE) 20 MG tablet 3 for 3 days then 2 for 3 days then one for 3 days and repeat for breathing problems 36 tablet 1    REPATHA SURECLICK 140 mg/mL PnIj Inject 140 mg into the skin every 14 (fourteen) days.      traZODone (DESYREL) 50 MG tablet Take 1 tablet by mouth every evening.       "aspirin (ECOTRIN) 81 MG EC tablet Take 1 tablet (81 mg total) by mouth once daily. 30 tablet 0    metoprolol succinate (TOPROL-XL) 25 MG 24 hr tablet Take 25 mg by mouth once daily.      nintedanib (OFEV) 150 mg Cap Take 1 capsule (150 mg total) by mouth 2 (two) times a day. (Patient not taking: Reported on 11/14/2022) 60 capsule 11    ondansetron (ZOFRAN) 4 MG tablet Take 1 tablet by mouth every 8 (eight) hours as needed.       No current facility-administered medications for this visit.       Alpha-1 Antitrypsin:  Last PFT:   Last CT:    Review of Systems  General: Feeling Well.  Eyes: Vision is good.  ENT:  No sinusitis or pharyngitis.   Heart:: No chest pain or palpitations.  Lungs:  She gets short of breath with exertion.  No cough  GI: No Nausea, vomiting, constipation, diarrhea, or reflux.  : No dysuria, hesitancy, or nocturia.  Musculoskeletal: No joint pain or myalgias.  Skin: No lesions or rashes.  Neuro: No headaches or neuropathy.  Lymph: No edema or adenopathy.  Psych: No anxiety or depression.  Endo: No weight change.    OBJECTIVE:      BP (!) 160/80 (BP Location: Left arm, Patient Position: Sitting, BP Method: Medium (Manual))   Pulse 81   Ht 5' 1" (1.549 m)   Wt 87.5 kg (193 lb)   LMP 09/01/1986   SpO2 95%   BMI 36.47 kg/m²     Physical Exam  GENERAL: Older patient in no distress.  HEENT: Pupils equal and reactive. Extraocular movements intact. Nose intact.      Pharynx moist.  NECK: Supple.   HEART: Regular rate and rhythm. No murmur or gallop auscultated.  LUNGS:  There are dense crackles posteriorly.  Lung excursion symmetrical. No change in fremitus.   ABDOMEN: Bowel sounds present. Non-tender, no masses palpated.  EXTREMITIES: Normal muscle tone and joint movement, no cyanosis or clubbing.   LYMPHATICS: No adenopathy palpated, no edema.  SKIN: Dry, intact, no lesions.   NEURO: Cranial nerves II-XII intact. Motor strength 5/5 bilaterally, upper and lower extremities.  PSYCH: Appropriate " affect.    Assessment:       1. IPF (idiopathic pulmonary fibrosis)    2. Chronic hypoxemic respiratory failure    3. Obstructive sleep apnea syndrome      CT is consistent with usual interstitial pneumonia.  She is apparently hypoxemic with ambulation although I cannot find her pulmonary function test or her 6 minute walk.  Explained the rationale for Ofev.  Explained why it is most appropriate for someone like herself who has early pulmonary fibrosis and hopefully can have an extended period of time before she is significantly symptomatic.  Discussed why the need for the connective tissue workup is important.  Discussed the need for routine follow-up of her AST and ALT.  Discussed why wearing her oxygen is important.    Plan:       IPF (idiopathic pulmonary fibrosis)  -     AST (SGOT); Standing  -     ALT (SGPT); Standing    Chronic hypoxemic respiratory failure    Obstructive sleep apnea syndrome       Follow up in about 3 months (around 2/14/2023).    Start Ofev, use immodium as needed  Wear oxygen to sleep and with exertion,  Keep sats >89%  Get AST and ALT in 1 month and then every 3 months forever  Get CCP, BIPIN, and RF drawn  Call for any issues  Will try to get results of PFT's and 6 minute walk

## 2022-11-14 NOTE — PATIENT INSTRUCTIONS
Follow up in about 3 months (around 2/14/2023).    Start ofev, use immodium as needed  Wear oxygen to sleep and with exertion,  Keep sats >89%  Get AST and ALT in 1 month and then every 3 months forever  Get CCP, BIPIN, and RF drawn

## 2022-11-16 ENCOUNTER — TELEPHONE (OUTPATIENT)
Dept: PULMONOLOGY | Facility: HOSPITAL | Age: 67
End: 2022-11-16

## 2022-11-17 NOTE — TELEPHONE ENCOUNTER
Call patient with results of PFTs which show a mild restriction and mild diffusion defect.  Her 6 minute walk shows that she does in D get hypoxemic and does adequately on 2 L.  The patient states she is taken 3 doses of Ofev since she left the office and is vomited everything in her stomach after each dose.  Instructed her to stop taking the Ofev.

## 2022-11-30 ENCOUNTER — OFFICE VISIT (OUTPATIENT)
Dept: PULMONOLOGY | Facility: CLINIC | Age: 67
End: 2022-11-30
Payer: MEDICARE

## 2022-11-30 VITALS
SYSTOLIC BLOOD PRESSURE: 148 MMHG | HEART RATE: 78 BPM | OXYGEN SATURATION: 97 % | HEIGHT: 61 IN | DIASTOLIC BLOOD PRESSURE: 70 MMHG | BODY MASS INDEX: 36.65 KG/M2 | WEIGHT: 194.13 LBS

## 2022-11-30 DIAGNOSIS — G47.33 OBSTRUCTIVE SLEEP APNEA SYNDROME: ICD-10-CM

## 2022-11-30 DIAGNOSIS — G47.10 HYPERSOMNOLENCE DISORDER: ICD-10-CM

## 2022-11-30 DIAGNOSIS — J84.9 ILD (INTERSTITIAL LUNG DISEASE): ICD-10-CM

## 2022-11-30 DIAGNOSIS — J45.20 MILD INTERMITTENT ASTHMA WITHOUT COMPLICATION: ICD-10-CM

## 2022-11-30 DIAGNOSIS — J84.112 IPF (IDIOPATHIC PULMONARY FIBROSIS): ICD-10-CM

## 2022-11-30 DIAGNOSIS — J47.9 BRONCHIECTASIS WITHOUT COMPLICATION: Primary | ICD-10-CM

## 2022-11-30 DIAGNOSIS — J96.11 CHRONIC HYPOXEMIC RESPIRATORY FAILURE: ICD-10-CM

## 2022-11-30 PROCEDURE — 3078F DIAST BP <80 MM HG: CPT | Mod: CPTII,S$GLB,, | Performed by: INTERNAL MEDICINE

## 2022-11-30 PROCEDURE — 1125F PR PAIN SEVERITY QUANTIFIED, PAIN PRESENT: ICD-10-PCS | Mod: CPTII,S$GLB,, | Performed by: INTERNAL MEDICINE

## 2022-11-30 PROCEDURE — 3008F BODY MASS INDEX DOCD: CPT | Mod: CPTII,S$GLB,, | Performed by: INTERNAL MEDICINE

## 2022-11-30 PROCEDURE — 99999 PR PBB SHADOW E&M-EST. PATIENT-LVL III: ICD-10-PCS | Mod: PBBFAC,,, | Performed by: INTERNAL MEDICINE

## 2022-11-30 PROCEDURE — 1125F AMNT PAIN NOTED PAIN PRSNT: CPT | Mod: CPTII,S$GLB,, | Performed by: INTERNAL MEDICINE

## 2022-11-30 PROCEDURE — 99215 PR OFFICE/OUTPT VISIT, EST, LEVL V, 40-54 MIN: ICD-10-PCS | Mod: S$GLB,,, | Performed by: INTERNAL MEDICINE

## 2022-11-30 PROCEDURE — 3008F PR BODY MASS INDEX (BMI) DOCUMENTED: ICD-10-PCS | Mod: CPTII,S$GLB,, | Performed by: INTERNAL MEDICINE

## 2022-11-30 PROCEDURE — 3077F PR MOST RECENT SYSTOLIC BLOOD PRESSURE >= 140 MM HG: ICD-10-PCS | Mod: CPTII,S$GLB,, | Performed by: INTERNAL MEDICINE

## 2022-11-30 PROCEDURE — 4010F ACE/ARB THERAPY RXD/TAKEN: CPT | Mod: CPTII,S$GLB,, | Performed by: INTERNAL MEDICINE

## 2022-11-30 PROCEDURE — 3288F PR FALLS RISK ASSESSMENT DOCUMENTED: ICD-10-PCS | Mod: CPTII,S$GLB,, | Performed by: INTERNAL MEDICINE

## 2022-11-30 PROCEDURE — 1101F PR PT FALLS ASSESS DOC 0-1 FALLS W/OUT INJ PAST YR: ICD-10-PCS | Mod: CPTII,S$GLB,, | Performed by: INTERNAL MEDICINE

## 2022-11-30 PROCEDURE — 4010F PR ACE/ARB THEARPY RXD/TAKEN: ICD-10-PCS | Mod: CPTII,S$GLB,, | Performed by: INTERNAL MEDICINE

## 2022-11-30 PROCEDURE — 3077F SYST BP >= 140 MM HG: CPT | Mod: CPTII,S$GLB,, | Performed by: INTERNAL MEDICINE

## 2022-11-30 PROCEDURE — 3288F FALL RISK ASSESSMENT DOCD: CPT | Mod: CPTII,S$GLB,, | Performed by: INTERNAL MEDICINE

## 2022-11-30 PROCEDURE — 3078F PR MOST RECENT DIASTOLIC BLOOD PRESSURE < 80 MM HG: ICD-10-PCS | Mod: CPTII,S$GLB,, | Performed by: INTERNAL MEDICINE

## 2022-11-30 PROCEDURE — 99215 OFFICE O/P EST HI 40 MIN: CPT | Mod: S$GLB,,, | Performed by: INTERNAL MEDICINE

## 2022-11-30 PROCEDURE — 1101F PT FALLS ASSESS-DOCD LE1/YR: CPT | Mod: CPTII,S$GLB,, | Performed by: INTERNAL MEDICINE

## 2022-11-30 PROCEDURE — 99999 PR PBB SHADOW E&M-EST. PATIENT-LVL III: CPT | Mod: PBBFAC,,, | Performed by: INTERNAL MEDICINE

## 2022-11-30 RX ORDER — AZITHROMYCIN 500 MG/1
TABLET, FILM COATED ORAL
Qty: 3 TABLET | Refills: 3 | Status: SHIPPED | OUTPATIENT
Start: 2022-11-30 | End: 2023-08-03 | Stop reason: SDUPTHER

## 2022-11-30 RX ORDER — MODAFINIL 200 MG/1
200 TABLET ORAL 2 TIMES DAILY PRN
Qty: 60 TABLET | Refills: 5 | Status: SHIPPED | OUTPATIENT
Start: 2022-11-30 | End: 2023-03-14 | Stop reason: SDUPTHER

## 2022-11-30 RX ORDER — LEVALBUTEROL TARTRATE 45 UG/1
1-2 AEROSOL, METERED ORAL EVERY 4 HOURS PRN
Qty: 15 G | Refills: 11 | Status: SHIPPED | OUTPATIENT
Start: 2022-11-30 | End: 2023-10-03 | Stop reason: SDUPTHER

## 2022-11-30 NOTE — PATIENT INSTRUCTIONS
Will renew provigil -- you have had great benefit with alertness with provigil.      Continue oxygen and cpap.    Would consider going to ofev 100 twice daily call in couple days      May consider stopping ofev and using esbriet  with titrating up as able.      Would check oxygen level walking weekly or so.  If levels fall over few days to weeks -- that pattern is not fibrosis but something else    Albuterol has caused jitteriness but xopenex has worked well with no adverse effect.  Will ask staff to do prior auth.    We review cxr 2013 to 2019 with no ild changes but cxr 2020 to current have ild seen.  Ct chest viewed again today.

## 2022-11-30 NOTE — PROGRESS NOTES
11/30/2022    Sakshi Hess  New Patient Consult    Chief Complaint   Patient presents with    Follow-up         HPI:  11/30/2022 was seen by Dr Macias for 2nd opinion.    Pt ambulates nearly daily with ox monitoring -- upper 90's.  Anxious.  Has used proveigil with good results    Took ofev with severe n/v 1 st pill and recurred with subsequtent events.  May consider esbriet or lower dose ofev.     Becka,rf, ccp not done.    spirometry bronchodilator, lung volumes by gas dilution, diffusion capacity measured April 7, 2022. Spirometry was normal. There was no airflow obstruction. The forced vital capacity was 92% predicted.     Lung volume showed total lung capacity to be 77% predicted and low. Diffusion was low also at 65% predicted but did improve when corrected for lung volume.     Patient has a mild restrictive component. There is no airflow obstruction. The bronchodilator response was not significant. Diffusion was decreased somewhat. Clinical correlation necessary.       6 minute walk study was accomplished April 7, 2022. Baseline room air saturation was 93%. With ambulation O2 sat fell to 88% by 2 minutes. Patient needed 2 L of oxygen maintain sat low 90s subsequently. Patient walked 66% of the reference distance of 255                    8/8/2022 no ofev yet -- may be at local pharmacy.  Some ely usual degree. Uses ox prn.  Uses cpap.  Doing better with cpap and oxygen. Pt feels some excess sleepiness and loss focus despite good cpap compliance. Pt not using trazodone.     Had recent azithromycin. Uses xopenex as needed.        Not using buspar and wishes to stop .  Was seeing psyc in [place]-- psyc left town yrs ago.  [place] to have some add?       Patient Instructions   Need to get ofev started.    Use prednisone and inhalers as needed.    Use cpap with oxygen for sleep    Trial modafinil for excessive lingering sleepiness to improve alertness and focus.  Try one daily in early morning.  Increase to 2  daily after 3 days if needed.  Call when medications getting low to explain benefit and order more if helpful.        2022 compliance report  From 3 months ago--  for cpap shows cpap used 72% of days, average use  on days used  5hr 20 min, pressure ranges were low from 4-10, breathing difficulties decreased from 36 with no cpap in past to about 3.  No issues identified -- no adjustment.      pulm fibrosis found on ct in April - ofev    2022 no abx and breathing stable.  cpap ok.  Has ahi 36 in , cpap auto 4-20.  Notes high pressure with removal mask at night.  Breathing roughly same but good and bad days.  Vague am stiffness of hands attributed to prior shoulder surgery.    No cough now wheezes.  Uses xopenex bid last month-- no help     Pt had  acute sob with wheezes, considered hospital, ended up reviewed her Facebook, found had prior Feb with similar symptoms and took inhalers with good control.    Patient Instructions   Need compliance report  for cpap-- may need to adjust cpap pressure if average high.      Will set up oxygen.    You have honeycombing of lung and subpleural reticular change seen on ct abd seen 2022.  You have prominent lower lung rales bilaterally.     Pulmonary functions with tlc 77% with low difussion.    Your oxygen saturation falls to 88% after walking 2 minutes-- would recommend using oxygen .  Bleed in 2 lpm oxygen into cpap.     Would recommend treatment for pulmonary fibrosis with ofev, should have blood work monthly x 3 once starting ofev.  May have loose stools and need imodium.  Dose would be one twice daily.      If prednisone needed-- try one daily for 3 days.  May need inhaler if acute short breath/wheeze.  1/10/2022 uses cpap well -- uses nasal pillars.  Uses over 4 hrs /night.  Had admit [facility]     Sinuses ok    Pt  Reports occasional low ox 90 or so.      Has boxes of inhalers at home-- took prednisone in fall.  No fh fibrosis but mom  46 yo.    Patient Instructions   You have mild scarring of lungs seen on ct of abdomen.   Rales are heard on exam suggesting scarring of lungs.      Would check oxygen level walking and breathing test.    Ct chest would be good to check-- could do in 6-12 months.    Use amoxil for sinus/lung infection    Will screen joint disease that may be associated with lung disease.    4/15/2021 uses cpap - uses over 4 hrs nightly, got call from  [employer] saying needed to purchase or something?  Uses high compliance with great benefit.      10/1/2020 pt uses cpap 6 hrs nightly, has problems with mask, went to nasal with chin strap - needed tape to keep mouth closed.   Pt having good response when mask tolerated well.  No prednisone - stable  Patient Instructions   You are tolerating cpap well - you should do better with airfit f30 mask than nasal mask.    Continue symbicort as control lungs has been good.      You should use xopenex in place albuterol as you have palpitations with albuterol.  6/29/2020 - no prednisone last 3 wks, takes once every 3 wks, was not using symbicort less than daily. Brittaney's mom.  Sinuses good.      You had prior sleep apnea, up to 40/hr?  Used cpap for 3 yrs but stopped 10 yrs ago for multiple reasons.  Patient Instructions   symbicort is preventive - use regular 2 puffs twice daily,   singulair daily also.      Use xopenex inhaler, albuterol not as effective for you- as rescue therapy, use 2 puff up to every 4-6 as needed. May use nebulizer.    Use prednisone if needed.  20 mg daily for 2-3 should be adequate.  symbicort should decrease prednisone need.    Sleep apnea - up to 5 /hour is within normal range, study may apply cpap if can diagnose sleep apnea 1st part of night.   May need to see to discuss cpap tolerance post study.  10/15/2019pt had cough/wheezes as child, never outgrew.  Had cough all life- mucous clear. occ green mucous, uses combivent. Has neb with xopenex- albuterol ppt shakes.  Uses  "steroids once every 5 yrs- had to 3 wks prednisone.  Er visits once yr - may get steroid shot.  Lives [place],  No sinus, has dog and cat.  Breathing seems stable- breathing gets bad 4 month a yr    Uses oxycodone tid for last 1.5 yrs.      Has sleep apnea - cpap broke and not followed up.    Patient Instructions   Asthma, copd with chronic disease?    Preventive - use regular- sinuglair daily, symbicort 2 twice daily    Rescue medication - use albuterol inhaler or xopenex nebulizer up to every 4 hrs or so.    Action plan - prednisone one daily for 3 May be adequate, may need 3 for 3 and taper if severe.  Er if sickly    If yellow mucous - use azithromycin.    Spacer will help inhaled medication.    Breathing test and follow up a yr.  The chief compliant  problem is new to me",    PFSH:  Past Medical History:   Diagnosis Date    Arthritis     COPD (chronic obstructive pulmonary disease)     Coronary artery disease     Hypertension     Sleep apnea     use CPAP at night          Past Surgical History:   Procedure Laterality Date    CORONARY ANGIOGRAPHY N/A 2/15/2021    Procedure: ANGIOGRAM, CORONARY ARTERY;  Surgeon: Aidan Buchanan MD;  Location: [facility] CATH/EP LAB;  Service: Cardiology;  Laterality: N/A;    CORONARY ARTERY BYPASS GRAFT      CORONARY BYPASS GRAFT ANGIOGRAPHY  2/15/2021    Procedure: Bypass graft study;  Surgeon: Aidan Buchanan MD;  Location: [facility] CATH/EP LAB;  Service: Cardiology;;    HYSTERECTOMY      JOINT REPLACEMENT      bilateral knee replacement     LEFT HEART CATHETERIZATION Left 2/15/2021    Procedure: Left heart cath;  Surgeon: Aidan Buchanan MD;  Location: [facility] CATH/EP LAB;  Service: Cardiology;  Laterality: Left;     Social History     Tobacco Use    Smoking status: Never     Passive exposure: Past    Smokeless tobacco: Never   Substance Use Topics    Alcohol use: No    Drug use: No     Family History   Problem Relation Age of Onset    Pulmonary embolism Mother      Review of patient's " "allergies indicates:   Allergen Reactions    Metronidazole Other (See Comments)     DISORIENTED       Statins-hmg-coa reductase inhibitors Tinitus     Joint pain       Performance Status:The patient's activity level is no limits with regular activity.      Review of Systems:  a review of eleven systems covering constitutional, Eye, HEENT, Psych, Respiratory, Cardiac, GI, , Musculoskeletal, Endocrine, Dermatologic was negative except for pertinent findings as listed ABOVE and below: night sweats,   pertinent positive as above, rest is good      Exam:Comprehensive exam done. BP (!) 148/70 (BP Location: Left arm, Patient Position: Sitting, BP Method: Medium (Automatic))   Pulse 78   Ht 5' 1" (1.549 m)   Wt 88 kg (194 lb 1.8 oz)   LMP 09/01/1986   SpO2 97% Comment: On room air at rest.  BMI 36.68 kg/m²   Exam included Vitals as listed, and patient's appearance and affect and alertness and mood, oral exam for yeast and hygiene and pharynx lesions and Mallapatti (M) score, neck with inspection for jvd and masses and thyroid abnormalities and lymph nodes (supraclavicular and infraclavicular nodes and axillary also examined and noted if abn), chest exam included symmetry and effort and fremitus and percussion and auscultation, cardiac exam included rhythm and gallops and murmur and rubs and jvd and edema, abdominal exam for mass and hepatosplenomegaly and tenderness and hernias and bowel sounds, Musculoskeletal exam with muscle tone and posture and mobility/gait and  strength, and skin for rashes and cyanosis and pallor and turgor, extremity for clubbing.  Findings were normal except for pertinent findings listed below:   M2, chest is symmetric, no distress, normal percussion, normal fremitus and bilat slight rales lung bases  No clubbing nor edema    Radiographs (ct chest and cxr) reviewed: view by direct vision  March 15 , 2019 nad, post cabg    Ct abd 1/2/2022 ild seen lower lungs with ? Honeycombing.  Ct " chest 4/12/2022 ild seen with honeycombing.     Labs reviewed            PFT   fvc 92%, tlc 77%, dlco 65% 4/7/2022  Six min walk 93% rest , 88% 2 minutes, and 94% end of walk.        Results for SAKSHI BALLARD (MRN [id number]) as of 10/15/2019 09:59   Ref. Range 6/11/2012 04:51   Eosinophils Relative Latest Ref Range: 0.0 - 3.0 % 5.5 (H)   Basophil% Latest Ref Range: 0.0 - 3.0 % 0.5       Plan:  Clinical impression is apparently straight forward and impression with management as below.     Sakshi was seen today for follow-up.    Diagnoses and all orders for this visit:    Bronchiectasis without complication  -     levalbuterol (XOPENEX HFA) 45 mcg/actuation inhaler; Inhale 1-2 puffs into the lungs every 4 (four) hours as needed for Wheezing. Rescue    Obstructive sleep apnea syndrome  -     modafiniL (PROVIGIL) 200 MG Tab; Take 1 tablet (200 mg total) by mouth 2 (two) times daily as needed (excess sleepiness).    Hypersomnolence disorder  -     modafiniL (PROVIGIL) 200 MG Tab; Take 1 tablet (200 mg total) by mouth 2 (two) times daily as needed (excess sleepiness).    Mild intermittent asthma without complication  -     azithromycin (ZITHROMAX) 500 MG tablet; One daily for yellow mucous, repeat if needed    ILD (interstitial lung disease)    IPF (idiopathic pulmonary fibrosis)    Chronic hypoxemic respiratory failure        Follow up in about 6 months (around 5/30/2023), or if symptoms worsen or fail to improve.    Discussed with patient above for education the following:      Patient Instructions   Will renew provigil -- you have had great benefit with alertness with provigil.      Continue oxygen and cpap.    Would consider going to ofev 100 twice daily call in couple days      May consider stopping ofev and using esbriet  with titrating up as able.      Would check oxygen level walking weekly or so.  If levels fall over few days to weeks -- that pattern is not fibrosis but something else    Albuterol has caused  alex but xopenex has worked well with no adverse effect.  Will ask staff to do prior auth.    We review cxr 2013 to 2019 with no ild changes but cxr 2020 to current have ild seen.  Ct chest viewed again today.        Eval took 54 min-- ct and cxr viewed from current to 2012

## 2023-01-27 ENCOUNTER — TELEPHONE (OUTPATIENT)
Dept: PULMONOLOGY | Facility: CLINIC | Age: 68
End: 2023-01-27
Payer: COMMERCIAL

## 2023-01-27 NOTE — TELEPHONE ENCOUNTER
Cover my meds  #[id number]     Approvedon January 26  Request Reference Number: [id number]. MODAFINIL TAB 200MG is approved through 07/26/2023. Your patient may now fill this prescription and it will be covered.

## 2023-03-08 ENCOUNTER — TELEPHONE (OUTPATIENT)
Dept: PULMONOLOGY | Facility: CLINIC | Age: 68
End: 2023-03-08
Payer: MEDICARE

## 2023-03-14 ENCOUNTER — OFFICE VISIT (OUTPATIENT)
Dept: PULMONOLOGY | Facility: CLINIC | Age: 68
End: 2023-03-14
Payer: MEDICARE

## 2023-03-14 ENCOUNTER — SPECIALTY PHARMACY (OUTPATIENT)
Dept: PHARMACY | Facility: CLINIC | Age: 68
End: 2023-03-14
Payer: MEDICARE

## 2023-03-14 VITALS
SYSTOLIC BLOOD PRESSURE: 163 MMHG | BODY MASS INDEX: 36.06 KG/M2 | HEIGHT: 61 IN | DIASTOLIC BLOOD PRESSURE: 83 MMHG | WEIGHT: 191 LBS | HEART RATE: 75 BPM | OXYGEN SATURATION: 97 %

## 2023-03-14 DIAGNOSIS — G47.33 OBSTRUCTIVE SLEEP APNEA SYNDROME: ICD-10-CM

## 2023-03-14 DIAGNOSIS — J84.9 ILD (INTERSTITIAL LUNG DISEASE): ICD-10-CM

## 2023-03-14 DIAGNOSIS — J84.9 INTERSTITIAL PULMONARY DISEASE, UNSPECIFIED: Primary | ICD-10-CM

## 2023-03-14 DIAGNOSIS — G47.10 HYPERSOMNOLENCE DISORDER: ICD-10-CM

## 2023-03-14 DIAGNOSIS — J84.112 IPF (IDIOPATHIC PULMONARY FIBROSIS): ICD-10-CM

## 2023-03-14 DIAGNOSIS — L23.7 POISON IVY DERMATITIS: ICD-10-CM

## 2023-03-14 DIAGNOSIS — J47.9 BRONCHIECTASIS WITHOUT COMPLICATION: ICD-10-CM

## 2023-03-14 PROCEDURE — 3079F PR MOST RECENT DIASTOLIC BLOOD PRESSURE 80-89 MM HG: ICD-10-PCS | Mod: CPTII,S$GLB,, | Performed by: INTERNAL MEDICINE

## 2023-03-14 PROCEDURE — 99214 PR OFFICE/OUTPT VISIT, EST, LEVL IV, 30-39 MIN: ICD-10-PCS | Mod: S$GLB,,, | Performed by: INTERNAL MEDICINE

## 2023-03-14 PROCEDURE — 3288F PR FALLS RISK ASSESSMENT DOCUMENTED: ICD-10-PCS | Mod: CPTII,S$GLB,, | Performed by: INTERNAL MEDICINE

## 2023-03-14 PROCEDURE — 1159F PR MEDICATION LIST DOCUMENTED IN MEDICAL RECORD: ICD-10-PCS | Mod: CPTII,S$GLB,, | Performed by: INTERNAL MEDICINE

## 2023-03-14 PROCEDURE — 4010F PR ACE/ARB THEARPY RXD/TAKEN: ICD-10-PCS | Mod: CPTII,S$GLB,, | Performed by: INTERNAL MEDICINE

## 2023-03-14 PROCEDURE — 99999 PR PBB SHADOW E&M-EST. PATIENT-LVL IV: ICD-10-PCS | Mod: PBBFAC,,, | Performed by: INTERNAL MEDICINE

## 2023-03-14 PROCEDURE — 4010F ACE/ARB THERAPY RXD/TAKEN: CPT | Mod: CPTII,S$GLB,, | Performed by: INTERNAL MEDICINE

## 2023-03-14 PROCEDURE — 99214 OFFICE O/P EST MOD 30 MIN: CPT | Mod: S$GLB,,, | Performed by: INTERNAL MEDICINE

## 2023-03-14 PROCEDURE — 3288F FALL RISK ASSESSMENT DOCD: CPT | Mod: CPTII,S$GLB,, | Performed by: INTERNAL MEDICINE

## 2023-03-14 PROCEDURE — 1101F PT FALLS ASSESS-DOCD LE1/YR: CPT | Mod: CPTII,S$GLB,, | Performed by: INTERNAL MEDICINE

## 2023-03-14 PROCEDURE — 3008F BODY MASS INDEX DOCD: CPT | Mod: CPTII,S$GLB,, | Performed by: INTERNAL MEDICINE

## 2023-03-14 PROCEDURE — 3008F PR BODY MASS INDEX (BMI) DOCUMENTED: ICD-10-PCS | Mod: CPTII,S$GLB,, | Performed by: INTERNAL MEDICINE

## 2023-03-14 PROCEDURE — 99999 PR PBB SHADOW E&M-EST. PATIENT-LVL IV: CPT | Mod: PBBFAC,,, | Performed by: INTERNAL MEDICINE

## 2023-03-14 PROCEDURE — 1101F PR PT FALLS ASSESS DOC 0-1 FALLS W/OUT INJ PAST YR: ICD-10-PCS | Mod: CPTII,S$GLB,, | Performed by: INTERNAL MEDICINE

## 2023-03-14 PROCEDURE — 3077F SYST BP >= 140 MM HG: CPT | Mod: CPTII,S$GLB,, | Performed by: INTERNAL MEDICINE

## 2023-03-14 PROCEDURE — 3079F DIAST BP 80-89 MM HG: CPT | Mod: CPTII,S$GLB,, | Performed by: INTERNAL MEDICINE

## 2023-03-14 PROCEDURE — 1159F MED LIST DOCD IN RCRD: CPT | Mod: CPTII,S$GLB,, | Performed by: INTERNAL MEDICINE

## 2023-03-14 PROCEDURE — 1125F AMNT PAIN NOTED PAIN PRSNT: CPT | Mod: CPTII,S$GLB,, | Performed by: INTERNAL MEDICINE

## 2023-03-14 PROCEDURE — 3077F PR MOST RECENT SYSTOLIC BLOOD PRESSURE >= 140 MM HG: ICD-10-PCS | Mod: CPTII,S$GLB,, | Performed by: INTERNAL MEDICINE

## 2023-03-14 PROCEDURE — 1125F PR PAIN SEVERITY QUANTIFIED, PAIN PRESENT: ICD-10-PCS | Mod: CPTII,S$GLB,, | Performed by: INTERNAL MEDICINE

## 2023-03-14 RX ORDER — NINTEDANIB 100 MG/1
100 CAPSULE ORAL 2 TIMES DAILY
Qty: 60 CAPSULE | Refills: 11 | Status: SHIPPED | OUTPATIENT
Start: 2023-03-14 | End: 2023-03-14 | Stop reason: SDUPTHER

## 2023-03-14 RX ORDER — METHYLPREDNISOLONE 4 MG/1
TABLET ORAL
Qty: 21 EACH | Refills: 1 | Status: SHIPPED | OUTPATIENT
Start: 2023-03-14 | End: 2023-04-04

## 2023-03-14 RX ORDER — MODAFINIL 200 MG/1
200 TABLET ORAL 2 TIMES DAILY PRN
Qty: 60 TABLET | Refills: 5 | Status: SHIPPED | OUTPATIENT
Start: 2023-03-14 | End: 2023-08-03 | Stop reason: SDUPTHER

## 2023-03-14 RX ORDER — METFORMIN HYDROCHLORIDE 500 MG/1
500 TABLET, EXTENDED RELEASE ORAL NIGHTLY
Status: ON HOLD | COMMUNITY
Start: 2023-01-23 | End: 2024-02-29

## 2023-03-14 RX ORDER — NINTEDANIB 100 MG/1
100 CAPSULE ORAL 2 TIMES DAILY
Qty: 60 CAPSULE | Refills: 11 | Status: SHIPPED | OUTPATIENT
Start: 2023-03-14 | End: 2023-07-09 | Stop reason: SDUPTHER

## 2023-03-14 NOTE — TELEPHONE ENCOUNTER
Outgoing call to pt regarding Ofev. Pt stated she only received one shipment of Ofev 150mg, felt nauseated, MD changed dose to 100mg. Pt confirmed still fills [facility]

## 2023-03-14 NOTE — TELEPHONE ENCOUNTER
Benefit Investigation    Ofev  Human [employer]  LIS Level 2  Estimated copay: $0   Pt is in initial phase  Ofev is LDD: [facility] ([place], [place])

## 2023-03-14 NOTE — TELEPHONE ENCOUNTER
Ofev rx received. Claim goes through with $0 copay without PA required. CMM states PA required. Attempted to submit via CM. Key: MING, awaiting questionnaires    Outgoing call to plan to confirm if PA is required. Spoke to Heidi, confirmed PA is already on file.     Authorization #: [id number]  Effective dates: 08/18/2022 - 12/31/2023

## 2023-03-14 NOTE — PATIENT INSTRUCTIONS
Ct chest viewed, pattern fibrosis seen-- would repeat    Not on medications for fibrosis as had severe reaction to ofev with projectile vomiting.    Would repeat ct and try ofev 100  -- start once daily and dose twice daily if no problem.      Aviod poison ivy-- may use medrol dose pack for rash or lungs if severe.     Use levalbuterol for cough,zpak    Call for sooner follow up if needed.    Provigil renewed.

## 2023-03-14 NOTE — PROGRESS NOTES
3/14/2023    Sakshi Hess  New Patient Consult    Chief Complaint   Patient presents with    Follow-up     6 month f/u          HPI:    3/14/2923- uses cpap well, uses nasal mask, breathing stable, no ofev re try -fearful of emesis.  Had poison ivy 3 wks ago with lingering rash.      11/30/2022 was seen by Dr Macias for 2nd opinion.    Pt ambulates nearly daily with ox monitoring -- upper 90's.  Anxious.  Has used proveigil with good results  Took ofev with severe n/v 1 st pill and recurred with subsequtent events.  May consider esbriet or lower dose ofev.   Becka,rf, ccp not done.    spirometry bronchodilator, lung volumes by gas dilution, diffusion capacity measured April 7, 2022. Spirometry was normal. There was no airflow obstruction. The forced vital capacity was 92% predicted.   Lung volume showed total lung capacity to be 77% predicted and low. Diffusion was low also at 65% predicted but did improve when corrected for lung volume.   Patient has a mild restrictive component. There is no airflow obstruction. The bronchodilator response was not significant. Diffusion was decreased somewhat. Clinical correlation necessary.       6 minute walk study was accomplished April 7, 2022. Baseline room air saturation was 93%. With ambulation O2 sat fell to 88% by 2 minutes. Patient needed 2 L of oxygen maintain sat low 90s subsequently. Patient walked 66% of the reference distance of 255            Patient Instructions   Will renew provigil -- you have had great benefit with alertness with provigil.    Continue oxygen and cpap.  Would consider going to ofev 100 twice daily call in couple days    May consider stopping ofev and using esbriet  with titrating up as able.    Would check oxygen level walking weekly or so.  If levels fall over few days to weeks -- that pattern is not fibrosis but something else  Albuterol has caused jitteriness but xopenex has worked well with no adverse effect.  Will ask staff to do prior  auth.  We review cxr 2013 to 2019 with no ild changes but cxr 2020 to current have ild seen.  Ct chest viewed again today.          8/8/2022 no ofev yet -- may be at local pharmacy.  Some ely usual degree. Uses ox prn.  Uses cpap.  Doing better with cpap and oxygen. Pt feels some excess sleepiness and loss focus despite good cpap compliance. Pt not using trazodone.     Had recent azithromycin. Uses xopenex as needed.        Not using buspar and wishes to stop .  Was seeing psyc in [place]-- psyc left town yrs ago.  [place] to have some add?       Patient Instructions   Need to get ofev started.    Use prednisone and inhalers as needed.    Use cpap with oxygen for sleep    Trial modafinil for excessive lingering sleepiness to improve alertness and focus.  Try one daily in early morning.  Increase to 2 daily after 3 days if needed.  Call when medications getting low to explain benefit and order more if helpful.        8/8/2022 compliance report  From 3 months ago--  for cpap shows cpap used 72% of days, average use  on days used  5hr 20 min, pressure ranges were low from 4-10, breathing difficulties decreased from 36 with no cpap in past to about 3.  No issues identified -- no adjustment.      pulm fibrosis found on ct in April - ofev    4/8/2022 no abx and breathing stable.  cpap ok.  Has ahi 36 in 2020, cpap auto 4-20.  Notes high pressure with removal mask at night.  Breathing roughly same but good and bad days.  Vague am stiffness of hands attributed to prior shoulder surgery.    No cough now wheezes.  Uses xopenex bid last month-- no help     Pt had 1st feb acute sob with wheezes, considered hospital, ended up reviewed her Facebook, found had prior Feb with similar symptoms and took inhalers with good control.    Patient Instructions   Need compliance report  for cpap-- may need to adjust cpap pressure if average high.      Will set up oxygen.    You have honeycombing of lung and subpleural reticular change seen  on ct abd seen 2022.  You have prominent lower lung rales bilaterally.     Pulmonary functions with tlc 77% with low difussion.    Your oxygen saturation falls to 88% after walking 2 minutes-- would recommend using oxygen .  Bleed in 2 lpm oxygen into cpap.     Would recommend treatment for pulmonary fibrosis with ofev, should have blood work monthly x 3 once starting ofev.  May have loose stools and need imodium.  Dose would be one twice daily.      If prednisone needed-- try one daily for 3 days.  May need inhaler if acute short breath/wheeze.  1/10/2022 uses cpap well -- uses nasal pillars.  Uses over 4 hrs /night.  Had admit h     Sinuses ok    Pt  Reports occasional low ox 90 or so.      Has boxes of inhalers at home-- took prednisone in fall.  No fh fibrosis but mom  44 yo.   Patient Instructions   You have mild scarring of lungs seen on ct of abdomen.   Rales are heard on exam suggesting scarring of lungs.      Would check oxygen level walking and breathing test.    Ct chest would be good to check-- could do in 6-12 months.    Use amoxil for sinus/lung infection    Will screen joint disease that may be associated with lung disease.    4/15/2021 uses cpap - uses over 4 hrs nightly, got call from  [facility] [place] saying needed to purchase or something?  Uses high compliance with great benefit.      10/1/2020 pt uses cpap 6 hrs nightly, has problems with mask, went to nasal with chin strap - needed tape to keep mouth closed.   Pt having good response when mask tolerated well.  No prednisone - stable  Patient Instructions   You are tolerating cpap well - you should do better with airfit f30 mask than nasal mask.    Continue symbicort as control lungs has been good.      You should use xopenex in place albuterol as you have palpitations with albuterol.  2020 - no prednisone last 3 wks, takes once every 3 wks, was not using symbicort less than daily. Brittaney's mom.  Sinuses good.      You had prior  "sleep apnea, up to 40/hr?  Used cpap for 3 yrs but stopped 10 yrs ago for multiple reasons.  Patient Instructions   symbicort is preventive - use regular 2 puffs twice daily,   singulair daily also.      Use xopenex inhaler, albuterol not as effective for you- as rescue therapy, use 2 puff up to every 4-6 as needed. May use nebulizer.    Use prednisone if needed.  20 mg daily for 2-3 should be adequate.  symbicort should decrease prednisone need.    Sleep apnea - up to 5 /hour is within normal range, study may apply cpap if can diagnose sleep apnea 1st part of night.   May need to see to discuss cpap tolerance post study.  10/15/2019pt had cough/wheezes as child, never outgrew.  Had cough all life- mucous clear. occ green mucous, uses combivent. Has neb with xopenex- albuterol ppt shakes.  Uses steroids once every 5 yrs- had to 3 wks prednisone.  Er visits once yr - may get steroid shot.  Lives [place],  No sinus, has dog and cat.  Breathing seems stable- breathing gets bad 4 month a yr    Uses oxycodone tid for last 1.5 yrs.      Has sleep apnea - cpap broke and not followed up.    Patient Instructions   Asthma, copd with chronic disease?    Preventive - use regular- sinuglair daily, symbicort 2 twice daily    Rescue medication - use albuterol inhaler or xopenex nebulizer up to every 4 hrs or so.    Action plan - prednisone one daily for 3 May be adequate, may need 3 for 3 and taper if severe.  Er if sickly    If yellow mucous - use azithromycin.    Spacer will help inhaled medication.    Breathing test and follow up a yr.  The chief compliant  problem is new to me",    PFSH:  Past Medical History:   Diagnosis Date    Arthritis     COPD (chronic obstructive pulmonary disease)     Coronary artery disease     Hypertension     Sleep apnea     use CPAP at night          Past Surgical History:   Procedure Laterality Date    CORONARY ANGIOGRAPHY N/A 2/15/2021    Procedure: ANGIOGRAM, CORONARY ARTERY;  Surgeon: Aidan" "MD Chanel;  Location: [facility] CATH/EP LAB;  Service: Cardiology;  Laterality: N/A;    CORONARY ARTERY BYPASS GRAFT      CORONARY BYPASS GRAFT ANGIOGRAPHY  2/15/2021    Procedure: Bypass graft study;  Surgeon: Aidan Buchanan MD;  Location: [facility] CATH/EP LAB;  Service: Cardiology;;    HYSTERECTOMY      JOINT REPLACEMENT      bilateral knee replacement     LEFT HEART CATHETERIZATION Left 2/15/2021    Procedure: Left heart cath;  Surgeon: Aidan Buchanan MD;  Location: [facility] CATH/EP LAB;  Service: Cardiology;  Laterality: Left;     Social History     Tobacco Use    Smoking status: Never     Passive exposure: Past    Smokeless tobacco: Never   Substance Use Topics    Alcohol use: No    Drug use: No     Family History   Problem Relation Age of Onset    Pulmonary embolism Mother      Review of patient's allergies indicates:   Allergen Reactions    Metronidazole Other (See Comments)     DISORIENTED       Statins-hmg-coa reductase inhibitors Tinitus     Joint pain       Performance Status:The patient's activity level is no limits with regular activity.      Review of Systems:  a review of eleven systems covering constitutional, Eye, HEENT, Psych, Respiratory, Cardiac, GI, , Musculoskeletal, Endocrine, Dermatologic was negative except for pertinent findings as listed ABOVE and below: night sweats,   pertinent positive as above, rest is good      Exam:Comprehensive exam done. BP (!) 163/83 (BP Location: Left arm, Patient Position: Sitting, BP Method: Medium (Automatic))   Pulse 75   Ht 5' 1" (1.549 m)   Wt 86.7 kg (191 lb 0.5 oz)   LMP 09/01/1986   SpO2 97% Comment: on room air at rest  BMI 36.09 kg/m²   Exam included Vitals as listed, and patient's appearance and affect and alertness and mood, oral exam for yeast and hygiene and pharynx lesions and Mallapatti (M) score, neck with inspection for jvd and masses and thyroid abnormalities and lymph nodes (supraclavicular and infraclavicular nodes and axillary also " examined and noted if abn), chest exam included symmetry and effort and fremitus and percussion and auscultation, cardiac exam included rhythm and gallops and murmur and rubs and jvd and edema, abdominal exam for mass and hepatosplenomegaly and tenderness and hernias and bowel sounds, Musculoskeletal exam with muscle tone and posture and mobility/gait and  strength, and skin for rashes and cyanosis and pallor and turgor, extremity for clubbing.  Findings were normal except for pertinent findings listed below:   M2, chest is symmetric, no distress, normal percussion, normal fremitus and bilat slight rales lung bases  No clubbing nor edema    Radiographs (ct chest and cxr) reviewed: view by direct vision  March 15 , 2019 nad, post cabg    Ct abd 1/2/2022 ild seen lower lungs with ? Honeycombing.  Ct chest 4/12/2022 ild seen with honeycombing.     Labs reviewed            PFT   fvc 92%, tlc 77%, dlco 65% 4/7/2022  Six min walk 93% rest , 88% 2 minutes, and 94% end of walk.        Results for SAKSHI BALLARD (MRN [id number]) as of 10/15/2019 09:59   Ref. Range 6/11/2012 04:51   Eosinophils Relative Latest Ref Range: 0.0 - 3.0 % 5.5 (H)   Basophil% Latest Ref Range: 0.0 - 3.0 % 0.5       Plan:  Clinical impression is apparently straight forward and impression with management as below.     Sakshi was seen today for follow-up.    Diagnoses and all orders for this visit:    Interstitial pulmonary disease, unspecified  -     CT Chest Without Contrast; Future    Obstructive sleep apnea syndrome  -     modafiniL (PROVIGIL) 200 MG Tab; Take 1 tablet (200 mg total) by mouth 2 (two) times daily as needed (excess sleepiness).    Hypersomnolence disorder  -     modafiniL (PROVIGIL) 200 MG Tab; Take 1 tablet (200 mg total) by mouth 2 (two) times daily as needed (excess sleepiness).    IPF (idiopathic pulmonary fibrosis)  -     modafiniL (PROVIGIL) 200 MG Tab; Take 1 tablet (200 mg total) by mouth 2 (two) times daily as needed  (excess sleepiness).  -     nintedanib (OFEV) 100 mg Cap; Take 100 mg by mouth 2 (two) times a day.    ILD (interstitial lung disease)    Poison ivy dermatitis  -     methylPREDNISolone (MEDROL DOSEPACK) 4 mg tablet; use as directed    Bronchiectasis without complication  -     methylPREDNISolone (MEDROL DOSEPACK) 4 mg tablet; use as directed            Follow up in about 6 months (around 9/14/2023), or if symptoms worsen or fail to improve.    Discussed with patient above for education the following:      Patient Instructions   Ct chest viewed, pattern fibrosis seen-- would repeat    Not on medications for fibrosis as had severe reaction to ofev with projectile vomiting.    Would repeat ct and try ofev 100  -- start once daily and dose twice daily if no problem.      Aviod poison ivy-- may use medrol dose pack for rash or lungs if severe.     Use levalbuterol for cough,zpak    Call for sooner follow up if needed.    Provigil renewed.

## 2023-03-14 NOTE — TELEPHONE ENCOUNTER
Ofev 100mg Rx routed to [facility] ([place], [place])  Closing out referral at [facility]

## 2023-06-14 ENCOUNTER — HOSPITAL ENCOUNTER (OUTPATIENT)
Dept: RADIOLOGY | Facility: HOSPITAL | Age: 68
Discharge: HOME | End: 2023-06-14
Attending: INTERNAL MEDICINE
Payer: MEDICARE

## 2023-06-14 DIAGNOSIS — J84.9 INTERSTITIAL PULMONARY DISEASE, UNSPECIFIED: ICD-10-CM

## 2023-06-14 PROCEDURE — 71250 CT THORAX DX C-: CPT | Mod: 26,,, | Performed by: RADIOLOGY

## 2023-06-14 PROCEDURE — 71250 CT CHEST WITHOUT CONTRAST: ICD-10-PCS | Mod: 26,,, | Performed by: RADIOLOGY

## 2023-06-14 PROCEDURE — 71250 CT THORAX DX C-: CPT | Mod: TC

## 2023-07-09 ENCOUNTER — PATIENT MESSAGE (OUTPATIENT)
Dept: PULMONOLOGY | Facility: CLINIC | Age: 68
End: 2023-07-09
Payer: MEDICARE

## 2023-07-11 ENCOUNTER — PATIENT MESSAGE (OUTPATIENT)
Dept: PULMONOLOGY | Facility: CLINIC | Age: 68
End: 2023-07-11
Payer: MEDICARE

## 2023-07-11 DIAGNOSIS — J84.112 IPF (IDIOPATHIC PULMONARY FIBROSIS): ICD-10-CM

## 2023-07-11 RX ORDER — NINTEDANIB 100 MG/1
100 CAPSULE ORAL 2 TIMES DAILY
Qty: 180 CAPSULE | Refills: 2 | Status: SHIPPED | OUTPATIENT
Start: 2023-07-11 | End: 2023-08-03 | Stop reason: SDUPTHER

## 2023-07-24 ENCOUNTER — PATIENT MESSAGE (OUTPATIENT)
Dept: PULMONOLOGY | Facility: CLINIC | Age: 68
End: 2023-07-24
Payer: MEDICARE

## 2023-08-02 ENCOUNTER — PATIENT MESSAGE (OUTPATIENT)
Dept: PULMONOLOGY | Facility: CLINIC | Age: 68
End: 2023-08-02

## 2023-08-03 ENCOUNTER — OFFICE VISIT (OUTPATIENT)
Dept: PULMONOLOGY | Facility: CLINIC | Age: 68
End: 2023-08-03
Payer: MEDICARE

## 2023-08-03 ENCOUNTER — PATIENT MESSAGE (OUTPATIENT)
Dept: PULMONOLOGY | Facility: CLINIC | Age: 68
End: 2023-08-03

## 2023-08-03 DIAGNOSIS — G47.33 OBSTRUCTIVE SLEEP APNEA SYNDROME: ICD-10-CM

## 2023-08-03 DIAGNOSIS — E66.01 SEVERE OBESITY (BMI 35.0-39.9) WITH COMORBIDITY: ICD-10-CM

## 2023-08-03 DIAGNOSIS — G47.10 HYPERSOMNOLENCE DISORDER: ICD-10-CM

## 2023-08-03 DIAGNOSIS — J45.20 MILD INTERMITTENT ASTHMA WITHOUT COMPLICATION: ICD-10-CM

## 2023-08-03 DIAGNOSIS — J47.9 BRONCHIECTASIS WITHOUT COMPLICATION: Primary | ICD-10-CM

## 2023-08-03 DIAGNOSIS — J84.112 IPF (IDIOPATHIC PULMONARY FIBROSIS): ICD-10-CM

## 2023-08-03 DIAGNOSIS — J44.9 CHRONIC OBSTRUCTIVE PULMONARY DISEASE, UNSPECIFIED COPD TYPE: ICD-10-CM

## 2023-08-03 PROCEDURE — 4010F PR ACE/ARB THEARPY RXD/TAKEN: ICD-10-PCS | Mod: CPTII,95,, | Performed by: INTERNAL MEDICINE

## 2023-08-03 PROCEDURE — 99214 PR OFFICE/OUTPT VISIT, EST, LEVL IV, 30-39 MIN: ICD-10-PCS | Mod: 95,,, | Performed by: INTERNAL MEDICINE

## 2023-08-03 PROCEDURE — 1159F MED LIST DOCD IN RCRD: CPT | Mod: CPTII,95,, | Performed by: INTERNAL MEDICINE

## 2023-08-03 PROCEDURE — 1101F PT FALLS ASSESS-DOCD LE1/YR: CPT | Mod: CPTII,95,, | Performed by: INTERNAL MEDICINE

## 2023-08-03 PROCEDURE — 3288F PR FALLS RISK ASSESSMENT DOCUMENTED: ICD-10-PCS | Mod: CPTII,95,, | Performed by: INTERNAL MEDICINE

## 2023-08-03 PROCEDURE — 1159F PR MEDICATION LIST DOCUMENTED IN MEDICAL RECORD: ICD-10-PCS | Mod: CPTII,95,, | Performed by: INTERNAL MEDICINE

## 2023-08-03 PROCEDURE — 3288F FALL RISK ASSESSMENT DOCD: CPT | Mod: CPTII,95,, | Performed by: INTERNAL MEDICINE

## 2023-08-03 PROCEDURE — 1101F PR PT FALLS ASSESS DOC 0-1 FALLS W/OUT INJ PAST YR: ICD-10-PCS | Mod: CPTII,95,, | Performed by: INTERNAL MEDICINE

## 2023-08-03 PROCEDURE — 4010F ACE/ARB THERAPY RXD/TAKEN: CPT | Mod: CPTII,95,, | Performed by: INTERNAL MEDICINE

## 2023-08-03 PROCEDURE — 99214 OFFICE O/P EST MOD 30 MIN: CPT | Mod: 95,,, | Performed by: INTERNAL MEDICINE

## 2023-08-03 RX ORDER — MODAFINIL 200 MG/1
200 TABLET ORAL 2 TIMES DAILY PRN
Qty: 60 TABLET | Refills: 5 | Status: ON HOLD | OUTPATIENT
Start: 2023-08-03 | End: 2024-02-29

## 2023-08-03 RX ORDER — AZITHROMYCIN 500 MG/1
TABLET, FILM COATED ORAL
Qty: 3 TABLET | Refills: 3 | Status: SHIPPED | OUTPATIENT
Start: 2023-08-03 | End: 2023-10-03 | Stop reason: SDUPTHER

## 2023-08-03 RX ORDER — NINTEDANIB 100 MG/1
100 CAPSULE ORAL 2 TIMES DAILY
Qty: 180 CAPSULE | Refills: 2 | Status: SHIPPED | OUTPATIENT
Start: 2023-08-03 | End: 2024-02-26

## 2023-08-03 RX ORDER — ALBUTEROL SULFATE 0.83 MG/ML
2.5 SOLUTION RESPIRATORY (INHALATION) EVERY 6 HOURS PRN
Qty: 120 EACH | Refills: 11 | Status: ON HOLD | OUTPATIENT
Start: 2023-08-03 | End: 2024-02-29

## 2023-08-03 NOTE — PROGRESS NOTES
8/3/2023    Sakshi Hess  New Patient Consult    Chief Complaint   Patient presents with    Follow-up     Discuss vest       ]The [place] location is: [place]   The chief complaint leading to consultation is: bronchiectasis and fibrosis lung      Visit type: audiovisual    Face to Face time with patient: 20  33 minutes of total time spent on the encounter, which includes face to face time and non-face to face time preparing to see the patient (eg, review of tests), Obtaining and/or reviewing separately obtained history, Documenting clinical information in the electronic or other health record, Independently interpreting results (not separately reported) and communicating results to the patient/family/caregiver, or Care coordination (not separately reported).         Each patient to whom he or she provides medical services by telemedicine is:  (1) informed of the relationship between the physician and patient and the respective role of any other health care provider with respect to management of the patient; and (2) notified that he or she may decline to receive medical services by telemedicine and may withdraw from such care at any time.    Notes:        8/3/2023   Ofev, started and needs script to [facility]   Provigil helps and needs    Copd stable  Needs to f/u weight.    Uses ofev bid with occ pastey stools -- uses immodium-- doing reasonbe.  Breathing seems stable with aeorbika.        Pt still having lingering secretino--- used abx  last wk.  And used abx twice in last 12 months,  Uses provigil with good results     3/14/2923- uses cpap well, uses nasal mask, breathing stable, no ofev re try -fearful of emesis.  Had poison ivy 3 wks ago with lingering rash.      Patient Instructions   Ct chest viewed, pattern fibrosis seen-- would repeat    Not on medications for fibrosis as had severe reaction to ofev with projectile vomiting.    Would repeat ct and try ofev 100  -- start once daily and dose twice  daily if no problem.      Aviod poison ivy-- may use medrol dose pack for rash or lungs if severe.     Use levalbuterol for cough,zpak    Call for sooner follow up if needed.    Provigil renewed.  11/30/2022 was seen by Dr Macias for 2nd opinion.    Pt ambulates nearly daily with ox monitoring -- upper 90's.  Anxious.  Has used proveigil with good results  Took ofev with severe n/v 1 st pill and recurred with subsequtent events.  May consider esbriet or lower dose ofev.   Becka,rf, ccp not done.    spirometry bronchodilator, lung volumes by gas dilution, diffusion capacity measured April 7, 2022. Spirometry was normal. There was no airflow obstruction. The forced vital capacity was 92% predicted.   Lung volume showed total lung capacity to be 77% predicted and low. Diffusion was low also at 65% predicted but did improve when corrected for lung volume.   Patient has a mild restrictive component. There is no airflow obstruction. The bronchodilator response was not significant. Diffusion was decreased somewhat. Clinical correlation necessary.       6 minute walk study was accomplished April 7, 2022. Baseline room air saturation was 93%. With ambulation O2 sat fell to 88% by 2 minutes. Patient needed 2 L of oxygen maintain sat low 90s subsequently. Patient walked 66% of the reference distance of 255            Patient Instructions   Will renew provigil -- you have had great benefit with alertness with provigil.    Continue oxygen and cpap.  Would consider going to ofev 100 twice daily call in couple days    May consider stopping ofev and using esbriet  with titrating up as able.    Would check oxygen level walking weekly or so.  If levels fall over few days to weeks -- that pattern is not fibrosis but something else  Albuterol has caused jitteriness but xopenex has worked well with no adverse effect.  Will ask staff to do prior auth.  We review cxr 2013 to 2019 with no ild changes but cxr 2020 to current have ild seen.   Ct chest viewed again today.          8/8/2022 no ofev yet -- may be at local pharmacy.  Some ely usual degree. Uses ox prn.  Uses cpap.  Doing better with cpap and oxygen. Pt feels some excess sleepiness and loss focus despite good cpap compliance. Pt not using trazodone.     Had recent azithromycin. Uses xopenex as needed.        Not using buspar and wishes to stop .  Was seeing psyc in [place]-- psyc left town yrs ago.  [place] to have some add?       Patient Instructions   Need to get ofev started.    Use prednisone and inhalers as needed.    Use cpap with oxygen for sleep    Trial modafinil for excessive lingering sleepiness to improve alertness and focus.  Try one daily in early morning.  Increase to 2 daily after 3 days if needed.  Call when medications getting low to explain benefit and order more if helpful.        8/8/2022 compliance report  From 3 months ago--  for cpap shows cpap used 72% of days, average use  on days used  5hr 20 min, pressure ranges were low from 4-10, breathing difficulties decreased from 36 with no cpap in past to about 3.  No issues identified -- no adjustment.      pulm fibrosis found on ct in April - ofev    4/8/2022 no abx and breathing stable.  cpap ok.  Has ahi 36 in 2020, cpap auto 4-20.  Notes high pressure with removal mask at night.  Breathing roughly same but good and bad days.  Vague am stiffness of hands attributed to prior shoulder surgery.    No cough now wheezes.  Uses xopenex bid last month-- no help     Pt had 1st feb acute sob with wheezes, considered hospital, ended up reviewed her Facebook, found had prior Feb with similar symptoms and took inhalers with good control.    Patient Instructions   Need compliance report  for cpap-- may need to adjust cpap pressure if average high.      Will set up oxygen.    You have honeycombing of lung and subpleural reticular change seen on ct abd seen 1/2/2022.  You have prominent lower lung rales bilaterally.     Pulmonary  functions with tlc 77% with low difussion.    Your oxygen saturation falls to 88% after walking 2 minutes-- would recommend using oxygen /.  Bleed in 2 lpm oxygen into cpap.     Would recommend treatment for pulmonary fibrosis with ofev, should have blood work monthly x 3 once starting ofev.  May have loose stools and need imodium.  Dose would be one twice daily.      If prednisone needed-- try one daily for 3 days.  May need inhaler if acute short breath/wheeze.  1/10/2022 uses cpap well -- uses nasal pillars.  Uses over 4 hrs /night.  Had admit [facility]     Sinuses ok    Pt  Reports occasional low ox 90 or so.      Has boxes of inhalers at home-- took prednisone in fall.  No fh fibrosis but mom  44 yo.   Patient Instructions   You have mild scarring of lungs seen on ct of abdomen.   Rales are heard on exam suggesting scarring of lungs.      Would check oxygen level walking and breathing test.    Ct chest would be good to check-- could do in 6-12 months.    Use amoxil for sinus/lung infection    Will screen joint disease that may be associated with lung disease.    4/15/2021 uses cpap - uses over 4 hrs nightly, got call from  [employer] saying needed to purchase or something?  Uses high compliance with great benefit.      10/1/2020 pt uses cpap 6 hrs nightly, has problems with mask, went to nasal with chin strap - needed tape to keep mouth closed.   Pt having good response when mask tolerated well.  No prednisone - stable  Patient Instructions   You are tolerating cpap well - you should do better with airfit f30 mask than nasal mask.    Continue symbicort as control lungs has been good.      You should use xopenex in place albuterol as you have palpitations with albuterol.  2020 - no prednisone last 3 wks, takes once every 3 wks, was not using symbicort less than daily. Brittaney's mom.  Sinuses good.      You had prior sleep apnea, up to 40/hr?  Used cpap for 3 yrs but stopped 10 yrs ago for multiple  "reasons.  Patient Instructions   symbicort is preventive - use regular 2 puffs twice daily,   singulair daily also.      Use xopenex inhaler, albuterol not as effective for you- as rescue therapy, use 2 puff up to every 4-6 as needed. May use nebulizer.    Use prednisone if needed.  20 mg daily for 2-3 should be adequate.  symbicort should decrease prednisone need.    Sleep apnea - up to 5 /hour is within normal range, study may apply cpap if can diagnose sleep apnea 1st part of night.   May need to see to discuss cpap tolerance post study.  10/15/2019pt had cough/wheezes as child, never outgrew.  Had cough all life- mucous clear. occ green mucous, uses combivent. Has neb with xopenex- albuterol ppt shakes.  Uses steroids once every 5 yrs- had to 3 wks prednisone.  Er visits once yr - may get steroid shot.  Lives [place],  No sinus, has dog and cat.  Breathing seems stable- breathing gets bad 4 month a yr    Uses oxycodone tid for last 1.5 yrs.      Has sleep apnea - cpap broke and not followed up.    Patient Instructions   Asthma, copd with chronic disease?    Preventive - use regular- sinuglair daily, symbicort 2 twice daily    Rescue medication - use albuterol inhaler or xopenex nebulizer up to every 4 hrs or so.    Action plan - prednisone one daily for 3 May be adequate, may need 3 for 3 and taper if severe.  Er if sickly    If yellow mucous - use azithromycin.    Spacer will help inhaled medication.    Breathing test and follow up a yr.  The chief compliant  problem is new to me",    PFSH:  Past Medical History:   Diagnosis Date    Arthritis     COPD (chronic obstructive pulmonary disease)     Coronary artery disease     Hypertension     Sleep apnea     use CPAP at night          Past Surgical History:   Procedure Laterality Date    CORONARY ANGIOGRAPHY N/A 2/15/2021    Procedure: ANGIOGRAM, CORONARY ARTERY;  Surgeon: Aidan Buchanan MD;  Location: [facility] CATH/EP LAB;  Service: Cardiology;  Laterality: N/A;    " CORONARY ARTERY BYPASS GRAFT      CORONARY BYPASS GRAFT ANGIOGRAPHY  2/15/2021    Procedure: Bypass graft study;  Surgeon: Aidan Buchanan MD;  Location: [facility] CATH/EP LAB;  Service: Cardiology;;    HYSTERECTOMY      JOINT REPLACEMENT      bilateral knee replacement     LEFT HEART CATHETERIZATION Left 2/15/2021    Procedure: Left heart cath;  Surgeon: Aidan Buchanan MD;  Location: [facility] CATH/EP LAB;  Service: Cardiology;  Laterality: Left;     Social History     Tobacco Use    Smoking status: Never     Passive exposure: Past    Smokeless tobacco: Never   Substance Use Topics    Alcohol use: No    Drug use: No     Family History   Problem Relation Age of Onset    Pulmonary embolism Mother      Review of patient's allergies indicates:   Allergen Reactions    Metronidazole Other (See Comments)     DISORIENTED       Statins-hmg-coa reductase inhibitors Tinitus     Joint pain       Performance Status:The patient's activity level is no limits with regular activity.      Review of Systems:  a review of eleven systems covering constitutional, Eye, HEENT, Psych, Respiratory, Cardiac, GI, , Musculoskeletal, Endocrine, Dermatologic was negative except for pertinent findings as listed ABOVE and below: night sweats,   pertinent positive as above, rest is good      Exam:Comprehensive exam done. [facility] 09/01/1986   Exam included Vitals as listed, and patient's appearance and affect and alertness and mood, oral exam for yeast and hygiene and pharynx lesions and Mallapatti (M) score, neck with inspection for jvd and masses and thyroid abnormalities and lymph nodes (supraclavicular and infraclavicular nodes and axillary also examined and noted if abn), chest exam included symmetry and effort and fremitus and percussion and auscultation, cardiac exam included rhythm and gallops and murmur and rubs and jvd and edema, abdominal exam for mass and hepatosplenomegaly and tenderness and hernias and bowel sounds, Musculoskeletal exam with  muscle tone and posture and mobility/gait and  strength, and skin for rashes and cyanosis and pallor and turgor, extremity for clubbing.  Findings were normal except for pertinent findings listed below:   M2, chest is symmetric, no distress, normal percussion, normal fremitus and bilat slight rales lung bases  No clubbing nor edema    Radiographs (ct chest and cxr) reviewed: view by direct vision  March 15 , 2019 nad, post cabg    Ct abd 1/2/2022 ild seen lower lungs with ? Honeycombing.  Ct chest 4/12/2022 ild seen with honeycombing.     Labs reviewed            PFT   fvc 92%, tlc 77%, dlco 65% 4/7/2022  Six min walk 93% rest , 88% 2 minutes, and 94% end of walk.        Results for SAKSHI BALLARD (MRN [id number]) as of 10/15/2019 09:59   Ref. Range 6/11/2012 04:51   Eosinophils Relative Latest Ref Range: 0.0 - 3.0 % 5.5 (H)   Basophil% Latest Ref Range: 0.0 - 3.0 % 0.5       Plan:  Clinical impression is apparently straight forward and impression with management as below.     Sakshi was seen today for follow-up.    Diagnoses and all orders for this visit:    Bronchiectasis without complication  -     VEST FOR AIRWAY CLEARANCE FOR HOME USE  -     albuterol (PROVENTIL) 2.5 mg /3 mL (0.083 %) nebulizer solution; Take 3 mLs (2.5 mg total) by nebulization every 6 (six) hours as needed for Wheezing or Shortness of Breath.    Obstructive sleep apnea syndrome  -     modafiniL (PROVIGIL) 200 MG Tab; Take 1 tablet (200 mg total) by mouth 2 (two) times daily as needed (excess sleepiness).    Hypersomnolence disorder  -     modafiniL (PROVIGIL) 200 MG Tab; Take 1 tablet (200 mg total) by mouth 2 (two) times daily as needed (excess sleepiness).    IPF (idiopathic pulmonary fibrosis)  -     modafiniL (PROVIGIL) 200 MG Tab; Take 1 tablet (200 mg total) by mouth 2 (two) times daily as needed (excess sleepiness).  -     nintedanib (OFEV) 100 mg Cap; Take 100 mg by mouth 2 (two) times a day.    Mild intermittent asthma without  complication  -     azithromycin (ZITHROMAX) 500 MG tablet; One daily for yellow mucous, repeat if needed    Severe obesity (BMI 35.0-39.9) with comorbidity    Chronic obstructive pulmonary disease, unspecified COPD type              Follow up in about 6 months (around 2/3/2024), or if symptoms worsen or fail to improve.    Discussed with patient above for education the following:      Patient Instructions   You have bronchiectasis seen on June 2023 viewed by me, Dr Bowen,  and use aerobika , you needed antibiotics twice last 12 months for lung infection.  Will order vest-- you should do nebulizer with aerobika with vest daily.    Provigil renewed    Use azithromycin for lung infections     Continue ofev    Recheck 6 months.  Call if problem    Bmi is up but weight not done today-- will follow up in future    Copd present.  Bronchiectasis treatment will help.    Ct chest viewed with patient today..

## 2023-08-11 NOTE — PATIENT INSTRUCTIONS
You have bronchiectasis seen on June 2023 viewed by me, Dr Bowen,  and use aerobika , you needed antibiotics twice last 12 months for lung infection.  Will order vest-- you should do nebulizer with aerobika with vest daily.    Provigil renewed    Use azithromycin for lung infections     Continue ofev    Recheck 6 months.  Call if problem    Bmi is up but weight not done today-- will follow up in future    Copd present.  Bronchiectasis treatment will help.    Ct chest viewed with patient today..   Patient

## 2023-08-21 ENCOUNTER — TELEPHONE (OUTPATIENT)
Dept: PULMONOLOGY | Facility: CLINIC | Age: 68
End: 2023-08-21
Payer: MEDICARE

## 2023-10-03 DIAGNOSIS — J47.9 BRONCHIECTASIS WITHOUT COMPLICATION: ICD-10-CM

## 2023-10-03 DIAGNOSIS — J45.20 MILD INTERMITTENT ASTHMA WITHOUT COMPLICATION: ICD-10-CM

## 2023-10-05 RX ORDER — AZITHROMYCIN 500 MG/1
TABLET, FILM COATED ORAL
Qty: 3 TABLET | Refills: 3 | Status: ON HOLD | OUTPATIENT
Start: 2023-10-05 | End: 2024-02-29 | Stop reason: HOSPADM

## 2023-10-05 RX ORDER — LEVALBUTEROL TARTRATE 45 UG/1
1-2 AEROSOL, METERED ORAL EVERY 4 HOURS PRN
Qty: 15 G | Refills: 11 | Status: ON HOLD | OUTPATIENT
Start: 2023-10-05 | End: 2024-02-29

## 2024-02-21 ENCOUNTER — TELEPHONE (OUTPATIENT)
Dept: PULMONOLOGY | Facility: CLINIC | Age: 69
End: 2024-02-21
Payer: MEDICARE

## 2024-02-21 NOTE — TELEPHONE ENCOUNTER
Placed a call to the pt in regards to a refill Modafinil which is a controlled substance. An appointment was given on 3/18/24 at 1:20pm. Pt verbalized understanding.

## 2024-02-21 NOTE — TELEPHONE ENCOUNTER
----- Message from Leland Draper sent at 2/21/2024  2:35 PM CST -----  Regarding: refill  Contact: patient  Type:  RX Refill Request    Who Called:  patient   Refill or New Rx:  refill  RX Name and Strength:  modafiniL (PROVIGIL) 200 MG Tab 60 tablet 5 8/3/2023 -   Sig - Route: Take 1 tablet (200 mg total) by mouth 2 (two) times daily as needed (excess sleepiness). - Oral   Sent to pharmacy as: modafiniL (PROVIGIL) 200 MG Tab   E-Prescribing Status: Receipt confirmed by pharmacy (8/3/2023  5:11 PM CDT)   Prior authorization: Canceled - Other (Ins could not id pt)       How is the patient currently taking it? (ex. 1XDay):  see above   Is this a 30 day or 90 day RX:  see above   Preferred Pharmacy with phone number:    [facility]. - [place], [place] - [address]  [place] [address]  Phone: [phone] Fax: [phone]    Local or Mail Order:  local  Ordering Provider:  Dr Giles  Best Call Back Number:  [phone]  Additional Information:  Pt stated that she jusrt need enough meds until her appt on 04/02. Thanks

## 2024-02-21 NOTE — TELEPHONE ENCOUNTER
----- Message from Gertrudis Balderrama sent at 2/21/2024  3:10 PM CST -----  Contact: self  Type:  Patient Returning Call    Who Called:  ayse   Who Left Message for Patient:  audrey   Does the patient know what this is regarding?:  [facility] appt on 3/18/24 at 1:20 PM   Best Call Back Number:  [phone]  Additional Information:  Patient wants this appt please and she will be there [facility] and thanks      
Appointment confirmed with pt.  
home

## 2024-02-26 ENCOUNTER — HOSPITAL ENCOUNTER (INPATIENT)
Facility: HOSPITAL | Age: 69
LOS: 2 days | Discharge: HOME | DRG: 191 | End: 2024-02-29
Attending: EMERGENCY MEDICINE | Admitting: STUDENT IN AN ORGANIZED HEALTH CARE EDUCATION/TRAINING PROGRAM
Payer: MEDICARE

## 2024-02-26 DIAGNOSIS — G47.10 HYPERSOMNOLENCE DISORDER: ICD-10-CM

## 2024-02-26 DIAGNOSIS — I20.0 UNSTABLE ANGINA: ICD-10-CM

## 2024-02-26 DIAGNOSIS — R06.02 SHORTNESS OF BREATH: Primary | ICD-10-CM

## 2024-02-26 DIAGNOSIS — J44.1 COPD EXACERBATION: ICD-10-CM

## 2024-02-26 DIAGNOSIS — J84.112 IPF (IDIOPATHIC PULMONARY FIBROSIS): ICD-10-CM

## 2024-02-26 DIAGNOSIS — J47.9 BRONCHIECTASIS WITHOUT COMPLICATION: ICD-10-CM

## 2024-02-26 DIAGNOSIS — R07.9 CHEST PAIN: ICD-10-CM

## 2024-02-26 DIAGNOSIS — G47.33 OBSTRUCTIVE SLEEP APNEA SYNDROME: ICD-10-CM

## 2024-02-26 PROBLEM — E11.9 DIABETES: Status: ACTIVE | Noted: 2024-02-26

## 2024-02-26 LAB
ALBUMIN SERPL BCP-MCNC: 4.4 G/DL (ref 3.5–5.2)
ALLENS TEST: ABNORMAL
ALP SERPL-CCNC: 89 U/L (ref 55–135)
ALT SERPL W/O P-5'-P-CCNC: 8 U/L (ref 10–44)
ANION GAP SERPL CALC-SCNC: 11 MMOL/L (ref 8–16)
AST SERPL-CCNC: 14 U/L (ref 10–40)
BASOPHILS # BLD AUTO: 0.02 K/UL (ref 0–0.2)
BASOPHILS NFR BLD: 0.2 % (ref 0–1.9)
BILIRUB SERPL-MCNC: 0.3 MG/DL (ref 0.1–1)
BNP SERPL-MCNC: 44 PG/ML (ref 0–99)
BUN SERPL-MCNC: 24 MG/DL (ref 8–23)
CALCIUM SERPL-MCNC: 9.1 MG/DL (ref 8.7–10.5)
CHLORIDE SERPL-SCNC: 101 MMOL/L (ref 95–110)
CO2 SERPL-SCNC: 27 MMOL/L (ref 23–29)
CREAT SERPL-MCNC: 2 MG/DL (ref 0.5–1.4)
DELSYS: ABNORMAL
DIFFERENTIAL METHOD BLD: ABNORMAL
EOSINOPHIL # BLD AUTO: 0.2 K/UL (ref 0–0.5)
EOSINOPHIL NFR BLD: 2.3 % (ref 0–8)
ERYTHROCYTE [DISTWIDTH] IN BLOOD BY AUTOMATED COUNT: 14.1 % (ref 11.5–14.5)
EST. GFR  (NO RACE VARIABLE): 26.7 ML/MIN/1.73 M^2
FLOW: 2
GLUCOSE SERPL-MCNC: 104 MG/DL (ref 70–110)
HCO3 UR-SCNC: 27.5 MMOL/L (ref 24–28)
HCT VFR BLD AUTO: 40.2 % (ref 37–48.5)
HGB BLD-MCNC: 12.7 G/DL (ref 12–16)
IMM GRANULOCYTES # BLD AUTO: 0.03 K/UL (ref 0–0.04)
IMM GRANULOCYTES NFR BLD AUTO: 0.4 % (ref 0–0.5)
INFLUENZA A, MOLECULAR: NEGATIVE
INFLUENZA B, MOLECULAR: NEGATIVE
LYMPHOCYTES # BLD AUTO: 1.5 K/UL (ref 1–4.8)
LYMPHOCYTES NFR BLD: 17.6 % (ref 18–48)
MAGNESIUM SERPL-MCNC: 1.9 MG/DL (ref 1.6–2.6)
MCH RBC QN AUTO: 28.7 PG (ref 27–31)
MCHC RBC AUTO-ENTMCNC: 31.6 G/DL (ref 32–36)
MCV RBC AUTO: 91 FL (ref 82–98)
MODE: ABNORMAL
MONOCYTES # BLD AUTO: 0.9 K/UL (ref 0.3–1)
MONOCYTES NFR BLD: 10.8 % (ref 4–15)
NEUTROPHILS # BLD AUTO: 5.7 K/UL (ref 1.8–7.7)
NEUTROPHILS NFR BLD: 68.7 % (ref 38–73)
NRBC BLD-RTO: 0 /100 WBC
PCO2 BLDA: 50 MMHG (ref 35–45)
PH SMN: 7.35 [PH] (ref 7.35–7.45)
PLATELET # BLD AUTO: 249 K/UL (ref 150–450)
PMV BLD AUTO: 9.2 FL (ref 9.2–12.9)
PO2 BLDA: 83 MMHG (ref 80–100)
POC BE: 2 MMOL/L
POC SATURATED O2: 95 % (ref 95–100)
POC TCO2: 29 MMOL/L (ref 23–27)
POTASSIUM SERPL-SCNC: 4 MMOL/L (ref 3.5–5.1)
PROCALCITONIN SERPL IA-MCNC: <0.05 NG/ML (ref 0–0.5)
PROT SERPL-MCNC: 7.4 G/DL (ref 6–8.4)
RBC # BLD AUTO: 4.42 M/UL (ref 4–5.4)
SAMPLE: ABNORMAL
SARS-COV-2 RDRP RESP QL NAA+PROBE: NEGATIVE
SITE: ABNORMAL
SODIUM SERPL-SCNC: 139 MMOL/L (ref 136–145)
SPECIMEN SOURCE: NORMAL
TROPONIN I SERPL HS-MCNC: 30.1 PG/ML (ref 0–14.9)
WBC # BLD AUTO: 8.3 K/UL (ref 3.9–12.7)

## 2024-02-26 PROCEDURE — 99285 EMERGENCY DEPT VISIT HI MDM: CPT | Mod: 25

## 2024-02-26 PROCEDURE — 99900031 HC PATIENT EDUCATION (STAT)

## 2024-02-26 PROCEDURE — 25000003 PHARM REV CODE 250: Performed by: STUDENT IN AN ORGANIZED HEALTH CARE EDUCATION/TRAINING PROGRAM

## 2024-02-26 PROCEDURE — 25000003 PHARM REV CODE 250: Performed by: PHYSICAL THERAPY ASSISTANT

## 2024-02-26 PROCEDURE — 25000242 PHARM REV CODE 250 ALT 637 W/ HCPCS: Performed by: STUDENT IN AN ORGANIZED HEALTH CARE EDUCATION/TRAINING PROGRAM

## 2024-02-26 PROCEDURE — 94761 N-INVAS EAR/PLS OXIMETRY MLT: CPT

## 2024-02-26 PROCEDURE — 96372 THER/PROPH/DIAG INJ SC/IM: CPT | Performed by: PHYSICAL THERAPY ASSISTANT

## 2024-02-26 PROCEDURE — 87040 BLOOD CULTURE FOR BACTERIA: CPT | Performed by: PHYSICAL THERAPY ASSISTANT

## 2024-02-26 PROCEDURE — 84145 PROCALCITONIN (PCT): CPT | Performed by: PHYSICAL THERAPY ASSISTANT

## 2024-02-26 PROCEDURE — 36415 COLL VENOUS BLD VENIPUNCTURE: CPT | Performed by: PHYSICAL THERAPY ASSISTANT

## 2024-02-26 PROCEDURE — 80053 COMPREHEN METABOLIC PANEL: CPT | Performed by: PHYSICIAN ASSISTANT

## 2024-02-26 PROCEDURE — 83735 ASSAY OF MAGNESIUM: CPT | Performed by: PHYSICIAN ASSISTANT

## 2024-02-26 PROCEDURE — 63700000 PHARM REV CODE 250 ALT 637 W/O HCPCS: Performed by: STUDENT IN AN ORGANIZED HEALTH CARE EDUCATION/TRAINING PROGRAM

## 2024-02-26 PROCEDURE — 85025 COMPLETE CBC W/AUTO DIFF WBC: CPT | Performed by: PHYSICIAN ASSISTANT

## 2024-02-26 PROCEDURE — 36600 WITHDRAWAL OF ARTERIAL BLOOD: CPT

## 2024-02-26 PROCEDURE — 27000221 HC OXYGEN, UP TO 24 HOURS

## 2024-02-26 PROCEDURE — 96366 THER/PROPH/DIAG IV INF ADDON: CPT

## 2024-02-26 PROCEDURE — 94640 AIRWAY INHALATION TREATMENT: CPT

## 2024-02-26 PROCEDURE — 96365 THER/PROPH/DIAG IV INF INIT: CPT

## 2024-02-26 PROCEDURE — 93005 ELECTROCARDIOGRAM TRACING: CPT | Performed by: INTERNAL MEDICINE

## 2024-02-26 PROCEDURE — 63600175 PHARM REV CODE 636 W HCPCS: Performed by: STUDENT IN AN ORGANIZED HEALTH CARE EDUCATION/TRAINING PROGRAM

## 2024-02-26 PROCEDURE — 87502 INFLUENZA DNA AMP PROBE: CPT | Performed by: PHYSICAL THERAPY ASSISTANT

## 2024-02-26 PROCEDURE — 82803 BLOOD GASES ANY COMBINATION: CPT

## 2024-02-26 PROCEDURE — 63600175 PHARM REV CODE 636 W HCPCS: Performed by: PHYSICAL THERAPY ASSISTANT

## 2024-02-26 PROCEDURE — U0002 COVID-19 LAB TEST NON-CDC: HCPCS | Performed by: PHYSICAL THERAPY ASSISTANT

## 2024-02-26 PROCEDURE — 84484 ASSAY OF TROPONIN QUANT: CPT | Performed by: PHYSICIAN ASSISTANT

## 2024-02-26 PROCEDURE — 83880 ASSAY OF NATRIURETIC PEPTIDE: CPT | Performed by: PHYSICIAN ASSISTANT

## 2024-02-26 PROCEDURE — 94640 AIRWAY INHALATION TREATMENT: CPT | Mod: XB

## 2024-02-26 PROCEDURE — 99900035 HC TECH TIME PER 15 MIN (STAT)

## 2024-02-26 PROCEDURE — 93010 ELECTROCARDIOGRAM REPORT: CPT | Mod: ,,, | Performed by: INTERNAL MEDICINE

## 2024-02-26 PROCEDURE — G0378 HOSPITAL OBSERVATION PER HR: HCPCS

## 2024-02-26 RX ORDER — ACETAMINOPHEN 325 MG/1
650 TABLET ORAL EVERY 4 HOURS PRN
Status: DISCONTINUED | OUTPATIENT
Start: 2024-02-26 | End: 2024-02-29 | Stop reason: HOSPADM

## 2024-02-26 RX ORDER — IPRATROPIUM BROMIDE AND ALBUTEROL SULFATE 2.5; .5 MG/3ML; MG/3ML
3 SOLUTION RESPIRATORY (INHALATION)
Status: DISCONTINUED | OUTPATIENT
Start: 2024-02-26 | End: 2024-02-29 | Stop reason: HOSPADM

## 2024-02-26 RX ORDER — OXYCODONE HYDROCHLORIDE 5 MG/1
15 TABLET ORAL EVERY 6 HOURS PRN
Status: DISCONTINUED | OUTPATIENT
Start: 2024-02-26 | End: 2024-02-29 | Stop reason: HOSPADM

## 2024-02-26 RX ORDER — AZITHROMYCIN 250 MG/1
500 TABLET, FILM COATED ORAL EVERY 24 HOURS
Status: DISCONTINUED | OUTPATIENT
Start: 2024-02-27 | End: 2024-02-29 | Stop reason: HOSPADM

## 2024-02-26 RX ORDER — MODAFINIL 100 MG/1
200 TABLET ORAL 2 TIMES DAILY PRN
Status: DISCONTINUED | OUTPATIENT
Start: 2024-02-26 | End: 2024-02-29 | Stop reason: HOSPADM

## 2024-02-26 RX ORDER — TALC
6 POWDER (GRAM) TOPICAL NIGHTLY PRN
Status: DISCONTINUED | OUTPATIENT
Start: 2024-02-26 | End: 2024-02-29 | Stop reason: HOSPADM

## 2024-02-26 RX ORDER — LOSARTAN POTASSIUM 25 MG/1
25 TABLET ORAL NIGHTLY
Status: DISCONTINUED | OUTPATIENT
Start: 2024-02-26 | End: 2024-02-29 | Stop reason: HOSPADM

## 2024-02-26 RX ORDER — LEVALBUTEROL TARTRATE 45 UG/1
2 AEROSOL, METERED ORAL EVERY 4 HOURS PRN
Status: DISCONTINUED | OUTPATIENT
Start: 2024-02-26 | End: 2024-02-26

## 2024-02-26 RX ORDER — CHLORTHALIDONE 25 MG/1
1 TABLET ORAL DAILY
Status: ON HOLD | COMMUNITY
End: 2024-02-29

## 2024-02-26 RX ORDER — CHLORTHALIDONE 25 MG/1
25 TABLET ORAL DAILY
Status: DISCONTINUED | OUTPATIENT
Start: 2024-02-27 | End: 2024-02-29 | Stop reason: HOSPADM

## 2024-02-26 RX ORDER — OMEPRAZOLE 40 MG/1
1 CAPSULE, DELAYED RELEASE ORAL DAILY
Status: ON HOLD | COMMUNITY
Start: 2023-11-13 | End: 2024-02-29

## 2024-02-26 RX ORDER — NITROGLYCERIN 0.4 MG/1
0.4 TABLET SUBLINGUAL EVERY 5 MIN PRN
Status: DISCONTINUED | OUTPATIENT
Start: 2024-02-26 | End: 2024-02-26

## 2024-02-26 RX ORDER — SODIUM CHLORIDE 0.9 % (FLUSH) 0.9 %
10 SYRINGE (ML) INJECTION
Status: DISCONTINUED | OUTPATIENT
Start: 2024-02-26 | End: 2024-02-29 | Stop reason: HOSPADM

## 2024-02-26 RX ORDER — ONDANSETRON HYDROCHLORIDE 2 MG/ML
4 INJECTION, SOLUTION INTRAVENOUS EVERY 8 HOURS PRN
Status: DISCONTINUED | OUTPATIENT
Start: 2024-02-26 | End: 2024-02-29 | Stop reason: HOSPADM

## 2024-02-26 RX ORDER — AMOXICILLIN 250 MG
1 CAPSULE ORAL 2 TIMES DAILY PRN
Status: DISCONTINUED | OUTPATIENT
Start: 2024-02-26 | End: 2024-02-29 | Stop reason: HOSPADM

## 2024-02-26 RX ORDER — ICOSAPENT ETHYL 1 G/1
2 CAPSULE ORAL 2 TIMES DAILY
Status: ON HOLD | COMMUNITY
Start: 2024-02-08 | End: 2024-02-29

## 2024-02-26 RX ORDER — PANTOPRAZOLE SODIUM 40 MG/1
40 TABLET, DELAYED RELEASE ORAL DAILY
Status: DISCONTINUED | OUTPATIENT
Start: 2024-02-27 | End: 2024-02-29 | Stop reason: HOSPADM

## 2024-02-26 RX ORDER — NITROGLYCERIN 40 MG/1
1 PATCH TRANSDERMAL DAILY
Status: ON HOLD | COMMUNITY
End: 2024-02-29

## 2024-02-26 RX ORDER — ICOSAPENT ETHYL 1 G/1
2 CAPSULE ORAL 2 TIMES DAILY
Status: DISCONTINUED | OUTPATIENT
Start: 2024-02-26 | End: 2024-02-26

## 2024-02-26 RX ORDER — ENOXAPARIN SODIUM 100 MG/ML
30 INJECTION SUBCUTANEOUS EVERY 24 HOURS
Status: DISCONTINUED | OUTPATIENT
Start: 2024-02-26 | End: 2024-02-29

## 2024-02-26 RX ORDER — IBUPROFEN 200 MG
24 TABLET ORAL
Status: DISCONTINUED | OUTPATIENT
Start: 2024-02-26 | End: 2024-02-29 | Stop reason: HOSPADM

## 2024-02-26 RX ORDER — ASPIRIN 81 MG/1
81 TABLET ORAL NIGHTLY
Status: DISCONTINUED | OUTPATIENT
Start: 2024-02-27 | End: 2024-02-29 | Stop reason: HOSPADM

## 2024-02-26 RX ORDER — IBUPROFEN 200 MG
16 TABLET ORAL
Status: DISCONTINUED | OUTPATIENT
Start: 2024-02-26 | End: 2024-02-29 | Stop reason: HOSPADM

## 2024-02-26 RX ORDER — INSULIN ASPART 100 [IU]/ML
0-5 INJECTION, SOLUTION INTRAVENOUS; SUBCUTANEOUS
Status: DISCONTINUED | OUTPATIENT
Start: 2024-02-26 | End: 2024-02-29 | Stop reason: HOSPADM

## 2024-02-26 RX ORDER — LEVALBUTEROL INHALATION SOLUTION 1.25 MG/3ML
1.25 SOLUTION RESPIRATORY (INHALATION) ONCE
Status: COMPLETED | OUTPATIENT
Start: 2024-02-26 | End: 2024-02-26

## 2024-02-26 RX ORDER — AZITHROMYCIN 250 MG/1
500 TABLET, FILM COATED ORAL
Status: COMPLETED | OUTPATIENT
Start: 2024-02-26 | End: 2024-02-26

## 2024-02-26 RX ORDER — GLUCAGON 1 MG
1 KIT INJECTION
Status: DISCONTINUED | OUTPATIENT
Start: 2024-02-26 | End: 2024-02-29 | Stop reason: HOSPADM

## 2024-02-26 RX ORDER — ASPIRIN 325 MG
325 TABLET ORAL
Status: COMPLETED | OUTPATIENT
Start: 2024-02-26 | End: 2024-02-26

## 2024-02-26 RX ORDER — LEVALBUTEROL INHALATION SOLUTION 1.25 MG/3ML
1.25 SOLUTION RESPIRATORY (INHALATION) EVERY 4 HOURS PRN
Status: DISCONTINUED | OUTPATIENT
Start: 2024-02-26 | End: 2024-02-29 | Stop reason: HOSPADM

## 2024-02-26 RX ORDER — PREDNISONE 20 MG/1
40 TABLET ORAL DAILY
Status: DISCONTINUED | OUTPATIENT
Start: 2024-02-27 | End: 2024-02-29 | Stop reason: HOSPADM

## 2024-02-26 RX ORDER — PREDNISONE 20 MG/1
40 TABLET ORAL
Status: COMPLETED | OUTPATIENT
Start: 2024-02-26 | End: 2024-02-26

## 2024-02-26 RX ORDER — LEVALBUTEROL INHALATION SOLUTION 1.25 MG/3ML
1.25 SOLUTION RESPIRATORY (INHALATION)
Status: COMPLETED | OUTPATIENT
Start: 2024-02-26 | End: 2024-02-26

## 2024-02-26 RX ORDER — LOPERAMIDE HYDROCHLORIDE 2 MG/1
2 CAPSULE ORAL
Status: DISCONTINUED | OUTPATIENT
Start: 2024-02-26 | End: 2024-02-29 | Stop reason: HOSPADM

## 2024-02-26 RX ADMIN — CEFTRIAXONE SODIUM 1 G: 1 INJECTION, POWDER, FOR SOLUTION INTRAMUSCULAR; INTRAVENOUS at 09:02

## 2024-02-26 RX ADMIN — LOSARTAN POTASSIUM 25 MG: 25 TABLET, FILM COATED ORAL at 09:02

## 2024-02-26 RX ADMIN — LEVALBUTEROL HYDROCHLORIDE 1.25 MG: 1.25 SOLUTION RESPIRATORY (INHALATION) at 07:02

## 2024-02-26 RX ADMIN — OXYCODONE HYDROCHLORIDE 15 MG: 5 TABLET ORAL at 10:02

## 2024-02-26 RX ADMIN — LEVALBUTEROL HYDROCHLORIDE 1.25 MG: 1.25 SOLUTION RESPIRATORY (INHALATION) at 06:02

## 2024-02-26 RX ADMIN — ASPIRIN 325 MG ORAL TABLET 325 MG: 325 PILL ORAL at 07:02

## 2024-02-26 RX ADMIN — AZITHROMYCIN DIHYDRATE 500 MG: 250 TABLET ORAL at 09:02

## 2024-02-26 RX ADMIN — NITROGLYCERIN 0.4 MG: 0.4 TABLET SUBLINGUAL at 07:02

## 2024-02-26 RX ADMIN — ENOXAPARIN SODIUM 30 MG: 30 INJECTION SUBCUTANEOUS at 09:02

## 2024-02-26 RX ADMIN — PREDNISONE 40 MG: 20 TABLET ORAL at 07:02

## 2024-02-26 NOTE — ED PROVIDER NOTES
Encounter Date: 2/26/2024       History     Chief Complaint   Patient presents with    Shortness of Breath    Chest Pain    Fatigue     HPI    68-year-old with history pulmonary fibrosis, COPD presents to the emergency department for shortness of breath.      Patient states that she has had shortness of breath for the past 3 weeks and has had increasingly worsening dyspnea on exertion.  States that she has not been taking the medicines prescribed for pulmonary fibrosis because she does not like the way it makes her feel.  States that she also has not taken steroids or azithromycin as recommended when she is having increased shortness of breath by her pulmonologist because she had to go to a graduation last week and she did not feel like being mean; she states that prednisone makes her mean in the bad mood.      Patient states that she has also been having chest pain for the past 3 weeks has been consistent, worse with movement of her arm.  Denies having associated diaphoresis, nausea, vomiting.  States that she has been using a nitro patch at night which mildly improves the pain.  Says that she came into the emergency department because she had increased sputum production.      Review of patient's allergies indicates:   Allergen Reactions    Metronidazole Other (See Comments)     DISORIENTED       Statins-hmg-coa reductase inhibitors Tinitus     Joint pain     Past Medical History:   Diagnosis Date    Arthritis     COPD (chronic obstructive pulmonary disease)     Coronary artery disease     Hypertension     Sleep apnea     use CPAP at night      Past Surgical History:   Procedure Laterality Date    CORONARY ANGIOGRAPHY N/A 2/15/2021    Procedure: ANGIOGRAM, CORONARY ARTERY;  Surgeon: Aidan Buchanan MD;  Location: [facility] CATH/EP LAB;  Service: Cardiology;  Laterality: N/A;    CORONARY ARTERY BYPASS GRAFT      CORONARY BYPASS GRAFT ANGIOGRAPHY  2/15/2021    Procedure: Bypass graft study;  Surgeon: Aidan Buchanan MD;   Location: [facility] CATH/EP LAB;  Service: Cardiology;;    HYSTERECTOMY      JOINT REPLACEMENT      bilateral knee replacement     LEFT HEART CATHETERIZATION Left 2/15/2021    Procedure: Left heart cath;  Surgeon: Aidan Buchanan MD;  Location: [facility] CATH/EP LAB;  Service: Cardiology;  Laterality: Left;     Family History   Problem Relation Age of Onset    Pulmonary embolism Mother      Social History     Tobacco Use    Smoking status: Never     Passive exposure: Past    Smokeless tobacco: Never   Substance Use Topics    Alcohol use: No    Drug use: No     Review of Systems   Constitutional:  Positive for fatigue. Negative for fever.   Respiratory:  Positive for cough, chest tightness and shortness of breath. Negative for apnea.    Gastrointestinal:  Negative for nausea and vomiting.   Genitourinary:  Negative for dysuria.   Skin:  Negative for wound.   Neurological:  Negative for headaches.   Psychiatric/Behavioral:  Negative for agitation.        Physical Exam     Initial Vitals [02/26/24 1616]   BP Pulse Resp Temp SpO2   136/87 78 18 98.2 °F (36.8 °C) (!) 91 %      MAP       --         Physical Exam    Constitutional: She appears well-developed and well-nourished.   HENT:   Head: Normocephalic and atraumatic.   Eyes: EOM are normal.   Neck:   Normal range of motion.  Cardiovascular:  Normal rate and regular rhythm.           Pulmonary/Chest: No respiratory distress. She has wheezes. She has rhonchi.   Wheezes and rhonchorous breath sounds noted in the bases of bilateral lungs   Abdominal: Abdomen is soft. There is no abdominal tenderness. There is no guarding.   Musculoskeletal:         General: Normal range of motion.      Cervical back: Normal range of motion.     Neurological: She is alert and oriented to person, place, and time.   Skin: Skin is warm and dry.   Psychiatric: She has a normal mood and affect.       ED Course   Procedures  Labs Reviewed   CBC W/ AUTO DIFFERENTIAL - Abnormal; Notable for the following  components:       Result Value    MCHC 31.6 (*)     Lymph % 17.6 (*)     All other components within normal limits   COMPREHENSIVE METABOLIC PANEL - Abnormal; Notable for the following components:    BUN 24 (*)     Creatinine 2.0 (*)     ALT 8 (*)     eGFR 26.7 (*)     All other components within normal limits   TROPONIN I HIGH SENSITIVITY - Abnormal; Notable for the following components:    Troponin I High Sensitivity 30.1 (*)     All other components within normal limits   TROPONIN I HIGH SENSITIVITY - Abnormal; Notable for the following components:    Troponin I High Sensitivity 31.2 (*)     All other components within normal limits   COMPREHENSIVE METABOLIC PANEL - Abnormal; Notable for the following components:    Glucose 198 (*)     BUN 24 (*)     eGFR 49.3 (*)     All other components within normal limits    Narrative:     In order to access the algorithm for troponin high  sensitivity orders access the address below:  [email or web address]://[facility]/[email or web address] SENSITIVITY  [email or web address]   TROPONIN I HIGH SENSITIVITY - Abnormal; Notable for the following components:    Troponin I High Sensitivity 35.0 (*)     All other components within normal limits    Narrative:     In order to access the algorithm for troponin high  sensitivity orders access the address below:  [email or web address]://[facility]/[email or web address] SENSITIVITY  [email or web address]   CBC W/ AUTO DIFFERENTIAL - Abnormal; Notable for the following components:    Mono # 0.2 (*)     Gran % 83.2 (*)     Lymph % 13.8 (*)     Mono % 2.3 (*)     All other components within normal limits    Narrative:     In order to access the algorithm for troponin high  sensitivity orders access the address below:  [email or web address]://[facility]/ClinicalProtocols/HIGH SENSITIVITY  [email or web address]   ISTAT PROCEDURE - Abnormal; Notable for the following components:    POC PH 7.348 (*)     POC PCO2 50.0 (*)     POC TCO2 29 (*)     All other components within normal limits   POCT  GLUCOSE - Abnormal; Notable for the following components:    POC Glucose 162 (*)     All other components within normal limits   CULTURE, BLOOD   CULTURE, BLOOD   CULTURE, RESPIRATORY   MAGNESIUM   B-TYPE NATRIURETIC PEPTIDE   SARS-COV-2 RNA AMPLIFICATION, QUAL   INFLUENZA A AND B ANTIGEN    Narrative:     Specimen Source->Nasopharyngeal Swab   PROCALCITONIN   LIPID PANEL    Narrative:     In order to access the algorithm for troponin high  sensitivity orders access the address below:  [email or web address] SENSITIVITY  [email or web address]   MAGNESIUM    Narrative:     In order to access the algorithm for troponin high  sensitivity orders access the address below:  [email or web address]://[facility]/[email or web address] SENSITIVITY  [email or web address]   PHOSPHORUS    Narrative:     In order to access the algorithm for troponin high  sensitivity orders access the address below:  [email or web address]/[email or web address] SENSITIVITY  [email or web address]   POCT GLUCOSE MONITORING CONTINUOUS     EKG Readings: (Independently Interpreted)   Initial Reading: No STEMI. Rhythm: Normal Sinus Rhythm. Heart Rate: 74. Ectopy: No Ectopy. Conduction: Normal.   T-wave inversions in lead 1 and aVL     ECG Results              EKG 12-lead (In process)        Collection Time Result Time QRS Duration OHS QTC Calculation    02/26/24 17:00:51 02/27/24 06:30:09 80 433                     In process by Interface, Lab In [facility] (02/27/24 06:30:19)                   Narrative:    Test Reason : R07.9,    Vent. Rate : 075 BPM     Atrial Rate : 075 BPM     P-R Int : 178 ms          QRS Dur : 080 ms      QT Int : 388 ms       P-R-T Axes : 039 -33 100 degrees     QTc Int : 433 ms    Sinus rhythm with Premature atrial complexes  Left axis deviation  Voltage criteria for left ventricular hypertrophy  Cannot rule out Septal infarct ,age undetermined  T wave abnormality, consider lateral ischemia  Abnormal ECG  When compared with ECG of  31-DEC-2021 13:10,  Premature atrial complexes are now Present  Minimal criteria for Septal infarct are now Present    Referred By: [email or web address]   SELF           Confirmed By:                       In process by Interface, Lab In [facility] (02/27/24 05:18:54)                   Narrative:    Test Reason : R07.9,    Vent. Rate : 075 BPM     Atrial Rate : 075 BPM     P-R Int : 178 ms          QRS Dur : 080 ms      QT Int : 388 ms       P-R-T Axes : 039 -33 100 degrees     QTc Int : 433 ms    Sinus rhythm with Premature atrial complexes  Left axis deviation  Voltage criteria for left ventricular hypertrophy  Cannot rule out Septal infarct ,age undetermined  T wave abnormality, consider lateral ischemia  Abnormal ECG  When compared with ECG of 31-DEC-2021 13:10,  Premature atrial complexes are now Present  Minimal criteria for Septal infarct are now Present    Referred By: ADY   SELF           Confirmed By:                                   Imaging Results              X-Ray Chest AP Portable (Final result)  Result time 02/26/24 17:17:28      Final result by Melvina Kaye DO (02/26/24 17:17:28)                   Narrative:    AP chest radiograph: 2/26/2024 5:17 PM CST    Indication: 68 years  old Female with Chest Pain.    Comparison: 12/31/2021    Findings: The cardiomediastinal silhouette is normal in size.    No pneumothorax is seen.    There are bilateral interstitial airspace opacities. Some of these appear to be chronic.    Midline sternotomy changes are seen.    No discrete pleural effusion is apparent.    Impression: There are bilateral interstitial airspace opacities. Some of these appear to be chronic.    Electronically signed by:  Melvina Kaye DO  02/26/2024 05:17 PM CST Workstation: [id number]                                  X-Rays:   Independently Interpreted Readings:   Chest X-Ray: Normal heart size.  No infiltrates.  No acute abnormalities.     Medications   sodium chloride 0.9% flush 10 mL (has  no administration in time range)   enoxaparin injection 30 mg (30 mg Subcutaneous Given 2/26/24 2128)   acetaminophen tablet 650 mg (has no administration in time range)   senna-docusate 8.6-50 mg per tablet 1 tablet (has no administration in time range)   loperamide capsule 2 mg (has no administration in time range)   ondansetron injection 4 mg (has no administration in time range)   nitroGLYCERIN 2% TD oint ointment 1 inch (1 inch Topical (Top) Not Given 2/27/24 1200)   melatonin tablet 6 mg (has no administration in time range)   albuterol-ipratropium 2.5 mg-0.5 mg/3 mL nebulizer solution 3 mL (3 mLs Nebulization Given 2/27/24 1330)   predniSONE tablet 40 mg (40 mg Oral Not Given 2/27/24 0900)   cefTRIAXone (ROCEPHIN) 1 g in dextrose 5 % 100 mL IVPB (ready to mix) (0 g Intravenous Stopped 2/26/24 2313)     And   azithromycin tablet 500 mg (500 mg Oral Not Given 2/27/24 0900)   aspirin EC tablet 81 mg (has no administration in time range)   losartan tablet 25 mg (25 mg Oral Given 2/26/24 2129)   chlorthalidone tablet 25 mg (25 mg Oral Not Given 2/27/24 0900)   modafiniL tablet 200 mg (has no administration in time range)   pantoprazole EC tablet 40 mg (40 mg Oral Not Given 2/27/24 0900)   oxyCODONE immediate release tablet 15 mg (15 mg Oral Given 2/27/24 1346)   levalbuterol nebulizer solution 1.25 mg (has no administration in time range)   glucose chewable tablet 16 g (has no administration in time range)   glucose chewable tablet 24 g (has no administration in time range)   dextrose 50% injection 12.5 g (has no administration in time range)   dextrose 50% injection 25 g (has no administration in time range)   glucagon (human recombinant) injection 1 mg (has no administration in time range)   insulin aspart U-100 pen 0-5 Units (has no administration in time range)   azithromycin tablet 500 mg (500 mg Oral Given 2/26/24 2128)   predniSONE tablet 40 mg (40 mg Oral Given 2/26/24 1942)   levalbuterol nebulizer  solution 1.25 mg (1.25 mg Nebulization Given 2/26/24 1813)   aspirin tablet 325 mg (325 mg Oral Given 2/26/24 1942)   levalbuterol nebulizer solution 1.25 mg (1.25 mg Nebulization Given 2/26/24 1947)     Medical Decision Making  68-year-old history of COPD, pulmonary fibrosis presenting to the emergency department for shortness of breath and increased dyspnea on exertion.      Vitals notable for an oxygen saturation of 91%, otherwise within acceptable limits.  Physical examination notable for wheezes and rhonchi in the bases of bilateral lungs.      Differential diagnosis includes COPD exacerbation, worsening pulmonary fibrosis in the setting of not taking medications, ACS.    Plan to obtain basic labs when patient, x-ray.  We will give her azithromycin and prednisone as per her pulmonologist note.    Will continue to monitor in the emergency department.    Sandee Lorenzo MD, ESTELA  [facility] Emergency Medicine PGY-4  02/26/2024 5:45 PM       Attending Note:  I provided a face to face evaluation of this patient.  I discussed the patient's care with the Resident.  I reviewed their note and agree with the history, physical, assessment, diagnosis, treatment, all procedures performed, xray and EKG interpretations and discharge plan provided by the Resident. My overall impression is chest pain with shortness of breath.  EKG was unchanged compared to prior with inverted T-waves in high lateral leads.  First troponin 30.1.  She was treated with aspirin nitro and breathing treatments here.  She will be admitted for further workup.  June Miller M.D. 2/26/2024 7:41 PM          Amount and/or Complexity of Data Reviewed  Labs: ordered. Decision-making details documented in ED Course.  Radiology: ordered and independent interpretation performed.  ECG/medicine tests: ordered and independent interpretation performed.    Risk  OTC drugs.  Prescription drug management.  Decision regarding hospitalization.               ED Course  as of 02/27/24 1350   Mon Feb 26, 2024 1833 Troponin I High Sensitivity(!): 30.1  Elevated. Patient has no previous troponin I on record [MB]   1854 On repeat assessment of the patient, she stated that she now has increased chest pain currently and that she has been taking nitroglycerin throughout the day to relieve her pain.  We will consult Medicine for high-risk chest pain. [MB]   1923 Hospital Medicine has been consulted for this patient. [MB]      ED Course User Index  [MB] Sandee Lorenzo MD                             Clinical Impression:  Final diagnoses:  [R07.9] Chest pain  [R06.02] Shortness of breath (Primary)          ED Disposition Condition    Observation                 June Miller MD  02/27/24 1353

## 2024-02-26 NOTE — FIRST PROVIDER EVALUATION
Emergency Department TeleTriage Encounter Note      CHIEF COMPLAINT    Chief Complaint   Patient presents with    Shortness of Breath    Chest Pain    Fatigue       VITAL SIGNS   Initial Vitals [02/26/24 1616]   BP Pulse Resp Temp SpO2   136/87 78 18 98.2 °F (36.8 °C) (!) 91 %      MAP       --            ALLERGIES    Review of patient's allergies indicates:   Allergen Reactions    Metronidazole Other (See Comments)     DISORIENTED       Statins-hmg-coa reductase inhibitors Tinitus     Joint pain       PROVIDER TRIAGE NOTE  Patient presents with complaint of  Chest pain and shortness of breath for 2 weeks.  She reports she has been too busy to be able to come to the emergency room.  Denied lower extremity swelling.  No change in symptoms today.      Phy:   Constitutional: well nourished, well developed, appearing stated age, NAD        Initial orders will be placed and care will be transferred to an alternate provider when patient is roomed for a full evaluation. Any additional orders and the final disposition will be determined by that provider.        ORDERS  Labs Reviewed - No data to display    ED Orders (720h ago, onward)      None              Virtual Visit Note: The provider triage portion of this emergency department evaluation and documentation was performed via [employer], a HIPAA-compliant telemedicine application, in concert with a tele-presenter in the room. A face to face patient evaluation with one of my colleagues will occur once the patient is placed in an emergency department room.      DISCLAIMER: This note was prepared with [employer] voice recognition transcription software. Garbled syntax, mangled pronouns, and other bizarre constructions may be attributed to that software system.

## 2024-02-26 NOTE — Clinical Note
Diagnosis: Chest pain [754746]   Future Attending Provider: CURRY HOLLAND [[id number]]   Place in Observation: [facility] [[id number]]

## 2024-02-27 ENCOUNTER — CLINICAL SUPPORT (OUTPATIENT)
Dept: CARDIOLOGY | Facility: HOSPITAL | Age: 69
DRG: 191 | End: 2024-02-27
Attending: EMERGENCY MEDICINE
Payer: MEDICARE

## 2024-02-27 VITALS — HEIGHT: 61 IN | BODY MASS INDEX: 34.93 KG/M2 | WEIGHT: 185 LBS

## 2024-02-27 LAB
ALBUMIN SERPL BCP-MCNC: 4.4 G/DL (ref 3.5–5.2)
ALP SERPL-CCNC: 94 U/L (ref 55–135)
ALT SERPL W/O P-5'-P-CCNC: 10 U/L (ref 10–44)
ANION GAP SERPL CALC-SCNC: 11 MMOL/L (ref 8–16)
AORTIC ROOT ANNULUS: 2.9 CM
AORTIC VALVE CUSP SEPERATION: 1.6 CM
ASCENDING AORTA: 3.2 CM
AST SERPL-CCNC: 13 U/L (ref 10–40)
AV INDEX (PROSTH): 0.78
AV MEAN GRADIENT: 6 MMHG
AV PEAK GRADIENT: 11 MMHG
AV VALVE AREA BY VELOCITY RATIO: 2.69 CM²
AV VALVE AREA: 2.71 CM²
AV VELOCITY RATIO: 0.78
BASOPHILS # BLD AUTO: 0.03 K/UL (ref 0–0.2)
BASOPHILS NFR BLD: 0.4 % (ref 0–1.9)
BILIRUB SERPL-MCNC: 0.2 MG/DL (ref 0.1–1)
BSA FOR ECHO PROCEDURE: 1.9 M2
BUN SERPL-MCNC: 24 MG/DL (ref 8–23)
CALCIUM SERPL-MCNC: 9.1 MG/DL (ref 8.7–10.5)
CHLORIDE SERPL-SCNC: 101 MMOL/L (ref 95–110)
CHOLEST SERPL-MCNC: 148 MG/DL (ref 120–199)
CHOLEST/HDLC SERPL: 3.3 {RATIO} (ref 2–5)
CO2 SERPL-SCNC: 25 MMOL/L (ref 23–29)
CREAT SERPL-MCNC: 1.2 MG/DL (ref 0.5–1.4)
CV ECHO LV RWT: 0.46 CM
DIFFERENTIAL METHOD BLD: ABNORMAL
DOP CALC AO PEAK VEL: 1.67 M/S
DOP CALC AO VTI: 38.2 CM
DOP CALC LVOT AREA: 3.5 CM2
DOP CALC LVOT DIAMETER: 2.1 CM
DOP CALC LVOT PEAK VEL: 1.3 M/S
DOP CALC LVOT STROKE VOLUME: 103.51 CM3
DOP CALC MV VTI: 42.2 CM
DOP CALCLVOT PEAK VEL VTI: 29.9 CM
E WAVE DECELERATION TIME: 285 MSEC
E/A RATIO: 0.95
E/E' RATIO: 13.88 M/S
ECHO LV POSTERIOR WALL: 1.1 CM (ref 0.6–1.1)
EOSINOPHIL # BLD AUTO: 0 K/UL (ref 0–0.5)
EOSINOPHIL NFR BLD: 0 % (ref 0–8)
ERYTHROCYTE [DISTWIDTH] IN BLOOD BY AUTOMATED COUNT: 13.9 % (ref 11.5–14.5)
EST. GFR  (NO RACE VARIABLE): 49.3 ML/MIN/1.73 M^2
FRACTIONAL SHORTENING: 29 % (ref 28–44)
GLUCOSE SERPL-MCNC: 132 MG/DL (ref 70–110)
GLUCOSE SERPL-MCNC: 162 MG/DL (ref 70–110)
GLUCOSE SERPL-MCNC: 168 MG/DL (ref 70–110)
GLUCOSE SERPL-MCNC: 198 MG/DL (ref 70–110)
HCT VFR BLD AUTO: 38.8 % (ref 37–48.5)
HDLC SERPL-MCNC: 45 MG/DL (ref 40–75)
HDLC SERPL: 30.4 % (ref 20–50)
HGB BLD-MCNC: 12.5 G/DL (ref 12–16)
IMM GRANULOCYTES # BLD AUTO: 0.02 K/UL (ref 0–0.04)
IMM GRANULOCYTES NFR BLD AUTO: 0.3 % (ref 0–0.5)
INTERVENTRICULAR SEPTUM: 0.8 CM (ref 0.6–1.1)
IVC DIAMETER: 2.15 CM
LDLC SERPL CALC-MCNC: 83.4 MG/DL (ref 63–159)
LEFT INTERNAL DIMENSION IN SYSTOLE: 3.41 CM (ref 2.1–4)
LEFT VENTRICLE DIASTOLIC VOLUME INDEX: 59.02 ML/M2
LEFT VENTRICLE DIASTOLIC VOLUME: 108 ML
LEFT VENTRICLE MASS INDEX: 87 G/M2
LEFT VENTRICLE SYSTOLIC VOLUME INDEX: 26.1 ML/M2
LEFT VENTRICLE SYSTOLIC VOLUME: 47.8 ML
LEFT VENTRICULAR INTERNAL DIMENSION IN DIASTOLE: 4.8 CM (ref 3.5–6)
LEFT VENTRICULAR MASS: 158.82 G
LV LATERAL E/E' RATIO: 12.33 M/S
LV SEPTAL E/E' RATIO: 15.86 M/S
LVOT MG: 4 MMHG
LVOT MV: 0.88 CM/S
LYMPHOCYTES # BLD AUTO: 1.1 K/UL (ref 1–4.8)
LYMPHOCYTES NFR BLD: 13.8 % (ref 18–48)
MAGNESIUM SERPL-MCNC: 2.1 MG/DL (ref 1.6–2.6)
MCH RBC QN AUTO: 28.9 PG (ref 27–31)
MCHC RBC AUTO-ENTMCNC: 32.2 G/DL (ref 32–36)
MCV RBC AUTO: 90 FL (ref 82–98)
MONOCYTES # BLD AUTO: 0.2 K/UL (ref 0.3–1)
MONOCYTES NFR BLD: 2.3 % (ref 4–15)
MV MEAN GRADIENT: 4 MMHG
MV PEAK A VEL: 1.17 M/S
MV PEAK E VEL: 1.11 M/S
MV PEAK GRADIENT: 8 MMHG
MV STENOSIS PRESSURE HALF TIME: 67 MS
MV VALVE AREA BY CONTINUITY EQUATION: 2.45 CM2
MV VALVE AREA P 1/2 METHOD: 3.28 CM2
NEUTROPHILS # BLD AUTO: 6.5 K/UL (ref 1.8–7.7)
NEUTROPHILS NFR BLD: 83.2 % (ref 38–73)
NONHDLC SERPL-MCNC: 103 MG/DL
NRBC BLD-RTO: 0 /100 WBC
PHOSPHATE SERPL-MCNC: 3.7 MG/DL (ref 2.7–4.5)
PISA MRMAX VEL: 4.68 M/S
PISA TR MAX VEL: 2.64 M/S
PLATELET # BLD AUTO: 243 K/UL (ref 150–450)
PMV BLD AUTO: 9.6 FL (ref 9.2–12.9)
POTASSIUM SERPL-SCNC: 4.3 MMOL/L (ref 3.5–5.1)
PROT SERPL-MCNC: 7.7 G/DL (ref 6–8.4)
PV MV: 0.83 M/S
PV PEAK GRADIENT: 7 MMHG
PV PEAK VELOCITY: 1.28 M/S
RA PRESSURE ESTIMATED: 3 MMHG
RBC # BLD AUTO: 4.33 M/UL (ref 4–5.4)
RV TB RVSP: 6 MMHG
RV TISSUE DOPPLER FREE WALL SYSTOLIC VELOCITY 1 (APICAL 4 CHAMBER VIEW): 8.92 CM/S
SODIUM SERPL-SCNC: 137 MMOL/L (ref 136–145)
TDI LATERAL: 0.09 M/S
TDI SEPTAL: 0.07 M/S
TDI: 0.08 M/S
TR MAX PG: 28 MMHG
TRIGL SERPL-MCNC: 98 MG/DL (ref 30–150)
TROPONIN I SERPL HS-MCNC: 31.2 PG/ML (ref 0–14.9)
TROPONIN I SERPL HS-MCNC: 35 PG/ML (ref 0–14.9)
TV REST PULMONARY ARTERY PRESSURE: 31 MMHG
WBC # BLD AUTO: 7.75 K/UL (ref 3.9–12.7)
Z-SCORE OF LEFT VENTRICULAR DIMENSION IN END DIASTOLE: -0.55
Z-SCORE OF LEFT VENTRICULAR DIMENSION IN END SYSTOLE: 0.67

## 2024-02-27 PROCEDURE — 25000242 PHARM REV CODE 250 ALT 637 W/ HCPCS: Performed by: PHYSICAL THERAPY ASSISTANT

## 2024-02-27 PROCEDURE — 85025 COMPLETE CBC W/AUTO DIFF WBC: CPT | Performed by: PHYSICAL THERAPY ASSISTANT

## 2024-02-27 PROCEDURE — 93306 TTE W/DOPPLER COMPLETE: CPT | Mod: 26,,, | Performed by: GENERAL PRACTICE

## 2024-02-27 PROCEDURE — 27000221 HC OXYGEN, UP TO 24 HOURS

## 2024-02-27 PROCEDURE — 94640 AIRWAY INHALATION TREATMENT: CPT

## 2024-02-27 PROCEDURE — 94761 N-INVAS EAR/PLS OXIMETRY MLT: CPT

## 2024-02-27 PROCEDURE — 84100 ASSAY OF PHOSPHORUS: CPT | Performed by: PHYSICAL THERAPY ASSISTANT

## 2024-02-27 PROCEDURE — 25000003 PHARM REV CODE 250: Performed by: PHYSICAL THERAPY ASSISTANT

## 2024-02-27 PROCEDURE — 99900031 HC PATIENT EDUCATION (STAT)

## 2024-02-27 PROCEDURE — 63600175 PHARM REV CODE 636 W HCPCS: Performed by: PHYSICAL THERAPY ASSISTANT

## 2024-02-27 PROCEDURE — 83735 ASSAY OF MAGNESIUM: CPT | Performed by: PHYSICAL THERAPY ASSISTANT

## 2024-02-27 PROCEDURE — 93306 TTE W/DOPPLER COMPLETE: CPT

## 2024-02-27 PROCEDURE — 80061 LIPID PANEL: CPT | Performed by: PHYSICAL THERAPY ASSISTANT

## 2024-02-27 PROCEDURE — 84484 ASSAY OF TROPONIN QUANT: CPT | Mod: 91 | Performed by: PHYSICAL THERAPY ASSISTANT

## 2024-02-27 PROCEDURE — 94799 UNLISTED PULMONARY SVC/PX: CPT

## 2024-02-27 PROCEDURE — 21400001 HC TELEMETRY ROOM

## 2024-02-27 PROCEDURE — 80053 COMPREHEN METABOLIC PANEL: CPT | Performed by: PHYSICAL THERAPY ASSISTANT

## 2024-02-27 PROCEDURE — 99900035 HC TECH TIME PER 15 MIN (STAT)

## 2024-02-27 PROCEDURE — 84484 ASSAY OF TROPONIN QUANT: CPT | Performed by: PHYSICAL THERAPY ASSISTANT

## 2024-02-27 RX ADMIN — NITROGLYCERIN 1 INCH: 20 OINTMENT TOPICAL at 05:02

## 2024-02-27 RX ADMIN — OXYCODONE HYDROCHLORIDE 15 MG: 5 TABLET ORAL at 01:02

## 2024-02-27 RX ADMIN — ACETAMINOPHEN 650 MG: 325 TABLET ORAL at 09:02

## 2024-02-27 RX ADMIN — IPRATROPIUM BROMIDE AND ALBUTEROL SULFATE 3 ML: 2.5; .5 SOLUTION RESPIRATORY (INHALATION) at 01:02

## 2024-02-27 RX ADMIN — NITROGLYCERIN 1 INCH: 20 OINTMENT TOPICAL at 12:02

## 2024-02-27 RX ADMIN — CEFTRIAXONE SODIUM 1 G: 1 INJECTION, POWDER, FOR SOLUTION INTRAMUSCULAR; INTRAVENOUS at 09:02

## 2024-02-27 RX ADMIN — IPRATROPIUM BROMIDE AND ALBUTEROL SULFATE 3 ML: 2.5; .5 SOLUTION RESPIRATORY (INHALATION) at 09:02

## 2024-02-27 RX ADMIN — ONDANSETRON 4 MG: 2 INJECTION INTRAMUSCULAR; INTRAVENOUS at 04:02

## 2024-02-27 RX ADMIN — OXYCODONE HYDROCHLORIDE 15 MG: 5 TABLET ORAL at 07:02

## 2024-02-27 RX ADMIN — ASPIRIN 81 MG: 81 TABLET, COATED ORAL at 09:02

## 2024-02-27 RX ADMIN — LOSARTAN POTASSIUM 25 MG: 25 TABLET, FILM COATED ORAL at 09:02

## 2024-02-27 RX ADMIN — OXYCODONE HYDROCHLORIDE 15 MG: 5 TABLET ORAL at 05:02

## 2024-02-27 RX ADMIN — ENOXAPARIN SODIUM 30 MG: 30 INJECTION SUBCUTANEOUS at 04:02

## 2024-02-27 NOTE — HOSPITAL COURSE
Patient admitted with COPD exacerbation and unstable angina. She has noticed over the past two weeks she has chest pain at rest while in bed or in early morning. She describes the pain on left chest wall that wraps around and under left breast, radiating to her left shoulder, neck and jaw.   She has been using her NTG and it does relieve the pain most days. She feels like this pain is worse than when she had her CABG. In addition, she has had severe cough with increased SOB for past few days. She has not gone up in her home O2 2-3 liters. She has tried her home nebulizer without relief. She has been placed on COPD pathway, on oral prednisone and Rocephin and azithromycin due to reported chills at home.     Cardiology eval: maximize anti-anginal therapy, changed to effient; aspirin and add imdur   Ranexa for chronic chest pain   Continue Antonietta Jenkins to complete 1 week antibiotic   Complete 5 days prednisone

## 2024-02-27 NOTE — ASSESSMENT & PLAN NOTE
Patient's COPD is with exacerbation noted by continued dyspnea currently. Patient is currently on COPD Pathway.  Steroids, Antibiotics, and Supplemental oxygen PRN for O2 >90% and monitor respiratory status closely.   Covid and flu, procal pending.   CXR with B opacities, mostly stable compared to previous   Start IV ceftriaxone and azithromycin  due to sputum production and low grade fever of 99 on my exam with chills.   Duonebs and continue scheduled inhalers

## 2024-02-27 NOTE — PHARMACY MED REC
"Admission Medication History     The home medication history was taken by Krishan Corado.    You may go to "Admission" then "Reconcile Home Medications" tabs to review and/or act upon these items.     The home medication list has been updated by the Pharmacy department.   Please read ALL comments highlighted in yellow.   Please address this information as you see fit.    Feel free to contact us if you have any questions or require assistance.      The medications listed below were removed from the home medication list. Please reorder if appropriate:  Patient reports no longer taking the following medication(s):  Allopurinol 300 mg  Buspar 7.5 mg  Hydrochlorothiazide 12.5 mg  Ofev 100 mg    Medications listed below were obtained from: Patient/family and Analytic software- [employer]  No current facility-administered medications on file prior to encounter.     Current Outpatient Medications on File Prior to Encounter   Medication Sig Dispense Refill    acetaminophen (TYLENOL) 500 MG tablet Take 500 mg by mouth every 4 (four) hours as needed for Pain.      albuterol (PROVENTIL) 2.5 mg /3 mL (0.083 %) nebulizer solution Take 3 mLs (2.5 mg total) by nebulization every 6 (six) hours as needed for Wheezing or Shortness of Breath. 120 each 11    aspirin (ECOTRIN) 81 MG EC tablet Take 1 tablet (81 mg total) by mouth once daily. (Patient taking differently: Take 81 mg by mouth every evening.) 30 tablet 0    aspirin-acetaminophen-caffeine (GOODY'S EXTRA STRENGTH) 500-325-65 mg PwPk Take 1 Dose by mouth as needed (pain).      chlorthalidone (HYGROTEN) 25 MG Tab Take 1 tablet by mouth once daily.      icosapent ethyL (VASCEPA) 1 gram Cap Take 2 capsules by mouth 2 (two) times daily.      levalbuterol (XOPENEX HFA) 45 mcg/actuation inhaler Inhale 1-2 puffs into the lungs every 4 (four) hours as needed for Wheezing. Rescue 15 g 11    losartan (COZAAR) 25 MG tablet Take 25 mg by mouth every evening.      metFORMIN (GLUCOPHAGE-XR) 500 " MG ER 24hr tablet Take 500 mg by mouth every evening.      modafiniL (PROVIGIL) 200 MG Tab Take 1 tablet (200 mg total) by mouth 2 (two) times daily as needed (excess sleepiness). 60 tablet 5    nitroGLYCERIN 0.2 mg/hr TD PT24 (NITRODUR) 0.2 mg/hr Place 1 patch onto the skin once daily.      omeprazole (PRILOSEC) 40 MG capsule Take 1 capsule by mouth once daily.      oxyCODONE (ROXICODONE) 15 MG Tab Take 15 mg by mouth every 6 (six) hours as needed for Pain.      REPATHA SURECLICK 140 mg/mL PnIj Inject 140 mg into the skin every 14 (fourteen) days.      traZODone (DESYREL) 50 MG tablet Take 25-50 mg by mouth every evening.      azithromycin (ZITHROMAX) 500 MG tablet One daily for yellow mucous, repeat if needed (Patient not taking: Reported on 2/26/2024) 3 tablet 3    metoprolol succinate (TOPROL-XL) 25 MG 24 hr tablet Take 25 mg by mouth once daily.      [DISCONTINUED] allopurinol (ZYLOPRIM) 300 MG tablet Take 300 mg by mouth once daily.  5    [DISCONTINUED] busPIRone (BUSPAR) 7.5 MG tablet Take 7.5 mg by mouth 2 (two) times a day.      [DISCONTINUED] hydroCHLOROthiazide (MICROZIDE) 12.5 mg capsule Take 12.5 mg by mouth once daily.      [DISCONTINUED] nintedanib (OFEV) 100 mg Cap Take 100 mg by mouth 2 (two) times a day. 180 capsule 2    [DISCONTINUED] nitroGLYCERIN (NITROSTAT) 0.4 MG SL tablet Place 1 tablet (0.4 mg total) under the tongue every 5 (five) minutes as needed for Chest pain. 30 tablet 0       Krishan Corado  EXT 1924                .

## 2024-02-27 NOTE — PROGRESS NOTES
[facility] - Emergency Dept  Hospital Medicine  Progress Note    Patient Name: Sakshi Hess  MRN: [id number]  Patient Class: IP- Inpatient   Admission Date: 2/26/2024  Length of Stay: 0 days  Attending Physician: Maria Teresa Marrero MD  Primary Care Provider: Wenceslao Deras NP        Subjective:     Principal Problem:COPD exacerbation        HPI:  Patient is a 68-year-old female with a past medical history of COPD, pulmonary fibrosis, CAD status post CABG and multiple stents, GERD, and hypertension. Patient presents to the ED today with complaints of worsening shortness a breath, dyspnea on exertion, and chest pain. Patient states symptoms have gradually occurred over the last 3 weeks. Patient states they have also gradually progressed thus bringing her into the ED today. Patient reports she has not taken her chronic COPD medications due to fear of becoming mean on her prednisone. Chest x-ray today shows bilateral interstitial airspace opacities but is mostly stable compared to previous imaging, O2 90-91% in ED on initial assessment. Improved saturation following nebs. EKG was normal sinus rhythm, does have lateral T-wave inversions also present on last EKG.  Hemoglobin 12.7, potassium 4.0, creatinine 2.0, Mag 1.9, troponin 30.1, BNP 44.  Last cardiac catheterization with ejection fraction of 55%.  My exam patient denies shortness or breath, nausea, vomiting, abdominal pain, diarrhea, constipation.  Patient is full code.    Overview/Hospital Course:  Patient admitted with COPD exacerbation and unstable angina. She has noticed over the past two weeks she has chest pain at rest while in bed or in early morning. She describes the pain on left chest wall that wraps around and under left breast, radiating to her left shoulder, neck and jaw.   She has been using her NTG and it does relieve the pain most days. She feels like this pain is worse than when she had her CABG. In addition, she has had severe  cough with increased SOB for past few days. She has not gone up in her home O2 2-3 liters. She has tried her home nebulizer without relief. She has been placed on COPD pathway, on oral prednisone and Rocephin and azithromycin due to reported chills at home.     Interval History: see hospital course     Review of Systems   Constitutional:  Positive for chills and fatigue.   HENT:  Positive for congestion.    Respiratory:  Positive for cough, shortness of breath and wheezing.    Cardiovascular:  Positive for chest pain.   Gastrointestinal: Negative.    Genitourinary: Negative.    Musculoskeletal:  Positive for back pain.   Skin: Negative.    Neurological: Negative.      Objective:     Vital Signs (Most Recent):  Temp: 97.7 °F (36.5 °C) (02/27/24 1115)  Pulse: 73 (02/27/24 1400)  Resp: 19 (02/27/24 1400)  BP: (!) 166/84 (02/27/24 1400)  SpO2: 98 % (02/27/24 1400) Vital Signs (24h Range):  Temp:  [97.7 °F (36.5 °C)-99.3 °F (37.4 °C)] 97.7 °F (36.5 °C)  Pulse:  [62-84] 73  Resp:  [14-30] 19  SpO2:  [91 %-100 %] 98 %  BP: (118-166)/(57-87) 166/84     Weight: 83.9 kg (184 lb 15.5 oz)  Body mass index is 34.97 kg/m².    Intake/Output Summary (Last 24 hours) at 2/27/2024 1553  Last data filed at 2/26/2024 2313  Gross per 24 hour   Intake 100 ml   Output --   Net 100 ml         Physical Exam  Constitutional:       General: She is not in acute distress.     Appearance: She is not diaphoretic.   HENT:      Head: Normocephalic and atraumatic.      Mouth/Throat:      Mouth: Mucous membranes are dry.      Pharynx: Oropharynx is clear. No oropharyngeal exudate.   Cardiovascular:      Rate and Rhythm: Normal rate and regular rhythm.      Pulses: Normal pulses.   Pulmonary:      Breath sounds: Wheezing and rhonchi present.   Abdominal:      General: Bowel sounds are normal. There is no distension.      Tenderness: There is no abdominal tenderness.   Musculoskeletal:         General: No swelling. Normal range of motion.      Cervical  back: Normal range of motion and neck supple.   Lymphadenopathy:      Cervical: No cervical adenopathy.   Skin:     General: Skin is warm and dry.      Capillary Refill: Capillary refill takes less than 2 seconds.   Neurological:      General: No focal deficit present.      Mental Status: She is alert and oriented to person, place, and time. Mental status is at baseline.   Psychiatric:         Mood and Affect: Mood normal.         Behavior: Behavior normal.             Significant Labs: All pertinent labs within the past 24 hours have been reviewed.  Recent Lab Results  (Last 5 results in the past 24 hours)        02/27/24  0812   02/27/24  0740   02/27/24  0424   02/27/24  0013   02/26/24  2134        Procalcitonin         <0.050  Comment: The Procalcitonin test is intended to aid in the risk assessment of   critically ill patients on their first day of ICU admission for   progression   to severe sepsis and septic shock.    A result of <0.50 ng/mL is associated with a low risk of severe   sepsis   and/or septic shock.  This does not exclude localized infection.    A result between 0.50 ng/mL and 2.0 ng/mL should be interpreted with   consideration of the patient's history and is recommended to retest   within 6   to 24 hours.        A result of >2.0 ng/mL is associated with a high risk of severe   sepsis   and/or septic shock.    Procalcitonin may not be accurate among patients with   localized  infection, recent trauma or major surgery, immunosuppressed   state,  invasive fungal infection, renal dysfunction. Decisions   regarding  initiation or continuation of antibiotic therapy should not be   based  solely on procalcitonin levels.         Albumin     4.4           ALP     94           Allens Test               ALT     10           Anion Gap     11           Ao root annulus   2.90             Ascending aorta   3.20             Ao peak yumiko   1.67             Ao VTI   38.20             AST     13           AV valve  area   2.71             JOANIE by Velocity Ratio   2.69             AORTIC VALVE CUSP SEPERATION   1.60             AV mean gradient   6             AV index (prosthetic)   0.78             AV peak gradient   11             AV Velocity Ratio   0.78             Baso #     0.03           Basophil %     0.4           BILIRUBIN TOTAL     0.2  Comment: For infants and newborns, interpretation of results should be based  on gestational age, weight and in agreement with clinical  observations.    Premature Infant recommended reference ranges:  Up to 24 hours.............<8.0 mg/dL  Up to 48 hours............<12.0 mg/dL  3-5 days..................<15.0 mg/dL  6-29 days.................<15.0 mg/dL             Site               BSA   1.9             BUN     24           Calcium     9.1           Chloride     101           Cholesterol Total     148  Comment: The National Cholesterol Education Program (NCEP) has set the  following guidelines (reference ranges) for Cholesterol:  Optimal.....................<200 mg/dL  Borderline High.............200-239 mg/dL  High........................> or = 240 mg/dL             CO2     25           Creatinine     1.2           Left Ventricle Relative Wall Thickness   0.46             DelSys               Differential Method     Automated           E/A ratio   0.95             E/E' ratio   13.88             eGFR     49.3           Eos #     0.0           Eos %     0.0           E wave deceleration time   285.00             Flow               FS   29             Glucose     198           Gran # (ANC)     6.5           Gran %     83.2           HDL     45  Comment: The National Cholesterol Education Program (NCEP) has set the  following guidelines (reference values) for HDL Cholesterol:  Low...............<40 mg/dL  Optimal...........>60 mg/dL             HDL/Cholesterol Ratio     30.4           Hematocrit     38.8           Hemoglobin     12.5           Immature Grans (Abs)     0.02  Comment:  Mild elevation in immature granulocytes is non specific and   can be seen in a variety of conditions including stress response,   acute inflammation, trauma and pregnancy. Correlation with other   laboratory and clinical findings is essential.             Immature Granulocytes     0.3           IVC diameter   2.15             IVSd   0.80             LVOT area   3.5             LDL Cholesterol     83.4  Comment: The National Cholesterol Education Program (NCEP) has set the  following guidelines (reference values) for LDL Cholesterol:  Optimal.......................<130 mg/dL  Borderline High...............130-159 mg/dL  High..........................160-189 mg/dL  Very High.....................>190 mg/dL             LV LATERAL E/E' RATIO   12.33             LV SEPTAL E/E' RATIO   15.86             LV EDV BP   108.00             LV Diastolic Volume Index   59.02             LVIDd   4.80             LVIDs   3.41             LV mass   158.82             LV Mass Index   87             Left Ventricular Outflow Tract Mean Gradient   4.00             Left Ventricular Outflow Tract Mean Velocity   0.88             LVOT diameter   2.10             LVOT peak amadeo   1.30             LVOT stroke volume   103.51             LVOT peak VTI   29.90             LV ESV BP   47.80             LV Systolic Volume Index   26.1             Lymph #     1.1           Lymph %     13.8           Magnesium      2.1           MCH     28.9           MCHC     32.2           MCV     90           Mean e'   0.08             Mode               Mono #     0.2           Mono %     2.3           MPV     9.6           Mr max amadeo   4.68             MV valve area p 1/2 method   3.28             MV valve area by continuity eq   2.45             MV mean gradient   4             MV peak gradient   8             MV Peak A Amadeo   1.17             MV Peak E Amadeo   1.11             MV stenosis pressure 1/2 time   67.00             MV VTI   42.2             Non-HDL  Cholesterol     103  Comment: Risk category and Non-HDL cholesterol goals:  Coronary heart disease (CHD)or equivalent (10-year risk of CHD >20%):  Non-HDL cholesterol goal     <130 mg/dL  Two or more CHD risk factors and 10-year risk of CHD <= 20%:  Non-HDL cholesterol goal     <160 mg/dL  0 to 1 CHD risk factor:  Non-HDL cholesterol goal     <190 mg/dL             nRBC     0           Phosphorus Level     3.7           Platelet Count     243           POC BE               POC Glucose 162               POC HCO3               POC PCO2               POC PH               POC PO2               POC SATURATED O2               POC TCO2               Potassium     4.3           PROTEIN TOTAL     7.7           Pulmonary Valve Mean Velocity   0.83             PV peak gradient   7             PV PEAK VELOCITY   1.28             Posterior Wall   1.10             Est. RA pres   3             RBC     4.33           RDW     13.9           RV S'   8.92             RV TB RVSP   6             Sample               Sodium     137           TDI SEPTAL   0.07             TDI LATERAL   0.09             Total Cholesterol/HDL Ratio     3.3           Triglycerides     98  Comment: The National Cholesterol Education Program (NCEP) has set the  following guidelines (reference values) for triglycerides:  Normal......................<150 mg/dL  Borderline High.............150-199 mg/dL  High........................200-499 mg/dL             Triscuspid Valve Regurgitation Peak Gradient   28             TR Max Amadeo   2.64             Troponin I High Sensitivity     35.0  Comment: Troponin results differ between methods. Do not use   results between Troponin methods interchangeably.    Alkaline Phospatase levels above 400 U/L may   cause false positive results.    Access hsTnI should not be used for patients taking   Asfotase eran (Strensiq).     31.2  Comment: Troponin results differ between methods. Do not use   results between Troponin methods  interchangeably.    Alkaline Phospatase levels above 400 U/L may   cause false positive results.    Access hsTnI should not be used for patients taking   Asfotase eran (Strensiq).           TV resting pulmonary artery pressure   31             WBC     7.75           ZLVIDD   -0.55             ZLVIDS   0.67                                    Significant Imaging: I have reviewed all pertinent imaging results/findings within the past 24 hours.    Assessment/Plan:      * COPD exacerbation  Patient's COPD is with exacerbation noted by continued dyspnea currently. Patient is currently on COPD Pathway.  Steroids, Antibiotics, and Supplemental oxygen PRN for O2 >90% and monitor respiratory status closely.   Covid and flu, procal pending.   CXR with B opacities, mostly stable compared to previous   Start IV ceftriaxone and azithromycin  due to sputum production and low grade fever of 99 on my exam with chills.   Duonebs and continue scheduled inhalers       Diabetes  On metformin, will add POCT glucose checks and insulin sliding scale     Unstable angina  Unstable angina for approximately 3 weeks that has progressively worsened; concern that chest pain happens at night while   History of CABG and multiple stents   Last cardiac cath in 2021 shows:  70% left main with total occlusion pulsatile flow of the distal left anterior descending artery  70% stenosis of the proximal circumflex  Ninety and 95% stenosis of the mid right coronary artery  Cardiology consulted due to extensive history for further recs  First troponin elevated to 30.1, trend cardiac enzymes and troponin  Updated echo pending   aspirin, p.r.n. nitrates  Has intolerance to statins  Cardiac diet  repeat 12 lead EKG with chest pain  Dr. Norwood consulted       Obstructive sleep apnea syndrome  CPAP qhs      GERD (gastroesophageal reflux disease)  PPI         VTE Risk Mitigation (From admission, onward)           Ordered     enoxaparin injection 30 mg  Daily          02/26/24 1954     IP VTE HIGH RISK PATIENT  Once         02/26/24 1954     Place sequential compression device  Until discontinued         02/26/24 1954                    Discharge Planning   RENATO: 3/1/2024     Code Status: Full Code   Is the patient medically ready for discharge?:     Reason for patient still in hospital (select all that apply): Patient trending condition  Discharge Plan A: Home                  Maria Teresa Marrero MD  Department of Hospital Medicine   [facility] - Emergency Dept

## 2024-02-27 NOTE — SUBJECTIVE & OBJECTIVE
Past Medical History:   Diagnosis Date    Arthritis     COPD (chronic obstructive pulmonary disease)     Coronary artery disease     Hypertension     Sleep apnea     use CPAP at night        Past Surgical History:   Procedure Laterality Date    CORONARY ANGIOGRAPHY N/A 2/15/2021    Procedure: ANGIOGRAM, CORONARY ARTERY;  Surgeon: Aidan Buchanan MD;  Location: [facility] CATH/EP LAB;  Service: Cardiology;  Laterality: N/A;    CORONARY ARTERY BYPASS GRAFT      CORONARY BYPASS GRAFT ANGIOGRAPHY  2/15/2021    Procedure: Bypass graft study;  Surgeon: Aidan Buchanan MD;  Location: [facility] CATH/EP LAB;  Service: Cardiology;;    HYSTERECTOMY      JOINT REPLACEMENT      bilateral knee replacement     LEFT HEART CATHETERIZATION Left 2/15/2021    Procedure: Left heart cath;  Surgeon: Aidan Buchanan MD;  Location: [facility] CATH/EP LAB;  Service: Cardiology;  Laterality: Left;       Review of patient's allergies indicates:   Allergen Reactions    Metronidazole Other (See Comments)     DISORIENTED       Statins-hmg-coa reductase inhibitors Tinitus     Joint pain       No current facility-administered medications on file prior to encounter.     Current Outpatient Medications on File Prior to Encounter   Medication Sig    acetaminophen (TYLENOL) 500 MG tablet Take 500 mg by mouth as needed.     albuterol (PROVENTIL) 2.5 mg /3 mL (0.083 %) nebulizer solution Take 3 mLs (2.5 mg total) by nebulization every 6 (six) hours as needed for Wheezing or Shortness of Breath.    allopurinol (ZYLOPRIM) 300 MG tablet Take 300 mg by mouth once daily.    aspirin (ECOTRIN) 81 MG EC tablet Take 1 tablet (81 mg total) by mouth once daily.    aspirin-acetaminophen-caffeine (GOODY'S EXTRA STRENGTH) 500-325-65 mg PwPk Take 1 Dose by mouth as needed.    azithromycin (ZITHROMAX) 500 MG tablet One daily for yellow mucous, repeat if needed    busPIRone (BUSPAR) 7.5 MG tablet Take 7.5 mg by mouth 2 (two) times a day.    hydroCHLOROthiazide (MICROZIDE) 12.5 mg capsule  Take 12.5 mg by mouth once daily.    levalbuterol (XOPENEX HFA) 45 mcg/actuation inhaler Inhale 1-2 puffs into the lungs every 4 (four) hours as needed for Wheezing. Rescue    losartan (COZAAR) 25 MG tablet Take 25 mg by mouth once daily.    metFORMIN (GLUCOPHAGE-XR) 500 MG ER 24hr tablet Take 500 mg by mouth.    metoprolol succinate (TOPROL-XL) 25 MG 24 hr tablet Take 25 mg by mouth once daily.    modafiniL (PROVIGIL) 200 MG Tab Take 1 tablet (200 mg total) by mouth 2 (two) times daily as needed (excess sleepiness).    nintedanib (OFEV) 100 mg Cap Take 100 mg by mouth 2 (two) times a day.    nitroGLYCERIN (NITROSTAT) 0.4 MG SL tablet Place 1 tablet (0.4 mg total) under the tongue every 5 (five) minutes as needed for Chest pain.    oxyCODONE (ROXICODONE) 15 MG Tab Take 15 mg by mouth every 6 (six) hours as needed.    REPATHA SURECLICK 140 mg/mL PnIj Inject 140 mg into the skin every 14 (fourteen) days.    traZODone (DESYREL) 50 MG tablet Take 1 tablet by mouth every evening.     Family History       Problem Relation (Age of Onset)    Pulmonary embolism Mother          Tobacco Use    Smoking status: Never     Passive exposure: Past    Smokeless tobacco: Never   Substance and Sexual Activity    Alcohol use: No    Drug use: No    Sexual activity: Not Currently     Review of Systems   Constitutional:  Positive for fatigue. Negative for appetite change, chills, fever and unexpected weight change.   HENT: Negative.  Negative for congestion, ear pain, hearing loss, rhinorrhea, sore throat and tinnitus.    Eyes: Negative.  Negative for pain, discharge and redness.   Respiratory:  Positive for cough (mild), shortness of breath and wheezing. Negative for stridor.    Cardiovascular:  Positive for chest pain. Negative for palpitations and leg swelling.   Gastrointestinal: Negative.  Negative for abdominal distention, abdominal pain, constipation, diarrhea, nausea and vomiting.   Endocrine: Negative.  Negative for cold  intolerance, heat intolerance, polydipsia, polyphagia and polyuria.   Genitourinary: Negative.  Negative for decreased urine volume, difficulty urinating, flank pain, hematuria and urgency.   Musculoskeletal: Negative.  Negative for arthralgias, gait problem and myalgias.   Skin: Negative.  Negative for pallor and rash.   Allergic/Immunologic: Negative.  Negative for environmental allergies, food allergies and immunocompromised state.   Neurological:  Positive for dizziness. Negative for syncope, facial asymmetry, weakness and headaches.   Hematological: Negative.    Psychiatric/Behavioral:  Positive for decreased concentration. Negative for agitation, self-injury and suicidal ideas.      Objective:     Vital Signs (Most Recent):  Temp: 99.3 °F (37.4 °C) (02/26/24 1930)  Pulse: 84 (02/26/24 1947)  Resp: 20 (02/26/24 1947)  BP: (!) 140/69 (02/26/24 1930)  SpO2: 97 % (02/26/24 1930) Vital Signs (24h Range):  Temp:  [98.2 °F (36.8 °C)-99.3 °F (37.4 °C)] 99.3 °F (37.4 °C)  Pulse:  [70-84] 84  Resp:  [18-20] 20  SpO2:  [91 %-100 %] 97 %  BP: (136-140)/(69-87) 140/69     Weight: 83.9 kg (185 lb)  Body mass index is 34.96 kg/m².     Physical Exam  Vitals reviewed.   Constitutional:       General: She is not in acute distress.     Appearance: Normal appearance. She is normal weight. She is not toxic-appearing.   HENT:      Head: Normocephalic and atraumatic.      Nose: Nose normal. No congestion or rhinorrhea.      Mouth/Throat:      Mouth: Mucous membranes are moist.      Pharynx: Oropharynx is clear.   Eyes:      General:         Right eye: No discharge.         Left eye: No discharge.      Extraocular Movements: Extraocular movements intact.      Conjunctiva/sclera: Conjunctivae normal.      Pupils: Pupils are equal, round, and reactive to light.   Cardiovascular:      Rate and Rhythm: Normal rate and regular rhythm.      Pulses: Normal pulses.      Heart sounds: Normal heart sounds.   Pulmonary:      Effort: Pulmonary  effort is normal. No respiratory distress.      Breath sounds: Wheezing and rhonchi present.   Chest:      Chest wall: No tenderness.   Abdominal:      General: Abdomen is flat. Bowel sounds are normal. There is no distension.      Palpations: Abdomen is soft.      Tenderness: There is no abdominal tenderness. There is no guarding.   Musculoskeletal:         General: Normal range of motion.      Cervical back: Normal range of motion and neck supple.      Right lower leg: No edema.      Left lower leg: No edema.   Skin:     General: Skin is warm and dry.      Findings: No rash.   Neurological:      General: No focal deficit present.      Mental Status: She is alert and oriented to person, place, and time. Mental status is at baseline.      Motor: No weakness.   Psychiatric:         Mood and Affect: Mood normal.              CRANIAL NERVES     CN III, IV, VI   Pupils are equal, round, and reactive to light.       Significant Labs: All pertinent labs within the past 24 hours have been reviewed.    CBC:   Recent Labs   Lab 02/26/24  1710   WBC 8.30   HGB 12.7   HCT 40.2        CMP:   Recent Labs   Lab 02/26/24  1710      K 4.0      CO2 27      BUN 24*   CREATININE 2.0*   CALCIUM 9.1   PROT 7.4   ALBUMIN 4.4   BILITOT 0.3   ALKPHOS 89   AST 14   ALT 8*   ANIONGAP 11     Magnesium:   Recent Labs   Lab 02/26/24  1710   MG 1.9       Significant Imaging: I have reviewed all pertinent imaging results/findings within the past 24 hours.

## 2024-02-27 NOTE — ASSESSMENT & PLAN NOTE
Unstable angina for approximately 3 weeks that has progressively worsened  History of CABG and multiple stents   Last cardiac cath in 2021 shows:  70% left main with total occlusion pulsatile flow of the distal left anterior descending artery  70% stenosis of the proximal circumflex  Ninety and 95% stenosis of the mid right coronary artery  Cardiology consulted due to extensive history for further recs  First troponin elevated to 30.1, trend cardiac enzymes and troponin  Updated echo pending   aspirin, p.r.n. nitrates  Has intolerance to statins  Cardiac diet, NPO after midnight  repeat 12 lead EKG with chest pain

## 2024-02-27 NOTE — H&P
[facility] - Emergency Dept  Hospital Medicine  History & Physical    Patient Name: Sakshi Hess  MRN: [id number]  Patient Class: OP- Observation  Admission Date: 2/26/2024  Attending Physician: Lucien Giles MD   Primary Care Provider: Wenceslao Deras NP         Patient information was obtained from patient and ER records.     Subjective:     Principal Problem:COPD exacerbation    Chief Complaint:   Chief Complaint   Patient presents with    Shortness of Breath    Chest Pain    Fatigue        HPI: Patient is a 68-year-old female with a past medical history of COPD, pulmonary fibrosis, CAD status post CABG and multiple stents, GERD, and hypertension. Patient presents to the ED today with complaints of worsening shortness a breath, dyspnea on exertion, and chest pain. Patient states symptoms have gradually occurred over the last 3 weeks. Patient states they have also gradually progressed thus bringing her into the ED today. Patient reports she has not taken her chronic COPD medications due to fear of becoming mean on her prednisone. Chest x-ray today shows bilateral interstitial airspace opacities but is mostly stable compared to previous imaging, O2 90-91% in ED on initial assessment. Improved saturation following nebs. EKG was normal sinus rhythm, does have lateral T-wave inversions also present on last EKG.  Hemoglobin 12.7, potassium 4.0, creatinine 2.0, Mag 1.9, troponin 30.1, BNP 44.  Last cardiac catheterization with ejection fraction of 55%.  My exam patient denies shortness or breath, nausea, vomiting, abdominal pain, diarrhea, constipation.  Patient is full code.    Past Medical History:   Diagnosis Date    Arthritis     COPD (chronic obstructive pulmonary disease)     Coronary artery disease     Hypertension     Sleep apnea     use CPAP at night        Past Surgical History:   Procedure Laterality Date    CORONARY ANGIOGRAPHY N/A 2/15/2021    Procedure: ANGIOGRAM, CORONARY ARTERY;   Surgeon: Aidan Buchanan MD;  Location: [facility] CATH/EP LAB;  Service: Cardiology;  Laterality: N/A;    CORONARY ARTERY BYPASS GRAFT      CORONARY BYPASS GRAFT ANGIOGRAPHY  2/15/2021    Procedure: Bypass graft study;  Surgeon: Aidan Buchanan MD;  Location: [facility] CATH/EP LAB;  Service: Cardiology;;    HYSTERECTOMY      JOINT REPLACEMENT      bilateral knee replacement     LEFT HEART CATHETERIZATION Left 2/15/2021    Procedure: Left heart cath;  Surgeon: Aidan Buchanan MD;  Location: [facility] CATH/EP LAB;  Service: Cardiology;  Laterality: Left;       Review of patient's allergies indicates:   Allergen Reactions    Metronidazole Other (See Comments)     DISORIENTED       Statins-hmg-coa reductase inhibitors Tinitus     Joint pain       No current facility-administered medications on file prior to encounter.     Current Outpatient Medications on File Prior to Encounter   Medication Sig    acetaminophen (TYLENOL) 500 MG tablet Take 500 mg by mouth as needed.     albuterol (PROVENTIL) 2.5 mg /3 mL (0.083 %) nebulizer solution Take 3 mLs (2.5 mg total) by nebulization every 6 (six) hours as needed for Wheezing or Shortness of Breath.    allopurinol (ZYLOPRIM) 300 MG tablet Take 300 mg by mouth once daily.    aspirin (ECOTRIN) 81 MG EC tablet Take 1 tablet (81 mg total) by mouth once daily.    aspirin-acetaminophen-caffeine (GOODY'S EXTRA STRENGTH) 500-325-65 mg PwPk Take 1 Dose by mouth as needed.    azithromycin (ZITHROMAX) 500 MG tablet One daily for yellow mucous, repeat if needed    busPIRone (BUSPAR) 7.5 MG tablet Take 7.5 mg by mouth 2 (two) times a day.    hydroCHLOROthiazide (MICROZIDE) 12.5 mg capsule Take 12.5 mg by mouth once daily.    levalbuterol (XOPENEX HFA) 45 mcg/actuation inhaler Inhale 1-2 puffs into the lungs every 4 (four) hours as needed for Wheezing. Rescue    losartan (COZAAR) 25 MG tablet Take 25 mg by mouth once daily.    metFORMIN (GLUCOPHAGE-XR) 500 MG ER 24hr tablet Take 500 mg by mouth.     metoprolol succinate (TOPROL-XL) 25 MG 24 hr tablet Take 25 mg by mouth once daily.    modafiniL (PROVIGIL) 200 MG Tab Take 1 tablet (200 mg total) by mouth 2 (two) times daily as needed (excess sleepiness).    nintedanib (OFEV) 100 mg Cap Take 100 mg by mouth 2 (two) times a day.    nitroGLYCERIN (NITROSTAT) 0.4 MG SL tablet Place 1 tablet (0.4 mg total) under the tongue every 5 (five) minutes as needed for Chest pain.    oxyCODONE (ROXICODONE) 15 MG Tab Take 15 mg by mouth every 6 (six) hours as needed.    REPATHA SURECLICK 140 mg/mL PnIj Inject 140 mg into the skin every 14 (fourteen) days.    traZODone (DESYREL) 50 MG tablet Take 1 tablet by mouth every evening.     Family History       Problem Relation (Age of Onset)    Pulmonary embolism Mother          Tobacco Use    Smoking status: Never     Passive exposure: Past    Smokeless tobacco: Never   Substance and Sexual Activity    Alcohol use: No    Drug use: No    Sexual activity: Not Currently     Review of Systems   Constitutional:  Positive for fatigue. Negative for appetite change, chills, fever and unexpected weight change.   HENT: Negative.  Negative for congestion, ear pain, hearing loss, rhinorrhea, sore throat and tinnitus.    Eyes: Negative.  Negative for pain, discharge and redness.   Respiratory:  Positive for cough (mild), shortness of breath and wheezing. Negative for stridor.    Cardiovascular:  Positive for chest pain. Negative for palpitations and leg swelling.   Gastrointestinal: Negative.  Negative for abdominal distention, abdominal pain, constipation, diarrhea, nausea and vomiting.   Endocrine: Negative.  Negative for cold intolerance, heat intolerance, polydipsia, polyphagia and polyuria.   Genitourinary: Negative.  Negative for decreased urine volume, difficulty urinating, flank pain, hematuria and urgency.   Musculoskeletal: Negative.  Negative for arthralgias, gait problem and myalgias.   Skin: Negative.  Negative for pallor and rash.    Allergic/Immunologic: Negative.  Negative for environmental allergies, food allergies and immunocompromised state.   Neurological:  Positive for dizziness. Negative for syncope, facial asymmetry, weakness and headaches.   Hematological: Negative.    Psychiatric/Behavioral:  Positive for decreased concentration. Negative for agitation, self-injury and suicidal ideas.      Objective:     Vital Signs (Most Recent):  Temp: 99.3 °F (37.4 °C) (02/26/24 1930)  Pulse: 84 (02/26/24 1947)  Resp: 20 (02/26/24 1947)  BP: (!) 140/69 (02/26/24 1930)  SpO2: 97 % (02/26/24 1930) Vital Signs (24h Range):  Temp:  [98.2 °F (36.8 °C)-99.3 °F (37.4 °C)] 99.3 °F (37.4 °C)  Pulse:  [70-84] 84  Resp:  [18-20] 20  SpO2:  [91 %-100 %] 97 %  BP: (136-140)/(69-87) 140/69     Weight: 83.9 kg (185 lb)  Body mass index is 34.96 kg/m².     Physical Exam  Vitals reviewed.   Constitutional:       General: She is not in acute distress.     Appearance: Normal appearance. She is normal weight. She is not toxic-appearing.   HENT:      Head: Normocephalic and atraumatic.      Nose: Nose normal. No congestion or rhinorrhea.      Mouth/Throat:      Mouth: Mucous membranes are moist.      Pharynx: Oropharynx is clear.   Eyes:      General:         Right eye: No discharge.         Left eye: No discharge.      Extraocular Movements: Extraocular movements intact.      Conjunctiva/sclera: Conjunctivae normal.      Pupils: Pupils are equal, round, and reactive to light.   Cardiovascular:      Rate and Rhythm: Normal rate and regular rhythm.      Pulses: Normal pulses.      Heart sounds: Normal heart sounds.   Pulmonary:      Effort: Pulmonary effort is normal. No respiratory distress.      Breath sounds: Wheezing and rhonchi present.   Chest:      Chest wall: No tenderness.   Abdominal:      General: Abdomen is flat. Bowel sounds are normal. There is no distension.      Palpations: Abdomen is soft.      Tenderness: There is no abdominal tenderness. There is no  guarding.   Musculoskeletal:         General: Normal range of motion.      Cervical back: Normal range of motion and neck supple.      Right lower leg: No edema.      Left lower leg: No edema.   Skin:     General: Skin is warm and dry.      Findings: No rash.   Neurological:      General: No focal deficit present.      Mental Status: She is alert and oriented to person, place, and time. Mental status is at baseline.      Motor: No weakness.   Psychiatric:         Mood and Affect: Mood normal.              CRANIAL NERVES     CN III, IV, VI   Pupils are equal, round, and reactive to light.       Significant Labs: All pertinent labs within the past 24 hours have been reviewed.    CBC:   Recent Labs   Lab 02/26/24  1710   WBC 8.30   HGB 12.7   HCT 40.2        CMP:   Recent Labs   Lab 02/26/24  1710      K 4.0      CO2 27      BUN 24*   CREATININE 2.0*   CALCIUM 9.1   PROT 7.4   ALBUMIN 4.4   BILITOT 0.3   ALKPHOS 89   AST 14   ALT 8*   ANIONGAP 11     Magnesium:   Recent Labs   Lab 02/26/24  1710   MG 1.9       Significant Imaging: I have reviewed all pertinent imaging results/findings within the past 24 hours.    Assessment/Plan:     * COPD exacerbation  Patient's COPD is with exacerbation noted by continued dyspnea currently. Patient is currently on COPD Pathway.  Steroids, Antibiotics, and Supplemental oxygen PRN for O2 >90% and monitor respiratory status closely.   Covid and flu, procal pending.   CXR with B opacities, mostly stable compared to previous   Start IV ceftriaxone and azithromycin  due to sputum production and low grade fever of 99 on my exam with chills.   Duonebs and continue scheduled inhalers       Diabetes  On metformin, will add POCT glucose checks and insulin sliding scale     Unstable angina  Unstable angina for approximately 3 weeks that has progressively worsened  History of CABG and multiple stents   Last cardiac cath in 2021 shows:  70% left main with total occlusion  pulsatile flow of the distal left anterior descending artery  70% stenosis of the proximal circumflex  Ninety and 95% stenosis of the mid right coronary artery  Cardiology consulted due to extensive history for further recs  First troponin elevated to 30.1, trend cardiac enzymes and troponin  Updated echo pending   aspirin, p.r.n. nitrates  Has intolerance to statins  Cardiac diet, NPO after midnight  repeat 12 lead EKG with chest pain      Obstructive sleep apnea syndrome  CPAP qhs      GERD (gastroesophageal reflux disease)  PPI         VTE Risk Mitigation (From admission, onward)           Ordered     enoxaparin injection 30 mg  Daily         02/26/24 1954     IP VTE HIGH RISK PATIENT  Once         02/26/24 1954     Place sequential compression device  Until discontinued         02/26/24 1954                     On 02/26/2024, patient should be placed in hospital observation services under my care in collaboration with Dr Giles.      CATHY Conde  Department of Hospital Medicine  [facility] - Emergency Dept

## 2024-02-27 NOTE — PROGRESS NOTES
Pharmacist Renal Dose Adjustment Note    Sakshi Hess is a 68 y.o. female being treated with the medication Lovenox 40 mg.    Patient Data:    Vital Signs (Most Recent):  Temp: 99.3 °F (37.4 °C) (02/26/24 1930)  Pulse: 84 (02/26/24 1947)  Resp: 20 (02/26/24 1947)  BP: (!) 140/69 (02/26/24 1930)  SpO2: 97 % (02/26/24 1930) Vital Signs (72h Range):  Temp:  [98.2 °F (36.8 °C)-99.3 °F (37.4 °C)]   Pulse:  [70-84]   Resp:  [18-20]   BP: (136-140)/(69-87)   SpO2:  [91 %-100 %]      Recent Labs   Lab 02/26/24  1710   CREATININE 2.0*     Serum creatinine: 2 mg/dL (H) 02/26/24 1710  Estimated creatinine clearance: 26.4 mL/min (A)    Lovenox 40 mg daily will be changed to Lovenox 30 mg daily per renal dosing protocol. CrCl < 30 ml/min.    Pharmacist's Name: Sven Gomez  Pharmacist's Extension: [id number]

## 2024-02-27 NOTE — SUBJECTIVE & OBJECTIVE
Interval History: see hospital course     Review of Systems   Constitutional:  Positive for chills and fatigue.   HENT:  Positive for congestion.    Respiratory:  Positive for cough, shortness of breath and wheezing.    Cardiovascular:  Positive for chest pain.   Gastrointestinal: Negative.    Genitourinary: Negative.    Musculoskeletal:  Positive for back pain.   Skin: Negative.    Neurological: Negative.      Objective:     Vital Signs (Most Recent):  Temp: 97.7 °F (36.5 °C) (02/27/24 1115)  Pulse: 73 (02/27/24 1400)  Resp: 19 (02/27/24 1400)  BP: (!) 166/84 (02/27/24 1400)  SpO2: 98 % (02/27/24 1400) Vital Signs (24h Range):  Temp:  [97.7 °F (36.5 °C)-99.3 °F (37.4 °C)] 97.7 °F (36.5 °C)  Pulse:  [62-84] 73  Resp:  [14-30] 19  SpO2:  [91 %-100 %] 98 %  BP: (118-166)/(57-87) 166/84     Weight: 83.9 kg (184 lb 15.5 oz)  Body mass index is 34.97 kg/m².    Intake/Output Summary (Last 24 hours) at 2/27/2024 1553  Last data filed at 2/26/2024 2313  Gross per 24 hour   Intake 100 ml   Output --   Net 100 ml         Physical Exam  Constitutional:       General: She is not in acute distress.     Appearance: She is not diaphoretic.   HENT:      Head: Normocephalic and atraumatic.      Mouth/Throat:      Mouth: Mucous membranes are dry.      Pharynx: Oropharynx is clear. No oropharyngeal exudate.   Cardiovascular:      Rate and Rhythm: Normal rate and regular rhythm.      Pulses: Normal pulses.   Pulmonary:      Breath sounds: Wheezing and rhonchi present.   Abdominal:      General: Bowel sounds are normal. There is no distension.      Tenderness: There is no abdominal tenderness.   Musculoskeletal:         General: No swelling. Normal range of motion.      Cervical back: Normal range of motion and neck supple.   Lymphadenopathy:      Cervical: No cervical adenopathy.   Skin:     General: Skin is warm and dry.      Capillary Refill: Capillary refill takes less than 2 seconds.   Neurological:      General: No focal deficit  present.      Mental Status: She is alert and oriented to person, place, and time. Mental status is at baseline.   Psychiatric:         Mood and Affect: Mood normal.         Behavior: Behavior normal.             Significant Labs: All pertinent labs within the past 24 hours have been reviewed.  Recent Lab Results  (Last 5 results in the past 24 hours)        02/27/24  0812   02/27/24  0740   02/27/24  0424   02/27/24  0013   02/26/24  2134        Procalcitonin         <0.050  Comment: The Procalcitonin test is intended to aid in the risk assessment of   critically ill patients on their first day of ICU admission for   progression   to severe sepsis and septic shock.    A result of <0.50 ng/mL is associated with a low risk of severe   sepsis   and/or septic shock.  This does not exclude localized infection.    A result between 0.50 ng/mL and 2.0 ng/mL should be interpreted with   consideration of the patient's history and is recommended to retest   within 6   to 24 hours.        A result of >2.0 ng/mL is associated with a high risk of severe   sepsis   and/or septic shock.    Procalcitonin may not be accurate among patients with   localized  infection, recent trauma or major surgery, immunosuppressed   state,  invasive fungal infection, renal dysfunction. Decisions   regarding  initiation or continuation of antibiotic therapy should not be   based  solely on procalcitonin levels.         Albumin     4.4           ALP     94           Allens Test               ALT     10           Anion Gap     11           Ao root annulus   2.90             Ascending aorta   3.20             Ao peak yumiko   1.67             Ao VTI   38.20             AST     13           AV valve area   2.71             JOANIE by Velocity Ratio   2.69             AORTIC VALVE CUSP SEPERATION   1.60             AV mean gradient   6             AV index (prosthetic)   0.78             AV peak gradient   11             AV Velocity Ratio   0.78              Baso #     0.03           Basophil %     0.4           BILIRUBIN TOTAL     0.2  Comment: For infants and newborns, interpretation of results should be based  on gestational age, weight and in agreement with clinical  observations.    Premature Infant recommended reference ranges:  Up to 24 hours.............<8.0 mg/dL  Up to 48 hours............<12.0 mg/dL  3-5 days..................<15.0 mg/dL  6-29 days.................<15.0 mg/dL             Site               BSA   1.9             BUN     24           Calcium     9.1           Chloride     101           Cholesterol Total     148  Comment: The National Cholesterol Education Program (NCEP) has set the  following guidelines (reference ranges) for Cholesterol:  Optimal.....................<200 mg/dL  Borderline High.............200-239 mg/dL  High........................> or = 240 mg/dL             CO2     25           Creatinine     1.2           Left Ventricle Relative Wall Thickness   0.46             DelSys               Differential Method     Automated           E/A ratio   0.95             E/E' ratio   13.88             eGFR     49.3           Eos #     0.0           Eos %     0.0           E wave deceleration time   285.00             Flow               FS   29             Glucose     198           Gran # (ANC)     6.5           Gran %     83.2           HDL     45  Comment: The National Cholesterol Education Program (NCEP) has set the  following guidelines (reference values) for HDL Cholesterol:  Low...............<40 mg/dL  Optimal...........>60 mg/dL             HDL/Cholesterol Ratio     30.4           Hematocrit     38.8           Hemoglobin     12.5           Immature Grans (Abs)     0.02  Comment: Mild elevation in immature granulocytes is non specific and   can be seen in a variety of conditions including stress response,   acute inflammation, trauma and pregnancy. Correlation with other   laboratory and clinical findings is essential.              Immature Granulocytes     0.3           IVC diameter   2.15             IVSd   0.80             LVOT area   3.5             LDL Cholesterol     83.4  Comment: The National Cholesterol Education Program (NCEP) has set the  following guidelines (reference values) for LDL Cholesterol:  Optimal.......................<130 mg/dL  Borderline High...............130-159 mg/dL  High..........................160-189 mg/dL  Very High.....................>190 mg/dL             LV LATERAL E/E' RATIO   12.33             LV SEPTAL E/E' RATIO   15.86             LV EDV BP   108.00             LV Diastolic Volume Index   59.02             LVIDd   4.80             LVIDs   3.41             LV mass   158.82             LV Mass Index   87             Left Ventricular Outflow Tract Mean Gradient   4.00             Left Ventricular Outflow Tract Mean Velocity   0.88             LVOT diameter   2.10             LVOT peak amadeo   1.30             LVOT stroke volume   103.51             LVOT peak VTI   29.90             LV ESV BP   47.80             LV Systolic Volume Index   26.1             Lymph #     1.1           Lymph %     13.8           Magnesium      2.1           MCH     28.9           MCHC     32.2           MCV     90           Mean e'   0.08             Mode               Mono #     0.2           Mono %     2.3           MPV     9.6           Mr max amadeo   4.68             MV valve area p 1/2 method   3.28             MV valve area by continuity eq   2.45             MV mean gradient   4             MV peak gradient   8             MV Peak A Amadeo   1.17             MV Peak E Amadeo   1.11             MV stenosis pressure 1/2 time   67.00             MV VTI   42.2             Non-HDL Cholesterol     103  Comment: Risk category and Non-HDL cholesterol goals:  Coronary heart disease (CHD)or equivalent (10-year risk of CHD >20%):  Non-HDL cholesterol goal     <130 mg/dL  Two or more CHD risk factors and 10-year risk of CHD <= 20%:  Non-HDL  cholesterol goal     <160 mg/dL  0 to 1 CHD risk factor:  Non-HDL cholesterol goal     <190 mg/dL             nRBC     0           Phosphorus Level     3.7           Platelet Count     243           POC BE               POC Glucose 162               POC HCO3               POC PCO2               POC PH               POC PO2               POC SATURATED O2               POC TCO2               Potassium     4.3           PROTEIN TOTAL     7.7           Pulmonary Valve Mean Velocity   0.83             PV peak gradient   7             PV PEAK VELOCITY   1.28             Posterior Wall   1.10             Est. RA pres   3             RBC     4.33           RDW     13.9           RV S'   8.92             RV TB RVSP   6             Sample               Sodium     137           TDI SEPTAL   0.07             TDI LATERAL   0.09             Total Cholesterol/HDL Ratio     3.3           Triglycerides     98  Comment: The National Cholesterol Education Program (NCEP) has set the  following guidelines (reference values) for triglycerides:  Normal......................<150 mg/dL  Borderline High.............150-199 mg/dL  High........................200-499 mg/dL             Triscuspid Valve Regurgitation Peak Gradient   28             TR Max Amadeo   2.64             Troponin I High Sensitivity     35.0  Comment: Troponin results differ between methods. Do not use   results between Troponin methods interchangeably.    Alkaline Phospatase levels above 400 U/L may   cause false positive results.    Access hsTnI should not be used for patients taking   Asfotase eran (Strensiq).     31.2  Comment: Troponin results differ between methods. Do not use   results between Troponin methods interchangeably.    Alkaline Phospatase levels above 400 U/L may   cause false positive results.    Access hsTnI should not be used for patients taking   Asfotase eran (Strensiq).           TV resting pulmonary artery pressure   31             WBC     7.75            ZLVIDD   -0.55             ZLVIDS   0.67                                    Significant Imaging: I have reviewed all pertinent imaging results/findings within the past 24 hours.

## 2024-02-27 NOTE — PLAN OF CARE
[facility] - Emergency Dept  Initial Discharge Assessment       Primary Care Provider: Wenceslao Deras NP    Admission Diagnosis: Chest pain [R07.9]    Admission Date: 2/26/2024  Expected Discharge Date:     Social work intern completed discharge assessment with pt at ED bedside. Pt AAOx4s. Pt lives alone. Demographics, PCP, and insurance verified. No home health. No dialysis. Pt completes ADLs without assistance, but experiencing shortness of breath for last 3 weeks. Pt is currently on 2L of oxygen. Pt declines coumadin and dialysis. Pt to discharge home via family transport- granddaughter Zulema. Pt has no other needs to be addressed at this time.    Case management will follow up as needed.      Payor: [employer] MEDICARE / Plan: [employer] PPO SPECIAL NEEDS / Product Type: Medicare Advantage /     Extended Emergency Contact Information  Primary Emergency Contact: Gabriella Jiménez  Address: [address] [place] [address] [id number] [place] [address] [place]  Home Phone: [phone]  Mobile Phone: [phone]  Relation: Daughter    Discharge Plan A: Home  Discharge Plan B: Home      [facility] - [place], [place] - [address]  [address] [facility].  [place] [address]  Phone: [phone] Fax: [phone]    [id number] [address] & [address] [address]  Phone: [phone] Fax: [phone]      Initial Assessment (most recent)       Adult Discharge Assessment - 02/27/24 0953          Discharge Assessment    Assessment Type Discharge Planning Assessment     Confirmed/corrected address, phone number and insurance Yes     Confirmed Demographics Correct on Facesheet     Source of Information patient     Does patient/caregiver understand observation status Yes     Reason For Admission COPD exacerbation     People in Home alone     Facility Arrived From: Home     Do you expect to return to your current  living situation? Yes     Do you have help at home or someone to help you manage your care at home? Yes     Who are your caregiver(s) and their phone number(s)? Gabriella Jiménez (Daughter)  [phone] (Home Phone)     Prior to hospitilization cognitive status: Alert/Oriented     Current cognitive status: Alert/Oriented     Walking or Climbing Stairs Difficulty yes     Walking or Climbing Stairs --   recent shortness of breath    Dressing/Bathing Difficulty no     Home Layout Able to live on 1st floor     Equipment Currently Used at Home oxygen;wheelchair;cane, straight   2L of oxygen    Readmission within 30 days? No     Patient currently being followed by outpatient case management? No     Do you currently have service(s) that help you manage your care at home? No     Do you take prescription medications? Yes     Do you have prescription coverage? Yes     Coverage [employer]ED MEDIC[employer] - [employer] [facility] [place] HMO PPO SPECIAL NEEDS     Do you have any problems affording any of your prescribed medications? No     Is the patient taking medications as prescribed? yes   ED dr sullivan state she is not taking all meds as prescribed    Who is going to help you get home at discharge? Zulema Aviles- [phone]     How do you get to doctors appointments? car, drives self     Are you on dialysis? No     Do you take coumadin? No     Discharge Plan A Home     Discharge Plan B Home     DME Needed Upon Discharge  rollator   If insurance will cover a rollator    Discharge Plan discussed with: Patient

## 2024-02-27 NOTE — HPI
Patient is a 68-year-old female with a past medical history of COPD, pulmonary fibrosis, CAD status post CABG and multiple stents, GERD, and hypertension. Patient presents to the ED today with complaints of worsening shortness a breath, dyspnea on exertion, and chest pain. Patient states symptoms have gradually occurred over the last 3 weeks. Patient states they have also gradually progressed thus bringing her into the ED today. Patient reports she has not taken her chronic COPD medications due to fear of becoming mean on her prednisone. Chest x-ray today shows bilateral interstitial airspace opacities but is mostly stable compared to previous imaging, O2 90-91% in ED on initial assessment. Improved saturation following nebs. EKG was normal sinus rhythm, does have lateral T-wave inversions also present on last EKG.  Hemoglobin 12.7, potassium 4.0, creatinine 2.0, Mag 1.9, troponin 30.1, BNP 44.  Last cardiac catheterization with ejection fraction of 55%.  My exam patient denies shortness or breath, nausea, vomiting, abdominal pain, diarrhea, constipation.  Patient is full code.

## 2024-02-27 NOTE — ASSESSMENT & PLAN NOTE
Unstable angina for approximately 3 weeks that has progressively worsened; concern that chest pain happens at night while   History of CABG and multiple stents   Last cardiac cath in 2021 shows:  70% left main with total occlusion pulsatile flow of the distal left anterior descending artery  70% stenosis of the proximal circumflex  Ninety and 95% stenosis of the mid right coronary artery  Cardiology consulted due to extensive history for further recs  First troponin elevated to 30.1, trend cardiac enzymes and troponin  Updated echo pending   aspirin, p.r.n. nitrates  Has intolerance to statins  Cardiac diet  repeat 12 lead EKG with chest pain  Dr. Norwood consulted

## 2024-02-28 LAB
GLUCOSE SERPL-MCNC: 116 MG/DL (ref 70–110)
GLUCOSE SERPL-MCNC: 134 MG/DL (ref 70–110)
GLUCOSE SERPL-MCNC: 136 MG/DL (ref 70–110)
GLUCOSE SERPL-MCNC: 163 MG/DL (ref 70–110)

## 2024-02-28 PROCEDURE — 21400001 HC TELEMETRY ROOM

## 2024-02-28 PROCEDURE — 63700000 PHARM REV CODE 250 ALT 637 W/O HCPCS: Performed by: PHYSICAL THERAPY ASSISTANT

## 2024-02-28 PROCEDURE — 99900031 HC PATIENT EDUCATION (STAT)

## 2024-02-28 PROCEDURE — 94640 AIRWAY INHALATION TREATMENT: CPT

## 2024-02-28 PROCEDURE — 25000242 PHARM REV CODE 250 ALT 637 W/ HCPCS: Performed by: PHYSICAL THERAPY ASSISTANT

## 2024-02-28 PROCEDURE — 25000003 PHARM REV CODE 250: Performed by: PHYSICAL THERAPY ASSISTANT

## 2024-02-28 PROCEDURE — 25000003 PHARM REV CODE 250

## 2024-02-28 PROCEDURE — 94664 DEMO&/EVAL PT USE INHALER: CPT

## 2024-02-28 PROCEDURE — 63600175 PHARM REV CODE 636 W HCPCS: Performed by: PHYSICAL THERAPY ASSISTANT

## 2024-02-28 PROCEDURE — 99900035 HC TECH TIME PER 15 MIN (STAT)

## 2024-02-28 PROCEDURE — 94761 N-INVAS EAR/PLS OXIMETRY MLT: CPT

## 2024-02-28 RX ORDER — ISOSORBIDE MONONITRATE 30 MG/1
30 TABLET, EXTENDED RELEASE ORAL DAILY
Status: DISCONTINUED | OUTPATIENT
Start: 2024-02-28 | End: 2024-02-29 | Stop reason: HOSPADM

## 2024-02-28 RX ORDER — RANOLAZINE 500 MG/1
500 TABLET, EXTENDED RELEASE ORAL 2 TIMES DAILY
Status: DISCONTINUED | OUTPATIENT
Start: 2024-02-28 | End: 2024-02-29 | Stop reason: HOSPADM

## 2024-02-28 RX ORDER — PRASUGREL 10 MG/1
10 TABLET, FILM COATED ORAL DAILY
Status: DISCONTINUED | OUTPATIENT
Start: 2024-02-28 | End: 2024-02-29 | Stop reason: HOSPADM

## 2024-02-28 RX ORDER — CLOPIDOGREL BISULFATE 75 MG/1
75 TABLET ORAL DAILY
Status: DISCONTINUED | OUTPATIENT
Start: 2024-02-28 | End: 2024-02-28

## 2024-02-28 RX ADMIN — OXYCODONE HYDROCHLORIDE 15 MG: 5 TABLET ORAL at 08:02

## 2024-02-28 RX ADMIN — RANOLAZINE 500 MG: 500 TABLET, FILM COATED, EXTENDED RELEASE ORAL at 08:02

## 2024-02-28 RX ADMIN — OXYCODONE HYDROCHLORIDE 15 MG: 5 TABLET ORAL at 02:02

## 2024-02-28 RX ADMIN — PRASUGREL 10 MG: 10 TABLET, FILM COATED ORAL at 11:02

## 2024-02-28 RX ADMIN — ONDANSETRON 4 MG: 2 INJECTION INTRAMUSCULAR; INTRAVENOUS at 08:02

## 2024-02-28 RX ADMIN — IPRATROPIUM BROMIDE AND ALBUTEROL SULFATE 3 ML: 2.5; .5 SOLUTION RESPIRATORY (INHALATION) at 08:02

## 2024-02-28 RX ADMIN — ASPIRIN 81 MG: 81 TABLET, COATED ORAL at 08:02

## 2024-02-28 RX ADMIN — ENOXAPARIN SODIUM 30 MG: 30 INJECTION SUBCUTANEOUS at 05:02

## 2024-02-28 RX ADMIN — OXYCODONE HYDROCHLORIDE 15 MG: 5 TABLET ORAL at 07:02

## 2024-02-28 RX ADMIN — IPRATROPIUM BROMIDE AND ALBUTEROL SULFATE 3 ML: 2.5; .5 SOLUTION RESPIRATORY (INHALATION) at 07:02

## 2024-02-28 RX ADMIN — ISOSORBIDE MONONITRATE 30 MG: 30 TABLET, EXTENDED RELEASE ORAL at 11:02

## 2024-02-28 RX ADMIN — CHLORTHALIDONE 25 MG: 25 TABLET ORAL at 09:02

## 2024-02-28 RX ADMIN — PANTOPRAZOLE SODIUM 40 MG: 40 TABLET, DELAYED RELEASE ORAL at 09:02

## 2024-02-28 RX ADMIN — PREDNISONE 40 MG: 20 TABLET ORAL at 09:02

## 2024-02-28 RX ADMIN — CEFTRIAXONE SODIUM 1 G: 1 INJECTION, POWDER, FOR SOLUTION INTRAMUSCULAR; INTRAVENOUS at 08:02

## 2024-02-28 RX ADMIN — AZITHROMYCIN DIHYDRATE 500 MG: 250 TABLET ORAL at 09:02

## 2024-02-28 RX ADMIN — RANOLAZINE 500 MG: 500 TABLET, FILM COATED, EXTENDED RELEASE ORAL at 11:02

## 2024-02-28 RX ADMIN — LOSARTAN POTASSIUM 25 MG: 25 TABLET, FILM COATED ORAL at 08:02

## 2024-02-28 RX ADMIN — IPRATROPIUM BROMIDE AND ALBUTEROL SULFATE 3 ML: 2.5; .5 SOLUTION RESPIRATORY (INHALATION) at 01:02

## 2024-02-28 NOTE — RESPIRATORY THERAPY
02/27/24 2116   Patient Assessment/Suction   Level of Consciousness (AVPU) alert   Respiratory Effort Normal;Unlabored   Expansion/Accessory Muscles/Retractions no use of accessory muscles   All Lung Fields Breath Sounds Anterior:;coarse;diminished   Rhythm/Pattern, Respiratory unlabored;pattern regular;depth regular;no shortness of breath reported   Cough Frequency infrequent   PRE-TX-O2   Device (Oxygen Therapy) room air   SpO2 99 %   Pulse Oximetry Type Intermittent   $ Pulse Oximetry - Multiple Charge Pulse Oximetry - Multiple   Pulse 69   Resp 16   Aerosol Therapy   $ Aerosol Therapy Charges Aerosol Treatment   Daily Review of Necessity (SVN) completed   Respiratory Treatment Status (SVN) given   Treatment Route (SVN) mask;oxygen   Patient Position (SVN) HOB elevated   Post Treatment Assessment (SVN) increased aeration   Signs of Intolerance (SVN) none   Breath Sounds Post-Respiratory Treatment   Post-treatment Heart Rate (beats/min) 69   Post-treatment Resp Rate (breaths/min) 16   Tobacco Cessation Intervention   Do you use any type of tobacco product? No   Respiratory Evaluation   $ Care Plan Tech Time 15 min   $ Eval/Re-eval Charges Evaluation   Evaluation For New Orders   Admitting Diagnosis copd exacerbation   Pulmonary Diagnosis copd, sleep apnea   Home Oxygen   Has Home Oxygen? No   Home Aerosol, MDI, DPI, and Other Treatments/Therapies   Home Respiratory Therapy Per Patient/Review of Chart Yes   MDI Home Meds/Freq Albuterol q4 prn   Other Home Respiratory Therapies home cpap   Oxygen Care Plan   Rationale No rational found   Bronchodilator Care Plan   Bronchodilator Care Plan Aerosol   Aerosol Meds w/ frequency Albuterol - Ipratropium (DUO-NEB) 0.5mg-3mg(2.5ml) 3ml Nebulizer Solution2.5mg TID   Rationale Maintain home respiratory medicine   Atelectasis Care Plan   Rationale No Rational Found   Airway Clearance Care Plan   Rationale No rationale found

## 2024-02-28 NOTE — CONSULTS
[facility]   Cardiology Note    Consult Requested By: JONES  Reason for Consult: Chest pain    SUBJECTIVE:     History of Present Illness: The pt Patient presents as a 68 yr old White Female with Of coronary disease, status post coronary bypass surgery in 2011 x 3 vessel, lima to LAD, saphenous vein graft to OM and RCA. Patent LIMA to LAD, saphenous vein graft to OM, and RCA 7 during graft to the RCA in 2021, Hypertension, hyperlipidemia, and diabetes mellitus     The pt was last seen in office in September. She has had increasing episodes of chest pain at rest and with exertion that starts in left side of chest and radiates to left breast neck and jaw. She has also had increased cough and SOB at rest over the last few days. She states nitroglycerin was ineffective and she was using oxygen but symptoms persisted and she presented to the ED. Of note , she did stop taking prednisone. ED w/up bilateral interstitial airspsace opacities, stable from previous, oxygen sats  in 90=-91 % range. EKG with no acute changes TWI lateral leads not new. Cretinine 2.0 mag 1.9 BNP 44, trop flat 30's     Pt examined this am and reports feeling much better. She is sitting in chair at bedside, and has ambulated across room with no pain. She reports SOB is markedly improved. Tele reviewed Sr. BP stable H/H 12/38 cr down to 1.2 k 4.3 echo EF 55-60 % mold MR, PAP 28 mmhg     Review of patient's allergies indicates:   Allergen Reactions    Metronidazole Other (See Comments)     DISORIENTED       Statins-hmg-coa reductase inhibitors Tinitus     Joint pain       Past Medical History:   Diagnosis Date    Arthritis     COPD (chronic obstructive pulmonary disease)     Coronary artery disease     Hypertension     Sleep apnea     use CPAP at night      Past Surgical History:   Procedure Laterality Date    CORONARY ANGIOGRAPHY N/A 2/15/2021    Procedure: ANGIOGRAM, CORONARY ARTERY;  Surgeon: Aidan Buchanan MD;  Location: [facility] CATH/EP LAB;   Service: Cardiology;  Laterality: N/A;    CORONARY ARTERY BYPASS GRAFT      CORONARY BYPASS GRAFT ANGIOGRAPHY  2/15/2021    Procedure: Bypass graft study;  Surgeon: Aidan Buchanan MD;  Location: [facility] CATH/EP LAB;  Service: Cardiology;;    HYSTERECTOMY      JOINT REPLACEMENT      bilateral knee replacement     LEFT HEART CATHETERIZATION Left 2/15/2021    Procedure: Left heart cath;  Surgeon: Aidan Buchanan MD;  Location: [facility] CATH/EP LAB;  Service: Cardiology;  Laterality: Left;     Family History   Problem Relation Age of Onset    Pulmonary embolism Mother      Social History     Tobacco Use    Smoking status: Never     Passive exposure: Past    Smokeless tobacco: Never   Substance Use Topics    Alcohol use: No    Drug use: No       Review of Systems:  ROS    OBJECTIVE:     Vital Signs (Most Recent)  Temp: 97.9 °F (36.6 °C) (02/28/24 0743)  Pulse: (!) 57 (02/28/24 0743)  Resp: 17 (02/28/24 0743)  BP: (!) 142/75 (02/28/24 0743)  SpO2: 95 % (02/28/24 0743)    Vital Signs Range (Last 24H):  Temp:  [97.7 °F (36.5 °C)-98.2 °F (36.8 °C)]   Pulse:  [57-91]   Resp:  [16-24]   BP: (136-179)/(62-91)   SpO2:  [92 %-99 %]     I & O (Last 24H):    Intake/Output Summary (Last 24 hours) at 2/28/2024 0854  Last data filed at 2/28/2024 0840  Gross per 24 hour   Intake 240 ml   Output --   Net 240 ml       Current Diet:     Current Diet Order   Procedures    Diet Cardiac        Allergies:  Review of patient's allergies indicates:   Allergen Reactions    Metronidazole Other (See Comments)     DISORIENTED       Statins-hmg-coa reductase inhibitors Tinitus     Joint pain       Meds:  Scheduled Meds:   albuterol-ipratropium  3 mL Nebulization Q6H WAKE    aspirin  81 mg Oral QHS    cefTRIAXone (Rocephin) IV (PEDS and ADULTS)  1 g Intravenous Q24H    And    azithromycin  500 mg Oral Daily    chlorthalidone  25 mg Oral Daily    enoxparin  30 mg Subcutaneous Daily    losartan  25 mg Oral QHS    nitroGLYCERIN 2% TD oint  1 inch Topical  (Top) Q6H    pantoprazole  40 mg Oral Daily    predniSONE  40 mg Oral Daily     Continuous Infusions:  PRN Meds:acetaminophen, dextrose 50% in water (D50W), dextrose 50% in water (D50W), glucagon (human recombinant), glucose, glucose, insulin aspart U-100, levalbuterol, loperamide, melatonin, modafiniL, ondansetron, oxyCODONE, senna-docusate 8.6-50 mg, sodium chloride 0.9%    Oxygen/Ventilator Data (Last 24H):  (if applicable)            Hemodynamic Parameters (Last 24H):   (if applicable)        Laboratory and Radiology Data:  Recent Results (from the past 24 hour(s))   POCT glucose    Collection Time: 02/27/24  4:03 PM   Result Value Ref Range    POC Glucose 168 (H) 70 - 110   POCT glucose    Collection Time: 02/27/24  8:51 PM   Result Value Ref Range    POC Glucose 132 (H) 70 - 110   POCT glucose    Collection Time: 02/28/24  7:11 AM   Result Value Ref Range    POC Glucose 116 (H) 70 - 110     Imaging Results              X-Ray Chest AP Portable (Final result)  Result time 02/26/24 17:17:28      Final result by Melvina Kaye DO (02/26/24 17:17:28)                   Narrative:    AP chest radiograph: 2/26/2024 5:17 PM CST    Indication: 68 years  old Female with Chest Pain.    Comparison: 12/31/2021    Findings: The cardiomediastinal silhouette is normal in size.    No pneumothorax is seen.    There are bilateral interstitial airspace opacities. Some of these appear to be chronic.    Midline sternotomy changes are seen.    No discrete pleural effusion is apparent.    Impression: There are bilateral interstitial airspace opacities. Some of these appear to be chronic.    Electronically signed by:  Melvina Kaye DO  02/26/2024 05:17 PM CST Workstation: [id number]                                    12-lead EKG interpretation:  (if applicable)      Current Cardiac Rhythm:   (if applicable)    Physical Exam:   Physical Exam    ASSESSMENT/PLAN:   Assessment:   COPD exacerbation  Chest pain  HAYLEY  DM  COPD  GERD  "    Plan: Pt symptoms have improved  No c/o chest pain this am  Trop flat  No acute changes in EKG  Renal function improved  Will maximize anti-anginal therapy  Continue ASA  Add Effient, pt states plavix "disagrees with her"   Add imdur/ranexa  Hold BB until COPD exacerbation has resolved then restart at previous dose  Will discuss stress testing vs LHC after COPD exacerbation has resolved  Thank you for your consult  Will follow   Discussed case with              "

## 2024-02-28 NOTE — PLAN OF CARE
Patient to follow up with Dr. Bowen (pulmonology) as previously scheduled. PCP follow up scheduled and added to AVS   02/28/24 1017   Post-Acute Status   Post-Acute Authorization Other   Other Status No Post-Acute Service Needs   Hospital Resources/Appts/Education Provided Appointments scheduled and added to AVS

## 2024-02-28 NOTE — NURSING
Nurses Note -- 4 Eyes      2/28/2024   4:22 AM      Skin assessed during: Admit      [] No Altered Skin Integrity Present    []Prevention Measures Documented      [] Yes- Altered Skin Integrity Present or Discovered   [] LDA Added if Not in Epic (Describe Wound)   [] New Altered Skin Integrity was Present on Admit and Documented in LDA   [] Wound Image Taken    Wound Care Consulted? No    Attending Nurse:  Coretta Boyle RN/Staff Member:  to jimenez

## 2024-02-28 NOTE — ED NOTES
"Pt states she is "uncomfortable" and "wants a room." Pt states "the hospital my daughter works at has a room" and would like to go there. Pt informed that a transfer is not possible due to lack of medical need. Pt informed of choice to leave AMA, pt did not respond.   "

## 2024-02-28 NOTE — PLAN OF CARE
Problem: Adjustment to Illness COPD (Chronic Obstructive Pulmonary Disease)  Goal: Optimal Chronic Illness Coping  Outcome: Ongoing, Progressing     Problem: Functional Ability Impaired COPD (Chronic Obstructive Pulmonary Disease)  Goal: Optimal Level of Functional [place]  Outcome: Ongoing, Progressing     Problem: Infection COPD (Chronic Obstructive Pulmonary Disease)  Goal: Absence of Infection Signs and Symptoms  Outcome: Ongoing, Progressing     Problem: Oral Intake Inadequate COPD (Chronic Obstructive Pulmonary Disease)  Goal: Improved Nutrition Intake  Outcome: Ongoing, Progressing     Problem: Respiratory Compromise COPD (Chronic Obstructive Pulmonary Disease)  Goal: Effective Oxygenation and Ventilation  Outcome: Ongoing, Progressing     Problem: Adult Inpatient Plan of Care  Goal: Plan of Care Review  Outcome: Ongoing, Progressing  Goal: Patient-Specific Goal (Individualized)  Outcome: Ongoing, Progressing  Goal: Absence of Hospital-Acquired Illness or Injury  Outcome: Ongoing, Progressing  Goal: Optimal Comfort and Wellbeing  Outcome: Ongoing, Progressing  Goal: Readiness for Transition of Care  Outcome: Ongoing, Progressing     Problem: Diabetes Comorbidity  Goal: Blood Glucose Level Within Targeted Range  Outcome: Ongoing, Progressing

## 2024-02-29 VITALS
TEMPERATURE: 98 F | RESPIRATION RATE: 18 BRPM | SYSTOLIC BLOOD PRESSURE: 156 MMHG | OXYGEN SATURATION: 95 % | DIASTOLIC BLOOD PRESSURE: 72 MMHG | HEART RATE: 75 BPM | WEIGHT: 179.63 LBS | HEIGHT: 61 IN | BODY MASS INDEX: 33.91 KG/M2

## 2024-02-29 PROBLEM — I20.0 UNSTABLE ANGINA: Status: RESOLVED | Noted: 2024-02-26 | Resolved: 2024-02-29

## 2024-02-29 LAB
GLUCOSE SERPL-MCNC: 100 MG/DL (ref 70–110)
GLUCOSE SERPL-MCNC: 109 MG/DL (ref 70–110)

## 2024-02-29 PROCEDURE — 99900035 HC TECH TIME PER 15 MIN (STAT)

## 2024-02-29 PROCEDURE — 99900031 HC PATIENT EDUCATION (STAT)

## 2024-02-29 PROCEDURE — 63700000 PHARM REV CODE 250 ALT 637 W/O HCPCS: Performed by: PHYSICAL THERAPY ASSISTANT

## 2024-02-29 PROCEDURE — 25000003 PHARM REV CODE 250

## 2024-02-29 PROCEDURE — 63600175 PHARM REV CODE 636 W HCPCS: Performed by: PHYSICAL THERAPY ASSISTANT

## 2024-02-29 PROCEDURE — 94761 N-INVAS EAR/PLS OXIMETRY MLT: CPT

## 2024-02-29 PROCEDURE — 25000242 PHARM REV CODE 250 ALT 637 W/ HCPCS: Performed by: PHYSICAL THERAPY ASSISTANT

## 2024-02-29 PROCEDURE — 94664 DEMO&/EVAL PT USE INHALER: CPT

## 2024-02-29 PROCEDURE — 94640 AIRWAY INHALATION TREATMENT: CPT

## 2024-02-29 PROCEDURE — 25000003 PHARM REV CODE 250: Performed by: PHYSICAL THERAPY ASSISTANT

## 2024-02-29 RX ORDER — LEVOFLOXACIN 500 MG/1
500 TABLET, FILM COATED ORAL DAILY
Qty: 5 TABLET | Refills: 0 | Status: SHIPPED | OUTPATIENT
Start: 2024-02-29 | End: 2024-03-05

## 2024-02-29 RX ORDER — METOPROLOL SUCCINATE 25 MG/1
25 TABLET, EXTENDED RELEASE ORAL DAILY
Status: ON HOLD
Start: 2024-02-29 | End: 2024-06-13

## 2024-02-29 RX ORDER — MODAFINIL 200 MG/1
200 TABLET ORAL 2 TIMES DAILY PRN
Qty: 60 TABLET | Refills: 5
Start: 2024-02-29 | End: 2024-03-05

## 2024-02-29 RX ORDER — OMEPRAZOLE 40 MG/1
40 CAPSULE, DELAYED RELEASE ORAL DAILY
Qty: 30 CAPSULE | Refills: 0 | Status: ON HOLD
Start: 2024-02-29 | End: 2024-06-13

## 2024-02-29 RX ORDER — LEVALBUTEROL TARTRATE 45 UG/1
1-2 AEROSOL, METERED ORAL EVERY 4 HOURS PRN
Qty: 15 G | Refills: 11
Start: 2024-02-29 | End: 2025-02-28

## 2024-02-29 RX ORDER — ALBUTEROL SULFATE 0.83 MG/ML
2.5 SOLUTION RESPIRATORY (INHALATION) EVERY 6 HOURS PRN
Qty: 120 EACH | Refills: 11
Start: 2024-02-29 | End: 2024-03-05 | Stop reason: SDUPTHER

## 2024-02-29 RX ORDER — EVOLOCUMAB 140 MG/ML
140 INJECTION, SOLUTION SUBCUTANEOUS
Start: 2024-02-29

## 2024-02-29 RX ORDER — PREDNISONE 20 MG/1
40 TABLET ORAL DAILY
Qty: 10 TABLET | Refills: 0 | Status: SHIPPED | OUTPATIENT
Start: 2024-03-01 | End: 2024-03-05 | Stop reason: SDUPTHER

## 2024-02-29 RX ORDER — ISOPROPYL ALCOHOL IN GLYCERIN 95 %-5 %
1 DROPS OTIC (EAR)
Start: 2024-02-29

## 2024-02-29 RX ORDER — ISOSORBIDE MONONITRATE 30 MG/1
30 TABLET, EXTENDED RELEASE ORAL DAILY
Qty: 30 TABLET | Refills: 2 | Status: SHIPPED | OUTPATIENT
Start: 2024-03-01 | End: 2024-05-30

## 2024-02-29 RX ORDER — NITROGLYCERIN 40 MG/1
1 PATCH TRANSDERMAL DAILY
Start: 2024-02-29

## 2024-02-29 RX ORDER — METFORMIN HYDROCHLORIDE 500 MG/1
500 TABLET, EXTENDED RELEASE ORAL NIGHTLY
Start: 2024-02-29

## 2024-02-29 RX ORDER — ICOSAPENT ETHYL 1 G/1
2 CAPSULE ORAL 2 TIMES DAILY
Start: 2024-02-29

## 2024-02-29 RX ORDER — PRASUGREL 10 MG/1
10 TABLET, FILM COATED ORAL DAILY
Qty: 30 TABLET | Refills: 2 | Status: SHIPPED | OUTPATIENT
Start: 2024-03-01 | End: 2024-05-30

## 2024-02-29 RX ORDER — CHLORTHALIDONE 25 MG/1
25 TABLET ORAL DAILY
Start: 2024-02-29

## 2024-02-29 RX ORDER — RANOLAZINE 500 MG/1
500 TABLET, EXTENDED RELEASE ORAL 2 TIMES DAILY
Qty: 60 TABLET | Refills: 2 | Status: SHIPPED | OUTPATIENT
Start: 2024-02-29 | End: 2024-05-29

## 2024-02-29 RX ORDER — ACETAMINOPHEN 500 MG
500 TABLET ORAL EVERY 4 HOURS PRN
Refills: 0
Start: 2024-02-29

## 2024-02-29 RX ORDER — ENOXAPARIN SODIUM 100 MG/ML
40 INJECTION SUBCUTANEOUS EVERY 24 HOURS
Status: DISCONTINUED | OUTPATIENT
Start: 2024-02-29 | End: 2024-02-29 | Stop reason: HOSPADM

## 2024-02-29 RX ORDER — TRAZODONE HYDROCHLORIDE 50 MG/1
25-50 TABLET ORAL NIGHTLY
Start: 2024-02-29

## 2024-02-29 RX ORDER — LOSARTAN POTASSIUM 25 MG/1
25 TABLET ORAL NIGHTLY
Start: 2024-02-29

## 2024-02-29 RX ORDER — ASPIRIN 81 MG/1
81 TABLET ORAL DAILY
Qty: 30 TABLET | Refills: 0 | OUTPATIENT
Start: 2024-02-29 | End: 2024-06-12

## 2024-02-29 RX ORDER — OXYCODONE HYDROCHLORIDE 15 MG/1
15 TABLET ORAL EVERY 6 HOURS PRN
Qty: 20 TABLET | Refills: 0 | Status: SHIPPED | OUTPATIENT
Start: 2024-02-29 | End: 2024-03-05

## 2024-02-29 RX ADMIN — ISOSORBIDE MONONITRATE 30 MG: 30 TABLET, EXTENDED RELEASE ORAL at 08:02

## 2024-02-29 RX ADMIN — PANTOPRAZOLE SODIUM 40 MG: 40 TABLET, DELAYED RELEASE ORAL at 08:02

## 2024-02-29 RX ADMIN — PRASUGREL 10 MG: 10 TABLET, FILM COATED ORAL at 08:02

## 2024-02-29 RX ADMIN — OXYCODONE HYDROCHLORIDE 15 MG: 5 TABLET ORAL at 08:02

## 2024-02-29 RX ADMIN — RANOLAZINE 500 MG: 500 TABLET, FILM COATED, EXTENDED RELEASE ORAL at 08:02

## 2024-02-29 RX ADMIN — OXYCODONE HYDROCHLORIDE 15 MG: 5 TABLET ORAL at 02:02

## 2024-02-29 RX ADMIN — AZITHROMYCIN DIHYDRATE 500 MG: 250 TABLET ORAL at 08:02

## 2024-02-29 RX ADMIN — IPRATROPIUM BROMIDE AND ALBUTEROL SULFATE 3 ML: 2.5; .5 SOLUTION RESPIRATORY (INHALATION) at 08:02

## 2024-02-29 RX ADMIN — PREDNISONE 40 MG: 20 TABLET ORAL at 08:02

## 2024-02-29 RX ADMIN — CHLORTHALIDONE 25 MG: 25 TABLET ORAL at 08:02

## 2024-02-29 NOTE — PROGRESS NOTES
[facility]  Progress Note    Patient Name: Sakshi Hess  MRN: [id number]  Patient Class: IP- Inpatient   Admission Date: 2/26/2024  Length of Stay: 1 days  Attending Physician: Maria Teresa Marrero MD  Primary Care Provider: Wenceslao Deras NP        Subjective:     Principal Problem:COPD exacerbation        HPI:  Patient is a 68-year-old female with a past medical history of COPD, pulmonary fibrosis, CAD status post CABG and multiple stents, GERD, and hypertension. Patient presents to the ED today with complaints of worsening shortness a breath, dyspnea on exertion, and chest pain. Patient states symptoms have gradually occurred over the last 3 weeks. Patient states they have also gradually progressed thus bringing her into the ED today. Patient reports she has not taken her chronic COPD medications due to fear of becoming mean on her prednisone. Chest x-ray today shows bilateral interstitial airspace opacities but is mostly stable compared to previous imaging, O2 90-91% in ED on initial assessment. Improved saturation following nebs. EKG was normal sinus rhythm, does have lateral T-wave inversions also present on last EKG.  Hemoglobin 12.7, potassium 4.0, creatinine 2.0, Mag 1.9, troponin 30.1, BNP 44.  Last cardiac catheterization with ejection fraction of 55%.  My exam patient denies shortness or breath, nausea, vomiting, abdominal pain, diarrhea, constipation.  Patient is full code.    Overview/Hospital Course:  Patient admitted with COPD exacerbation and unstable angina. She has noticed over the past two weeks she has chest pain at rest while in bed or in early morning. She describes the pain on left chest wall that wraps around and under left breast, radiating to her left shoulder, neck and jaw.   She has been using her NTG and it does relieve the pain most days. She feels like this pain is worse than when she had her CABG. In addition, she has had severe cough with  increased SOB for past few days. She has not gone up in her home O2 2-3 liters. She has tried her home nebulizer without relief. She has been placed on COPD pathway, on oral prednisone and Rocephin and azithromycin due to reported chills at home.     Cardiology eval: maximize anti-anginal therapy, changed to effient;       Interval History: see hospital course     Review of Systems   Constitutional:  Positive for chills and fatigue.   HENT:  Positive for congestion.    Respiratory:  Positive for cough, shortness of breath and wheezing.    Cardiovascular:  Positive for chest pain.   Gastrointestinal: Negative.    Genitourinary: Negative.    Musculoskeletal:  Positive for back pain.   Skin: Negative.    Neurological: Negative.      Objective:     Vital Signs (Most Recent):  Temp: 98 °F (36.7 °C) (02/28/24 2308)  Pulse: 72 (02/28/24 2308)  Resp: 16 (02/28/24 2308)  BP: 139/67 (02/28/24 2308)  SpO2: 95 % (02/28/24 2308) Vital Signs (24h Range):  Temp:  [97.5 °F (36.4 °C)-98 °F (36.7 °C)] 98 °F (36.7 °C)  Pulse:  [55-83] 72  Resp:  [16-18] 16  SpO2:  [91 %-99 %] 95 %  BP: (136-181)/(60-80) 139/67     Weight: 81.5 kg (179 lb 9.6 oz)  Body mass index is 33.94 kg/m².    Intake/Output Summary (Last 24 hours) at 2/28/2024 2335  Last data filed at 2/28/2024 2332  Gross per 24 hour   Intake 600 ml   Output --   Net 600 ml           Physical Exam  Constitutional:       General: She is not in acute distress.     Appearance: She is not diaphoretic.   HENT:      Head: Normocephalic and atraumatic.      Mouth/Throat:      Mouth: Mucous membranes are dry.      Pharynx: Oropharynx is clear. No oropharyngeal exudate.   Cardiovascular:      Rate and Rhythm: Normal rate and regular rhythm.      Pulses: Normal pulses.   Pulmonary:      Breath sounds: Wheezing and rhonchi present.   Abdominal:      General: Bowel sounds are normal. There is no distension.      Tenderness: There is no abdominal tenderness.   Musculoskeletal:          General: No swelling. Normal range of motion.      Cervical back: Normal range of motion and neck supple.   Lymphadenopathy:      Cervical: No cervical adenopathy.   Skin:     General: Skin is warm and dry.      Capillary Refill: Capillary refill takes less than 2 seconds.   Neurological:      General: No focal deficit present.      Mental Status: She is alert and oriented to person, place, and time. Mental status is at baseline.   Psychiatric:         Mood and Affect: Mood normal.         Behavior: Behavior normal.             Significant Labs: All pertinent labs within the past 24 hours have been reviewed.  Recent Lab Results         02/28/24 2013 02/28/24  1616   02/28/24  1113   02/28/24  0711        POC Glucose 136   163   134   116               Significant Imaging: I have reviewed all pertinent imaging results/findings within the past 24 hours.    Assessment/Plan:      * COPD exacerbation  Patient's COPD is with exacerbation noted by continued dyspnea currently. Patient is currently on COPD Pathway.  Steroids, Antibiotics, and Supplemental oxygen PRN for O2 >90% and monitor respiratory status closely.   Covid and flu, procal pending.   CXR with B opacities, mostly stable compared to previous   Start IV ceftriaxone and azithromycin  due to sputum production and low grade fever of 99 on my exam with chills.   Duonebs and continue scheduled inhalers       Diabetes  On metformin, will add POCT glucose checks and insulin sliding scale     Unstable angina  Unstable angina for approximately 3 weeks that has progressively worsened; concern that chest pain happens at night while   History of CABG and multiple stents   Last cardiac cath in 2021 shows:  70% left main with total occlusion pulsatile flow of the distal left anterior descending artery  70% stenosis of the proximal circumflex  Ninety and 95% stenosis of the mid right coronary artery  Cardiology consulted due to extensive history for further recs  First  troponin elevated to 30.1, trend cardiac enzymes and troponin  Updated echo pending   aspirin, p.r.n. nitrates  Has intolerance to statins  Cardiac diet  repeat 12 lead EKG with chest pain  Dr. Norwood consulted       Obstructive sleep apnea syndrome  CPAP qhs      GERD (gastroesophageal reflux disease)  PPI         VTE Risk Mitigation (From admission, onward)           Ordered     enoxaparin injection 30 mg  Daily         02/26/24 1954     IP VTE HIGH RISK PATIENT  Once         02/26/24 1954     Place sequential compression device  Until discontinued         02/26/24 1954                    Discharge Planning   RENATO: 2/29/2024     Code Status: Full Code   Is the patient medically ready for discharge?:     Reason for patient still in hospital (select all that apply): Patient trending condition  Discharge Plan A: Home                  Maria Teresa Marrero MD  Department of Hospital Medicine   [facility]

## 2024-02-29 NOTE — PLAN OF CARE
Pt cleared for DC once new home meds are delivered to bedside.    02/29/24 1009   Final Note   Assessment Type Final Discharge Note   Anticipated Discharge Disposition Home

## 2024-02-29 NOTE — RESPIRATORY THERAPY
02/28/24 2028   Patient Assessment/Suction   Level of Consciousness (AVPU) alert   Respiratory Effort Normal;Unlabored   Expansion/Accessory Muscles/Retractions no retractions;no use of accessory muscles   All Lung Fields Breath Sounds diminished   PRE-TX-O2   Device (Oxygen Therapy) room air   SpO2 (!) 91 %   Pulse Oximetry Type Intermittent   $ Pulse Oximetry - Multiple Charge Pulse Oximetry - Multiple   Pulse 67   Resp 16   Aerosol Therapy   $ Aerosol Therapy Charges Aerosol Treatment   Daily Review of Necessity (SVN) completed   Respiratory Treatment Status (SVN) given   Treatment Route (SVN) mask;oxygen   Patient Position (SVN) HOB elevated   Post Treatment Assessment (SVN) breath sounds unchanged   Signs of Intolerance (SVN) none   Breath Sounds Post-Respiratory Treatment   Throughout All Fields Post-Treatment All Fields   Throughout All Fields Post-Treatment no change   Post-treatment Heart Rate (beats/min) 70   Post-treatment Resp Rate (breaths/min) 16   Vibratory PEP Therapy   $ Vibratory PEP Charges Aerobika Therapy   $ Vibratory PEP Equipment Aerobika Equipment   $ Vibratory PEP Tech Time Charges 15 min   Daily Review of Necessity (PEP Therapy) completed   Type (PEP Therapy) vibratory/oscillatory   Device (PEP Therapy) flutter   Route (PEP Therapy) mouthpiece   Breaths per Cycle (PEP Therapy) 10   Cycles (PEP Therapy) 1   Patient Position (PEP Therapy) HOB elevated   Post Treatment Assessment (PEP) breath sounds unchanged   Signs of Intolerance (PEP Therapy) none   Preset CPAP/BiPAP Settings   Mode Of Delivery Standby;other (see comments)  (Home cpap)   Education   $ Education BiPAP;Bronchodilator;Other (see comment);15 min  (Aerobika)

## 2024-02-29 NOTE — CARE UPDATE
02/29/24 0816   Patient Assessment/Suction   Level of Consciousness (AVPU) alert   Respiratory Effort Unlabored   Expansion/Accessory Muscles/Retractions no use of accessory muscles   All Lung Fields Breath Sounds diminished   PRE-TX-O2   Device (Oxygen Therapy) room air   SpO2 95 %   Pulse Oximetry Type Intermittent   $ Pulse Oximetry - Multiple Charge Pulse Oximetry - Multiple   Pulse 69   Resp 15   Aerosol Therapy   $ Aerosol Therapy Charges Aerosol Treatment   Daily Review of Necessity (SVN) completed   Respiratory Treatment Status (SVN) given   Treatment Route (SVN) mask   Patient Position (SVN) semi-Portillo's   Post Treatment Assessment (SVN) increased aeration   Signs of Intolerance (SVN) none   Breath Sounds Post-Respiratory Treatment   Throughout All Fields Post-Treatment aeration increased   Post-treatment Heart Rate (beats/min) 68   Post-treatment Resp Rate (breaths/min) 15   Vibratory PEP Therapy   $ Vibratory PEP Charges Acapella Therapy   $ Vibratory PEP Tech Time Charges 15 min   Daily Review of Necessity (PEP Therapy) completed   Type (PEP Therapy) vibratory/oscillatory   Device (PEP Therapy) flutter   Route (PEP Therapy) mouthpiece   Signs of Intolerance (PEP Therapy) none   Preset CPAP/BiPAP Settings   Mode Of Delivery   (home cpap)   Education   $ Education Bronchodilator;15 min   Respiratory Evaluation   $ Care Plan Tech Time 15 min

## 2024-02-29 NOTE — PROGRESS NOTES
[facility]   Cardiology Note    Consult Requested By: JONES  Reason for Consult: Chest pain    SUBJECTIVE:     History of Present Illness: The pt Patient presents as a 68 yr old White Female with Of coronary disease, status post coronary bypass surgery in 2011 x 3 vessel, lima to LAD, saphenous vein graft to OM and RCA. Patent LIMA to LAD, saphenous vein graft to OM, and RCA 7 during graft to the RCA in 2021, Hypertension, hyperlipidemia, and diabetes mellitus     The pt was last seen in office in September. She has had increasing episodes of chest pain at rest and with exertion that starts in left side of chest and radiates to left breast neck and jaw. She has also had increased cough and SOB at rest over the last few days. She states nitroglycerin was ineffective and she was using oxygen but symptoms persisted and she presented to the ED. Of note , she did stop taking prednisone. ED w/up bilateral interstitial airspsace opacities, stable from previous, oxygen sats  in 90=-91 % range. EKG with no acute changes TWI lateral leads not new. Cretinine 2.0 mag 1.9 BNP 44, trop flat 30's     Pt examined this am and reports feeling much better. She is sitting in chair at bedside, and has ambulated across room with no pain. She reports SOB is markedly improved. Tele reviewed Sr. BP stable H/H 12/38 cr down to 1.2 k 4.3 echo EF 55-60 % mold MR, PAP 28 mmhg     2/29==The pt is sitting in bedside chair with no chest pain ,denies GORDON. Denies LE edema. BP stable, Sr tele. Ambulating in room with no symptoms.     Review of patient's allergies indicates:   Allergen Reactions    Metronidazole Other (See Comments)     DISORIENTED       Statins-hmg-coa reductase inhibitors Tinitus     Joint pain       Past Medical History:   Diagnosis Date    Arthritis     COPD (chronic obstructive pulmonary disease)     Coronary artery disease     Hypertension     Sleep apnea     use CPAP at night      Past Surgical History:   Procedure  Laterality Date    CORONARY ANGIOGRAPHY N/A 2/15/2021    Procedure: ANGIOGRAM, CORONARY ARTERY;  Surgeon: Aidan Buchanan MD;  Location: [facility] CATH/EP LAB;  Service: Cardiology;  Laterality: N/A;    CORONARY ARTERY BYPASS GRAFT      CORONARY BYPASS GRAFT ANGIOGRAPHY  2/15/2021    Procedure: Bypass graft study;  Surgeon: Aidan Buchanan MD;  Location: [facility] CATH/EP LAB;  Service: Cardiology;;    HYSTERECTOMY      JOINT REPLACEMENT      bilateral knee replacement     LEFT HEART CATHETERIZATION Left 2/15/2021    Procedure: Left heart cath;  Surgeon: Aidan Buchanan MD;  Location: [facility] CATH/EP LAB;  Service: Cardiology;  Laterality: Left;     Family History   Problem Relation Age of Onset    Pulmonary embolism Mother      Social History     Tobacco Use    Smoking status: Never     Passive exposure: Past    Smokeless tobacco: Never   Substance Use Topics    Alcohol use: No    Drug use: No       Review of Systems:  Review of Systems   Constitutional:  Negative for chills, fever and weight loss.   Respiratory:  Positive for cough and sputum production. Negative for hemoptysis and shortness of breath.    Cardiovascular:  Negative for chest pain and palpitations.   Gastrointestinal:  Negative for heartburn and nausea.   Neurological:  Negative for dizziness and headaches.       OBJECTIVE:     Vital Signs (Most Recent)  Temp: 98.1 °F (36.7 °C) (02/29/24 0759)  Pulse: 69 (02/29/24 0816)  Resp: 15 (02/29/24 0816)  BP: (!) 153/71 (02/29/24 0759)  SpO2: 95 % (02/29/24 0816)    Vital Signs Range (Last 24H):  Temp:  [97.5 °F (36.4 °C)-98.1 °F (36.7 °C)]   Pulse:  [55-83]   Resp:  [15-18]   BP: (139-181)/(60-80)   SpO2:  [91 %-99 %]     I & O (Last 24H):    Intake/Output Summary (Last 24 hours) at 2/29/2024 0837  Last data filed at 2/29/2024 0408  Gross per 24 hour   Intake 720 ml   Output --   Net 720 ml         Current Diet:     Current Diet Order   Procedures    Diet Cardiac        Allergies:  Review of patient's allergies  indicates:   Allergen Reactions    Metronidazole Other (See Comments)     DISORIENTED       Statins-hmg-coa reductase inhibitors Tinitus     Joint pain       Meds:  Scheduled Meds:   albuterol-ipratropium  3 mL Nebulization Q6H WAKE    aspirin  81 mg Oral QHS    cefTRIAXone (Rocephin) IV (PEDS and ADULTS)  1 g Intravenous Q24H    And    azithromycin  500 mg Oral Daily    chlorthalidone  25 mg Oral Daily    enoxparin  40 mg Subcutaneous Daily    isosorbide mononitrate  30 mg Oral Daily    losartan  25 mg Oral QHS    nitroGLYCERIN 2% TD oint  1 inch Topical (Top) Q6H    pantoprazole  40 mg Oral Daily    prasugreL  10 mg Oral Daily    predniSONE  40 mg Oral Daily    ranolazine  500 mg Oral BID     Continuous Infusions:  PRN Meds:acetaminophen, dextrose 50% in water (D50W), dextrose 50% in water (D50W), glucagon (human recombinant), glucose, glucose, insulin aspart U-100, levalbuterol, loperamide, melatonin, modafiniL, ondansetron, oxyCODONE, senna-docusate 8.6-50 mg, sodium chloride 0.9%    Oxygen/Ventilator Data (Last 24H):  (if applicable)            Hemodynamic Parameters (Last 24H):   (if applicable)        Laboratory and Radiology Data:  Recent Results (from the past 24 hour(s))   POCT glucose    Collection Time: 02/28/24 11:13 AM   Result Value Ref Range    POC Glucose 134 (H) 70 - 110   POCT glucose    Collection Time: 02/28/24  4:16 PM   Result Value Ref Range    POC Glucose 163 (H) 70 - 110   POCT glucose    Collection Time: 02/28/24  8:13 PM   Result Value Ref Range    POC Glucose 136 (H) 70 - 110   POCT glucose    Collection Time: 02/29/24  7:33 AM   Result Value Ref Range    POC Glucose 109 70 - 110     Imaging Results              X-Ray Chest AP Portable (Final result)  Result time 02/26/24 17:17:28      Final result by Melvina Kaye DO (02/26/24 17:17:28)                   Narrative:    AP chest radiograph: 2/26/2024 5:17 PM CST    Indication: 68 years  old Female with Chest Pain.    Comparison:  "12/31/2021    Findings: The cardiomediastinal silhouette is normal in size.    No pneumothorax is seen.    There are bilateral interstitial airspace opacities. Some of these appear to be chronic.    Midline sternotomy changes are seen.    No discrete pleural effusion is apparent.    Impression: There are bilateral interstitial airspace opacities. Some of these appear to be chronic.    Electronically signed by:  Melvina Kaye DO  02/26/2024 05:17 PM [facility] Workstation: [id number]                                    12-lead EKG interpretation:  (if applicable)      Current Cardiac Rhythm:   (if applicable)    Physical Exam:   Physical Exam  Cardiovascular:      Rate and Rhythm: Normal rate and regular rhythm.      Heart sounds: Murmur heard.   Pulmonary:      Effort: Pulmonary effort is normal. No respiratory distress.      Breath sounds: Wheezing present.   Musculoskeletal:         General: No swelling.   Skin:     General: Skin is warm and dry.   Neurological:      Mental Status: She is alert and oriented to person, place, and time.   Psychiatric:         Mood and Affect: Mood normal.         ASSESSMENT/PLAN:   Assessment:   COPD exacerbation  Chest pain  HAYLEY  DM  COPD  GERD     Plan: Pt symptoms have improved  No c/o chest pain   Trop flat  No acute changes in EKG  SR tele   Renal function improved  Medications adjusted   Continue ASA/Effient   Added Effient,bc pt states plavix "disagrees with her"   Added imdur/ranexa  Restart  BB When COPD exacerbation resolved  No further cardiac intervention planned  Follow up in office in 1-2 weeks with  to discuss further testing vs [facility]  Stable for d/c from a cardiac standpoint.l        "

## 2024-02-29 NOTE — PROGRESS NOTES
Pharmacist Renal Dose Adjustment Note    Sakshi Hess is a 68 y.o. female being treated with the medication Enoxaparin    Patient Data:    Vital Signs (Most Recent):  Temp: 97.9 °F (36.6 °C) (02/29/24 0315)  Pulse: (!) 55 (02/29/24 0315)  Resp: 18 (02/29/24 0315)  BP: (!) 148/72 (02/29/24 0315)  SpO2: 95 % (02/29/24 0315) Vital Signs (72h Range):  Temp:  [97.5 °F (36.4 °C)-99.3 °F (37.4 °C)]   Pulse:  [55-91]   Resp:  [14-30]   BP: (118-181)/(57-91)   SpO2:  [91 %-100 %]      Recent Labs   Lab 02/26/24  1710 02/27/24  0424   CREATININE 2.0* 1.2     Serum creatinine: 1.2 mg/dL 02/27/24 0424  Estimated creatinine clearance: 43.4 mL/min    Medication: Enoxaparin 30 mg Q24H changed to Enoxaparin 40 mg Q24H per Renal Dosing Protocol: CrCl >30 mL/min    Pharmacist's Name: Coretta Berkowitz  Pharmacist's Extension: [phone]

## 2024-02-29 NOTE — PLAN OF CARE
Problem: Adjustment to Illness COPD (Chronic Obstructive Pulmonary Disease)  Goal: Optimal Chronic Illness Coping  Outcome: Met     Problem: Functional Ability Impaired COPD (Chronic Obstructive Pulmonary Disease)  Goal: Optimal Level of Functional [place]  Outcome: Met     Problem: Infection COPD (Chronic Obstructive Pulmonary Disease)  Goal: Absence of Infection Signs and Symptoms  Outcome: Met     Problem: Oral Intake Inadequate COPD (Chronic Obstructive Pulmonary Disease)  Goal: Improved Nutrition Intake  Outcome: Met     Problem: Respiratory Compromise COPD (Chronic Obstructive Pulmonary Disease)  Goal: Effective Oxygenation and Ventilation  Outcome: Met     Problem: Adult Inpatient Plan of Care  Goal: Plan of Care Review  Outcome: Met  Goal: Patient-Specific Goal (Individualized)  Outcome: Met  Goal: Absence of Hospital-Acquired Illness or Injury  Outcome: Met  Goal: Optimal Comfort and Wellbeing  Outcome: Met  Goal: Readiness for Transition of Care  Outcome: Met     Problem: Diabetes Comorbidity  Goal: Blood Glucose Level Within Targeted Range  Outcome: Met

## 2024-02-29 NOTE — SUBJECTIVE & OBJECTIVE
Interval History: see hospital course     Review of Systems   Constitutional:  Positive for chills and fatigue.   HENT:  Positive for congestion.    Respiratory:  Positive for cough, shortness of breath and wheezing.    Cardiovascular:  Positive for chest pain.   Gastrointestinal: Negative.    Genitourinary: Negative.    Musculoskeletal:  Positive for back pain.   Skin: Negative.    Neurological: Negative.      Objective:     Vital Signs (Most Recent):  Temp: 98 °F (36.7 °C) (02/28/24 2308)  Pulse: 72 (02/28/24 2308)  Resp: 16 (02/28/24 2308)  BP: 139/67 (02/28/24 2308)  SpO2: 95 % (02/28/24 2308) Vital Signs (24h Range):  Temp:  [97.5 °F (36.4 °C)-98 °F (36.7 °C)] 98 °F (36.7 °C)  Pulse:  [55-83] 72  Resp:  [16-18] 16  SpO2:  [91 %-99 %] 95 %  BP: (136-181)/(60-80) 139/67     Weight: 81.5 kg (179 lb 9.6 oz)  Body mass index is 33.94 kg/m².    Intake/Output Summary (Last 24 hours) at 2/28/2024 2335  Last data filed at 2/28/2024 2332  Gross per 24 hour   Intake 600 ml   Output --   Net 600 ml           Physical Exam  Constitutional:       General: She is not in acute distress.     Appearance: She is not diaphoretic.   HENT:      Head: Normocephalic and atraumatic.      Mouth/Throat:      Mouth: Mucous membranes are dry.      Pharynx: Oropharynx is clear. No oropharyngeal exudate.   Cardiovascular:      Rate and Rhythm: Normal rate and regular rhythm.      Pulses: Normal pulses.   Pulmonary:      Breath sounds: Wheezing and rhonchi present.   Abdominal:      General: Bowel sounds are normal. There is no distension.      Tenderness: There is no abdominal tenderness.   Musculoskeletal:         General: No swelling. Normal range of motion.      Cervical back: Normal range of motion and neck supple.   Lymphadenopathy:      Cervical: No cervical adenopathy.   Skin:     General: Skin is warm and dry.      Capillary Refill: Capillary refill takes less than 2 seconds.   Neurological:      General: No focal deficit present.       Mental Status: She is alert and oriented to person, place, and time. Mental status is at baseline.   Psychiatric:         Mood and Affect: Mood normal.         Behavior: Behavior normal.             Significant Labs: All pertinent labs within the past 24 hours have been reviewed.  Recent Lab Results         02/28/24  2013   02/28/24  1616   02/28/24  1113   02/28/24  0711        POC Glucose 136   163   134   116               Significant Imaging: I have reviewed all pertinent imaging results/findings within the past 24 hours.

## 2024-02-29 NOTE — NURSING
Patient discharged per orders, all instructions reviewed with patient and patient verbalized understanding.  IV discontinued x 1, Tele box discontinued x 1.  Patient transferred to wheelchair x 1, all belongings by patients side.  Patient's friend to transport her home.

## 2024-03-01 NOTE — DISCHARGE SUMMARY
[facility]  Discharge Summary      Patient Name: Sakshi Hess  MRN: [id number]  COLE: [id number]  Patient Class: IP- Inpatient  Admission Date: 2/26/2024  Hospital Length of Stay: 2 days  Discharge Date and Time: 02/29/2024  Attending Physician: No att. providers found   Discharging Provider: Maria Teresa Marrero MD  Primary Care Provider: Wenceslao Deras NP    Primary Care Team: Networked reference to record PCT     HPI:   Patient is a 68-year-old female with a past medical history of COPD, pulmonary fibrosis, CAD status post CABG and multiple stents, GERD, and hypertension. Patient presents to the ED today with complaints of worsening shortness a breath, dyspnea on exertion, and chest pain. Patient states symptoms have gradually occurred over the last 3 weeks. Patient states they have also gradually progressed thus bringing her into the ED today. Patient reports she has not taken her chronic COPD medications due to fear of becoming mean on her prednisone. Chest x-ray today shows bilateral interstitial airspace opacities but is mostly stable compared to previous imaging, O2 90-91% in ED on initial assessment. Improved saturation following nebs. EKG was normal sinus rhythm, does have lateral T-wave inversions also present on last EKG.  Hemoglobin 12.7, potassium 4.0, creatinine 2.0, Mag 1.9, troponin 30.1, BNP 44.  Last cardiac catheterization with ejection fraction of 55%.  My exam patient denies shortness or breath, nausea, vomiting, abdominal pain, diarrhea, constipation.  Patient is full code.    * No surgery found *      Hospital Course:   Patient admitted with COPD exacerbation and unstable angina. She has noticed over the past two weeks she has chest pain at rest while in bed or in early morning. She describes the pain on left chest wall that wraps around and under left breast, radiating to her left shoulder, neck and jaw.   She has been using her NTG and it does relieve the  pain most days. She feels like this pain is worse than when she had her CABG. In addition, she has had severe cough with increased SOB for past few days. She has not gone up in her home O2 2-3 liters. She has tried her home nebulizer without relief. She has been placed on COPD pathway, on oral prednisone and Rocephin and azithromycin due to reported chills at home.     Cardiology eval: maximize anti-anginal therapy, changed to effient; aspirin and add imdur   Ranexa for chronic chest pain   Continue Repatha   Levaquin to complete 1 week antibiotic   Complete 5 days prednisone        Goals of Care Treatment Preferences:  Code Status: Full Code      Consults:   Consults (From admission, onward)          Status Ordering Provider     Inpatient consult to Cardiology  Once        Provider:  Aidan Buchanan MD    Completed GILL BENNETT            Pulmonary  * COPD exacerbation  Patient's COPD is with exacerbation noted by continued dyspnea currently. Patient is currently on COPD Pathway.  Steroids, Antibiotics, and Supplemental oxygen PRN for O2 >90% and monitor respiratory status closely.   Covid and flu, procal pending.   CXR with B opacities, mostly stable compared to previous   Start IV ceftriaxone and azithromycin  due to sputum production and low grade fever of 99 on my exam with chills.   Duonebs and continue scheduled inhalers         Final Active Diagnoses:    Diagnosis Date Noted POA    PRINCIPAL PROBLEM:  COPD exacerbation [J44.1] 01/03/2020 Yes    Diabetes [E11.9] 02/26/2024 Yes    Obstructive sleep apnea syndrome [G47.33] 04/15/2021 Yes    GERD (gastroesophageal reflux disease) [K21.9] 02/14/2021 Yes     Chronic      Problems Resolved During this Admission:    Diagnosis Date Noted Date Resolved POA    Unstable angina [I20.0] 02/26/2024 02/29/2024 Yes       Discharged Condition: good    Disposition: Home or Self Care    Follow Up:   Follow-up Information       Darek Bowen MD. Go on 3/18/2024.     Specialty: Pulmonary Disease  Why: follow up as previously scheduled  Contact information:  [address] [facility]  [address]  [facility] [address]  [phone]               Buntin, Wenceslao, NP. Go on 3/6/2024.    Specialty: Family Medicine  Why: hospital follow up apt scheduled at 10 AM  Contact information:  [address] [place] [address]  [phone]                           Patient Instructions:      Ambulatory referral/consult to Pulmonology   Standing Status: Future   Referral Priority: Routine Referral Type: Consultation   Referral Reason: Specialty Services Required   Requested Specialty: Pulmonary Disease   Number of Visits Requested: 1       Significant Diagnostic Studies: N/A    Pending Diagnostic Studies:       None           Medications:  Reconciled Home Medications:      Medication List        START taking these medications      isosorbide mononitrate 30 MG 24 hr tablet  Commonly known as: IMDUR  Take 1 tablet (30 mg total) by mouth once daily.     levoFLOXacin 500 MG tablet  Commonly known as: LEVAQUIN  Take 1 tablet (500 mg total) by mouth once daily. for 5 days     prasugreL 10 mg Tab  Commonly known as: EFFIENT  Take 1 tablet (10 mg total) by mouth once daily.     predniSONE 20 MG tablet  Commonly known as: DELTASONE  Take 2 tablets (40 mg total) by mouth once daily. for 5 days     ranolazine 500 MG Tb12  Commonly known as: RANEXA  Take 1 tablet (500 mg total) by mouth 2 (two) times daily.            CHANGE how you take these medications      aspirin 81 MG EC tablet  Commonly known as: ECOTRIN  Take 1 tablet (81 mg total) by mouth once daily.  What changed: when to take this     chlorthalidone 25 MG Tab  Commonly known as: HYGROTEN  Take 1 tablet (25 mg total) by mouth once daily.  What changed: when to take this     omeprazole 40 MG capsule  Commonly known as: PRILOSEC  Take 1 capsule (40 mg total) by mouth once daily.  What changed: when to take this            CONTINUE taking these  medications      acetaminophen 500 MG tablet  Commonly known as: TYLENOL  Take 1 tablet (500 mg total) by mouth every 4 (four) hours as needed for Pain.     albuterol 2.5 mg /3 mL (0.083 %) nebulizer solution  Commonly known as: PROVENTIL  Take 3 mLs (2.5 mg total) by nebulization every 6 (six) hours as needed for Wheezing or Shortness of Breath.     GOODY'S EXTRA STRENGTH 500-325-65 mg Pwpk  Generic drug: aspirin-acetaminophen-caffeine  Take 1 Dose by mouth as needed (pain).     icosapent ethyL 1 gram Cap  Commonly known as: VASCEPA  Take 2 capsules (2,000 mg total) by mouth 2 (two) times daily.     levalbuterol 45 mcg/actuation inhaler  Commonly known as: XOPENEX HFA  Inhale 1-2 puffs into the lungs every 4 (four) hours as needed for Wheezing. Rescue     losartan 25 MG tablet  Commonly known as: COZAAR  Take 1 tablet (25 mg total) by mouth every evening.     metFORMIN 500 MG ER 24hr tablet  Commonly known as: GLUCOPHAGE-XR  Take 1 tablet (500 mg total) by mouth every evening.     metoprolol succinate 25 MG 24 hr tablet  Commonly known as: TOPROL-XL  Take 1 tablet (25 mg total) by mouth once daily.     modafiniL 200 MG Tab  Commonly known as: PROVIGIL  Take 1 tablet (200 mg total) by mouth 2 (two) times daily as needed (excess sleepiness).     nitroGLYCERIN 0.2 mg/hr TD PT24 0.2 mg/hr  Commonly known as: NITRODUR  Place 1 patch onto the skin once daily.     oxyCODONE 15 MG Tab  Commonly known as: ROXICODONE  Take 1 tablet (15 mg total) by mouth every 6 (six) hours as needed for Pain.     REPATHA SURECLICK 140 mg/mL Pnij  Generic drug: evolocumab  Inject 1 mL (140 mg total) into the skin every 14 (fourteen) days.     traZODone 50 MG tablet  Commonly known as: DESYREL  Take 0.5-1 tablets (25-50 mg total) by mouth every evening.            STOP taking these medications      azithromycin 500 MG tablet  Commonly known as: ZITHROMAX              Indwelling Lines/Drains at time of discharge:   Lines/Drains/Airways        None                   Time spent on the discharge of patient: 45 minutes         Maria Teresa Marrero MD  Department of Hospital Medicine  Formerly [facility], [facility]

## 2024-03-02 LAB
BACTERIA BLD CULT: NORMAL
BACTERIA BLD CULT: NORMAL

## 2024-03-05 ENCOUNTER — OFFICE VISIT (OUTPATIENT)
Dept: PULMONOLOGY | Facility: CLINIC | Age: 69
End: 2024-03-05
Payer: MEDICARE

## 2024-03-05 VITALS
HEIGHT: 61 IN | HEART RATE: 73 BPM | DIASTOLIC BLOOD PRESSURE: 74 MMHG | BODY MASS INDEX: 34.04 KG/M2 | WEIGHT: 180.31 LBS | OXYGEN SATURATION: 97 % | SYSTOLIC BLOOD PRESSURE: 160 MMHG

## 2024-03-05 DIAGNOSIS — J84.112 IPF (IDIOPATHIC PULMONARY FIBROSIS): Primary | ICD-10-CM

## 2024-03-05 DIAGNOSIS — J44.9 CHRONIC OBSTRUCTIVE PULMONARY DISEASE, UNSPECIFIED COPD TYPE: ICD-10-CM

## 2024-03-05 DIAGNOSIS — G47.10 HYPERSOMNOLENCE DISORDER: ICD-10-CM

## 2024-03-05 DIAGNOSIS — J84.9 ILD (INTERSTITIAL LUNG DISEASE): ICD-10-CM

## 2024-03-05 DIAGNOSIS — J47.9 BRONCHIECTASIS, UNCOMPLICATED: ICD-10-CM

## 2024-03-05 DIAGNOSIS — J47.9 BRONCHIECTASIS WITHOUT COMPLICATION: ICD-10-CM

## 2024-03-05 DIAGNOSIS — J96.11 CHRONIC HYPOXEMIC RESPIRATORY FAILURE: ICD-10-CM

## 2024-03-05 DIAGNOSIS — G47.33 OBSTRUCTIVE SLEEP APNEA SYNDROME: ICD-10-CM

## 2024-03-05 DIAGNOSIS — J45.20 MILD INTERMITTENT ASTHMA WITHOUT COMPLICATION: ICD-10-CM

## 2024-03-05 PROCEDURE — 99214 OFFICE O/P EST MOD 30 MIN: CPT | Mod: S$GLB,,, | Performed by: INTERNAL MEDICINE

## 2024-03-05 PROCEDURE — 4010F ACE/ARB THERAPY RXD/TAKEN: CPT | Mod: CPTII,S$GLB,, | Performed by: INTERNAL MEDICINE

## 2024-03-05 PROCEDURE — 3078F DIAST BP <80 MM HG: CPT | Mod: CPTII,S$GLB,, | Performed by: INTERNAL MEDICINE

## 2024-03-05 PROCEDURE — 99999 PR PBB SHADOW E&M-EST. PATIENT-LVL IV: CPT | Mod: PBBFAC,,, | Performed by: INTERNAL MEDICINE

## 2024-03-05 PROCEDURE — 3008F BODY MASS INDEX DOCD: CPT | Mod: CPTII,S$GLB,, | Performed by: INTERNAL MEDICINE

## 2024-03-05 PROCEDURE — 3288F FALL RISK ASSESSMENT DOCD: CPT | Mod: CPTII,S$GLB,, | Performed by: INTERNAL MEDICINE

## 2024-03-05 PROCEDURE — 1101F PT FALLS ASSESS-DOCD LE1/YR: CPT | Mod: CPTII,S$GLB,, | Performed by: INTERNAL MEDICINE

## 2024-03-05 PROCEDURE — 3077F SYST BP >= 140 MM HG: CPT | Mod: CPTII,S$GLB,, | Performed by: INTERNAL MEDICINE

## 2024-03-05 PROCEDURE — 1111F DSCHRG MED/CURRENT MED MERGE: CPT | Mod: CPTII,S$GLB,, | Performed by: INTERNAL MEDICINE

## 2024-03-05 PROCEDURE — 1159F MED LIST DOCD IN RCRD: CPT | Mod: CPTII,S$GLB,, | Performed by: INTERNAL MEDICINE

## 2024-03-05 RX ORDER — AZITHROMYCIN 500 MG/1
TABLET, FILM COATED ORAL
Qty: 3 TABLET | Refills: 3 | Status: ON HOLD | OUTPATIENT
Start: 2024-03-05 | End: 2024-06-13 | Stop reason: HOSPADM

## 2024-03-05 RX ORDER — PREDNISONE 20 MG/1
TABLET ORAL
Qty: 12 TABLET | Refills: 0 | Status: SHIPPED | OUTPATIENT
Start: 2024-03-05 | End: 2024-05-08 | Stop reason: SDUPTHER

## 2024-03-05 RX ORDER — ALLOPURINOL 300 MG/1
1 TABLET ORAL DAILY
COMMUNITY
End: 2024-06-12

## 2024-03-05 RX ORDER — DOXYCYCLINE 100 MG/1
100 CAPSULE ORAL EVERY 12 HOURS
Qty: 20 CAPSULE | Refills: 2 | Status: ON HOLD | OUTPATIENT
Start: 2024-03-05 | End: 2024-06-13 | Stop reason: HOSPADM

## 2024-03-05 RX ORDER — BUSPIRONE HYDROCHLORIDE 7.5 MG/1
1 TABLET ORAL 2 TIMES DAILY
COMMUNITY
End: 2024-06-12

## 2024-03-05 RX ORDER — ALBUTEROL SULFATE 0.83 MG/ML
2.5 SOLUTION RESPIRATORY (INHALATION) EVERY 6 HOURS PRN
Qty: 120 EACH | Refills: 11
Start: 2024-03-05 | End: 2025-03-05

## 2024-03-05 RX ORDER — ARMODAFINIL 150 MG/1
150 TABLET ORAL DAILY
Qty: 30 TABLET | Refills: 5 | Status: SHIPPED | OUTPATIENT
Start: 2024-03-05 | End: 2024-04-29

## 2024-03-05 RX ORDER — BUDESONIDE, GLYCOPYRROLATE, AND FORMOTEROL FUMARATE 160; 9; 4.8 UG/1; UG/1; UG/1
2 AEROSOL, METERED RESPIRATORY (INHALATION) 2 TIMES DAILY
Qty: 10.7 G | Refills: 11 | Status: ON HOLD | OUTPATIENT
Start: 2024-03-05 | End: 2024-06-13 | Stop reason: HOSPADM

## 2024-03-05 NOTE — PROGRESS NOTES
3/5/2024    Sakshi Hess  New Patient Consult    Chief Complaint   Patient presents with    Follow-up          3/5/2024 pt was admitted to [facility] 2/26-29th with copd/angina.     Uses vest but feels aerobika effective..  Pt took pred/abx with hosp stay  Breathing stable and mucous clear.  Pt under stress as daughter having difficulties with smoking/antidepressant rx.  Cannot tolerate ofev -- stopped around October 2024-- was dosing 100/d...      No more chest pain  Uses provigil with benefit.  Echo was good 2/27/2024  8/3/2023   [facility]   Provigil helps and needs    Copd stable  Needs to f/u weight.    Uses ofev bid with occ pastey stools -- uses immodium-- doing reasonbe.  Breathing seems stable with aeorbika.        Pt still having lingering secretino--- used abx  last wk.  And used abx twice in last 12 months,  Uses provigil with good results   Patient Instructions   You have bronchiectasis seen on June 2023 viewed by me, Dr Bowen,  and use aerobika , you needed antibiotics twice last 12 months for lung infection.  Will order vest-- you should do nebulizer with aerobika with vest daily.    Provigil renewed    Use azithromycin for lung infections     Continue ofev    Recheck 6 months.  Call if problem    Bmi is up but weight not done today-- will follow up in future    Copd present.  Bronchiectasis treatment will help.    Ct chest viewed with patient today..  3/14/2923- uses cpap well, uses nasal mask, breathing stable, no ofev re try -fearful of emesis.  Had poison ivy 3 wks ago with lingering rash.      Patient Instructions   Ct chest viewed, pattern fibrosis seen-- would repeat    Not on medications for fibrosis as had severe reaction to ofev with projectile vomiting.    Would repeat ct and try ofev 100  -- start once daily and dose twice daily if no problem.      Aviod poison ivy-- may use medrol dose pack for rash or lungs if severe.     Use levalbuterol for  cough,zpak    Call for sooner follow up if needed.    Provigil renewed.  11/30/2022 was seen by Dr Macias for 2nd opinion.    Pt ambulates nearly daily with ox monitoring -- upper 90's.  Anxious.  Has used proveigil with good results  Took ofev with severe n/v 1 st pill and recurred with subsequtent events.  May consider esbriet or lower dose ofev.   Becka,rf, ccp not done.    spirometry bronchodilator, lung volumes by gas dilution, diffusion capacity measured April 7, 2022. Spirometry was normal. There was no airflow obstruction. The forced vital capacity was 92% predicted.   Lung volume showed total lung capacity to be 77% predicted and low. Diffusion was low also at 65% predicted but did improve when corrected for lung volume.   Patient has a mild restrictive component. There is no airflow obstruction. The bronchodilator response was not significant. Diffusion was decreased somewhat. Clinical correlation necessary.       6 minute walk study was accomplished April 7, 2022. Baseline room air saturation was 93%. With ambulation O2 sat fell to 88% by 2 minutes. Patient needed 2 L of oxygen maintain sat low 90s subsequently. Patient walked 66% of the reference distance of 255            Patient Instructions   Will renew provigil -- you have had great benefit with alertness with provigil.    Continue oxygen and cpap.  Would consider going to ofev 100 twice daily call in couple days    May consider stopping ofev and using esbriet  with titrating up as able.    Would check oxygen level walking weekly or so.  If levels fall over few days to weeks -- that pattern is not fibrosis but something else  Albuterol has caused jitteriness but xopenex has worked well with no adverse effect.  Will ask staff to do prior auth.  We review cxr 2013 to 2019 with no ild changes but cxr 2020 to current have ild seen.  Ct chest viewed again today.          8/8/2022 no ofev yet -- may be at local pharmacy.  Some ely usual degree. Uses ox prn.   Uses cpap.  Doing better with cpap and oxygen. Pt feels some excess sleepiness and loss focus despite good cpap compliance. Pt not using trazodone.     Had recent azithromycin. Uses xopenex as needed.        Not using buspar and wishes to stop .  Was seeing psyc in [place]-- psyc left town yrs ago.  [facility] to have some add?       Patient Instructions   Need to get ofev started.    Use prednisone and inhalers as needed.    Use cpap with oxygen for sleep    Trial modafinil for excessive lingering sleepiness to improve alertness and focus.  Try one daily in early morning.  Increase to 2 daily after 3 days if needed.  Call when medications getting low to explain benefit and order more if helpful.        8/8/2022 compliance report  From 3 months ago--  for cpap shows cpap used 72% of days, average use  on days used  5hr 20 min, pressure ranges were low from 4-10, breathing difficulties decreased from 36 with no cpap in past to about 3.  No issues identified -- no adjustment.      pulm fibrosis found on ct in April - ofev    4/8/2022 no abx and breathing stable.  cpap ok.  Has ahi 36 in 2020, cpap auto 4-20.  Notes high pressure with removal mask at night.  Breathing roughly same but good and bad days.  Vague am stiffness of hands attributed to prior shoulder surgery.    No cough now wheezes.  Uses xopenex bid last month-- no help     Pt had 1st feb acute sob with wheezes, considered hospital, ended up reviewed her Facebook, found had prior Feb with similar symptoms and took inhalers with good control.    Patient Instructions   Need compliance report  for cpap-- may need to adjust cpap pressure if average high.      Will set up oxygen.    You have honeycombing of lung and subpleural reticular change seen on ct abd seen 1/2/2022.  You have prominent lower lung rales bilaterally.     Pulmonary functions with tlc 77% with low difussion.    Your oxygen saturation falls to 88% after walking 2 minutes-- would recommend using  oxygen /.  Bleed in 2 lpm oxygen into cpap.     Would recommend treatment for pulmonary fibrosis with ofev, should have blood work monthly x 3 once starting ofev.  May have loose stools and need imodium.  Dose would be one twice daily.      If prednisone needed-- try one daily for 3 days.  May need inhaler if acute short breath/wheeze.  1/10/2022 uses cpap well -- uses nasal pillars.  Uses over 4 hrs /night.  Had admit h     Sinuses ok    Pt  Reports occasional low ox 90 or so.      Has boxes of inhalers at home-- took prednisone in fall.  No fh fibrosis but mom  44 yo.   Patient Instructions   You have mild scarring of lungs seen on ct of abdomen.   Rales are heard on exam suggesting scarring of lungs.      Would check oxygen level walking and breathing test.    Ct chest would be good to check-- could do in 6-12 months.    Use amoxil for sinus/lung infection    Will screen joint disease that may be associated with lung disease.    4/15/2021 uses cpap - uses over 4 hrs nightly, got call from  [employer] [facility] [place] saying needed to purchase or something?  Uses high compliance with great benefit.      10/1/2020 pt uses cpap 6 hrs nightly, has problems with mask, went to nasal with chin strap - needed tape to keep mouth closed.   Pt having good response when mask tolerated well.  No prednisone - stable  Patient Instructions   You are tolerating cpap well - you should do better with airfit f30 mask than nasal mask.    Continue symbicort as control lungs has been good.      You should use xopenex in place albuterol as you have palpitations with albuterol.  2020 - no prednisone last 3 wks, takes once every 3 wks, was not using symbicort less than daily. Brittaney's mom.  Sinuses good.      You had prior sleep apnea, up to 40/hr?  Used cpap for 3 yrs but stopped 10 yrs ago for multiple reasons.  Patient Instructions   symbicort is preventive - use regular 2 puffs twice daily,   singulair daily also.      Use xopenex  "inhaler, albuterol not as effective for you- as rescue therapy, use 2 puff up to every 4-6 as needed. May use nebulizer.    Use prednisone if needed.  20 mg daily for 2-3 should be adequate.  symbicort should decrease prednisone need.    Sleep apnea - up to 5 /hour is within normal range, study may apply cpap if can diagnose sleep apnea 1st part of night.   May need to see to discuss cpap tolerance post study.  10/15/2019pt had cough/wheezes as child, never outgrew.  Had cough all life- mucous clear. occ green mucous, uses combivent. Has neb with xopenex- albuterol ppt shakes.  Uses steroids once every 5 yrs- had to 3 wks prednisone.  Er visits once yr - may get steroid shot.  Lives [place],  No sinus, has dog and cat.  Breathing seems stable- breathing gets bad 4 month a yr    Uses oxycodone tid for last 1.5 yrs.      Has sleep apnea - cpap broke and not followed up.    Patient Instructions   Asthma, copd with chronic disease?    Preventive - use regular- sinuglair daily, symbicort 2 twice daily    Rescue medication - use albuterol inhaler or xopenex nebulizer up to every 4 hrs or so.    Action plan - prednisone one daily for 3 May be adequate, may need 3 for 3 and taper if severe.  Er if sickly    If yellow mucous - use azithromycin.    Spacer will help inhaled medication.    Breathing test and follow up a yr.  The chief compliant  problem is new to me",    PFSH:  Past Medical History:   Diagnosis Date    Arthritis     COPD (chronic obstructive pulmonary disease)     Coronary artery disease     Hypertension     Sleep apnea     use CPAP at night          Past Surgical History:   Procedure Laterality Date    CORONARY ANGIOGRAPHY N/A 2/15/2021    Procedure: ANGIOGRAM, CORONARY ARTERY;  Surgeon: Aidan Buchanan MD;  Location: [facility] CATH/EP LAB;  Service: Cardiology;  Laterality: N/A;    CORONARY ARTERY BYPASS GRAFT      CORONARY BYPASS GRAFT ANGIOGRAPHY  2/15/2021    Procedure: Bypass graft study;  Surgeon: Aidan" "MD Chanel;  Location: [facility] CATH/EP LAB;  Service: Cardiology;;    HYSTERECTOMY      JOINT REPLACEMENT      bilateral knee replacement     LEFT HEART CATHETERIZATION Left 2/15/2021    Procedure: Left heart cath;  Surgeon: Aidan Buchanan MD;  Location: [facility] CATH/EP LAB;  Service: Cardiology;  Laterality: Left;     Social History     Tobacco Use    Smoking status: Never     Passive exposure: Past    Smokeless tobacco: Never   Substance Use Topics    Alcohol use: No    Drug use: No     Family History   Problem Relation Age of Onset    Pulmonary embolism Mother      Review of patient's allergies indicates:   Allergen Reactions    Metronidazole Other (See Comments)     DISORIENTED       Statins-hmg-coa reductase inhibitors Tinitus     Joint pain       Performance Status:The patient's activity level is no limits with regular activity.      Review of Systems:  a review of eleven systems covering constitutional, Eye, HEENT, Psych, Respiratory, Cardiac, GI, , Musculoskeletal, Endocrine, Dermatologic was negative except for pertinent findings as listed ABOVE and below: night sweats,   pertinent positive as above, rest is good      Exam:Comprehensive exam done. BP (!) 160/74 (BP Location: Right arm, Patient Position: Sitting, BP Method: Small (Automatic))   Pulse 73   Ht 5' 1" (1.549 m)   Wt 81.8 kg (180 lb 5.4 oz)   LMP 09/01/1986   SpO2 97% Comment: on room air at rest  BMI 34.07 kg/m²   Exam included Vitals as listed, and patient's appearance and affect and alertness and mood, oral exam for yeast and hygiene and pharynx lesions and Mallapatti (M) score, neck with inspection for jvd and masses and thyroid abnormalities and lymph nodes (supraclavicular and infraclavicular nodes and axillary also examined and noted if abn), chest exam included symmetry and effort and fremitus and percussion and auscultation, cardiac exam included rhythm and gallops and murmur and rubs and jvd and edema, abdominal exam for mass and " hepatosplenomegaly and tenderness and hernias and bowel sounds, Musculoskeletal exam with muscle tone and posture and mobility/gait and  strength, and skin for rashes and cyanosis and pallor and turgor, extremity for clubbing.  Findings were normal except for pertinent findings listed below:   M2, chest is symmetric, no distress, normal percussion, normal fremitus and bilat slight rales lung bases  No clubbing nor edema    Radiographs (ct chest and cxr) reviewed: view by direct vision  March 15 , 2019 nad, post cabg    Ct abd 1/2/2022 ild seen lower lungs with ? Honeycombing.  Ct chest 4/12/2022 ild seen with honeycombing.     Labs reviewed            PFT   fvc 92%, tlc 77%, dlco 65% 4/7/2022  Six min walk 93% rest , 88% 2 minutes, and 94% end of walk.        Results for SAKSHI BALLARD (MRN [id number]) as of 10/15/2019 09:59   Ref. Range 6/11/2012 04:51   Eosinophils Relative Latest Ref Range: 0.0 - 3.0 % 5.5 (H)   Basophil% Latest Ref Range: 0.0 - 3.0 % 0.5       Plan:  Clinical impression is apparently straight forward and impression with management as below.     Sakshi was seen today for follow-up.    Diagnoses and all orders for this visit:    IPF (idiopathic pulmonary fibrosis)  -     Complete PFT with bronchodilator; Future  -     CT Chest High Resolution Without Contrast; Future  -     Six Minute Walk Test to qualify for Home Oxygen; Future    ILD (interstitial lung disease)    Chronic hypoxemic respiratory failure  -     Six Minute Walk Test to qualify for Home Oxygen; Future    Obstructive sleep apnea syndrome    Hypersomnolence disorder  -     armodafiniL (NUVIGIL) 150 mg tablet; Take 1 tablet (150 mg total) by mouth once daily.    Bronchiectasis, uncomplicated  -     budesonide-glycopyr-formoterol (BREZTRI AEROSPHERE) 160-9-4.8 mcg/actuation HFAA; Inhale 2 puffs into the lungs 2 (two) times a day.  -     predniSONE (DELTASONE) 20 MG tablet; Take one daily for 3 days and may repeat for shortness of  breath.  -     azithromycin (ZITHROMAX) 500 MG tablet; One daily for yellow mucous, repeat if needed  -     doxycycline (VIBRAMYCIN) 100 MG Cap; Take 1 capsule (100 mg total) by mouth every 12 (twelve) hours.  -     HME - OTHER    Mild intermittent asthma without complication    Chronic obstructive pulmonary disease, unspecified COPD type  -     budesonide-glycopyr-formoterol (BREZTRI AEROSPHERE) 160-9-4.8 mcg/actuation HFAA; Inhale 2 puffs into the lungs 2 (two) times a day.  -     predniSONE (DELTASONE) 20 MG tablet; Take one daily for 3 days and may repeat for shortness of breath.  -     azithromycin (ZITHROMAX) 500 MG tablet; One daily for yellow mucous, repeat if needed  -     doxycycline (VIBRAMYCIN) 100 MG Cap; Take 1 capsule (100 mg total) by mouth every 12 (twelve) hours.                Follow up in about 6 months (around 9/5/2024), or if symptoms worsen or fail to improve.    Discussed with patient above for education the following:      Patient Instructions   We discuss using nuvigil over provigil as provigil losing effectiness late day on 400 mg daily split dose.  Will start 150 in morning nuvigil -- call or use portal if higher dose needed as can increase to 200, then may consider 250 max dose.        Would try breztri 2 twice daily to prevent lungs from worsening    Use prednisone if more cough or wheeze, use azithromycin or doxycycline (avoid sunshine on doxy) if yellow mucous-- azithro not always effective for 3 days?     Would check ct chest and pft and walk test in 6 months..    Continue cpap with ox nightly     Ct chest and cxr viewed from 6/2023 and 2/2024--- looked stable         Eval took 36 min

## 2024-03-05 NOTE — PATIENT INSTRUCTIONS
We discuss using nuvigil over provigil as provigil losing effectiness late day on 400 mg daily split dose.  Will start 150 in morning nuvigil -- call or use portal if higher dose needed as can increase to 200, then may consider 250 max dose.        Would try breztri 2 twice daily to prevent lungs from worsening    Use prednisone if more cough or wheeze, use azithromycin or doxycycline (avoid sunshine on doxy) if yellow mucous-- azithro not always effective for 3 days?     Would check ct chest and pft and walk test in 6 months..    Continue cpap with ox nightly     Ct chest and cxr viewed from 6/2023 and 2/2024--- looked stable

## 2024-03-06 ENCOUNTER — PATIENT OUTREACH (OUTPATIENT)
Dept: ADMINISTRATIVE | Facility: CLINIC | Age: 69
End: 2024-03-06
Payer: MEDICARE

## 2024-03-06 NOTE — PROGRESS NOTES
C3 nurse attempted to contact Sakshi Hess for a TCC post hospital discharge follow up call. No answer. Left voicemail with callback information. The patient has a scheduled HOSFU appointment with Wenceslao Deras NP on 03/06/24 @ 1000.

## 2024-03-07 NOTE — PROGRESS NOTES
3rd Attempt made to reach patient for TCC call. Left voicemail please call [phone] leave first name, last name, and  Christina will return your call.

## 2024-03-07 NOTE — PROGRESS NOTES
2nd Attempt made to reach patient for TCC call. Left voicemail please call [phone] leave first name, last name, and  Christina will return your call.

## 2024-03-10 ENCOUNTER — PATIENT MESSAGE (OUTPATIENT)
Dept: PULMONOLOGY | Facility: CLINIC | Age: 69
End: 2024-03-10
Payer: MEDICARE

## 2024-03-10 ENCOUNTER — TELEPHONE (OUTPATIENT)
Dept: ADMINISTRATIVE | Facility: CLINIC | Age: 69
End: 2024-03-10
Payer: MEDICARE

## 2024-03-11 DIAGNOSIS — B37.9 YEAST INFECTION: Primary | ICD-10-CM

## 2024-03-11 RX ORDER — FLUCONAZOLE 200 MG/1
200 TABLET ORAL DAILY
Qty: 7 TABLET | Refills: 0 | Status: SHIPPED | OUTPATIENT
Start: 2024-03-11 | End: 2024-03-18

## 2024-03-22 LAB
OHS QRS DURATION: 80 MS
OHS QTC CALCULATION: 433 MS

## 2024-04-29 ENCOUNTER — PATIENT MESSAGE (OUTPATIENT)
Dept: PULMONOLOGY | Facility: CLINIC | Age: 69
End: 2024-04-29
Payer: MEDICARE

## 2024-04-29 DIAGNOSIS — J84.112 IPF (IDIOPATHIC PULMONARY FIBROSIS): ICD-10-CM

## 2024-04-29 DIAGNOSIS — G47.10 HYPERSOMNOLENCE DISORDER: ICD-10-CM

## 2024-04-29 DIAGNOSIS — G47.33 OBSTRUCTIVE SLEEP APNEA SYNDROME: ICD-10-CM

## 2024-04-29 RX ORDER — MODAFINIL 200 MG/1
200 TABLET ORAL 2 TIMES DAILY PRN
Qty: 60 TABLET | Refills: 3 | Status: SHIPPED | OUTPATIENT
Start: 2024-04-29 | End: 2024-05-03 | Stop reason: SDUPTHER

## 2024-04-30 ENCOUNTER — TELEPHONE (OUTPATIENT)
Dept: PULMONOLOGY | Facility: CLINIC | Age: 69
End: 2024-04-30
Payer: MEDICARE

## 2024-04-30 NOTE — TELEPHONE ENCOUNTER
----- Message from Debbie Segura sent at 4/30/2024 12:46 PM CDT -----  Contact: patient  Type:  Patient Returning Call    Who Called:patient     Who Left Message for Patient: [place]     Does the patient know what this is regarding?:yes     Would the patient rather a call back or a response via [employer]? Call     Best Call Back Number:[phone] (home)      Additional Information:

## 2024-04-30 NOTE — TELEPHONE ENCOUNTER
----- Message from Aga Fenton sent at 4/30/2024 10:01 AM CDT -----  Contact: Krishan/[facility]     Type:  Pharmacy Calling to Clarify an RX    Name of Caller:  Krishan  Pharmacy Name:  [facility]  Prescription Name:  modafiniL (PROVIGIL) 200 MG Tab  What do they need to clarify?:  pharmacy is refusing to fill script since her other meds are not at this location

## 2024-04-30 NOTE — PROGRESS NOTES
The patient calls in relating the Nuvigil is causing her trouble with sleepiness and headaches.  Will change back to Provigil 200 mg twice a day with 3 refills to last her a total of 6 months.

## 2024-04-30 NOTE — TELEPHONE ENCOUNTER
Placed a call to the pt about her script Provigil. [facility] is refusing to fill the medication due to the other med are not at that location. Left a message for the pt to call back.

## 2024-05-03 DIAGNOSIS — G47.33 OBSTRUCTIVE SLEEP APNEA SYNDROME: ICD-10-CM

## 2024-05-03 DIAGNOSIS — G47.10 HYPERSOMNOLENCE DISORDER: ICD-10-CM

## 2024-05-03 DIAGNOSIS — J84.112 IPF (IDIOPATHIC PULMONARY FIBROSIS): ICD-10-CM

## 2024-05-03 RX ORDER — MODAFINIL 200 MG/1
200 TABLET ORAL 2 TIMES DAILY PRN
Qty: 60 TABLET | Refills: 3 | Status: SHIPPED | OUTPATIENT
Start: 2024-05-03

## 2024-05-08 DIAGNOSIS — J47.9 BRONCHIECTASIS, UNCOMPLICATED: ICD-10-CM

## 2024-05-08 DIAGNOSIS — J44.9 CHRONIC OBSTRUCTIVE PULMONARY DISEASE, UNSPECIFIED COPD TYPE: ICD-10-CM

## 2024-05-08 RX ORDER — PREDNISONE 20 MG/1
TABLET ORAL
Qty: 12 TABLET | Refills: 0 | Status: ON HOLD | OUTPATIENT
Start: 2024-05-08 | End: 2024-06-13 | Stop reason: HOSPADM

## 2024-06-06 DIAGNOSIS — M25.512 LEFT SHOULDER PAIN, UNSPECIFIED CHRONICITY: Primary | ICD-10-CM

## 2024-06-12 ENCOUNTER — HOSPITAL ENCOUNTER (OUTPATIENT)
Facility: HOSPITAL | Age: 69
Discharge: HOME | End: 2024-06-13
Attending: STUDENT IN AN ORGANIZED HEALTH CARE EDUCATION/TRAINING PROGRAM | Admitting: HOSPITALIST
Payer: MEDICARE

## 2024-06-12 DIAGNOSIS — I16.1 HYPERTENSIVE EMERGENCY: Primary | ICD-10-CM

## 2024-06-12 DIAGNOSIS — I10 HYPERTENSION: ICD-10-CM

## 2024-06-12 DIAGNOSIS — R79.89 ELEVATED BRAIN NATRIURETIC PEPTIDE (BNP) LEVEL: ICD-10-CM

## 2024-06-12 DIAGNOSIS — R79.89 ELEVATED TROPONIN: ICD-10-CM

## 2024-06-12 DIAGNOSIS — F11.90 OPIOID USE: ICD-10-CM

## 2024-06-12 DIAGNOSIS — R07.9 CHEST PAIN: ICD-10-CM

## 2024-06-12 PROBLEM — N18.2 CKD (CHRONIC KIDNEY DISEASE), STAGE II: Status: ACTIVE | Noted: 2021-02-14

## 2024-06-12 PROBLEM — I24.89 DEMAND ISCHEMIA: Status: ACTIVE | Noted: 2024-06-12

## 2024-06-12 PROBLEM — I16.0 HYPERTENSIVE URGENCY: Status: ACTIVE | Noted: 2024-06-12

## 2024-06-12 LAB
ALBUMIN SERPL BCP-MCNC: 4.6 G/DL (ref 3.5–5.2)
ALP SERPL-CCNC: 96 U/L (ref 55–135)
ALT SERPL W/O P-5'-P-CCNC: 9 U/L (ref 10–44)
ANION GAP SERPL CALC-SCNC: 10 MMOL/L (ref 8–16)
AST SERPL-CCNC: 17 U/L (ref 10–40)
BASOPHILS # BLD AUTO: 0.07 K/UL (ref 0–0.2)
BASOPHILS NFR BLD: 1 % (ref 0–1.9)
BILIRUB SERPL-MCNC: 0.3 MG/DL (ref 0.1–1)
BNP SERPL-MCNC: 256 PG/ML (ref 0–99)
BUN SERPL-MCNC: 12 MG/DL (ref 8–23)
CALCIUM SERPL-MCNC: 10.2 MG/DL (ref 8.7–10.5)
CHLORIDE SERPL-SCNC: 102 MMOL/L (ref 95–110)
CO2 SERPL-SCNC: 28 MMOL/L (ref 23–29)
CREAT SERPL-MCNC: 0.8 MG/DL (ref 0.5–1.4)
DIFFERENTIAL METHOD BLD: ABNORMAL
EOSINOPHIL # BLD AUTO: 0 K/UL (ref 0–0.5)
EOSINOPHIL NFR BLD: 0.4 % (ref 0–8)
ERYTHROCYTE [DISTWIDTH] IN BLOOD BY AUTOMATED COUNT: 13.6 % (ref 11.5–14.5)
EST. GFR  (NO RACE VARIABLE): >60 ML/MIN/1.73 M^2
GLUCOSE SERPL-MCNC: 129 MG/DL (ref 70–110)
GLUCOSE SERPL-MCNC: 153 MG/DL (ref 70–110)
HCT VFR BLD AUTO: 43 % (ref 37–48.5)
HGB BLD-MCNC: 14.3 G/DL (ref 12–16)
IMM GRANULOCYTES # BLD AUTO: 0.06 K/UL (ref 0–0.04)
IMM GRANULOCYTES NFR BLD AUTO: 0.9 % (ref 0–0.5)
LYMPHOCYTES # BLD AUTO: 1.3 K/UL (ref 1–4.8)
LYMPHOCYTES NFR BLD: 18.5 % (ref 18–48)
MCH RBC QN AUTO: 29.2 PG (ref 27–31)
MCHC RBC AUTO-ENTMCNC: 33.3 G/DL (ref 32–36)
MCV RBC AUTO: 88 FL (ref 82–98)
MONOCYTES # BLD AUTO: 0.1 K/UL (ref 0.3–1)
MONOCYTES NFR BLD: 2.1 % (ref 4–15)
NEUTROPHILS # BLD AUTO: 5.2 K/UL (ref 1.8–7.7)
NEUTROPHILS NFR BLD: 77.1 % (ref 38–73)
NRBC BLD-RTO: 0 /100 WBC
PLATELET # BLD AUTO: 355 K/UL (ref 150–450)
PMV BLD AUTO: 9.1 FL (ref 9.2–12.9)
POTASSIUM SERPL-SCNC: 3.9 MMOL/L (ref 3.5–5.1)
PROT SERPL-MCNC: 8.2 G/DL (ref 6–8.4)
RBC # BLD AUTO: 4.89 M/UL (ref 4–5.4)
SODIUM SERPL-SCNC: 140 MMOL/L (ref 136–145)
TROPONIN I SERPL HS-MCNC: 54.1 PG/ML (ref 0–14.9)
TROPONIN I SERPL HS-MCNC: 54.3 PG/ML (ref 0–14.9)
WBC # BLD AUTO: 6.77 K/UL (ref 3.9–12.7)

## 2024-06-12 PROCEDURE — 63600175 PHARM REV CODE 636 W HCPCS: Performed by: STUDENT IN AN ORGANIZED HEALTH CARE EDUCATION/TRAINING PROGRAM

## 2024-06-12 PROCEDURE — G0378 HOSPITAL OBSERVATION PER HR: HCPCS

## 2024-06-12 PROCEDURE — 25000003 PHARM REV CODE 250: Performed by: STUDENT IN AN ORGANIZED HEALTH CARE EDUCATION/TRAINING PROGRAM

## 2024-06-12 PROCEDURE — 82962 GLUCOSE BLOOD TEST: CPT

## 2024-06-12 PROCEDURE — 80053 COMPREHEN METABOLIC PANEL: CPT | Performed by: PHYSICIAN ASSISTANT

## 2024-06-12 PROCEDURE — 83880 ASSAY OF NATRIURETIC PEPTIDE: CPT | Performed by: PHYSICIAN ASSISTANT

## 2024-06-12 PROCEDURE — 25000242 PHARM REV CODE 250 ALT 637 W/ HCPCS: Performed by: NURSE PRACTITIONER

## 2024-06-12 PROCEDURE — 96376 TX/PRO/DX INJ SAME DRUG ADON: CPT

## 2024-06-12 PROCEDURE — 96374 THER/PROPH/DIAG INJ IV PUSH: CPT

## 2024-06-12 PROCEDURE — 96375 TX/PRO/DX INJ NEW DRUG ADDON: CPT

## 2024-06-12 PROCEDURE — 85025 COMPLETE CBC W/AUTO DIFF WBC: CPT | Performed by: PHYSICIAN ASSISTANT

## 2024-06-12 PROCEDURE — 99900035 HC TECH TIME PER 15 MIN (STAT)

## 2024-06-12 PROCEDURE — 25000003 PHARM REV CODE 250: Performed by: NURSE PRACTITIONER

## 2024-06-12 PROCEDURE — 93005 ELECTROCARDIOGRAM TRACING: CPT | Performed by: INTERNAL MEDICINE

## 2024-06-12 PROCEDURE — 84484 ASSAY OF TROPONIN QUANT: CPT | Mod: 91 | Performed by: NURSE PRACTITIONER

## 2024-06-12 PROCEDURE — 99285 EMERGENCY DEPT VISIT HI MDM: CPT | Mod: 25

## 2024-06-12 PROCEDURE — 94761 N-INVAS EAR/PLS OXIMETRY MLT: CPT

## 2024-06-12 PROCEDURE — 96372 THER/PROPH/DIAG INJ SC/IM: CPT | Mod: 59 | Performed by: NURSE PRACTITIONER

## 2024-06-12 PROCEDURE — 93010 ELECTROCARDIOGRAM REPORT: CPT | Mod: ,,, | Performed by: INTERNAL MEDICINE

## 2024-06-12 PROCEDURE — 99900031 HC PATIENT EDUCATION (STAT)

## 2024-06-12 PROCEDURE — 63600175 PHARM REV CODE 636 W HCPCS: Performed by: NURSE PRACTITIONER

## 2024-06-12 PROCEDURE — 84484 ASSAY OF TROPONIN QUANT: CPT | Performed by: PHYSICIAN ASSISTANT

## 2024-06-12 PROCEDURE — 94799 UNLISTED PULMONARY SVC/PX: CPT

## 2024-06-12 PROCEDURE — 94640 AIRWAY INHALATION TREATMENT: CPT

## 2024-06-12 PROCEDURE — 36415 COLL VENOUS BLD VENIPUNCTURE: CPT | Performed by: PHYSICIAN ASSISTANT

## 2024-06-12 RX ORDER — OXYCODONE HYDROCHLORIDE 15 MG/1
15 TABLET ORAL 4 TIMES DAILY PRN
COMMUNITY
Start: 2024-06-06

## 2024-06-12 RX ORDER — INSULIN ASPART 100 [IU]/ML
0-5 INJECTION, SOLUTION INTRAVENOUS; SUBCUTANEOUS
Status: DISCONTINUED | OUTPATIENT
Start: 2024-06-12 | End: 2024-06-13 | Stop reason: HOSPADM

## 2024-06-12 RX ORDER — ALBUTEROL SULFATE 0.83 MG/ML
2.5 SOLUTION RESPIRATORY (INHALATION) EVERY 6 HOURS PRN
Status: DISCONTINUED | OUTPATIENT
Start: 2024-06-12 | End: 2024-06-13 | Stop reason: HOSPADM

## 2024-06-12 RX ORDER — LANOLIN ALCOHOL/MO/W.PET/CERES
800 CREAM (GRAM) TOPICAL
Status: DISCONTINUED | OUTPATIENT
Start: 2024-06-12 | End: 2024-06-13 | Stop reason: HOSPADM

## 2024-06-12 RX ORDER — IBUPROFEN 200 MG
16 TABLET ORAL
Status: DISCONTINUED | OUTPATIENT
Start: 2024-06-12 | End: 2024-06-13 | Stop reason: HOSPADM

## 2024-06-12 RX ORDER — OXYCODONE HCL 10 MG/1
10 TABLET, FILM COATED, EXTENDED RELEASE ORAL EVERY 6 HOURS PRN
Status: DISCONTINUED | OUTPATIENT
Start: 2024-06-12 | End: 2024-06-12

## 2024-06-12 RX ORDER — SODIUM,POTASSIUM PHOSPHATES 280-250MG
2 POWDER IN PACKET (EA) ORAL
Status: DISCONTINUED | OUTPATIENT
Start: 2024-06-12 | End: 2024-06-13 | Stop reason: HOSPADM

## 2024-06-12 RX ORDER — LABETALOL HYDROCHLORIDE 5 MG/ML
10 INJECTION, SOLUTION INTRAVENOUS
Status: DISCONTINUED | OUTPATIENT
Start: 2024-06-12 | End: 2024-06-12

## 2024-06-12 RX ORDER — ONDANSETRON HYDROCHLORIDE 2 MG/ML
4 INJECTION, SOLUTION INTRAVENOUS EVERY 6 HOURS PRN
Status: DISCONTINUED | OUTPATIENT
Start: 2024-06-12 | End: 2024-06-13 | Stop reason: HOSPADM

## 2024-06-12 RX ORDER — NICARDIPINE HYDROCHLORIDE 0.2 MG/ML
0-15 INJECTION INTRAVENOUS CONTINUOUS
Status: DISCONTINUED | OUTPATIENT
Start: 2024-06-12 | End: 2024-06-13 | Stop reason: HOSPADM

## 2024-06-12 RX ORDER — ALUMINUM HYDROXIDE, MAGNESIUM HYDROXIDE, AND SIMETHICONE 1200; 120; 1200 MG/30ML; MG/30ML; MG/30ML
30 SUSPENSION ORAL 4 TIMES DAILY PRN
Status: DISCONTINUED | OUTPATIENT
Start: 2024-06-12 | End: 2024-06-13 | Stop reason: HOSPADM

## 2024-06-12 RX ORDER — GLUCAGON 1 MG
1 KIT INJECTION
Status: DISCONTINUED | OUTPATIENT
Start: 2024-06-12 | End: 2024-06-13 | Stop reason: HOSPADM

## 2024-06-12 RX ORDER — ALLOPURINOL 300 MG/1
300 TABLET ORAL DAILY
Status: DISCONTINUED | OUTPATIENT
Start: 2024-06-13 | End: 2024-06-13 | Stop reason: HOSPADM

## 2024-06-12 RX ORDER — HYDRALAZINE HYDROCHLORIDE 10 MG/1
10 TABLET, FILM COATED ORAL
Status: COMPLETED | OUTPATIENT
Start: 2024-06-12 | End: 2024-06-12

## 2024-06-12 RX ORDER — RANOLAZINE 500 MG/1
500 TABLET, EXTENDED RELEASE ORAL 2 TIMES DAILY
Status: DISCONTINUED | OUTPATIENT
Start: 2024-06-12 | End: 2024-06-13 | Stop reason: HOSPADM

## 2024-06-12 RX ORDER — HYDROCODONE BITARTRATE AND ACETAMINOPHEN 5; 325 MG/1; MG/1
1 TABLET ORAL EVERY 6 HOURS PRN
Status: DISCONTINUED | OUTPATIENT
Start: 2024-06-12 | End: 2024-06-12

## 2024-06-12 RX ORDER — MODAFINIL 100 MG/1
200 TABLET ORAL 2 TIMES DAILY PRN
Status: DISCONTINUED | OUTPATIENT
Start: 2024-06-12 | End: 2024-06-13 | Stop reason: HOSPADM

## 2024-06-12 RX ORDER — LEVALBUTEROL INHALATION SOLUTION 1.25 MG/3ML
1.25 SOLUTION RESPIRATORY (INHALATION) EVERY 4 HOURS
Status: DISCONTINUED | OUTPATIENT
Start: 2024-06-12 | End: 2024-06-13

## 2024-06-12 RX ORDER — ACETAMINOPHEN 325 MG/1
650 TABLET ORAL EVERY 8 HOURS PRN
Status: DISCONTINUED | OUTPATIENT
Start: 2024-06-12 | End: 2024-06-13 | Stop reason: HOSPADM

## 2024-06-12 RX ORDER — NALOXONE HCL 0.4 MG/ML
0.02 VIAL (ML) INJECTION
Status: DISCONTINUED | OUTPATIENT
Start: 2024-06-12 | End: 2024-06-13 | Stop reason: HOSPADM

## 2024-06-12 RX ORDER — TALC
6 POWDER (GRAM) TOPICAL NIGHTLY PRN
Status: DISCONTINUED | OUTPATIENT
Start: 2024-06-12 | End: 2024-06-13 | Stop reason: HOSPADM

## 2024-06-12 RX ORDER — PANTOPRAZOLE SODIUM 40 MG/1
40 TABLET, DELAYED RELEASE ORAL
Status: DISCONTINUED | OUTPATIENT
Start: 2024-06-13 | End: 2024-06-13 | Stop reason: HOSPADM

## 2024-06-12 RX ORDER — IBUPROFEN 200 MG
24 TABLET ORAL
Status: DISCONTINUED | OUTPATIENT
Start: 2024-06-12 | End: 2024-06-13 | Stop reason: HOSPADM

## 2024-06-12 RX ORDER — PRASUGREL 10 MG/1
10 TABLET, FILM COATED ORAL DAILY
Status: DISCONTINUED | OUTPATIENT
Start: 2024-06-13 | End: 2024-06-13 | Stop reason: HOSPADM

## 2024-06-12 RX ORDER — ISOSORBIDE MONONITRATE 30 MG/1
30 TABLET, EXTENDED RELEASE ORAL DAILY
Status: DISCONTINUED | OUTPATIENT
Start: 2024-06-13 | End: 2024-06-13 | Stop reason: HOSPADM

## 2024-06-12 RX ORDER — ACETAMINOPHEN 325 MG/1
650 TABLET ORAL EVERY 4 HOURS PRN
Status: DISCONTINUED | OUTPATIENT
Start: 2024-06-12 | End: 2024-06-13 | Stop reason: HOSPADM

## 2024-06-12 RX ORDER — SODIUM CHLORIDE 0.9 % (FLUSH) 0.9 %
2 SYRINGE (ML) INJECTION
Status: DISCONTINUED | OUTPATIENT
Start: 2024-06-12 | End: 2024-06-13 | Stop reason: HOSPADM

## 2024-06-12 RX ORDER — HYDROCODONE BITARTRATE AND ACETAMINOPHEN 10; 325 MG/1; MG/1
1 TABLET ORAL
Status: COMPLETED | OUTPATIENT
Start: 2024-06-12 | End: 2024-06-12

## 2024-06-12 RX ORDER — IBUPROFEN 200 MG
400 TABLET ORAL EVERY 6 HOURS PRN
COMMUNITY

## 2024-06-12 RX ORDER — ASPIRIN 81 MG/1
81 TABLET ORAL DAILY
Status: DISCONTINUED | OUTPATIENT
Start: 2024-06-13 | End: 2024-06-13 | Stop reason: HOSPADM

## 2024-06-12 RX ORDER — ENOXAPARIN SODIUM 100 MG/ML
40 INJECTION SUBCUTANEOUS EVERY 24 HOURS
Status: DISCONTINUED | OUTPATIENT
Start: 2024-06-12 | End: 2024-06-13 | Stop reason: HOSPADM

## 2024-06-12 RX ADMIN — HYDROCODONE BITARTRATE AND ACETAMINOPHEN 1 TABLET: 10; 325 TABLET ORAL at 06:06

## 2024-06-12 RX ADMIN — LEVALBUTEROL HYDROCHLORIDE 1.25 MG: 1.25 SOLUTION RESPIRATORY (INHALATION) at 08:06

## 2024-06-12 RX ADMIN — BUSPIRONE HYDROCHLORIDE 7.5 MG: 5 TABLET ORAL at 09:06

## 2024-06-12 RX ADMIN — ONDANSETRON 4 MG: 2 INJECTION INTRAMUSCULAR; INTRAVENOUS at 08:06

## 2024-06-12 RX ADMIN — NICARDIPINE HYDROCHLORIDE 5 MG/HR: 0.2 INJECTION INTRAVENOUS at 08:06

## 2024-06-12 RX ADMIN — OXYCODONE HYDROCHLORIDE 15 MG: 10 TABLET ORAL at 08:06

## 2024-06-12 RX ADMIN — HYDRALAZINE HYDROCHLORIDE 10 MG: 10 TABLET, FILM COATED ORAL at 06:06

## 2024-06-12 RX ADMIN — ENOXAPARIN SODIUM 40 MG: 40 INJECTION SUBCUTANEOUS at 08:06

## 2024-06-12 NOTE — FIRST PROVIDER EVALUATION
Emergency Department TeleTriage Encounter Note      CHIEF COMPLAINT    Chief Complaint   Patient presents with    Hypertension     Had a BP of 206/104 when taken at a store BP machine - denies chest pain, denies SOB, denies headache - patient presents to ER with no evidence of distress - ER BP is 222/118       VITAL SIGNS   Initial Vitals [06/12/24 1712]   BP Pulse Resp Temp SpO2   (!) 222/118 97 18 98.5 °F (36.9 °C) 95 %      MAP       --            ALLERGIES    Review of patient's allergies indicates:   Allergen Reactions    Metronidazole Other (See Comments)     DISORIENTED       Statins-hmg-coa reductase inhibitors Tinitus     Joint pain       PROVIDER TRIAGE NOTE  Patient presents with hypertension. She denies any symptoms other than slightly blurred vision. She has been compliant with medications.       ORDERS  Labs Reviewed - No data to display    ED Orders (720h ago, onward)      None              Virtual Visit Note: The provider triage portion of this emergency department evaluation and documentation was performed via [employer], a HIPAA-compliant telemedicine application, in concert with a tele-presenter in the room. A face to face patient evaluation with one of my colleagues will occur once the patient is placed in an emergency department room.      DISCLAIMER: This note was prepared with [employer] voice recognition transcription software. Garbled syntax, mangled pronouns, and other bizarre constructions may be attributed to that software system.

## 2024-06-12 NOTE — ED PROVIDER NOTES
Encounter Date: 6/12/2024       History     Chief Complaint   Patient presents with    Hypertension     Had a BP of 206/104 when taken at a store BP machine - denies chest pain, denies SOB, denies headache - patient presents to ER with no evidence of distress - ER BP is 222/118     69-year-old female presents for management of blood pressure.  History of hypertension and chronic opioids however has been out of her opioids for the last few days.  No trauma, fever or chills, no chest pain or shortness for breath but does have some nausea.    The history is provided by the patient.     Review of patient's allergies indicates:   Allergen Reactions    Metronidazole Other (See Comments)     DISORIENTED       Statins-hmg-coa reductase inhibitors Tinitus     Joint pain     Past Medical History:   Diagnosis Date    Arthritis     COPD (chronic obstructive pulmonary disease)     Coronary artery disease     Hypertension     Sleep apnea     use CPAP at night      Past Surgical History:   Procedure Laterality Date    CORONARY ANGIOGRAPHY N/A 2/15/2021    Procedure: ANGIOGRAM, CORONARY ARTERY;  Surgeon: Aidan Buchanan MD;  Location: [facility] CATH/EP LAB;  Service: Cardiology;  Laterality: N/A;    CORONARY ARTERY BYPASS GRAFT      CORONARY BYPASS GRAFT ANGIOGRAPHY  2/15/2021    Procedure: Bypass graft study;  Surgeon: Aidan Buchanan MD;  Location: [facility] CATH/EP LAB;  Service: Cardiology;;    HYSTERECTOMY      JOINT REPLACEMENT      bilateral knee replacement     LEFT HEART CATHETERIZATION Left 2/15/2021    Procedure: Left heart cath;  Surgeon: Aidan Buchanan MD;  Location: [facility] CATH/EP LAB;  Service: Cardiology;  Laterality: Left;     Family History   Problem Relation Name Age of Onset    Pulmonary embolism Mother       Social History     Tobacco Use    Smoking status: Never     Passive exposure: Past    Smokeless tobacco: Never   Substance Use Topics    Alcohol use: No    Drug use: No     Review of Systems   All other systems  reviewed and are negative.      Physical Exam     Initial Vitals [06/12/24 1712]   BP Pulse Resp Temp SpO2   (!) 222/118 97 18 98.5 °F (36.9 °C) 95 %      MAP       --         Physical Exam    Nursing note and vitals reviewed.  Constitutional: No distress.   HENT:   Head: Normocephalic.   Eyes: No scleral icterus.   Cardiovascular:  Normal rate.           Pulmonary/Chest: Effort normal. No stridor. No respiratory distress.   Abdominal: There is no guarding.     Neurological: She is alert.   Skin: No rash noted. No erythema.   Psychiatric:   Anxious appearing         ED Course   Critical Care    Date/Time: 6/12/2024 7:49 PM    Performed by: Tyrell Varghese Jr., DO  Authorized by: Sven Roberts MD  Direct patient critical care time: 10 minutes  Additional history critical care time: 5 minutes  Ordering / reviewing critical care time: 10 minutes  Documentation critical care time: 7 minutes  Total critical care time (exclusive of procedural time) : 32 minutes        Labs Reviewed   CBC W/ AUTO DIFFERENTIAL - Abnormal; Notable for the following components:       Result Value    MPV 9.1 (*)     Immature Granulocytes 0.9 (*)     Immature Grans (Abs) 0.06 (*)     Mono # 0.1 (*)     Gran % 77.1 (*)     Mono % 2.1 (*)     All other components within normal limits   COMPREHENSIVE METABOLIC PANEL - Abnormal; Notable for the following components:    Glucose 153 (*)     ALT 9 (*)     All other components within normal limits   B-TYPE NATRIURETIC PEPTIDE - Abnormal; Notable for the following components:     (*)     All other components within normal limits   TROPONIN I HIGH SENSITIVITY - Abnormal; Notable for the following components:    Troponin I High Sensitivity 54.3 (*)     All other components within normal limits          Imaging Results              X-Ray Chest 1 View (Final result)  Result time 06/12/24 19:00:07      Final result by Kirill Ibarra MD (06/12/24 19:00:07)                   Impression:      As  above.      Electronically signed by: Kirill Ibarra  Date:    06/12/2024  Time:    19:00               Narrative:    EXAMINATION:  XR CHEST 1 VIEW    CLINICAL HISTORY:  Essential (primary) hypertension    TECHNIQUE:  Single frontal view of the chest was performed.    COMPARISON:  Radiograph 02/26/2024, CT chest 06/14/2023    FINDINGS:  Postoperative changes of the chest with prior median sternotomy.  Stable cardiomediastinal silhouette.  Patchy bilateral interstitial airspace opacities, slightly increased compared to prior exam.  Findings likely may relate to patient's known pulmonary fibrosis.  No significant pleural effusion or pneumothorax.                                       Medications   niCARdipine 40 mg/200 mL (0.2 mg/mL) infusion (has no administration in time range)   sodium chloride 0.9% flush 2 mL (has no administration in time range)   melatonin tablet 6 mg (has no administration in time range)   ondansetron injection 4 mg (has no administration in time range)   acetaminophen tablet 650 mg (has no administration in time range)   aluminum-magnesium hydroxide-simethicone 200-200-20 mg/5 mL suspension 30 mL (has no administration in time range)   acetaminophen tablet 650 mg (has no administration in time range)   HYDROcodone-acetaminophen 5-325 mg per tablet 1 tablet (has no administration in time range)   naloxone 0.4 mg/mL injection 0.02 mg (has no administration in time range)   potassium bicarbonate disintegrating tablet 50 mEq (has no administration in time range)   potassium bicarbonate disintegrating tablet 35 mEq (has no administration in time range)   potassium bicarbonate disintegrating tablet 60 mEq (has no administration in time range)   magnesium oxide tablet 800 mg (has no administration in time range)   magnesium oxide tablet 800 mg (has no administration in time range)   potassium, sodium phosphates 280-160-250 mg packet 2 packet (has no administration in time range)   potassium, sodium  phosphates 280-160-250 mg packet 2 packet (has no administration in time range)   potassium, sodium phosphates 280-160-250 mg packet 2 packet (has no administration in time range)   glucose chewable tablet 16 g (has no administration in time range)   glucose chewable tablet 24 g (has no administration in time range)   dextrose 50% injection 12.5 g (has no administration in time range)   dextrose 50% injection 25 g (has no administration in time range)   glucagon (human recombinant) injection 1 mg (has no administration in time range)   enoxaparin injection 40 mg (has no administration in time range)   insulin aspart U-100 pen 0-5 Units (has no administration in time range)   HYDROcodone-acetaminophen  mg per tablet 1 tablet (1 tablet Oral Given 6/12/24 1811)   hydrALAZINE tablet 10 mg (10 mg Oral Given 6/12/24 1811)     Medical Decision Making  69-year-old female presents with uncontrolled blood pressure and associated nausea.  No chest pain or shortness a breath, EKG obtained with no STEMI, do not suspect any intracranial hemorrhage.  Patient also reports being out of her chronic opioids for several days to almost a week now.  Patient administered oral blood pressure medication, oral narcotic and blood pressure increased, blood work obtained with elevated BNP and troponin from baseline, history of pulmonary disease, we will defer IV labetalol we will start on IV Cardene infusion.  Patient updated and agrees with plan.  Spoke with hospitalist team who will admit for further management evaluation    Amount and/or Complexity of Data Reviewed  Labs:  Decision-making details documented in ED Course.  Radiology: ordered.  ECG/medicine tests: ordered and independent interpretation performed.     Details: Rate 94, QRS 80, , sinus rhythm with no STEMI  Discussion of management or test interpretation with external provider(s): Spoke with Adrienne Escobar NP with hospitalist team will admit for further management  evaluation      Risk  Prescription drug management.  Decision regarding hospitalization.               ED Course as of 06/12/24 1950 Wed Jun 12, 2024 1831 WBC: 6.77 [KB]   1831 Hemoglobin: 14.3 [KB]   1831 Hematocrit: 43.0 [KB]   1831 BNP(!): 256 [KB]   1928 Troponin I High Sensitivity(!!): 54.3 [KB]      ED Course User Index  [KB] Tyrell Varghese Jr., DO                           Clinical Impression:  Final diagnoses:  [I10] Hypertension  [I16.1] Hypertensive emergency (Primary)  [F11.90] Opioid use  [R79.89] Elevated brain natriuretic peptide (BNP) level  [R79.89] Elevated troponin          ED Disposition Condition    Observation                 Tyrell Varghese Jr., DO  06/12/24 1950

## 2024-06-13 VITALS
OXYGEN SATURATION: 96 % | WEIGHT: 186 LBS | DIASTOLIC BLOOD PRESSURE: 78 MMHG | BODY MASS INDEX: 35.12 KG/M2 | HEIGHT: 61 IN | SYSTOLIC BLOOD PRESSURE: 119 MMHG | TEMPERATURE: 99 F | RESPIRATION RATE: 17 BRPM | HEART RATE: 81 BPM

## 2024-06-13 LAB
ALBUMIN SERPL BCP-MCNC: 3.9 G/DL (ref 3.5–5.2)
ALP SERPL-CCNC: 73 U/L (ref 55–135)
ALT SERPL W/O P-5'-P-CCNC: 5 U/L (ref 10–44)
ANION GAP SERPL CALC-SCNC: 9 MMOL/L (ref 8–16)
AST SERPL-CCNC: 13 U/L (ref 10–40)
BASOPHILS # BLD AUTO: 0.08 K/UL (ref 0–0.2)
BASOPHILS NFR BLD: 1.1 % (ref 0–1.9)
BILIRUB SERPL-MCNC: 0.2 MG/DL (ref 0.1–1)
BUN SERPL-MCNC: 13 MG/DL (ref 8–23)
CALCIUM SERPL-MCNC: 9.2 MG/DL (ref 8.7–10.5)
CHLORIDE SERPL-SCNC: 105 MMOL/L (ref 95–110)
CO2 SERPL-SCNC: 28 MMOL/L (ref 23–29)
CREAT SERPL-MCNC: 0.7 MG/DL (ref 0.5–1.4)
DIFFERENTIAL METHOD BLD: NORMAL
EOSINOPHIL # BLD AUTO: 0.3 K/UL (ref 0–0.5)
EOSINOPHIL NFR BLD: 3.7 % (ref 0–8)
ERYTHROCYTE [DISTWIDTH] IN BLOOD BY AUTOMATED COUNT: 13.9 % (ref 11.5–14.5)
EST. GFR  (NO RACE VARIABLE): >60 ML/MIN/1.73 M^2
GLUCOSE SERPL-MCNC: 117 MG/DL (ref 70–110)
GLUCOSE SERPL-MCNC: 126 MG/DL (ref 70–110)
HCT VFR BLD AUTO: 38.8 % (ref 37–48.5)
HGB BLD-MCNC: 12.6 G/DL (ref 12–16)
IMM GRANULOCYTES # BLD AUTO: 0.04 K/UL (ref 0–0.04)
IMM GRANULOCYTES NFR BLD AUTO: 0.5 % (ref 0–0.5)
LYMPHOCYTES # BLD AUTO: 2.4 K/UL (ref 1–4.8)
LYMPHOCYTES NFR BLD: 32.7 % (ref 18–48)
MAGNESIUM SERPL-MCNC: 1.6 MG/DL (ref 1.6–2.6)
MCH RBC QN AUTO: 29.4 PG (ref 27–31)
MCHC RBC AUTO-ENTMCNC: 32.5 G/DL (ref 32–36)
MCV RBC AUTO: 90 FL (ref 82–98)
MONOCYTES # BLD AUTO: 0.9 K/UL (ref 0.3–1)
MONOCYTES NFR BLD: 12.3 % (ref 4–15)
NEUTROPHILS # BLD AUTO: 3.6 K/UL (ref 1.8–7.7)
NEUTROPHILS NFR BLD: 49.7 % (ref 38–73)
NRBC BLD-RTO: 0 /100 WBC
OHS QRS DURATION: 80 MS
OHS QTC CALCULATION: 437 MS
PLATELET # BLD AUTO: 255 K/UL (ref 150–450)
PLATELET BLD QL SMEAR: NORMAL
PMV BLD AUTO: 10.1 FL (ref 9.2–12.9)
POTASSIUM SERPL-SCNC: 3.6 MMOL/L (ref 3.5–5.1)
PROT SERPL-MCNC: 6.7 G/DL (ref 6–8.4)
RBC # BLD AUTO: 4.29 M/UL (ref 4–5.4)
SODIUM SERPL-SCNC: 142 MMOL/L (ref 136–145)
TROPONIN I SERPL HS-MCNC: 50.5 PG/ML (ref 0–14.9)
WBC # BLD AUTO: 7.3 K/UL (ref 3.9–12.7)

## 2024-06-13 PROCEDURE — 83735 ASSAY OF MAGNESIUM: CPT | Performed by: NURSE PRACTITIONER

## 2024-06-13 PROCEDURE — 94640 AIRWAY INHALATION TREATMENT: CPT | Mod: XB

## 2024-06-13 PROCEDURE — 99900031 HC PATIENT EDUCATION (STAT)

## 2024-06-13 PROCEDURE — 94761 N-INVAS EAR/PLS OXIMETRY MLT: CPT

## 2024-06-13 PROCEDURE — 25000242 PHARM REV CODE 250 ALT 637 W/ HCPCS: Performed by: NURSE PRACTITIONER

## 2024-06-13 PROCEDURE — 63600175 PHARM REV CODE 636 W HCPCS: Performed by: NURSE PRACTITIONER

## 2024-06-13 PROCEDURE — 25000003 PHARM REV CODE 250: Performed by: NURSE PRACTITIONER

## 2024-06-13 PROCEDURE — 27000221 HC OXYGEN, UP TO 24 HOURS

## 2024-06-13 PROCEDURE — 85025 COMPLETE CBC W/AUTO DIFF WBC: CPT | Performed by: NURSE PRACTITIONER

## 2024-06-13 PROCEDURE — 25000003 PHARM REV CODE 250: Performed by: HOSPITALIST

## 2024-06-13 PROCEDURE — G0378 HOSPITAL OBSERVATION PER HR: HCPCS

## 2024-06-13 PROCEDURE — 36415 COLL VENOUS BLD VENIPUNCTURE: CPT | Performed by: NURSE PRACTITIONER

## 2024-06-13 PROCEDURE — 80053 COMPREHEN METABOLIC PANEL: CPT | Performed by: NURSE PRACTITIONER

## 2024-06-13 PROCEDURE — 84484 ASSAY OF TROPONIN QUANT: CPT | Performed by: NURSE PRACTITIONER

## 2024-06-13 RX ORDER — LEVALBUTEROL INHALATION SOLUTION 1.25 MG/3ML
1.25 SOLUTION RESPIRATORY (INHALATION) EVERY 6 HOURS
Status: DISCONTINUED | OUTPATIENT
Start: 2024-06-13 | End: 2024-06-13 | Stop reason: HOSPADM

## 2024-06-13 RX ORDER — ASPIRIN 81 MG/1
81 TABLET ORAL DAILY
Qty: 30 TABLET | Refills: 0 | Status: SHIPPED | OUTPATIENT
Start: 2024-06-13 | End: 2024-07-13

## 2024-06-13 RX ORDER — BUSPIRONE HYDROCHLORIDE 7.5 MG/1
7.5 TABLET ORAL 2 TIMES DAILY
Qty: 30 TABLET | Refills: 0 | Status: SHIPPED | OUTPATIENT
Start: 2024-06-13

## 2024-06-13 RX ORDER — OMEPRAZOLE 40 MG/1
40 CAPSULE, DELAYED RELEASE ORAL DAILY
Start: 2024-06-13 | End: 2024-07-13

## 2024-06-13 RX ORDER — METOPROLOL SUCCINATE 25 MG/1
25 TABLET, EXTENDED RELEASE ORAL DAILY
Status: DISCONTINUED | OUTPATIENT
Start: 2024-06-13 | End: 2024-06-13 | Stop reason: HOSPADM

## 2024-06-13 RX ORDER — CHLORTHALIDONE 25 MG/1
25 TABLET ORAL DAILY
Status: DISCONTINUED | OUTPATIENT
Start: 2024-06-13 | End: 2024-06-13 | Stop reason: HOSPADM

## 2024-06-13 RX ORDER — METOPROLOL SUCCINATE 25 MG/1
25 TABLET, EXTENDED RELEASE ORAL DAILY
Qty: 30 TABLET
Start: 2024-06-13

## 2024-06-13 RX ORDER — LOSARTAN POTASSIUM 25 MG/1
25 TABLET ORAL NIGHTLY
Status: DISCONTINUED | OUTPATIENT
Start: 2024-06-13 | End: 2024-06-13 | Stop reason: HOSPADM

## 2024-06-13 RX ADMIN — LEVALBUTEROL HYDROCHLORIDE 1.25 MG: 1.25 SOLUTION RESPIRATORY (INHALATION) at 04:06

## 2024-06-13 RX ADMIN — LOSARTAN POTASSIUM 25 MG: 25 TABLET, FILM COATED ORAL at 08:06

## 2024-06-13 RX ADMIN — OXYCODONE HYDROCHLORIDE 15 MG: 10 TABLET ORAL at 11:06

## 2024-06-13 RX ADMIN — LEVALBUTEROL HYDROCHLORIDE 1.25 MG: 1.25 SOLUTION RESPIRATORY (INHALATION) at 01:06

## 2024-06-13 RX ADMIN — ALLOPURINOL 300 MG: 300 TABLET ORAL at 08:06

## 2024-06-13 RX ADMIN — ALBUTEROL SULFATE 2.5 MG: 2.5 SOLUTION RESPIRATORY (INHALATION) at 08:06

## 2024-06-13 RX ADMIN — RANOLAZINE 500 MG: 500 TABLET, FILM COATED, EXTENDED RELEASE ORAL at 08:06

## 2024-06-13 RX ADMIN — ONDANSETRON 4 MG: 2 INJECTION INTRAMUSCULAR; INTRAVENOUS at 03:06

## 2024-06-13 RX ADMIN — BUSPIRONE HYDROCHLORIDE 7.5 MG: 5 TABLET ORAL at 08:06

## 2024-06-13 RX ADMIN — OXYCODONE HYDROCHLORIDE 15 MG: 10 TABLET ORAL at 04:06

## 2024-06-13 RX ADMIN — CHLORTHALIDONE 25 MG: 25 TABLET ORAL at 08:06

## 2024-06-13 RX ADMIN — ASPIRIN 81 MG: 81 TABLET, COATED ORAL at 08:06

## 2024-06-13 RX ADMIN — ISOSORBIDE MONONITRATE 30 MG: 30 TABLET, EXTENDED RELEASE ORAL at 08:06

## 2024-06-13 RX ADMIN — METOPROLOL SUCCINATE 25 MG: 25 TABLET, FILM COATED, EXTENDED RELEASE ORAL at 08:06

## 2024-06-13 NOTE — PLAN OF CARE
Spoke with patient at bedside. Patient states will drive herself home and declines any other needs.    Discharge orders and chart reviewed with no further post-acute discharge needs identified at this time.  At this time, patient is cleared for discharge from Case Management standpoint.        06/13/24 1036   Final Note   Assessment Type Final Discharge Note   Hospital Resources/Appts/Education Provided   (Patient's PCP office does not answer. Patient instructed to / will schedule post hospital follow up appointment with their PCP in 7 to 10 days from discharge)   Post-Acute Status   Coverage [employer]   Discharge Delays None known at this time

## 2024-06-13 NOTE — ASSESSMENT & PLAN NOTE
Patient on p.o. oxycodone IR q.6 15 mg will order 10 mg PO q6H PRN as pharmacy doesn't have the 15 mg  Change pharmacy unable to obtain medications since 05/25/2024

## 2024-06-13 NOTE — ASSESSMENT & PLAN NOTE
Creatine stable for now. BMP reviewed- noted Estimated Creatinine Clearance: 65.4 mL/min (based on SCr of 0.8 mg/dL). according to latest data. Based on current GFR, CKD stage is stage 2 - GFR 60-89.  Monitor UOP and serial BMP and adjust therapy as needed. Renally dose meds. Avoid nephrotoxic medications and procedures.

## 2024-06-13 NOTE — HOSPITAL COURSE
68 y/o female with pMHx of CAD, COPD, hypertension, and osteoarthritis who presented to the ED with reports of hypertensive urgency.  Patient reports that she has been out of her chronic opioids for some time and today has been unable to control her blood pressure.  She took her blood pressure at a BP S with a blood pressure of 206/104.  On arrival to the ED patient with a BP of 222/118.  After patient given hydralazine 10 mg IV and hydrocodone 10 mg p.o. patient's blood pressure remained elevated at 200/88.  Patient will be admitted on IV Cardene as she has with a history of pulmonary fibrosis and may not tolerate a beta blocker. Will resume her PO pain medications that she has been unable to  at the pharmacy as it is on backordered since 5/25/24.  Troponin 54.3, .  Chest x-ray- Stable cardiomediastinal silhouette.  Patchy bilateral interstitial airspace opacities, slightly increased compared to prior exam.  Findings likely may relate to patient's known pulmonary fibrosis.     Patient has mildly elevated troponins and hypertensive urgency upon arrival.  Metoprolol was restarted and patient was started on a Cardene drip.  Patient's blood pressure was within normal limits off the drip and on her home metoprolol.  Patient troponins were trended and were down trending.  She likely had type 2 MI in setting of hypertensive urgency.  Patient was discharged home told to follow up with PCP next week.  She was also counseled keeping a blood pressure log for her PCP appointment.

## 2024-06-13 NOTE — DISCHARGE SUMMARY
[facility]  Discharge Summary      Patient Name: Sakshi Hess  MRN: [id number]  COLE: [id number]  Patient Class: OP- Observation  Admission Date: 6/12/2024  Hospital Length of Stay: 0 days  Discharge Date and Time:  06/13/2024 10:06 AM  Attending Physician: Olvin Rowan Jr., MD   Discharging Provider: Olvin Rowan Jr, MD  Primary Care Provider: Wenceslao Deras NP    Primary Care Team: Networked reference to record PCT     HPI:   68 y/o female with pMHx of CAD, COPD, hypertension, and osteoarthritis who presented to the ED with reports of hypertensive urgency.  Patient reports that she has been out of her chronic opioids for some time and today has been unable to control her blood pressure.  She took her blood pressure at a BP S with a blood pressure of 206/104.  On arrival to the ED patient with a BP of 222/118.  After patient given hydralazine 10 mg IV and hydrocodone 10 mg p.o. patient's blood pressure remained elevated at 200/88.  Patient will be admitted on IV Cardene as she has with a history of pulmonary fibrosis and may not tolerate a beta blocker. Will resume her PO pain medications that she has been unable to  at the pharmacy as it is on backordered since 5/25/24.  Troponin 54.3, .  Chest x-ray- Stable cardiomediastinal silhouette.  Patchy bilateral interstitial airspace opacities, slightly increased compared to prior exam.  Findings likely may relate to patient's known pulmonary fibrosis.     * No surgery found *      Hospital Course:   68 y/o female with pMHx of CAD, COPD, hypertension, and osteoarthritis who presented to the ED with reports of hypertensive urgency.  Patient reports that she has been out of her chronic opioids for some time and today has been unable to control her blood pressure.  She took her blood pressure at a BP S with a blood pressure of 206/104.  On arrival to the ED patient with a BP of 222/118.  After patient given  hydralazine 10 mg IV and hydrocodone 10 mg p.o. patient's blood pressure remained elevated at 200/88.  Patient will be admitted on IV Cardene as she has with a history of pulmonary fibrosis and may not tolerate a beta blocker. Will resume her PO pain medications that she has been unable to  at the pharmacy as it is on backordered since 5/25/24.  Troponin 54.3, .  Chest x-ray- Stable cardiomediastinal silhouette.  Patchy bilateral interstitial airspace opacities, slightly increased compared to prior exam.  Findings likely may relate to patient's known pulmonary fibrosis.     Patient has mildly elevated troponins and hypertensive urgency upon arrival.  Metoprolol was restarted and patient was started on a Cardene drip.  Patient's blood pressure was within normal limits off the drip and on her home metoprolol.  Patient troponins were trended and were down trending.  She likely had type 2 MI in setting of hypertensive urgency.  Patient was discharged home told to follow up with PCP next week.  She was also counseled keeping a blood pressure log for her PCP appointment.     Goals of Care Treatment Preferences:  Code Status: Full Code      Consults:     Cardiac/Vascular  * Hypertensive urgency  Patient has a current diagnosis of hypertensive urgency (without evidence of end organ damage) which is uncontrolled.  Latest blood pressure and vitals reviewed-   Temp:  [98.5 °F (36.9 °C)]   Pulse:  [82-97]   Resp:  [18-22]   BP: (176-222)/()   SpO2:  [95 %-97 %] .   Patient currently on IV antihypertensives.   Home meds for hypertension were reviewed and noted below.   Hypertension Medications               chlorthalidone (HYGROTEN) 25 MG Tab Take 1 tablet (25 mg total) by mouth once daily.    isosorbide mononitrate (IMDUR) 30 MG 24 hr tablet Take 1 tablet (30 mg total) by mouth once daily.    losartan (COZAAR) 25 MG tablet Take 1 tablet (25 mg total) by mouth every evening.    metoprolol succinate  (TOPROL-XL) 25 MG 24 hr tablet Take 1 tablet (25 mg total) by mouth once daily.    nitroGLYCERIN 0.2 mg/hr TD PT24 (NITRODUR) 0.2 mg/hr Place 1 patch onto the skin once daily.            Medication adjustment for hospital antihypertensives is as follows- Add Cardene IV     Will aim for controlled BP reduction by medications noted above. Monitor and mitigate end organ damage as indicated.    Demand ischemia  Initial troponin 50.6  Trend troponin  Likely r/t htn w/ pulmonary fibrosis        Final Active Diagnoses:    Diagnosis Date Noted POA    PRINCIPAL PROBLEM:  Hypertensive urgency [I16.0] 06/12/2024 Yes    Chronic, continuous use of opioids [F11.90] 06/12/2024 Yes    Demand ischemia [I24.89] 06/12/2024 Unknown    Diabetes [E11.9] 02/26/2024 Yes    Severe obesity (BMI 35.0-39.9) with comorbidity [E66.01] 08/03/2023 Yes    IPF (idiopathic pulmonary fibrosis) [J84.112] 04/07/2022 Yes    Obstructive sleep apnea syndrome [G47.33] 04/15/2021 Yes    CKD (chronic kidney disease), stage II [N18.2] 02/14/2021 Yes      Problems Resolved During this Admission:       Discharged Condition: fair    Disposition: Home or Self Care    Follow Up:   Follow-up Information       Wenceslao Deras NP Follow up in 1 week(s).    Specialty: Family Medicine  Contact information:  [address] [place] [address] [place] [address] [phone]                           Patient Instructions:   No discharge procedures on file.    Significant Diagnostic Studies: Labs: CMP   Recent Labs   Lab 06/12/24  1746 06/13/24  0628    142   K 3.9 3.6    105   CO2 28 28   * 126*   BUN 12 13   CREATININE 0.8 0.7   CALCIUM 10.2 9.2   PROT 8.2 6.7   ALBUMIN 4.6 3.9   BILITOT 0.3 0.2   ALKPHOS 96 73   AST 17 13   ALT 9* 5*   ANIONGAP 10 9    and CBC   Recent Labs   Lab 06/12/24  1746 06/13/24  0628   WBC 6.77 7.30   HGB 14.3 12.6   HCT 43.0 38.8    255       Pending Diagnostic Studies:       None           Medications:  Reconciled Home  Medications:      Medication List        CONTINUE taking these medications      acetaminophen 500 MG tablet  Commonly known as: TYLENOL  Take 1 tablet (500 mg total) by mouth every 4 (four) hours as needed for Pain.     albuterol 2.5 mg /3 mL (0.083 %) nebulizer solution  Commonly known as: PROVENTIL  Take 3 mLs (2.5 mg total) by nebulization every 6 (six) hours as needed for Wheezing or Shortness of Breath.     aspirin 81 MG EC tablet  Commonly known as: ECOTRIN  Take 1 tablet (81 mg total) by mouth once daily.     busPIRone 7.5 MG tablet  Commonly known as: BUSPAR  Take 1 tablet (7.5 mg total) by mouth 2 (two) times daily.     chlorthalidone 25 MG Tab  Commonly known as: HYGROTEN  Take 1 tablet (25 mg total) by mouth once daily.     GOODY'S EXTRA STRENGTH 500-325-65 mg Pwpk  Generic drug: aspirin-acetaminophen-caffeine  Take 1 Dose by mouth as needed (pain).     ibuprofen 200 MG tablet  Commonly known as: ADVIL,MOTRIN  Take 400 mg by mouth every 6 (six) hours as needed for Pain.     icosapent ethyL 1 gram Cap  Commonly known as: VASCEPA  Take 2 capsules (2,000 mg total) by mouth 2 (two) times daily.     isosorbide mononitrate 30 MG 24 hr tablet  Commonly known as: IMDUR  Take 1 tablet (30 mg total) by mouth once daily.     levalbuterol 45 mcg/actuation inhaler  Commonly known as: XOPENEX HFA  Inhale 1-2 puffs into the lungs every 4 (four) hours as needed for Wheezing. Rescue     losartan 25 MG tablet  Commonly known as: COZAAR  Take 1 tablet (25 mg total) by mouth every evening.     metFORMIN 500 MG ER 24hr tablet  Commonly known as: GLUCOPHAGE-XR  Take 1 tablet (500 mg total) by mouth every evening.     metoprolol succinate 25 MG 24 hr tablet  Commonly known as: TOPROL-XL  Take 1 tablet (25 mg total) by mouth once daily.     modafiniL 200 MG Tab  Commonly known as: PROVIGIL  Take 1 tablet (200 mg total) by mouth 2 (two) times daily as needed (excess sleepiness).     nitroGLYCERIN 0.2 mg/hr TD PT24 0.2  mg/hr  Commonly known as: NITRODUR  Place 1 patch onto the skin once daily.     omeprazole 40 MG capsule  Commonly known as: PRILOSEC  Take 1 capsule (40 mg total) by mouth once daily.     oxyCODONE 15 MG Tab  Commonly known as: ROXICODONE  Take 15 mg by mouth 4 (four) times daily as needed for Pain.     prasugreL 10 mg Tab  Commonly known as: EFFIENT  Take 1 tablet (10 mg total) by mouth once daily.     ranolazine 500 MG Tb12  Commonly known as: RANEXA  Take 1 tablet (500 mg total) by mouth 2 (two) times daily.     REPATHA SURECLICK 140 mg/mL Pnij  Generic drug: evolocumab  Inject 1 mL (140 mg total) into the skin every 14 (fourteen) days.     traZODone 50 MG tablet  Commonly known as: DESYREL  Take 0.5-1 tablets (25-50 mg total) by mouth every evening.            STOP taking these medications      azithromycin 500 MG tablet  Commonly known as: ZITHROMAX     BREZTRI AEROSPHERE 160-9-4.8 mcg/actuation Hfaa  Generic drug: budesonide-glycopyr-formoterol     doxycycline 100 MG Cap  Commonly known as: VIBRAMYCIN     predniSONE 20 MG tablet  Commonly known as: DELTASONE              Indwelling Lines/Drains at time of discharge:   Lines/Drains/Airways       None                   Time spent on the discharge of patient: 40 minutes         Olvin Rowan Jr, MD  Department of Hospital Medicine  [facility]

## 2024-06-13 NOTE — H&P
[facility] - Emergency Dept  Hospital Medicine  History & Physical    Patient Name: Sakshi Hess  MRN: [id number]  Patient Class: OP- Observation  Admission Date: 6/12/2024  Attending Physician: Sven Roberts MD   Primary Care Provider: Wenceslao Deras NP         Patient information was obtained from patient and ER records.     Subjective:     Principal Problem:Hypertensive urgency    Chief Complaint:   Chief Complaint   Patient presents with    Hypertension     Had a BP of 206/104 when taken at a store BP machine - denies chest pain, denies SOB, denies headache - patient presents to ER with no evidence of distress - ER BP is 222/118        HPI: 70 y/o female with pMHx of CAD, COPD, hypertension, and osteoarthritis who presented to the ED with reports of hypertensive urgency.  Patient reports that she has been out of her chronic opioids for some time and today has been unable to control her blood pressure.  She took her blood pressure at a BP S with a blood pressure of 206/104.  On arrival to the ED patient with a BP of 222/118.  After patient given hydralazine 10 mg IV and hydrocodone 10 mg p.o. patient's blood pressure remained elevated at 200/88.  Patient will be admitted on IV Cardene as she has with a history of pulmonary fibrosis and may not tolerate a beta blocker. Will resume her PO pain medications that she has been unable to  at the pharmacy as it is on backordered since 5/25/24.  Troponin 54.3, .  Chest x-ray- Stable cardiomediastinal silhouette.  Patchy bilateral interstitial airspace opacities, slightly increased compared to prior exam.  Findings likely may relate to patient's known pulmonary fibrosis.     Past Medical History:   Diagnosis Date    Arthritis     COPD (chronic obstructive pulmonary disease)     Coronary artery disease     Hypertension     Sleep apnea     use CPAP at night        Past Surgical History:   Procedure Laterality Date    CORONARY  ANGIOGRAPHY N/A 2/15/2021    Procedure: ANGIOGRAM, CORONARY ARTERY;  Surgeon: Aidan Buchanan MD;  Location: [facility] CATH/EP LAB;  Service: Cardiology;  Laterality: N/A;    CORONARY ARTERY BYPASS GRAFT      CORONARY BYPASS GRAFT ANGIOGRAPHY  2/15/2021    Procedure: Bypass graft study;  Surgeon: Aidan Buchanan MD;  Location: [facility] CATH/EP LAB;  Service: Cardiology;;    HYSTERECTOMY      JOINT REPLACEMENT      bilateral knee replacement     LEFT HEART CATHETERIZATION Left 2/15/2021    Procedure: Left heart cath;  Surgeon: Aidan Buchanan MD;  Location: [facility] CATH/EP LAB;  Service: Cardiology;  Laterality: Left;       Review of patient's allergies indicates:   Allergen Reactions    Metronidazole Other (See Comments)     DISORIENTED       Statins-hmg-coa reductase inhibitors Tinitus     Joint pain       No current facility-administered medications on file prior to encounter.     Current Outpatient Medications on File Prior to Encounter   Medication Sig    acetaminophen (TYLENOL) 500 MG tablet Take 1 tablet (500 mg total) by mouth every 4 (four) hours as needed for Pain.    albuterol (PROVENTIL) 2.5 mg /3 mL (0.083 %) nebulizer solution Take 3 mLs (2.5 mg total) by nebulization every 6 (six) hours as needed for Wheezing or Shortness of Breath.    allopurinoL (ZYLOPRIM) 300 MG tablet Take 1 tablet by mouth once daily.    aspirin (ECOTRIN) 81 MG EC tablet Take 1 tablet (81 mg total) by mouth once daily.    aspirin-acetaminophen-caffeine (GOODY'S EXTRA STRENGTH) 500-325-65 mg PwPk Take 1 Dose by mouth as needed (pain).    azithromycin (ZITHROMAX) 500 MG tablet One daily for yellow mucous, repeat if needed    budesonide-glycopyr-formoterol (BREZTRI AEROSPHERE) 160-9-4.8 mcg/actuation HFAA Inhale 2 puffs into the lungs 2 (two) times a day.    busPIRone (BUSPAR) 7.5 MG tablet Take 1 tablet by mouth 2 (two) times daily.    chlorthalidone (HYGROTEN) 25 MG Tab Take 1 tablet (25 mg total) by mouth once daily.    doxycycline  (VIBRAMYCIN) 100 MG Cap Take 1 capsule (100 mg total) by mouth every 12 (twelve) hours.    icosapent ethyL (VASCEPA) 1 gram Cap Take 2 capsules (2,000 mg total) by mouth 2 (two) times daily.    isosorbide mononitrate (IMDUR) 30 MG 24 hr tablet Take 1 tablet (30 mg total) by mouth once daily.    levalbuterol (XOPENEX HFA) 45 mcg/actuation inhaler Inhale 1-2 puffs into the lungs every 4 (four) hours as needed for Wheezing. Rescue    losartan (COZAAR) 25 MG tablet Take 1 tablet (25 mg total) by mouth every evening.    metFORMIN (GLUCOPHAGE-XR) 500 MG ER 24hr tablet Take 1 tablet (500 mg total) by mouth every evening.    metoprolol succinate (TOPROL-XL) 25 MG 24 hr tablet Take 1 tablet (25 mg total) by mouth once daily.    modafiniL (PROVIGIL) 200 MG Tab Take 1 tablet (200 mg total) by mouth 2 (two) times daily as needed (excess sleepiness).    nitroGLYCERIN 0.2 mg/hr TD PT24 (NITRODUR) 0.2 mg/hr Place 1 patch onto the skin once daily.    omeprazole (PRILOSEC) 40 MG capsule Take 1 capsule (40 mg total) by mouth once daily.    prasugreL (EFFIENT) 10 mg Tab Take 1 tablet (10 mg total) by mouth once daily.    predniSONE (DELTASONE) 20 MG tablet Take one daily for 3 days and may repeat for shortness of breath.    ranolazine (RANEXA) 500 MG Tb12 Take 1 tablet (500 mg total) by mouth 2 (two) times daily.    REPATHA SURECLICK 140 mg/mL PnIj Inject 1 mL (140 mg total) into the skin every 14 (fourteen) days.    traZODone (DESYREL) 50 MG tablet Take 0.5-1 tablets (25-50 mg total) by mouth every evening.     Family History       Problem Relation (Age of Onset)    Pulmonary embolism Mother          Tobacco Use    Smoking status: Never     Passive exposure: Past    Smokeless tobacco: Never   Substance and Sexual Activity    Alcohol use: No    Drug use: No    Sexual activity: Not Currently     Review of Systems   Constitutional:  Positive for fatigue.   Cardiovascular:  Chest pain: elevatged BP reading.   Musculoskeletal:  Positive  for arthralgias (osteoarthritis).     Objective:     Vital Signs (Most Recent):  Temp: 98.5 °F (36.9 °C) (06/12/24 1712)  Pulse: 82 (06/12/24 1830)  Resp: (!) 22 (06/12/24 1830)  BP: (!) 176/93 (06/12/24 1834)  SpO2: 96 % (06/12/24 1834) Vital Signs (24h Range):  Temp:  [98.5 °F (36.9 °C)] 98.5 °F (36.9 °C)  Pulse:  [82-97] 82  Resp:  [18-22] 22  SpO2:  [95 %-97 %] 96 %  BP: (176-222)/() 176/93     Weight: 84.4 kg (186 lb)  Body mass index is 35.14 kg/m².     Physical Exam  Vitals reviewed.   Constitutional:       General: She is not in acute distress.     Appearance: Normal appearance. She is not toxic-appearing.   HENT:      Head: Normocephalic and atraumatic.      Mouth/Throat:      Mouth: Mucous membranes are moist.      Pharynx: Oropharynx is clear.   Eyes:      Extraocular Movements: Extraocular movements intact.      Pupils: Pupils are equal, round, and reactive to light.   Cardiovascular:      Rate and Rhythm: Normal rate and regular rhythm.      Pulses: Normal pulses.      Heart sounds: Normal heart sounds.   Pulmonary:      Effort: Pulmonary effort is normal. Tachypnea present.      Breath sounds: Normal breath sounds.   Abdominal:      General: Bowel sounds are normal. There is no distension.      Palpations: Abdomen is soft.      Tenderness: There is no abdominal tenderness.   Musculoskeletal:         General: No swelling. Normal range of motion.      Cervical back: Normal range of motion and neck supple.   Skin:     General: Skin is warm and dry.      Capillary Refill: Capillary refill takes less than 2 seconds.             Comments: incisions   Neurological:      General: No focal deficit present.      Mental Status: She is alert and oriented to person, place, and time. Mental status is at baseline.   Psychiatric:         Mood and Affect: Mood normal.         Behavior: Behavior normal.         Judgment: Judgment normal.              CRANIAL NERVES     CN III, IV, VI   Pupils are equal, round, and  reactive to light.       Significant Labs: All pertinent labs within the past 24 hours have been reviewed.  Recent Lab Results         06/12/24  1746        Albumin 4.6       ALP 96       ALT 9       Anion Gap 10       AST 17       Baso # 0.07       Basophil % 1.0       BILIRUBIN TOTAL 0.3  Comment: For infants and newborns, interpretation of results should be based  on gestational age, weight and in agreement with clinical  observations.    Premature Infant recommended reference ranges:  Up to 24 hours.............<8.0 mg/dL  Up to 48 hours............<12.0 mg/dL  3-5 days..................<15.0 mg/dL  6-29 days.................<15.0 mg/dL           Comment: Values of less than 100 pg/ml are consistent with non-CHF populations.       BUN 12       Calcium 10.2       Chloride 102       CO2 28       Creatinine 0.8       Differential Method Automated       eGFR >60.0       Eos # 0.0       Eos % 0.4       Glucose 153       Gran # (ANC) 5.2       Gran % 77.1       Hematocrit 43.0       Hemoglobin 14.3       Immature Grans (Abs) 0.06  Comment: Mild elevation in immature granulocytes is non specific and   can be seen in a variety of conditions including stress response,   acute inflammation, trauma and pregnancy. Correlation with other   laboratory and clinical findings is essential.         Immature Granulocytes 0.9       Lymph # 1.3       Lymph % 18.5       MCH 29.2       MCHC 33.3       MCV 88       Mono # 0.1       Mono % 2.1       MPV 9.1       nRBC 0       Platelet Count 355       Potassium 3.9       PROTEIN TOTAL 8.2       RBC 4.89       RDW 13.6       Sodium 140       Troponin I High Sensitivity 54.3  Comment: Troponin results differ between methods. Do not use   results between Troponin methods interchangeably.    Alkaline Phospatase levels above 400 U/L may   cause false positive results.    Access hsTnI should not be used for patients taking   Asfotase eran (Strensiq).  Critical result TNIHS 54.3 pg/mL  called to and read back by Doug Marie RN  ER at 12-Jun-2024 19:01 by [facility].  Result Rechecked         WBC 6.77               Significant Imaging: I have reviewed all pertinent imaging results/findings within the past 24 hours.    X-Ray Chest 1 View  Narrative: EXAMINATION:  XR CHEST 1 VIEW    CLINICAL HISTORY:  Essential (primary) hypertension    TECHNIQUE:  Single frontal view of the chest was performed.    COMPARISON:  Radiograph 02/26/2024, CT chest 06/14/2023    FINDINGS:  Postoperative changes of the chest with prior median sternotomy.  Stable cardiomediastinal silhouette.  Patchy bilateral interstitial airspace opacities, slightly increased compared to prior exam.  Findings likely may relate to patient's known pulmonary fibrosis.  No significant pleural effusion or pneumothorax.  Impression: As above.    Electronically signed by: Kirill Ibarra  Date:    06/12/2024  Time:    19:00     Assessment/Plan:     * Hypertensive urgency  Patient has a current diagnosis of hypertensive urgency (without evidence of end organ damage) which is uncontrolled.  Latest blood pressure and vitals reviewed-   Temp:  [98.5 °F (36.9 °C)]   Pulse:  [82-97]   Resp:  [18-22]   BP: (176-222)/()   SpO2:  [95 %-97 %] .   Patient currently on IV antihypertensives.   Home meds for hypertension were reviewed and noted below.   Hypertension Medications               chlorthalidone (HYGROTEN) 25 MG Tab Take 1 tablet (25 mg total) by mouth once daily.    isosorbide mononitrate (IMDUR) 30 MG 24 hr tablet Take 1 tablet (30 mg total) by mouth once daily.    losartan (COZAAR) 25 MG tablet Take 1 tablet (25 mg total) by mouth every evening.    metoprolol succinate (TOPROL-XL) 25 MG 24 hr tablet Take 1 tablet (25 mg total) by mouth once daily.    nitroGLYCERIN 0.2 mg/hr TD PT24 (NITRODUR) 0.2 mg/hr Place 1 patch onto the skin once daily.            Medication adjustment for hospital antihypertensives is as follows- Add Cardene IV  "    Will aim for controlled BP reduction by medications noted above. Monitor and mitigate end organ damage as indicated.    Demand ischemia  Initial troponin 50.6  Trend troponin  Likely r/t htn w/ pulmonary fibrosis      Chronic, continuous use of opioids  Patient on p.o. oxycodone IR q.6 15 mg will order 10 mg PO q6H PRN as pharmacy doesn't have the 15 mg  Change pharmacy unable to obtain medications since 05/25/2024      Diabetes  Patient's FSGs are controlled on current medication regimen.  Last A1c reviewed- No results found for: "LABA1C", "HGBA1C"  Most recent fingerstick glucose reviewed- No results for input(s): "POCTGLUCOSE" in the last 24 hours.  Current correctional scale  Low  Maintain anti-hyperglycemic dose as follows-   Antihyperglycemics (From admission, onward)      Start     Stop Route Frequency Ordered    06/12/24 2044  insulin aspart U-100 pen 0-5 Units         -- SubQ Before meals & nightly PRN 06/12/24 1945          Hold Oral hypoglycemics while patient is in the hospital.    Severe obesity (BMI 35.0-39.9) with comorbidity  Body mass index is 35.14 kg/m². Morbid obesity complicates all aspects of disease management from diagnostic modalities to treatment. Weight loss encouraged and health benefits explained to patient.         IPF (idiopathic pulmonary fibrosis)  Patient has a diagnosis idiopathic pulmonary fibrosis. The fibrosis is stable. The patient has the following signs/symptoms of pneumonia: cough. The patient does not have a current oxygen requirement and the patient does not have a home oxygen requirement. I have reviewed the pertinent imaging.   RT eval and treat  Breathing treatments as needed        Obstructive sleep apnea syndrome  Respiratory therapy assess and treat  Breathing treatments p.r.n.   continue patient's modafinil 200 mg b.i.d. p.r.n. sleepiness      CKD (chronic kidney disease), stage II  Creatine stable for now. BMP reviewed- noted Estimated Creatinine Clearance: " 65.4 mL/min (based on SCr of 0.8 mg/dL). according to latest data. Based on current GFR, CKD stage is stage 2 - GFR 60-89.  Monitor UOP and serial BMP and adjust therapy as needed. Renally dose meds. Avoid nephrotoxic medications and procedures.      VTE Risk Mitigation (From admission, onward)           Ordered     enoxaparin injection 40 mg  Daily         06/12/24 1945     IP VTE HIGH RISK PATIENT  Once         06/12/24 1945     Place sequential compression device  Until discontinued         06/12/24 1945                         On 06/12/2024, patient should be placed in hospital observation services under my care in collaboration with Armando.           Adrienne Escobar NP  Department of Hospital Medicine  [facility] - Emergency Dept

## 2024-06-13 NOTE — PLAN OF CARE
Met with patient at bedside to complete initial assessment. Patient / family reports patient DOES NOT have a living will and NO ONE is medical POA.     Patient has home oxygen in use which is provided thru [employer].    [facility]  Initial Discharge Assessment       Primary Care Provider: Wenceslao Deras NP    Admission Diagnosis: Hypertensive urgency [I16.0]    Admission Date: 6/12/2024  Expected Discharge Date:     Transition of Care Barriers: None    Payor: HUMANA MANAGED MEDICARE / Plan: HU[employer]NA SNP HMO PPO SPECIAL NEEDS / Product Type: Medicare Advantage /     Extended Emergency Contact Information  Primary Emergency Contact: Gabriella Jiménez  Address: [address] [place] [address] [place]  Home Phone: [phone]  Mobile Phone: [phone]  Relation: Daughter    Discharge Plan A: Home  Discharge Plan B: Home      [facility] - [place], [place] - [address]  [address] [facility].  [place] [address]  Phone: [phone] Fax: [phone]    [id number] - [place], [place] - [address] & [facility]  [address]  [facility] [address]  Phone: [phone] Fax: [phone]    [facility]  [address]  [facility] [address]  Phone: [phone] Fax: [phone]    [facility] [address]  [address]  [facility] [address]  Phone: [phone] Fax: [phone]      Initial Assessment (most recent)       Adult Discharge Assessment - 06/13/24 0834          Discharge Assessment    Assessment Type Discharge Planning Assessment     Confirmed/corrected address, phone number and insurance Yes     Confirmed Demographics Correct on Facesheet     Source of Information patient     Communicated RENATO with patient/caregiver No     Reason For Admission hypertensive urgency     People in Home alone     Facility Arrived From: home     Do you expect to return to your current living  situation? Yes     Do you have help at home or someone to help you manage your care at home? Yes     Who are your caregiver(s) and their phone number(s)? Adrienne Mcelroy (friend)     Current cognitive status: Alert/Oriented     Walking or Climbing Stairs Difficulty yes     Walking or Climbing Stairs ambulation difficulty, requires equipment     Dressing/Bathing Difficulty yes     Dressing/Bathing bathing difficulty, assistance 1 person     Equipment Currently Used at Home CPAP;nebulizer;oxygen;cane, straight;walker, rolling   O2 thru [employer]    Readmission within 30 days? No     Patient currently being followed by outpatient case management? No     Do you currently have service(s) that help you manage your care at home? No     Do you have prescription coverage? Yes     Coverage [employer]     Who is going to help you get home at discharge? drive self     How do you get to doctors appointments? car, drives self     Are you on dialysis? No     Do you take coumadin? No     Discharge Plan A Home     Discharge Plan B Home     DME Needed Upon Discharge  none     Discharge Plan discussed with: Patient     Transition of Care Barriers None

## 2024-06-13 NOTE — SUBJECTIVE & OBJECTIVE
Past Medical History:   Diagnosis Date    Arthritis     COPD (chronic obstructive pulmonary disease)     Coronary artery disease     Hypertension     Sleep apnea     use CPAP at night        Past Surgical History:   Procedure Laterality Date    CORONARY ANGIOGRAPHY N/A 2/15/2021    Procedure: ANGIOGRAM, CORONARY ARTERY;  Surgeon: Aidan Buchanan MD;  Location: [facility] CATH/EP LAB;  Service: Cardiology;  Laterality: N/A;    CORONARY ARTERY BYPASS GRAFT      CORONARY BYPASS GRAFT ANGIOGRAPHY  2/15/2021    Procedure: Bypass graft study;  Surgeon: Aidan Buchanan MD;  Location: [facility] CATH/EP LAB;  Service: Cardiology;;    HYSTERECTOMY      JOINT REPLACEMENT      bilateral knee replacement     LEFT HEART CATHETERIZATION Left 2/15/2021    Procedure: Left heart cath;  Surgeon: Aidan Buchanan MD;  Location: [facility] CATH/EP LAB;  Service: Cardiology;  Laterality: Left;       Review of patient's allergies indicates:   Allergen Reactions    Metronidazole Other (See Comments)     DISORIENTED       Statins-hmg-coa reductase inhibitors Tinitus     Joint pain       No current facility-administered medications on file prior to encounter.     Current Outpatient Medications on File Prior to Encounter   Medication Sig    acetaminophen (TYLENOL) 500 MG tablet Take 1 tablet (500 mg total) by mouth every 4 (four) hours as needed for Pain.    albuterol (PROVENTIL) 2.5 mg /3 mL (0.083 %) nebulizer solution Take 3 mLs (2.5 mg total) by nebulization every 6 (six) hours as needed for Wheezing or Shortness of Breath.    allopurinoL (ZYLOPRIM) 300 MG tablet Take 1 tablet by mouth once daily.    aspirin (ECOTRIN) 81 MG EC tablet Take 1 tablet (81 mg total) by mouth once daily.    aspirin-acetaminophen-caffeine (GOODY'S EXTRA STRENGTH) 500-325-65 mg PwPk Take 1 Dose by mouth as needed (pain).    azithromycin (ZITHROMAX) 500 MG tablet One daily for yellow mucous, repeat if needed    budesonide-glycopyr-formoterol (BREZTRI AEROSPHERE) 160-9-4.8  mcg/actuation HFAA Inhale 2 puffs into the lungs 2 (two) times a day.    busPIRone (BUSPAR) 7.5 MG tablet Take 1 tablet by mouth 2 (two) times daily.    chlorthalidone (HYGROTEN) 25 MG Tab Take 1 tablet (25 mg total) by mouth once daily.    doxycycline (VIBRAMYCIN) 100 MG Cap Take 1 capsule (100 mg total) by mouth every 12 (twelve) hours.    icosapent ethyL (VASCEPA) 1 gram Cap Take 2 capsules (2,000 mg total) by mouth 2 (two) times daily.    isosorbide mononitrate (IMDUR) 30 MG 24 hr tablet Take 1 tablet (30 mg total) by mouth once daily.    levalbuterol (XOPENEX HFA) 45 mcg/actuation inhaler Inhale 1-2 puffs into the lungs every 4 (four) hours as needed for Wheezing. Rescue    losartan (COZAAR) 25 MG tablet Take 1 tablet (25 mg total) by mouth every evening.    metFORMIN (GLUCOPHAGE-XR) 500 MG ER 24hr tablet Take 1 tablet (500 mg total) by mouth every evening.    metoprolol succinate (TOPROL-XL) 25 MG 24 hr tablet Take 1 tablet (25 mg total) by mouth once daily.    modafiniL (PROVIGIL) 200 MG Tab Take 1 tablet (200 mg total) by mouth 2 (two) times daily as needed (excess sleepiness).    nitroGLYCERIN 0.2 mg/hr TD PT24 (NITRODUR) 0.2 mg/hr Place 1 patch onto the skin once daily.    omeprazole (PRILOSEC) 40 MG capsule Take 1 capsule (40 mg total) by mouth once daily.    prasugreL (EFFIENT) 10 mg Tab Take 1 tablet (10 mg total) by mouth once daily.    predniSONE (DELTASONE) 20 MG tablet Take one daily for 3 days and may repeat for shortness of breath.    ranolazine (RANEXA) 500 MG Tb12 Take 1 tablet (500 mg total) by mouth 2 (two) times daily.    REPATHA SURECLICK 140 mg/mL PnIj Inject 1 mL (140 mg total) into the skin every 14 (fourteen) days.    traZODone (DESYREL) 50 MG tablet Take 0.5-1 tablets (25-50 mg total) by mouth every evening.     Family History       Problem Relation (Age of Onset)    Pulmonary embolism Mother          Tobacco Use    Smoking status: Never     Passive exposure: Past    Smokeless tobacco:  Never   Substance and Sexual Activity    Alcohol use: No    Drug use: No    Sexual activity: Not Currently     Review of Systems   Constitutional:  Positive for fatigue.   Cardiovascular:  Chest pain: elevatged BP reading.   Musculoskeletal:  Positive for arthralgias (osteoarthritis).     Objective:     Vital Signs (Most Recent):  Temp: 98.5 °F (36.9 °C) (06/12/24 1712)  Pulse: 82 (06/12/24 1830)  Resp: (!) 22 (06/12/24 1830)  BP: (!) 176/93 (06/12/24 1834)  SpO2: 96 % (06/12/24 1834) Vital Signs (24h Range):  Temp:  [98.5 °F (36.9 °C)] 98.5 °F (36.9 °C)  Pulse:  [82-97] 82  Resp:  [18-22] 22  SpO2:  [95 %-97 %] 96 %  BP: (176-222)/() 176/93     Weight: 84.4 kg (186 lb)  Body mass index is 35.14 kg/m².     Physical Exam  Vitals reviewed.   Constitutional:       General: She is not in acute distress.     Appearance: Normal appearance. She is not toxic-appearing.   HENT:      Head: Normocephalic and atraumatic.      Mouth/Throat:      Mouth: Mucous membranes are moist.      Pharynx: Oropharynx is clear.   Eyes:      Extraocular Movements: Extraocular movements intact.      Pupils: Pupils are equal, round, and reactive to light.   Cardiovascular:      Rate and Rhythm: Normal rate and regular rhythm.      Pulses: Normal pulses.      Heart sounds: Normal heart sounds.   Pulmonary:      Effort: Pulmonary effort is normal. Tachypnea present.      Breath sounds: Normal breath sounds.   Abdominal:      General: Bowel sounds are normal. There is no distension.      Palpations: Abdomen is soft.      Tenderness: There is no abdominal tenderness.   Musculoskeletal:         General: No swelling. Normal range of motion.      Cervical back: Normal range of motion and neck supple.   Skin:     General: Skin is warm and dry.      Capillary Refill: Capillary refill takes less than 2 seconds.             Comments: incisions   Neurological:      General: No focal deficit present.      Mental Status: She is alert and oriented to  person, place, and time. Mental status is at baseline.   Psychiatric:         Mood and Affect: Mood normal.         Behavior: Behavior normal.         Judgment: Judgment normal.              CRANIAL NERVES     CN III, IV, VI   Pupils are equal, round, and reactive to light.       Significant Labs: All pertinent labs within the past 24 hours have been reviewed.  Recent Lab Results         06/12/24  1746        Albumin 4.6       ALP 96       ALT 9       Anion Gap 10       AST 17       Baso # 0.07       Basophil % 1.0       BILIRUBIN TOTAL 0.3  Comment: For infants and newborns, interpretation of results should be based  on gestational age, weight and in agreement with clinical  observations.    Premature Infant recommended reference ranges:  Up to 24 hours.............<8.0 mg/dL  Up to 48 hours............<12.0 mg/dL  3-5 days..................<15.0 mg/dL  6-29 days.................<15.0 mg/dL           Comment: Values of less than 100 pg/ml are consistent with non-CHF populations.       BUN 12       Calcium 10.2       Chloride 102       CO2 28       Creatinine 0.8       Differential Method Automated       eGFR >60.0       Eos # 0.0       Eos % 0.4       Glucose 153       Gran # (ANC) 5.2       Gran % 77.1       Hematocrit 43.0       Hemoglobin 14.3       Immature Grans (Abs) 0.06  Comment: Mild elevation in immature granulocytes is non specific and   can be seen in a variety of conditions including stress response,   acute inflammation, trauma and pregnancy. Correlation with other   laboratory and clinical findings is essential.         Immature Granulocytes 0.9       Lymph # 1.3       Lymph % 18.5       MCH 29.2       MCHC 33.3       MCV 88       Mono # 0.1       Mono % 2.1       MPV 9.1       nRBC 0       Platelet Count 355       Potassium 3.9       PROTEIN TOTAL 8.2       RBC 4.89       RDW 13.6       Sodium 140       Troponin I High Sensitivity 54.3  Comment: Troponin results differ between methods. Do not  use   results between Troponin methods interchangeably.    Alkaline Phospatase levels above 400 U/L may   cause false positive results.    Access hsTnI should not be used for patients taking   Asfotase eran (Strensiq).  Critical result TNIHS 54.3 pg/mL called to and read back by Doug Marie RN  ER at 12-Jun-2024 19:01 by Farrukh.  Result Rechecked         WBC 6.77               Significant Imaging: I have reviewed all pertinent imaging results/findings within the past 24 hours.    X-Ray Chest 1 View  Narrative: EXAMINATION:  XR CHEST 1 VIEW    CLINICAL HISTORY:  Essential (primary) hypertension    TECHNIQUE:  Single frontal view of the chest was performed.    COMPARISON:  Radiograph 02/26/2024, CT chest 06/14/2023    FINDINGS:  Postoperative changes of the chest with prior median sternotomy.  Stable cardiomediastinal silhouette.  Patchy bilateral interstitial airspace opacities, slightly increased compared to prior exam.  Findings likely may relate to patient's known pulmonary fibrosis.  No significant pleural effusion or pneumothorax.  Impression: As above.    Electronically signed by: Kirill Ibarra  Date:    06/12/2024  Time:    19:00

## 2024-06-13 NOTE — CARE UPDATE
06/12/24 2051   Patient Assessment/Suction   Level of Consciousness (AVPU) alert   Respiratory Effort Unlabored;Short of breath   Expansion/Accessory Muscles/Retractions no use of accessory muscles   All Lung Fields Breath Sounds Anterior:;diminished   Rhythm/Pattern, Respiratory depth regular;pattern regular   Cough Frequency infrequent   Cough Type nonproductive   PRE-TX-O2   Device (Oxygen Therapy) room air   SpO2 96 %   Pulse Oximetry Type Continuous   $ Pulse Oximetry - Multiple Charge Pulse Oximetry - Multiple   Pulse (!) 113   Resp (!) 22   Aerosol Therapy   $ Aerosol Therapy Charges Aerosol Treatment   Daily Review of Necessity (SVN) completed   Respiratory Treatment Status (SVN) given   Treatment Route (SVN) mask   Patient Position HOB elevated   Post Treatment Assessment (SVN) increased aeration   Signs of Intolerance (SVN) none   Education   $ Education Bronchodilator;15 min   Tobacco Cessation Intervention   Do you use any type of tobacco product? No   Are you interested in quitting use of tobacco products? Not interested   Are you interested in Nicotine Replacement for symptom relief? No   Respiratory Evaluation   $ Care Plan Tech Time 15 min   $ Respiratory Evaluation Complete   Evaluation For New Orders   Admitting Diagnosis hypertension   Home Oxygen   Has Home Oxygen? Yes   Liter Flow   (2.5)   Duration with sleep;with activity   Route nasal cannula   Mode continuous   Device home concentrator with portable unit   Home Aerosol, MDI, DPI, and Other Treatments/Therapies   Home Respiratory Therapy Per Patient/Review of Chart Yes   MDI Home Meds/Freq budesonide-glycopyr-formoterol (BREZTRI AEROSPHERE) 160-9-4.8 mcg/actuation HFAA;       levalbuterol (XOPENEX HFA) 45 mcg/actuation inhaler   Oxygen Care Plan   Oxygen Care Plan Per Protocol   SPO2 Goal (%) 92% non-cardiac   Rationale SpO2 is <92% (Non-cardiac Pt.)   Bronchodilator Care Plan   Bronchodilator Care Plan MDI   MDI Meds w/ frequency    (budesonide-glycopyr-formoterol (BREZTRI AEROSPHERE) 160-9-4.8 mcg/actuation HFAA;        levalbuterol (XOPENEX HFA) 45 mcg/actuation inhale)   Rationale Maintain home respiratory medicine   Atelectasis Care Plan   Rationale No Rational Found   Airway Clearance Care Plan   Rationale No rationale found

## 2024-06-13 NOTE — PLAN OF CARE
Met with patient at bedside, explained Medicare Outpatient Observation Notice (MOON), and left Q&A information sheet at bedside. MOON form scanned into media manager.       06/13/24 0833   DOE Message   Medicare Outpatient and Observation Notification regarding financial responsibility Given to patient/caregiver;Explained to patient/caregiver;Signed/date by patient/caregiver   Date DOE was signed 06/13/24   Time DOE was signed 0820

## 2024-06-13 NOTE — ASSESSMENT & PLAN NOTE
"Patient's FSGs are controlled on current medication regimen.  Last A1c reviewed- No results found for: "LABA1C", "HGBA1C"  Most recent fingerstick glucose reviewed- No results for input(s): "POCTGLUCOSE" in the last 24 hours.  Current correctional scale  Low  Maintain anti-hyperglycemic dose as follows-   Antihyperglycemics (From admission, onward)      Start     Stop Route Frequency Ordered    06/12/24 2044  insulin aspart U-100 pen 0-5 Units         -- SubQ Before meals & nightly PRN 06/12/24 1945          Hold Oral hypoglycemics while patient is in the hospital.  "

## 2024-06-13 NOTE — NURSING
Pt educated on DC instrusctions, medication list, and follow up appt. All questions answered. VSS. Pt waiting on ride for dc.

## 2024-06-13 NOTE — HPI
70 y/o female with pMHx of CAD, COPD, hypertension, and osteoarthritis who presented to the ED with reports of hypertensive urgency.  Patient reports that she has been out of her chronic opioids for some time and today has been unable to control her blood pressure.  She took her blood pressure at a BP S with a blood pressure of 206/104.  On arrival to the ED patient with a BP of 222/118.  After patient given hydralazine 10 mg IV and hydrocodone 10 mg p.o. patient's blood pressure remained elevated at 200/88.  Patient will be admitted on IV Cardene as she has with a history of pulmonary fibrosis and may not tolerate a beta blocker. Will resume her PO pain medications that she has been unable to  at the pharmacy as it is on backordered since 5/25/24.  Troponin 54.3, .  Chest x-ray- Stable cardiomediastinal silhouette.  Patchy bilateral interstitial airspace opacities, slightly increased compared to prior exam.  Findings likely may relate to patient's known pulmonary fibrosis.

## 2024-06-13 NOTE — ASSESSMENT & PLAN NOTE
Respiratory therapy assess and treat  Breathing treatments p.r.n.   continue patient's modafinil 200 mg b.i.d. p.r.n. sleepiness

## 2024-06-13 NOTE — CARE UPDATE
06/13/24 0824   Patient Assessment/Suction   Level of Consciousness (AVPU) alert   Respiratory Effort Normal;Unlabored   All Lung Fields Breath Sounds Anterior:;Lateral:;diminished   Rhythm/Pattern, Respiratory unlabored;pattern regular;depth regular   Cough Type no productive sputum   PRE-TX-O2   Device (Oxygen Therapy) room air   SpO2 95 %   Pulse Oximetry Type Continuous   Pulse 87   Resp 16   BP (!) 151/71   Aerosol Therapy   $ Aerosol Therapy Charges Aerosol Treatment   Daily Review of Necessity (SVN) completed   Respiratory Treatment Status (SVN) given   Treatment Route (SVN) mask;oxygen   Patient Position HOB elevated   Post Treatment Assessment (SVN) breath sounds improved   Signs of Intolerance (SVN) none   Breath Sounds Post-Respiratory Treatment   Throughout All Fields Post-Treatment All Fields   Throughout All Fields Post-Treatment aeration increased   Post-treatment Heart Rate (beats/min) 80   Post-treatment Resp Rate (breaths/min) 17   Education   $ Education Bronchodilator;15 min

## 2024-06-13 NOTE — ASSESSMENT & PLAN NOTE
Patient has a current diagnosis of hypertensive urgency (without evidence of end organ damage) which is uncontrolled.  Latest blood pressure and vitals reviewed-   Temp:  [98.5 °F (36.9 °C)]   Pulse:  [82-97]   Resp:  [18-22]   BP: (176-222)/()   SpO2:  [95 %-97 %] .   Patient currently on IV antihypertensives.   Home meds for hypertension were reviewed and noted below.   Hypertension Medications               chlorthalidone (HYGROTEN) 25 MG Tab Take 1 tablet (25 mg total) by mouth once daily.    isosorbide mononitrate (IMDUR) 30 MG 24 hr tablet Take 1 tablet (30 mg total) by mouth once daily.    losartan (COZAAR) 25 MG tablet Take 1 tablet (25 mg total) by mouth every evening.    metoprolol succinate (TOPROL-XL) 25 MG 24 hr tablet Take 1 tablet (25 mg total) by mouth once daily.    nitroGLYCERIN 0.2 mg/hr TD PT24 (NITRODUR) 0.2 mg/hr Place 1 patch onto the skin once daily.            Medication adjustment for hospital antihypertensives is as follows- Add Cardene IV     Will aim for controlled BP reduction by medications noted above. Monitor and mitigate end organ damage as indicated.

## 2024-06-13 NOTE — ASSESSMENT & PLAN NOTE
Body mass index is 35.14 kg/m². Morbid obesity complicates all aspects of disease management from diagnostic modalities to treatment. Weight loss encouraged and health benefits explained to patient.

## 2024-06-13 NOTE — ASSESSMENT & PLAN NOTE
Patient has a diagnosis idiopathic pulmonary fibrosis. The fibrosis is stable. The patient has the following signs/symptoms of pneumonia: cough. The patient does not have a current oxygen requirement and the patient does not have a home oxygen requirement. I have reviewed the pertinent imaging.   RT eval and treat  Breathing treatments as needed

## 2024-06-14 NOTE — PLAN OF CARE
06/14/24 1534   Final Note   Assessment Type Final Discharge Note   Anticipated Discharge Disposition Home

## 2024-06-17 ENCOUNTER — HOSPITAL ENCOUNTER (OUTPATIENT)
Dept: RADIOLOGY | Facility: HOSPITAL | Age: 69
Discharge: HOME | End: 2024-06-17
Attending: ORTHOPAEDIC SURGERY
Payer: MEDICARE

## 2024-06-17 ENCOUNTER — OFFICE VISIT (OUTPATIENT)
Dept: ORTHOPEDICS | Facility: CLINIC | Age: 69
End: 2024-06-17
Payer: MEDICARE

## 2024-06-17 VITALS — RESPIRATION RATE: 18 BRPM | HEIGHT: 61 IN | WEIGHT: 186.06 LBS | BODY MASS INDEX: 35.13 KG/M2

## 2024-06-17 DIAGNOSIS — M25.511 RIGHT SHOULDER PAIN, UNSPECIFIED CHRONICITY: Primary | ICD-10-CM

## 2024-06-17 DIAGNOSIS — M25.512 LEFT SHOULDER PAIN, UNSPECIFIED CHRONICITY: ICD-10-CM

## 2024-06-17 DIAGNOSIS — S46.012A TRAUMATIC TEAR OF LEFT ROTATOR CUFF, UNSPECIFIED TEAR EXTENT, INITIAL ENCOUNTER: Primary | ICD-10-CM

## 2024-06-17 DIAGNOSIS — M75.102 TEAR OF LEFT ROTATOR CUFF, UNSPECIFIED TEAR EXTENT, UNSPECIFIED WHETHER TRAUMATIC: ICD-10-CM

## 2024-06-17 PROCEDURE — 73030 X-RAY EXAM OF SHOULDER: CPT | Mod: TC,PO,LT

## 2024-06-17 PROCEDURE — 1125F AMNT PAIN NOTED PAIN PRSNT: CPT | Mod: CPTII,S$GLB,, | Performed by: ORTHOPAEDIC SURGERY

## 2024-06-17 PROCEDURE — 99204 OFFICE O/P NEW MOD 45 MIN: CPT | Mod: S$GLB,,, | Performed by: ORTHOPAEDIC SURGERY

## 2024-06-17 PROCEDURE — 1159F MED LIST DOCD IN RCRD: CPT | Mod: CPTII,S$GLB,, | Performed by: ORTHOPAEDIC SURGERY

## 2024-06-17 PROCEDURE — 73030 X-RAY EXAM OF SHOULDER: CPT | Mod: 26,LT,, | Performed by: RADIOLOGY

## 2024-06-17 PROCEDURE — 1160F RVW MEDS BY RX/DR IN RCRD: CPT | Mod: CPTII,S$GLB,, | Performed by: ORTHOPAEDIC SURGERY

## 2024-06-17 PROCEDURE — 3008F BODY MASS INDEX DOCD: CPT | Mod: CPTII,S$GLB,, | Performed by: ORTHOPAEDIC SURGERY

## 2024-06-17 PROCEDURE — 99999 PR PBB SHADOW E&M-EST. PATIENT-LVL III: CPT | Mod: PBBFAC,,, | Performed by: ORTHOPAEDIC SURGERY

## 2024-06-17 PROCEDURE — 4010F ACE/ARB THERAPY RXD/TAKEN: CPT | Mod: CPTII,S$GLB,, | Performed by: ORTHOPAEDIC SURGERY

## 2024-06-17 NOTE — PROGRESS NOTES
Past Medical History:   Diagnosis Date    Arthritis     COPD (chronic obstructive pulmonary disease)     Coronary artery disease     Hypertension     Sleep apnea     use CPAP at night        Past Surgical History:   Procedure Laterality Date    CORONARY ANGIOGRAPHY N/A 2/15/2021    Procedure: ANGIOGRAM, CORONARY ARTERY;  Surgeon: Aidan Buchanan MD;  Location: [facility] CATH/EP LAB;  Service: Cardiology;  Laterality: N/A;    CORONARY ARTERY BYPASS GRAFT      CORONARY BYPASS GRAFT ANGIOGRAPHY  2/15/2021    Procedure: Bypass graft study;  Surgeon: Aidan Buchanan MD;  Location: [facility] CATH/EP LAB;  Service: Cardiology;;    HYSTERECTOMY      JOINT REPLACEMENT      bilateral knee replacement     LEFT HEART CATHETERIZATION Left 2/15/2021    Procedure: Left heart cath;  Surgeon: Aidan Buchanan MD;  Location: [facility] CATH/EP LAB;  Service: Cardiology;  Laterality: Left;       Current Outpatient Medications   Medication Sig    acetaminophen (TYLENOL) 500 MG tablet Take 1 tablet (500 mg total) by mouth every 4 (four) hours as needed for Pain.    albuterol (PROVENTIL) 2.5 mg /3 mL (0.083 %) nebulizer solution Take 3 mLs (2.5 mg total) by nebulization every 6 (six) hours as needed for Wheezing or Shortness of Breath.    aspirin (ECOTRIN) 81 MG EC tablet Take 1 tablet (81 mg total) by mouth once daily.    aspirin-acetaminophen-caffeine (GOODY'S EXTRA STRENGTH) 500-325-65 mg PwPk Take 1 Dose by mouth as needed (pain).    busPIRone (BUSPAR) 7.5 MG tablet Take 1 tablet (7.5 mg total) by mouth 2 (two) times daily.    chlorthalidone (HYGROTEN) 25 MG Tab Take 1 tablet (25 mg total) by mouth once daily.    ibuprofen (ADVIL,MOTRIN) 200 MG tablet Take 400 mg by mouth every 6 (six) hours as needed for Pain.    icosapent ethyL (VASCEPA) 1 gram Cap Take 2 capsules (2,000 mg total) by mouth 2 (two) times daily.    isosorbide mononitrate (IMDUR) 30 MG 24 hr tablet Take 1 tablet (30 mg total) by mouth once daily.    levalbuterol (XOPENEX HFA) 45  mcg/actuation inhaler Inhale 1-2 puffs into the lungs every 4 (four) hours as needed for Wheezing. Rescue    losartan (COZAAR) 25 MG tablet Take 1 tablet (25 mg total) by mouth every evening.    metFORMIN (GLUCOPHAGE-XR) 500 MG ER 24hr tablet Take 1 tablet (500 mg total) by mouth every evening.    metoprolol succinate (TOPROL-XL) 25 MG 24 hr tablet Take 1 tablet (25 mg total) by mouth once daily.    modafiniL (PROVIGIL) 200 MG Tab Take 1 tablet (200 mg total) by mouth 2 (two) times daily as needed (excess sleepiness).    nitroGLYCERIN 0.2 mg/hr TD PT24 (NITRODUR) 0.2 mg/hr Place 1 patch onto the skin once daily. (Patient taking differently: Place 1 patch onto the skin daily as needed (Chest Pain).)    omeprazole (PRILOSEC) 40 MG capsule Take 1 capsule (40 mg total) by mouth once daily.    oxyCODONE (ROXICODONE) 15 MG Tab Take 15 mg by mouth 4 (four) times daily as needed for Pain.    prasugreL (EFFIENT) 10 mg Tab Take 1 tablet (10 mg total) by mouth once daily.    ranolazine (RANEXA) 500 MG Tb12 Take 1 tablet (500 mg total) by mouth 2 (two) times daily. (Patient not taking: Reported on 6/12/2024)    REPATHA SURECLICK 140 mg/mL PnIj Inject 1 mL (140 mg total) into the skin every 14 (fourteen) days.    traZODone (DESYREL) 50 MG tablet Take 0.5-1 tablets (25-50 mg total) by mouth every evening. (Patient taking differently: Take 25-50 mg by mouth nightly as needed for Insomnia.)     No current facility-administered medications for this visit.       Review of patient's allergies indicates:   Allergen Reactions    Metronidazole Other (See Comments)     DISORIENTED       Statins-hmg-coa reductase inhibitors Tinitus     Joint pain       Family History   Problem Relation Name Age of Onset    Pulmonary embolism Mother         Social History     Socioeconomic History    Marital status:    Tobacco Use    Smoking status: Never     Passive exposure: Past    Smokeless tobacco: Never   Substance and Sexual Activity     Alcohol use: No    Drug use: No    Sexual activity: Not Currently       Chief Complaint: No chief complaint on file.      History of present illness:  This is a 69-year-old female seen for left shoulder pain.  Patient had an initial injury back in 2021 when she fell into a bookcase and hit both shoulders.  Patient suffered a known right rotator cuff tear that required surgery.  She thinks she injured the left 1 at the same time.  Surgery was performed by a surgeon in [place].  Patient then re-injured her left shoulder back in March.  Patient now has weakness.  Inability to do anything above her shoulder.  Shoulder is getting worse.  Pain is a 5/10.        Review of Systems:    Constitution: Negative for chills, fever, and sweats.  Negative for unexplained weight loss.    HENT:  Negative for headaches and blurry vision.    Cardiovascular:Negative for chest pain or irregular heart beat. Negative for hypertension.    Respiratory:  Negative for cough and shortness of breath.    Gastrointestinal: Negative for abdominal pain, heartburn, melena, nausea, and vomitting.    Genitourinary:  Negative bladder incontinence and dysuria.    Musculoskeletal:  See HPI    Neurological: Negative for numbness.    Psychiatric/Behavioral: Negative for depression.  The patient is not nervous/anxious.      Endocrine: Negative for polyuria    Hematologic/Lymphatic: Negative for bleeding problem.  Does not bruise/bleed easily.    Skin: Negative for poor would healing and rash      Physical Examination:    Vital Signs:  There were no vitals filed for this visit.    There is no height or weight on file to calculate BMI.    This a well-developed, well nourished patient in no acute distress.  They are alert and oriented and cooperative to examination.  Pt. walks without an antalgic gait.      Examination of the Left shoulder shows no rashes or erythema. There are no masses, ecchymosis, or atrophy. The patient has mild loss of full range of  motion in forward flexion, external rotation, and internal rotation to the mid T-spine. The patient has positive Durant and Neer test.  Nontender to palpation over a.c. joint. Normal stability anteriorly, posteriorly, and negative sulcus sign. Passive range of motion: Forward flexion of 180°, external rotation at 90° of 90°, internal rotation of 50°, and external rotation at 0° of 50°. 2+ radial pulse. Intact axillary, radial, median and ulnar sensation. 4 out of 5 resisted forward flexion, external rotation, and negative lift off test.      X-rays:  X-rays of the left shoulder ordered and reviewed which show some spurring of the humeral head possibly consistent with rotator cuff arthropathy         Assessment:  Left rotator cuff tear    Plan:  I reviewed the findings with her today.  I recommended an MRI to evaluate the rotator cuff.  Patient has had a few traumatic incidents and now has significant weakness and worsening pain.  Follow up after the MRI is completed.            All previous pertinent notes including ER visits, physical therapy visits, other orthopedic visits as well as other care for the same musculoskeletal problem were reviewed.  All pertinent lab values and previous imaging was reviewed pertinent to the current visit.    This note was created using [employer] voice recognition software that occasionally misinterpreted phrases or words.    Consult note is delivered via Epic messaging service.

## 2024-06-18 ENCOUNTER — PATIENT OUTREACH (OUTPATIENT)
Dept: ADMINISTRATIVE | Facility: CLINIC | Age: 69
End: 2024-06-18
Payer: MEDICARE

## 2024-06-18 NOTE — PROGRESS NOTES
C3 nurse spoke with Sakshi Hess for a TCC post hospital discharge follow up call. The patient does not have a scheduled HOSFU appointment with Wenceslao Deras NP within 5-7 days post hospital discharge date 6/13/24, but she plans to schedule a FU after having shoulder MRI and Ortho FU to determine course of treatment for shoulder.     She did note that her BP is now WNL and CBG is 120-160.

## 2024-06-22 ENCOUNTER — HOSPITAL ENCOUNTER (OUTPATIENT)
Dept: RADIOLOGY | Facility: HOSPITAL | Age: 69
Discharge: HOME | End: 2024-06-22
Attending: ORTHOPAEDIC SURGERY
Payer: MEDICARE

## 2024-06-22 DIAGNOSIS — M75.102 TEAR OF LEFT ROTATOR CUFF, UNSPECIFIED TEAR EXTENT, UNSPECIFIED WHETHER TRAUMATIC: ICD-10-CM

## 2024-06-22 LAB
OHS QRS DURATION: 80 MS
OHS QTC CALCULATION: 437 MS

## 2024-06-22 PROCEDURE — 73221 MRI JOINT UPR EXTREM W/O DYE: CPT | Mod: TC,LT

## 2024-06-22 PROCEDURE — 73221 MRI JOINT UPR EXTREM W/O DYE: CPT | Mod: 26,LT,, | Performed by: RADIOLOGY

## 2024-06-24 ENCOUNTER — OFFICE VISIT (OUTPATIENT)
Dept: ORTHOPEDICS | Facility: CLINIC | Age: 69
End: 2024-06-24
Payer: MEDICARE

## 2024-06-24 ENCOUNTER — TELEPHONE (OUTPATIENT)
Dept: PULMONOLOGY | Facility: CLINIC | Age: 69
End: 2024-06-24
Payer: MEDICARE

## 2024-06-24 VITALS — BODY MASS INDEX: 35.13 KG/M2 | WEIGHT: 186.06 LBS | HEIGHT: 61 IN

## 2024-06-24 DIAGNOSIS — M75.102 TEAR OF LEFT ROTATOR CUFF, UNSPECIFIED TEAR EXTENT, UNSPECIFIED WHETHER TRAUMATIC: Primary | ICD-10-CM

## 2024-06-24 DIAGNOSIS — S46.012A TRAUMATIC TEAR OF LEFT ROTATOR CUFF, UNSPECIFIED TEAR EXTENT, INITIAL ENCOUNTER: ICD-10-CM

## 2024-06-24 PROCEDURE — 1160F RVW MEDS BY RX/DR IN RCRD: CPT | Mod: CPTII,S$GLB,, | Performed by: ORTHOPAEDIC SURGERY

## 2024-06-24 PROCEDURE — 1159F MED LIST DOCD IN RCRD: CPT | Mod: CPTII,S$GLB,, | Performed by: ORTHOPAEDIC SURGERY

## 2024-06-24 PROCEDURE — 99214 OFFICE O/P EST MOD 30 MIN: CPT | Mod: 57,S$GLB,, | Performed by: ORTHOPAEDIC SURGERY

## 2024-06-24 PROCEDURE — 3008F BODY MASS INDEX DOCD: CPT | Mod: CPTII,S$GLB,, | Performed by: ORTHOPAEDIC SURGERY

## 2024-06-24 PROCEDURE — 99999 PR PBB SHADOW E&M-EST. PATIENT-LVL II: CPT | Mod: PBBFAC,,, | Performed by: ORTHOPAEDIC SURGERY

## 2024-06-24 PROCEDURE — 4010F ACE/ARB THERAPY RXD/TAKEN: CPT | Mod: CPTII,S$GLB,, | Performed by: ORTHOPAEDIC SURGERY

## 2024-06-24 NOTE — TELEPHONE ENCOUNTER
----- Message from Kingsley Lee sent at 6/24/2024  3:07 PM CDT -----  Regarding: pre op clearance  Patient was seen today by Dr. Danielson and consented for left reverse total shoulder arthroplasty.  Please advise on clearance for surgery.  No surgery date has been scheduled pending clearances.      Asa

## 2024-06-24 NOTE — PROGRESS NOTES
Past Medical History:   Diagnosis Date    Arthritis     COPD (chronic obstructive pulmonary disease)     Coronary artery disease     Hypertension     Sleep apnea     use CPAP at night        Past Surgical History:   Procedure Laterality Date    CORONARY ANGIOGRAPHY N/A 2/15/2021    Procedure: ANGIOGRAM, CORONARY ARTERY;  Surgeon: Aidan Buchanan MD;  Location: [facility] CATH/EP LAB;  Service: Cardiology;  Laterality: N/A;    CORONARY ARTERY BYPASS GRAFT      CORONARY BYPASS GRAFT ANGIOGRAPHY  2/15/2021    Procedure: Bypass graft study;  Surgeon: Aidan Buchanan MD;  Location: [facility] CATH/EP LAB;  Service: Cardiology;;    HYSTERECTOMY      JOINT REPLACEMENT      bilateral knee replacement     LEFT HEART CATHETERIZATION Left 2/15/2021    Procedure: Left heart cath;  Surgeon: Aidan Buchanan MD;  Location: [facility] CATH/EP LAB;  Service: Cardiology;  Laterality: Left;       Current Outpatient Medications   Medication Sig    acetaminophen (TYLENOL) 500 MG tablet Take 1 tablet (500 mg total) by mouth every 4 (four) hours as needed for Pain.    albuterol (PROVENTIL) 2.5 mg /3 mL (0.083 %) nebulizer solution Take 3 mLs (2.5 mg total) by nebulization every 6 (six) hours as needed for Wheezing or Shortness of Breath.    aspirin (ECOTRIN) 81 MG EC tablet Take 1 tablet (81 mg total) by mouth once daily.    aspirin-acetaminophen-caffeine (GOODY'S EXTRA STRENGTH) 500-325-65 mg PwPk Take 1 Dose by mouth as needed (pain).    busPIRone (BUSPAR) 7.5 MG tablet Take 1 tablet (7.5 mg total) by mouth 2 (two) times daily.    chlorthalidone (HYGROTEN) 25 MG Tab Take 1 tablet (25 mg total) by mouth once daily.    ibuprofen (ADVIL,MOTRIN) 200 MG tablet Take 400 mg by mouth every 6 (six) hours as needed for Pain.    icosapent ethyL (VASCEPA) 1 gram Cap Take 2 capsules (2,000 mg total) by mouth 2 (two) times daily. (Patient not taking: Reported on 6/18/2024)    isosorbide mononitrate (IMDUR) 30 MG 24 hr tablet Take 1 tablet (30 mg total) by mouth once  daily.    levalbuterol (XOPENEX HFA) 45 mcg/actuation inhaler Inhale 1-2 puffs into the lungs every 4 (four) hours as needed for Wheezing. Rescue    losartan (COZAAR) 25 MG tablet Take 1 tablet (25 mg total) by mouth every evening.    metFORMIN (GLUCOPHAGE-XR) 500 MG ER 24hr tablet Take 1 tablet (500 mg total) by mouth every evening.    metoprolol succinate (TOPROL-XL) 25 MG 24 hr tablet Take 1 tablet (25 mg total) by mouth once daily. (Patient not taking: Reported on 6/18/2024)    modafiniL (PROVIGIL) 200 MG Tab Take 1 tablet (200 mg total) by mouth 2 (two) times daily as needed (excess sleepiness).    nitroGLYCERIN 0.2 mg/hr TD PT24 (NITRODUR) 0.2 mg/hr Place 1 patch onto the skin once daily. (Patient taking differently: Place 1 patch onto the skin daily as needed (Chest Pain).)    omeprazole (PRILOSEC) 40 MG capsule Take 1 capsule (40 mg total) by mouth once daily.    oxyCODONE (ROXICODONE) 15 MG Tab Take 15 mg by mouth 4 (four) times daily as needed for Pain.    prasugreL (EFFIENT) 10 mg Tab Take 1 tablet (10 mg total) by mouth once daily.    ranolazine (RANEXA) 500 MG Tb12 Take 1 tablet (500 mg total) by mouth 2 (two) times daily. (Patient not taking: Reported on 6/12/2024)    REPATHA SURECLICK 140 mg/mL PnIj Inject 1 mL (140 mg total) into the skin every 14 (fourteen) days.    traZODone (DESYREL) 50 MG tablet Take 0.5-1 tablets (25-50 mg total) by mouth every evening. (Patient taking differently: Take 25-50 mg by mouth nightly as needed for Insomnia.)     No current facility-administered medications for this visit.       Review of patient's allergies indicates:   Allergen Reactions    Metronidazole Other (See Comments)     DISORIENTED       Statins-hmg-coa reductase inhibitors Tinitus     Joint pain       Family History   Problem Relation Name Age of Onset    Pulmonary embolism Mother         Social History     Socioeconomic History    Marital status:    Tobacco Use    Smoking status: Never     Passive  exposure: Past    Smokeless tobacco: Never   Substance and Sexual Activity    Alcohol use: No    Drug use: No    Sexual activity: Not Currently       Chief Complaint:   Chief Complaint   Patient presents with    Results     L shoulder MRI review       History of present illness:  This is a 69-year-old female seen for left shoulder pain.  Patient had an initial injury back in 2021 when she fell into a bookcase and hit both shoulders.  Patient suffered a known right rotator cuff tear that required surgery.  She thinks she injured the left 1 at the same time.  Surgery was performed by a surgeon in [place].  Patient then re-injured her left shoulder back in March.  Patient now has weakness.  Inability to do anything above her shoulder.  Shoulder is getting worse.  Pain is a 5/10.        Review of Systems:    Constitution: Negative for chills, fever, and sweats.  Negative for unexplained weight loss.    HENT:  Negative for headaches and blurry vision.    Cardiovascular:Negative for chest pain or irregular heart beat. Negative for hypertension.    Respiratory:  Negative for cough and shortness of breath.    Gastrointestinal: Negative for abdominal pain, heartburn, melena, nausea, and vomitting.    Genitourinary:  Negative bladder incontinence and dysuria.    Musculoskeletal:  See HPI    Neurological: Negative for numbness.    Psychiatric/Behavioral: Negative for depression.  The patient is not nervous/anxious.      Endocrine: Negative for polyuria    Hematologic/Lymphatic: Negative for bleeding problem.  Does not bruise/bleed easily.    Skin: Negative for poor would healing and rash      Physical Examination:    Vital Signs:  There were no vitals filed for this visit.    Body mass index is 35.16 kg/m².    This a well-developed, well nourished patient in no acute distress.  They are alert and oriented and cooperative to examination.  Pt. walks without an antalgic gait.      Examination of the Left shoulder shows no  rashes or erythema. There are no masses, ecchymosis, or atrophy. The patient has mild loss of full range of motion in forward flexion, external rotation, and internal rotation to the mid T-spine. The patient has positive Durant and Neer test.  Nontender to palpation over a.c. joint. Normal stability anteriorly, posteriorly, and negative sulcus sign. Passive range of motion: Forward flexion of 180°, external rotation at 90° of 90°, internal rotation of 50°, and external rotation at 0° of 50°. 2+ radial pulse. Intact axillary, radial, median and ulnar sensation. 4 out of 5 resisted forward flexion, external rotation, and negative lift off test.    Heart is regular rate without obvious murmurs   Normal respiratory effort without audible wheezing  Abdomen is soft and nontender       X-rays:  X-rays of the left shoulder  reviewed which show some spurring of the humeral head possibly consistent with rotator cuff arthropathy    MRI of the left shoulder is reviewed and interpreted:High-grade rotator cuff tearing with retraction and some muscle atrophy     Degenerated glenoid labrum.     Medially subluxed long head biceps tendon.     Large joint effusion and moderate subacromial subdeltoid bursitis.     Possible subscapularis recess loose body.     Advanced glenohumeral osteoarthritis.    Assessment:  Left rotator cuff tear  Left shoulder arthritis      Plan:  I reviewed the findings with her today.  I recommended a reverse total shoulder arthroplasty.  Patient has large retracted rotator cuff tear as well as some underlying arthritis already.  This would be the only way to address both problems at the same time.  Risks, benefits, and alternatives to the procedure were explained to the patient including but not limited to damage to nerves, arteries, blood vessels, bones, tendons, ligaments, stiffness, instability, infection, permanent limb dysfunction, DVT, PE, as well as general anesthetic complications including seizure,  stroke, heart attack and even death. The patient understood these risks and wished to proceed and signed the informed consent.               All previous pertinent notes including ER visits, physical therapy visits, other orthopedic visits as well as other care for the same musculoskeletal problem were reviewed.  All pertinent lab values and previous imaging was reviewed pertinent to the current visit.    This note was created using [employer] voice recognition software that occasionally misinterpreted phrases or words.    Consult note is delivered via Epic messaging service.

## 2024-08-07 DIAGNOSIS — J47.9 BRONCHIECTASIS WITHOUT COMPLICATION: ICD-10-CM

## 2024-08-08 RX ORDER — ALBUTEROL SULFATE 0.83 MG/ML
2.5 SOLUTION RESPIRATORY (INHALATION) EVERY 6 HOURS PRN
Qty: 120 EACH | Refills: 11
Start: 2024-08-08 | End: 2025-08-08

## 2024-09-05 ENCOUNTER — HOSPITAL ENCOUNTER (OUTPATIENT)
Dept: RADIOLOGY | Facility: HOSPITAL | Age: 69
Discharge: HOME | End: 2024-09-05
Attending: INTERNAL MEDICINE
Payer: MEDICARE

## 2024-09-05 ENCOUNTER — HOSPITAL ENCOUNTER (OUTPATIENT)
Dept: PULMONOLOGY | Facility: HOSPITAL | Age: 69
Discharge: HOME | End: 2024-09-05
Attending: INTERNAL MEDICINE
Payer: MEDICARE

## 2024-09-05 DIAGNOSIS — J84.112 IPF (IDIOPATHIC PULMONARY FIBROSIS): Primary | ICD-10-CM

## 2024-09-05 DIAGNOSIS — J84.112 IPF (IDIOPATHIC PULMONARY FIBROSIS): ICD-10-CM

## 2024-09-05 DIAGNOSIS — J96.11 CHRONIC HYPOXEMIC RESPIRATORY FAILURE: ICD-10-CM

## 2024-09-05 LAB
DLCO SINGLE BREATH LLN: 13.83
DLCO SINGLE BREATH PRE REF: 62.8 %
DLCO SINGLE BREATH REF: 19.57
DLCOC SBVA LLN: 2.79
DLCOC SBVA REF: 4.41
DLCOC SINGLE BREATH LLN: 13.83
DLCOC SINGLE BREATH REF: 19.57
DLCOVA LLN: 2.79
DLCOVA PRE REF: 95.4 %
DLCOVA PRE: 4.21 ML/(MIN*MMHG*L) (ref 2.79–6.04)
DLCOVA REF: 4.41
ERV LLN: -16449.37
ERV PRE REF: 105.9 %
ERV REF: 0.63
FEF 25 75 CHG: 11.9 %
FEF 25 75 LLN: 1.11
FEF 25 75 POST REF: 60 %
FEF 25 75 PRE REF: 53.6 %
FEF 25 75 REF: 2.51
FET100 CHG: 38.6 %
FEV1 CHG: 10.6 %
FEV1 FVC CHG: 1.5 %
FEV1 FVC LLN: 65
FEV1 FVC POST REF: 99 %
FEV1 FVC PRE REF: 97.5 %
FEV1 FVC REF: 79
FEV1 LLN: 1.47
FEV1 POST REF: 86.4 %
FEV1 PRE REF: 78.1 %
FEV1 REF: 2.01
FRCPLETH LLN: 1.72
FRCPLETH PREREF: 71.8 %
FRCPLETH REF: 2.54
FVC CHG: 8.9 %
FVC LLN: 1.88
FVC POST REF: 86.7 %
FVC PRE REF: 79.6 %
FVC REF: 2.57
IVC PRE: 2.11 L (ref 1.88–3.3)
IVC SINGLE BREATH LLN: 1.88
IVC SINGLE BREATH PRE REF: 82.1 %
IVC SINGLE BREATH REF: 2.57
MVV LLN: 61
MVV PRE REF: 84.2 %
MVV REF: 72
PEF CHG: 23.4 %
PEF LLN: 3.72
PEF POST REF: 89.1 %
PEF PRE REF: 72.2 %
PEF REF: 5.27
POST FEF 25 75: 1.51 L/S (ref 1.11–3.91)
POST FET 100: 7.86 SEC
POST FEV1 FVC: 77.8 % (ref 65.33–90.01)
POST FEV1: 1.73 L (ref 1.47–2.53)
POST FVC: 2.23 L (ref 1.88–3.3)
POST PEF: 4.7 L/S (ref 3.72–6.82)
PRE DLCO: 12.28 ML/(MIN*MMHG) (ref 13.83–25.3)
PRE ERV: 0.67 L (ref -16449.37–16450.63)
PRE FEF 25 75: 1.35 L/S (ref 1.11–3.91)
PRE FET 100: 5.67 SEC
PRE FEV1 FVC: 76.66 % (ref 65.33–90.01)
PRE FEV1: 1.57 L (ref 1.47–2.53)
PRE FRC PL: 1.82 L (ref 1.72–3.36)
PRE FVC: 2.05 L (ref 1.88–3.3)
PRE MVV: 60.49 L/MIN (ref 61.03–82.58)
PRE PEF: 3.81 L/S (ref 3.72–6.82)
PRE RV: 1.15 L (ref 1.33–2.48)
PRE TLC: 3.2 L (ref 3.45–5.42)
RAW LLN: 3.06
RAW PRE REF: 169.9 %
RAW PRE: 5.2 CMH2O*S/L (ref 3.06–3.06)
RAW REF: 3.06
RV LLN: 1.33
RV PRE REF: 60.5 %
RV REF: 1.91
RVTLC LLN: 33
RVTLC PRE REF: 85 %
RVTLC PRE: 36.06 % (ref 32.83–52.01)
RVTLC REF: 42
TLC LLN: 3.45
TLC PRE REF: 72.1 %
TLC REF: 4.44
VA PRE: 2.92 L (ref 4.29–4.29)
VA SINGLE BREATH LLN: 4.29
VA SINGLE BREATH PRE REF: 68.1 %
VA SINGLE BREATH REF: 4.29
VC LLN: 1.88
VC PRE REF: 79.6 %
VC PRE: 2.05 L (ref 1.88–3.3)
VC REF: 2.57

## 2024-09-05 PROCEDURE — 94618 PULMONARY STRESS TESTING: CPT

## 2024-09-05 PROCEDURE — 94729 DIFFUSING CAPACITY: CPT

## 2024-09-05 PROCEDURE — 71250 CT THORAX DX C-: CPT | Mod: TC

## 2024-09-05 PROCEDURE — 71250 CT THORAX DX C-: CPT | Mod: 26,,, | Performed by: RADIOLOGY

## 2024-09-05 PROCEDURE — 94060 EVALUATION OF WHEEZING: CPT

## 2024-09-05 PROCEDURE — 94726 PLETHYSMOGRAPHY LUNG VOLUMES: CPT

## 2024-09-05 RX ORDER — ALBUTEROL SULFATE 2.5 MG/.5ML
SOLUTION RESPIRATORY (INHALATION)
Status: DISCONTINUED
Start: 2024-09-05 | End: 2024-09-06 | Stop reason: HOSPADM

## 2024-09-09 ENCOUNTER — OFFICE VISIT (OUTPATIENT)
Dept: PULMONOLOGY | Facility: CLINIC | Age: 69
End: 2024-09-09
Payer: MEDICARE

## 2024-09-09 VITALS
DIASTOLIC BLOOD PRESSURE: 76 MMHG | WEIGHT: 180 LBS | BODY MASS INDEX: 33.99 KG/M2 | HEART RATE: 77 BPM | OXYGEN SATURATION: 94 % | SYSTOLIC BLOOD PRESSURE: 141 MMHG | HEIGHT: 61 IN

## 2024-09-09 DIAGNOSIS — G47.10 HYPERSOMNOLENCE DISORDER: ICD-10-CM

## 2024-09-09 DIAGNOSIS — J47.9 BRONCHIECTASIS, UNCOMPLICATED: ICD-10-CM

## 2024-09-09 DIAGNOSIS — G47.33 OBSTRUCTIVE SLEEP APNEA SYNDROME: ICD-10-CM

## 2024-09-09 DIAGNOSIS — J84.112 IPF (IDIOPATHIC PULMONARY FIBROSIS): Primary | ICD-10-CM

## 2024-09-09 PROCEDURE — 3078F DIAST BP <80 MM HG: CPT | Mod: CPTII,S$GLB,, | Performed by: INTERNAL MEDICINE

## 2024-09-09 PROCEDURE — 99214 OFFICE O/P EST MOD 30 MIN: CPT | Mod: S$GLB,,, | Performed by: INTERNAL MEDICINE

## 2024-09-09 PROCEDURE — 1159F MED LIST DOCD IN RCRD: CPT | Mod: CPTII,S$GLB,, | Performed by: INTERNAL MEDICINE

## 2024-09-09 PROCEDURE — 4010F ACE/ARB THERAPY RXD/TAKEN: CPT | Mod: CPTII,S$GLB,, | Performed by: INTERNAL MEDICINE

## 2024-09-09 PROCEDURE — 3288F FALL RISK ASSESSMENT DOCD: CPT | Mod: CPTII,S$GLB,, | Performed by: INTERNAL MEDICINE

## 2024-09-09 PROCEDURE — 99999 PR PBB SHADOW E&M-EST. PATIENT-LVL IV: CPT | Mod: PBBFAC,,, | Performed by: INTERNAL MEDICINE

## 2024-09-09 PROCEDURE — 1101F PT FALLS ASSESS-DOCD LE1/YR: CPT | Mod: CPTII,S$GLB,, | Performed by: INTERNAL MEDICINE

## 2024-09-09 PROCEDURE — 3008F BODY MASS INDEX DOCD: CPT | Mod: CPTII,S$GLB,, | Performed by: INTERNAL MEDICINE

## 2024-09-09 PROCEDURE — 3077F SYST BP >= 140 MM HG: CPT | Mod: CPTII,S$GLB,, | Performed by: INTERNAL MEDICINE

## 2024-09-09 RX ORDER — MODAFINIL 200 MG/1
200 TABLET ORAL 2 TIMES DAILY PRN
Qty: 60 TABLET | Refills: 5 | Status: SHIPPED | OUTPATIENT
Start: 2024-09-09

## 2024-09-09 NOTE — PROGRESS NOTES
9/9/2024    Sakshi Hess  New Patient Consult    Chief Complaint   Patient presents with    Follow-up    COPD          9/9/2024 pt had f/u pft with no sign change from 2022,  ctchest 9/5/2024 felt to be unchanged.    Pt never smoker  Not using vest as may trigger angina    Last 2 months had achy and felt not right -  pt gained 9 lbs in 3 wks --- pt was seeing Dr Bourne, follows DR Buchanan's office.   Pt said to have good looking heart but was in heart failure    Pt had htn episode in June as was out of pain meds..    Pt still having eds, naps daily,uses 200 mg twcie daily    3/5/2024 pt was admitted to [facility] 2/26-29th with copd/angina.     Uses vest but feels aerobika effective..  Pt took pred/abx with hosp stay  Breathing stable and mucous clear.  Pt under stress as daughter having difficulties with smoking/antidepressant rx.  Cannot tolerate ofev -- stopped around October 2024-- was dosing 100/d...      No more chest pain  Uses provigil with benefit.  Echo was good 2/27/2024  Patient Instructions   We discuss using nuvigil over provigil as provigil losing effectiness late day on 400 mg daily split dose.  Will start 150 in morning nuvigil -- call or use portal if higher dose needed as can increase to 200, then may consider 250 max dose.        Would try breztri 2 twice daily to prevent lungs from worsening    Use prednisone if more cough or wheeze, use azithromycin or doxycycline (avoid sunshine on doxy) if yellow mucous-- azithro not always effective for 3 days?     Would check ct chest and pft and walk test in 6 months..    Continue cpap with ox nightly     Ct chest and cxr viewed from 6/2023 and 2/2024--- looked stable    8/3/2023   Ofev, started and needs script to [facility]   Provigil helps and needs    Copd stable  Needs to f/u weight.    Uses ofev bid with occ pastey stools -- uses immodium-- doing reasonbe.  Breathing seems stable with aeorbika.        Pt still having lingering secretino--- used abx   last wk.  And used abx twice in last 12 months,  Uses provigil with good results   Patient Instructions   You have bronchiectasis seen on June 2023 viewed by me, Dr Bowen,  and use aerobika , you needed antibiotics twice last 12 months for lung infection.  Will order vest-- you should do nebulizer with aerobika with vest daily.    Provigil renewed    Use azithromycin for lung infections     Continue ofev    Recheck 6 months.  Call if problem    Bmi is up but weight not done today-- will follow up in future    Copd present.  Bronchiectasis treatment will help.    Ct chest viewed with patient today..  3/14/2923- uses cpap well, uses nasal mask, breathing stable, no ofev re try -fearful of emesis.  Had poison ivy 3 wks ago with lingering rash.      Patient Instructions   Ct chest viewed, pattern fibrosis seen-- would repeat    Not on medications for fibrosis as had severe reaction to ofev with projectile vomiting.    Would repeat ct and try ofev 100  -- start once daily and dose twice daily if no problem.      Aviod poison ivy-- may use medrol dose pack for rash or lungs if severe.     Use levalbuterol for cough,zpak    Call for sooner follow up if needed.    Provigil renewed.  11/30/2022 was seen by Dr Macias for 2nd opinion.    Pt ambulates nearly daily with ox monitoring -- upper 90's.  Anxious.  Has used proveigil with good results  Took ofev with severe n/v 1 st pill and recurred with subsequtent events.  May consider esbriet or lower dose ofev.   Becka,rf, ccp not done.    spirometry bronchodilator, lung volumes by gas dilution, diffusion capacity measured April 7, 2022. Spirometry was normal. There was no airflow obstruction. The forced vital capacity was 92% predicted.   Lung volume showed total lung capacity to be 77% predicted and low. Diffusion was low also at 65% predicted but did improve when corrected for lung volume.   Patient has a mild restrictive component. There is no airflow obstruction. The  bronchodilator response was not significant. Diffusion was decreased somewhat. Clinical correlation necessary.       6 minute walk study was accomplished April 7, 2022. Baseline room air saturation was 93%. With ambulation O2 sat fell to 88% by 2 minutes. Patient needed 2 L of oxygen maintain sat low 90s subsequently. Patient walked 66% of the reference distance of 255            Patient Instructions   Will renew provigil -- you have had great benefit with alertness with provigil.    Continue oxygen and cpap.  Would consider going to ofev 100 twice daily call in couple days    May consider stopping ofev and using esbriet  with titrating up as able.    Would check oxygen level walking weekly or so.  If levels fall over few days to weeks -- that pattern is not fibrosis but something else  Albuterol has caused jitteriness but xopenex has worked well with no adverse effect.  Will ask staff to do prior auth.  We review cxr 2013 to 2019 with no ild changes but cxr 2020 to current have ild seen.  Ct chest viewed again today.          8/8/2022 no ofev yet -- may be at local pharmacy.  Some ely usual degree. Uses ox prn.  Uses cpap.  Doing better with cpap and oxygen. Pt feels some excess sleepiness and loss focus despite good cpap compliance. Pt not using trazodone.     Had recent azithromycin. Uses xopenex as needed.        Not using buspar and wishes to stop .  Was seeing psyc in [place]-- psyc left town yrs ago.  [place] to have some add?       Patient Instructions   Need to get ofev started.    Use prednisone and inhalers as needed.    Use cpap with oxygen for sleep    Trial modafinil for excessive lingering sleepiness to improve alertness and focus.  Try one daily in early morning.  Increase to 2 daily after 3 days if needed.  Call when medications getting low to explain benefit and order more if helpful.        8/8/2022 compliance report  From 3 months ago--  for cpap shows cpap used 72% of days, average use  on days  used  5hr 20 min, pressure ranges were low from 4-10, breathing difficulties decreased from 36 with no cpap in past to about 3.  No issues identified -- no adjustment.      pulm fibrosis found on ct in April - ofev    2022 no abx and breathing stable.  cpap ok.  Has ahi 36 in , cpap auto 4-20.  Notes high pressure with removal mask at night.  Breathing roughly same but good and bad days.  Vague am stiffness of hands attributed to prior shoulder surgery.    No cough now wheezes.  Uses xopenex bid last month-- no help     Pt had  acute sob with wheezes, considered hospital, ended up reviewed her Facebook, found had prior Feb with similar symptoms and took inhalers with good control.    Patient Instructions   Need compliance report  for cpap-- may need to adjust cpap pressure if average high.      Will set up oxygen.    You have honeycombing of lung and subpleural reticular change seen on ct abd seen 2022.  You have prominent lower lung rales bilaterally.     Pulmonary functions with tlc 77% with low difussion.    Your oxygen saturation falls to 88% after walking 2 minutes-- would recommend using oxygen .  Bleed in 2 lpm oxygen into cpap.     Would recommend treatment for pulmonary fibrosis with ofev, should have blood work monthly x 3 once starting ofev.  May have loose stools and need imodium.  Dose would be one twice daily.      If prednisone needed-- try one daily for 3 days.  May need inhaler if acute short breath/wheeze.  1/10/2022 uses cpap well -- uses nasal pillars.  Uses over 4 hrs /night.  Had admit smh     Sinuses ok    Pt  Reports occasional low ox 90 or so.      Has boxes of inhalers at home-- took prednisone in fall.  No fh fibrosis but mom  44 yo.   Patient Instructions   You have mild scarring of lungs seen on ct of abdomen.   Rales are heard on exam suggesting scarring of lungs.      Would check oxygen level walking and breathing test.    Ct chest would be good to check--  could do in 6-12 months.    Use amoxil for sinus/lung infection    Will screen joint disease that may be associated with lung disease.    4/15/2021 uses cpap - uses over 4 hrs nightly, got call from  [employer] [facility] [place] saying needed to purchase or something?  Uses high compliance with great benefit.      10/1/2020 pt uses cpap 6 hrs nightly, has problems with mask, went to nasal with chin strap - needed tape to keep mouth closed.   Pt having good response when mask tolerated well.  No prednisone - stable  Patient Instructions   You are tolerating cpap well - you should do better with airfit f30 mask than nasal mask.    Continue symbicort as control lungs has been good.      You should use xopenex in place albuterol as you have palpitations with albuterol.  6/29/2020 - no prednisone last 3 wks, takes once every 3 wks, was not using symbicort less than daily. Brittaney's mom.  Sinuses good.      You had prior sleep apnea, up to 40/hr?  Used cpap for 3 yrs but stopped 10 yrs ago for multiple reasons.  Patient Instructions   symbicort is preventive - use regular 2 puffs twice daily,   singulair daily also.      Use xopenex inhaler, albuterol not as effective for you- as rescue therapy, use 2 puff up to every 4-6 as needed. May use nebulizer.    Use prednisone if needed.  20 mg daily for 2-3 should be adequate.  symbicort should decrease prednisone need.    Sleep apnea - up to 5 /hour is within normal range, study may apply cpap if can diagnose sleep apnea 1st part of night.   May need to see to discuss cpap tolerance post study.  10/15/2019pt had cough/wheezes as child, never outgrew.  Had cough all life- mucous clear. occ green mucous, uses combivent. Has neb with xopenex- albuterol ppt shakes.  Uses steroids once every 5 yrs- had to 3 wks prednisone.  Er visits once yr - may get steroid shot.  Lives [place],  No sinus, has dog and cat.  Breathing seems stable- breathing gets bad 4 month a yr    Uses oxycodone tid for last  "1.5 yrs.      Has sleep apnea - cpap broke and not followed up.    Patient Instructions   Asthma, copd with chronic disease?    Preventive - use regular- sinuglair daily, symbicort 2 twice daily    Rescue medication - use albuterol inhaler or xopenex nebulizer up to every 4 hrs or so.    Action plan - prednisone one daily for 3 May be adequate, may need 3 for 3 and taper if severe.  Er if sickly    If yellow mucous - use azithromycin.    Spacer will help inhaled medication.    Breathing test and follow up a yr.  The chief compliant  problem is new to me",    PFSH:  Past Medical History:   Diagnosis Date    Arthritis     COPD (chronic obstructive pulmonary disease)     Coronary artery disease     Hypertension     Sleep apnea     use CPAP at night          Past Surgical History:   Procedure Laterality Date    CORONARY ANGIOGRAPHY N/A 2/15/2021    Procedure: ANGIOGRAM, CORONARY ARTERY;  Surgeon: Aidan Buchanan MD;  Location: [facility] CATH/EP LAB;  Service: Cardiology;  Laterality: N/A;    CORONARY ARTERY BYPASS GRAFT      CORONARY BYPASS GRAFT ANGIOGRAPHY  2/15/2021    Procedure: Bypass graft study;  Surgeon: Aidan Buchanan MD;  Location: [facility] CATH/EP LAB;  Service: Cardiology;;    HYSTERECTOMY      JOINT REPLACEMENT      bilateral knee replacement     LEFT HEART CATHETERIZATION Left 2/15/2021    Procedure: Left heart cath;  Surgeon: Aidan Buchanan MD;  Location: [facility] CATH/EP LAB;  Service: Cardiology;  Laterality: Left;     Social History     Tobacco Use    Smoking status: Never     Passive exposure: Past    Smokeless tobacco: Never   Substance Use Topics    Alcohol use: No    Drug use: No     Family History   Problem Relation Name Age of Onset    Pulmonary embolism Mother       Review of patient's allergies indicates:   Allergen Reactions    Tylenol [acetaminophen] Other (See Comments)     Patient states allergic to TYLENOL brand name.  Blood pressure becomes hypertensive.      Levofloxacin Other (See Comments)     Pt " "states it causes tendon tears.    Metronidazole Other (See Comments)     DISORIENTED       Statins-hmg-coa reductase inhibitors Tinitus     Joint pain       Performance Status:The patient's activity level is no limits with regular activity.      Review of Systems:  a review of eleven systems covering constitutional, Eye, HEENT, Psych, Respiratory, Cardiac, GI, , Musculoskeletal, Endocrine, Dermatologic was negative except for pertinent findings as listed ABOVE and below: night sweats,   pertinent positive as above, rest is good      Exam:Comprehensive exam done. BP (!) 141/76 (BP Location: Left arm, Patient Position: Sitting, BP Method: Small (Automatic))   Pulse 77   Ht 5' 1" (1.549 m)   Wt 81.7 kg (180 lb 0.1 oz)   LMP 09/01/1986   SpO2 (!) 94% Comment: on room air at rest  BMI 34.01 kg/m²   Exam included Vitals as listed, and patient's appearance and affect and alertness and mood, oral exam for yeast and hygiene and pharynx lesions and Mallapatti (M) score, neck with inspection for jvd and masses and thyroid abnormalities and lymph nodes (supraclavicular and infraclavicular nodes and axillary also examined and noted if abn), chest exam included symmetry and effort and fremitus and percussion and auscultation, cardiac exam included rhythm and gallops and murmur and rubs and jvd and edema, abdominal exam for mass and hepatosplenomegaly and tenderness and hernias and bowel sounds, Musculoskeletal exam with muscle tone and posture and mobility/gait and  strength, and skin for rashes and cyanosis and pallor and turgor, extremity for clubbing.  Findings were normal except for pertinent findings listed below:   M2, chest is symmetric, no distress, normal percussion, normal fremitus and bilat slight rales lung bases  No clubbing nor edema    Radiographs (ct chest and cxr) reviewed: view by direct vision  March 15 , 2019 nad, post cabg    Ct abd 1/2/2022 ild seen lower lungs with ? Honeycombing.  Ct chest " 4/12/2022 ild seen with honeycombing.     Labs reviewed            PFT   fvc 92%, tlc 77%, dlco 65% 4/7/2022  Six min walk 93% rest , 88% 2 minutes, and 94% end of walk.        Results for SAKSHI BALLARD (MRN [id number]) as of 10/15/2019 09:59   Ref. Range 6/11/2012 04:51   Eosinophils Relative Latest Ref Range: 0.0 - 3.0 % 5.5 (H)   Basophil% Latest Ref Range: 0.0 - 3.0 % 0.5       Plan:  Clinical impression is apparently straight forward and impression with management as below.     Sakshi was seen today for follow-up and copd.    Diagnoses and all orders for this visit:    IPF (idiopathic pulmonary fibrosis)  -     modafiniL (PROVIGIL) 200 MG Tab; Take 1 tablet (200 mg total) by mouth 2 (two) times daily as needed (excess sleepiness).    Bronchiectasis, uncomplicated  -     HME - OTHER    Obstructive sleep apnea syndrome  -     modafiniL (PROVIGIL) 200 MG Tab; Take 1 tablet (200 mg total) by mouth 2 (two) times daily as needed (excess sleepiness).    Hypersomnolence disorder  -     modafiniL (PROVIGIL) 200 MG Tab; Take 1 tablet (200 mg total) by mouth 2 (two) times daily as needed (excess sleepiness).                  Follow up in about 6 months (around 3/9/2025), or if symptoms worsen or fail to improve.    Discussed with patient above for education the following:      Patient Instructions   Use oxygen -- you benefit greatly    Use cpap as you have nighlty    Ct chest viewed and compared to 2022      Fibrosis is stable by breathing test and ct--- repeat studies if more sob or in 1+ yrs....    If progress fibrosis--- may re consider anti fibrotic medication      Use inhalers if any cough, use inhalers more-- you have freq wheezes    Will reoder provigil but residual sleepiness is present.....      We discuss vest -- will discontinue.

## 2024-09-09 NOTE — PATIENT INSTRUCTIONS
Use oxygen -- you benefit greatly    Use cpap as you have nighlty    Ct chest viewed and compared to 2022      Fibrosis is stable by breathing test and ct--- repeat studies if more sob or in 1+ yrs....    If progress fibrosis--- may re consider anti fibrotic medication      Use inhalers if any cough, use inhalers more-- you have freq wheezes    Will reoder provigil but residual sleepiness is present.....      We discuss vest -- will discontinue.

## 2024-09-16 PROBLEM — J84.112 IPF (IDIOPATHIC PULMONARY FIBROSIS): Status: RESOLVED | Noted: 2022-04-07 | Resolved: 2024-09-16

## 2024-09-27 ENCOUNTER — TELEPHONE (OUTPATIENT)
Dept: ORTHOPEDICS | Facility: CLINIC | Age: 69
End: 2024-09-27
Payer: MEDICARE

## 2024-09-27 NOTE — TELEPHONE ENCOUNTER
Called and spoke to patient to discuss left shoulder.  She reports her condition has improved and she is also having some other health issues.  She would like to hold off on surgery.  She will contact our office with any future issues.      Asa

## 2024-11-22 DIAGNOSIS — J47.9 BRONCHIECTASIS, UNCOMPLICATED: Primary | ICD-10-CM

## 2024-11-25 RX ORDER — AZITHROMYCIN 500 MG/1
TABLET, FILM COATED ORAL
Qty: 3 TABLET | Refills: 1 | Status: SHIPPED | OUTPATIENT
Start: 2024-11-25

## 2024-12-31 DIAGNOSIS — J47.9 BRONCHIECTASIS, UNCOMPLICATED: ICD-10-CM

## 2025-01-02 RX ORDER — AZITHROMYCIN 500 MG/1
TABLET, FILM COATED ORAL
Qty: 3 TABLET | Refills: 1 | Status: SHIPPED | OUTPATIENT
Start: 2025-01-02

## 2025-01-28 DIAGNOSIS — J47.9 BRONCHIECTASIS WITHOUT COMPLICATION: ICD-10-CM

## 2025-01-28 RX ORDER — ALBUTEROL SULFATE 0.83 MG/ML
2.5 SOLUTION RESPIRATORY (INHALATION) EVERY 6 HOURS PRN
Qty: 120 EACH | Refills: 11 | Status: SHIPPED | OUTPATIENT
Start: 2025-01-28 | End: 2026-01-28

## 2025-03-10 ENCOUNTER — TELEPHONE (OUTPATIENT)
Dept: PULMONOLOGY | Facility: CLINIC | Age: 70
End: 2025-03-10
Payer: MEDICARE

## 2025-03-10 ENCOUNTER — OFFICE VISIT (OUTPATIENT)
Dept: PULMONOLOGY | Facility: CLINIC | Age: 70
End: 2025-03-10
Payer: MEDICARE

## 2025-03-10 VITALS
BODY MASS INDEX: 35.09 KG/M2 | HEART RATE: 69 BPM | SYSTOLIC BLOOD PRESSURE: 172 MMHG | OXYGEN SATURATION: 96 % | DIASTOLIC BLOOD PRESSURE: 84 MMHG | WEIGHT: 185.88 LBS | HEIGHT: 61 IN

## 2025-03-10 DIAGNOSIS — J84.9 INTERSTITIAL PULMONARY DISEASE, UNSPECIFIED: ICD-10-CM

## 2025-03-10 DIAGNOSIS — J84.9 ILD (INTERSTITIAL LUNG DISEASE): Primary | ICD-10-CM

## 2025-03-10 DIAGNOSIS — J96.11 CHRONIC HYPOXEMIC RESPIRATORY FAILURE: ICD-10-CM

## 2025-03-10 DIAGNOSIS — J84.112 IPF (IDIOPATHIC PULMONARY FIBROSIS): ICD-10-CM

## 2025-03-10 DIAGNOSIS — G47.33 OBSTRUCTIVE SLEEP APNEA SYNDROME: ICD-10-CM

## 2025-03-10 DIAGNOSIS — J47.9 BRONCHIECTASIS, UNCOMPLICATED: ICD-10-CM

## 2025-03-10 DIAGNOSIS — G47.10 HYPERSOMNOLENCE DISORDER: ICD-10-CM

## 2025-03-10 DIAGNOSIS — B37.31 VAGINAL YEAST INFECTION: ICD-10-CM

## 2025-03-10 PROCEDURE — 1159F MED LIST DOCD IN RCRD: CPT | Mod: CPTII,S$GLB,, | Performed by: INTERNAL MEDICINE

## 2025-03-10 PROCEDURE — 99213 OFFICE O/P EST LOW 20 MIN: CPT | Mod: S$GLB,,, | Performed by: INTERNAL MEDICINE

## 2025-03-10 PROCEDURE — 3079F DIAST BP 80-89 MM HG: CPT | Mod: CPTII,S$GLB,, | Performed by: INTERNAL MEDICINE

## 2025-03-10 PROCEDURE — 99999 PR PBB SHADOW E&M-EST. PATIENT-LVL IV: CPT | Mod: PBBFAC,,, | Performed by: INTERNAL MEDICINE

## 2025-03-10 PROCEDURE — 1101F PT FALLS ASSESS-DOCD LE1/YR: CPT | Mod: CPTII,S$GLB,, | Performed by: INTERNAL MEDICINE

## 2025-03-10 PROCEDURE — 3077F SYST BP >= 140 MM HG: CPT | Mod: CPTII,S$GLB,, | Performed by: INTERNAL MEDICINE

## 2025-03-10 PROCEDURE — 3288F FALL RISK ASSESSMENT DOCD: CPT | Mod: CPTII,S$GLB,, | Performed by: INTERNAL MEDICINE

## 2025-03-10 PROCEDURE — 3008F BODY MASS INDEX DOCD: CPT | Mod: CPTII,S$GLB,, | Performed by: INTERNAL MEDICINE

## 2025-03-10 RX ORDER — FLUCONAZOLE 200 MG/1
200 TABLET ORAL DAILY
Qty: 7 TABLET | Refills: 0 | Status: SHIPPED | OUTPATIENT
Start: 2025-03-10 | End: 2025-03-17

## 2025-03-10 RX ORDER — OSELTAMIVIR PHOSPHATE 75 MG/1
75 CAPSULE ORAL 2 TIMES DAILY
Qty: 10 CAPSULE | Refills: 0 | Status: SHIPPED | OUTPATIENT
Start: 2025-03-10 | End: 2025-03-16

## 2025-03-10 RX ORDER — MODAFINIL 200 MG/1
200 TABLET ORAL 2 TIMES DAILY PRN
Qty: 60 TABLET | Refills: 5 | Status: SHIPPED | OUTPATIENT
Start: 2025-03-10

## 2025-03-10 RX ORDER — AZITHROMYCIN 500 MG/1
TABLET, FILM COATED ORAL
Qty: 3 TABLET | Refills: 1 | Status: SHIPPED | OUTPATIENT
Start: 2025-03-10

## 2025-03-10 RX ORDER — LEVALBUTEROL INHALATION SOLUTION 1.25 MG/3ML
1 SOLUTION RESPIRATORY (INHALATION) EVERY 4 HOURS PRN
Qty: 90 EACH | Refills: 11 | Status: SHIPPED | OUTPATIENT
Start: 2025-03-10 | End: 2026-03-10

## 2025-03-10 NOTE — PROGRESS NOTES
3/10/2025    Sakshi Hess  New Patient Consult    Chief Complaint   Patient presents with    6m f/u    Pulmonary Fibrosis    Cough        3/10/2025 breathing doing well.  Needs abx, no flu vacc ine  Uses cpap nightly, does well on nuvigil  Uses prednisone occ, uses azityhr    Had vag yeast c/o    Not using buspar  Use ox for sleep --    9/9/2024 pt had f/u pft with no sign change from 2022,  ctchest 9/5/2024 felt to be unchanged.    Pt never smoker  Not using vest as may trigger angina    Last 2 months had achy and felt not right -  pt gained 9 lbs in 3 wks --- pt was seeing Dr Bourne, follows DR Buchanan's office.   Pt said to have good looking heart but was in heart failure    Pt had htn episode in June as was out of pain meds..    Pt still having eds, naps daily,uses 200 mg twcie daily    Patient Instructions   Use oxygen -- you benefit greatly    Use cpap as you have nighlty    Ct chest viewed and compared to 2022      Fibrosis is stable by breathing test and ct--- repeat studies if more sob or in 1+ yrs....    If progress fibrosis--- may re consider anti fibrotic medication      Use inhalers if any cough, use inhalers more-- you have freq wheezes    Will reoder provigil but residual sleepiness is present.....      We discuss vest -- will discontinue.  3/5/2024 pt was admitted to [facility] 2/26[facility]29th with copd/angina.     Uses vest but feels aerobika effective..  Pt took pred/abx with hosp stay  Breathing stable and mucous clear.  Pt under stress as daughter having difficulties with smoking/antidepressant rx.  Cannot tolerate ofev -- stopped around October 2024-- was dosing 100/d...      No more chest pain  Uses provigil with benefit.  Echo was good 2/27/2024  Patient Instructions   We discuss using nuvigil over provigil as provigil losing effectiness late day on 400 mg daily split dose.  Will start 150 in morning nuvigil -- call or use portal if higher dose needed as can increase to 200, then may consider 250  max dose.        Would try breztri 2 twice daily to prevent lungs from worsening    Use prednisone if more cough or wheeze, use azithromycin or doxycycline (avoid sunshine on doxy) if yellow mucous-- azithro not always effective for 3 days?     Would check ct chest and pft and walk test in 6 months..    Continue cpap with ox nightly     Ct chest and cxr viewed from 6/2023 and 2/2024--- looked stable    8/3/2023   Ofev, started and needs script to [facility]   Provigil helps and needs    Copd stable  Needs to f/u weight.    Uses ofev bid with occ pastey stools -- uses immodium-- doing reasonbe.  Breathing seems stable with aeorbika.        Pt still having lingering secretino--- used abx  last wk.  And used abx twice in last 12 months,  Uses provigil with good results   Patient Instructions   You have bronchiectasis seen on June 2023 viewed by me, Dr Bowen,  and use aerobika , you needed antibiotics twice last 12 months for lung infection.  Will order vest-- you should do nebulizer with aerobika with vest daily.    Provigil renewed    Use azithromycin for lung infections     Continue ofev    Recheck 6 months.  Call if problem    Bmi is up but weight not done today-- will follow up in future    Copd present.  Bronchiectasis treatment will help.    Ct chest viewed with patient today..  3/14/2923- uses cpap well, uses nasal mask, breathing stable, no ofev re try -fearful of emesis.  Had poison ivy 3 wks ago with lingering rash.      Patient Instructions   Ct chest viewed, pattern fibrosis seen-- would repeat    Not on medications for fibrosis as had severe reaction to ofev with projectile vomiting.    Would repeat ct and try ofev 100  -- start once daily and dose twice daily if no problem.      Aviod poison ivy-- may use medrol dose pack for rash or lungs if severe.     Use levalbuterol for cough,zpak    Call for sooner follow up if needed.    Provigil renewed.  11/30/2022 was seen by Dr Macias for 2nd opinion.    Pt  ambulates nearly daily with ox monitoring -- upper 90's.  Anxious.  Has used proveigil with good results  Took ofev with severe n/v 1 st pill and recurred with subsequtent events.  May consider esbriet or lower dose ofev.   Becka,rf, ccp not done.    spirometry bronchodilator, lung volumes by gas dilution, diffusion capacity measured April 7, 2022. Spirometry was normal. There was no airflow obstruction. The forced vital capacity was 92% predicted.   Lung volume showed total lung capacity to be 77% predicted and low. Diffusion was low also at 65% predicted but did improve when corrected for lung volume.   Patient has a mild restrictive component. There is no airflow obstruction. The bronchodilator response was not significant. Diffusion was decreased somewhat. Clinical correlation necessary.       6 minute walk study was accomplished April 7, 2022. Baseline room air saturation was 93%. With ambulation O2 sat fell to 88% by 2 minutes. Patient needed 2 L of oxygen maintain sat low 90s subsequently. Patient walked 66% of the reference distance of 255            Patient Instructions   Will renew provigil -- you have had great benefit with alertness with provigil.    Continue oxygen and cpap.  Would consider going to ofev 100 twice daily call in couple days    May consider stopping ofev and using esbriet  with titrating up as able.    Would check oxygen level walking weekly or so.  If levels fall over few days to weeks -- that pattern is not fibrosis but something else  Albuterol has caused jitteriness but xopenex has worked well with no adverse effect.  Will ask staff to do prior auth.  We review cxr 2013 to 2019 with no ild changes but cxr 2020 to current have ild seen.  Ct chest viewed again today.          8/8/2022 no ofev yet -- may be at local pharmacy.  Some ely usual degree. Uses ox prn.  Uses cpap.  Doing better with cpap and oxygen. Pt feels some excess sleepiness and loss focus despite good cpap compliance. Pt  not using trazodone.     Had recent azithromycin. Uses xopenex as needed.        Not using buspar and wishes to stop .  Was seeing psyc in [place]-- psyc left town yrs ago.  [place] to have some add?       Patient Instructions   Need to get ofev started.    Use prednisone and inhalers as needed.    Use cpap with oxygen for sleep    Trial modafinil for excessive lingering sleepiness to improve alertness and focus.  Try one daily in early morning.  Increase to 2 daily after 3 days if needed.  Call when medications getting low to explain benefit and order more if helpful.        8/8/2022 compliance report  From 3 months ago--  for cpap shows cpap used 72% of days, average use  on days used  5hr 20 min, pressure ranges were low from 4-10, breathing difficulties decreased from 36 with no cpap in past to about 3.  No issues identified -- no adjustment.      pulm fibrosis found on ct in April - ofev    4/8/2022 no abx and breathing stable.  cpap ok.  Has ahi 36 in 2020, cpap auto 4-20.  Notes high pressure with removal mask at night.  Breathing roughly same but good and bad days.  Vague am stiffness of hands attributed to prior shoulder surgery.    No cough now wheezes.  Uses xopenex bid last month-- no help     Pt had 1st feb acute sob with wheezes, considered hospital, ended up reviewed her Facebook, found had prior Feb with similar symptoms and took inhalers with good control.    Patient Instructions   Need compliance report  for cpap-- may need to adjust cpap pressure if average high.      Will set up oxygen.    You have honeycombing of lung and subpleural reticular change seen on ct abd seen 1/2/2022.  You have prominent lower lung rales bilaterally.     Pulmonary functions with tlc 77% with low difussion.    Your oxygen saturation falls to 88% after walking 2 minutes-- would recommend using oxygen 24/7.  Bleed in 2 lpm oxygen into cpap.     Would recommend treatment for pulmonary fibrosis with ofev, should have  blood work monthly x 3 once starting ofev.  May have loose stools and need imodium.  Dose would be one twice daily.      If prednisone needed-- try one daily for 3 days.  May need inhaler if acute short breath/wheeze.  1/10/2022 uses cpap well -- uses nasal pillars.  Uses over 4 hrs /night.  Had admit [facility]     Sinuses ok    Pt  Reports occasional low ox 90 or so.      Has boxes of inhalers at home-- took prednisone in fall.  No fh fibrosis but mom  44 yo.   Patient Instructions   You have mild scarring of lungs seen on ct of abdomen.   Rales are heard on exam suggesting scarring of lungs.      Would check oxygen level walking and breathing test.    Ct chest would be good to check-- could do in 6-12 months.    Use amoxil for sinus/lung infection    Will screen joint disease that may be associated with lung disease.    4/15/2021 uses cpap - uses over 4 hrs nightly, got call from  [employer] saying needed to purchase or something?  Uses high compliance with great benefit.      10/1/2020 pt uses cpap 6 hrs nightly, has problems with mask, went to nasal with chin strap - needed tape to keep mouth closed.   Pt having good response when mask tolerated well.  No prednisone - stable  Patient Instructions   You are tolerating cpap well - you should do better with airfit f30 mask than nasal mask.    Continue symbicort as control lungs has been good.      You should use xopenex in place albuterol as you have palpitations with albuterol.  2020 - no prednisone last 3 wks, takes once every 3 wks, was not using symbicort less than daily. Brittaney's mom.  Sinuses good.      You had prior sleep apnea, up to 40/hr?  Used cpap for 3 yrs but stopped 10 yrs ago for multiple reasons.  Patient Instructions   symbicort is preventive - use regular 2 puffs twice daily,   singulair daily also.      Use xopenex inhaler, albuterol not as effective for you- as rescue therapy, use 2 puff up to every 4-6 as needed. May use  "nebulizer.    Use prednisone if needed.  20 mg daily for 2-3 should be adequate.  symbicort should decrease prednisone need.    Sleep apnea - up to 5 /hour is within normal range, study may apply cpap if can diagnose sleep apnea 1st part of night.   May need to see to discuss cpap tolerance post study.  10/15/2019pt had cough/wheezes as child, never outgrew.  Had cough all life- mucous clear. occ green mucous, uses combivent. Has neb with xopenex- albuterol ppt shakes.  Uses steroids once every 5 yrs- had to 3 wks prednisone.  Er visits once yr - may get steroid shot.  Lives [place],  No sinus, has dog and cat.  Breathing seems stable- breathing gets bad 4 month a yr    Uses oxycodone tid for last 1.5 yrs.      Has sleep apnea - cpap broke and not followed up.    Patient Instructions   Asthma, copd with chronic disease?    Preventive - use regular- sinuglair daily, symbicort 2 twice daily    Rescue medication - use albuterol inhaler or xopenex nebulizer up to every 4 hrs or so.    Action plan - prednisone one daily for 3 May be adequate, may need 3 for 3 and taper if severe.  Er if sickly    If yellow mucous - use azithromycin.    Spacer will help inhaled medication.    Breathing test and follow up a yr.  The chief compliant  problem is new to me",    PFSH:  Past Medical History:   Diagnosis Date    Arthritis     COPD (chronic obstructive pulmonary disease)     Coronary artery disease     Hypertension     Sleep apnea     use CPAP at night          Past Surgical History:   Procedure Laterality Date    CORONARY ANGIOGRAPHY N/A 2/15/2021    Procedure: ANGIOGRAM, CORONARY ARTERY;  Surgeon: Aidan Buchanan MD;  Location: [facility] CATH/EP LAB;  Service: Cardiology;  Laterality: N/A;    CORONARY ARTERY BYPASS GRAFT      CORONARY BYPASS GRAFT ANGIOGRAPHY  2/15/2021    Procedure: Bypass graft study;  Surgeon: Aidan Buchanan MD;  Location: [facility] CATH/EP LAB;  Service: Cardiology;;    HYSTERECTOMY      JOINT REPLACEMENT      " "bilateral knee replacement     LEFT HEART CATHETERIZATION Left 2/15/2021    Procedure: Left heart cath;  Surgeon: Aidan Buchanan MD;  Location: [facility] CATH[facility]EP LAB;  Service: Cardiology;  Laterality: Left;     Social History     Tobacco Use    Smoking status: Never     Passive exposure: Past    Smokeless tobacco: Never   Substance Use Topics    Alcohol use: No    Drug use: No     Family History   Problem Relation Name Age of Onset    Pulmonary embolism Mother       Review of patient's allergies indicates:   Allergen Reactions    Acetaminophen Other (See Comments) and Nausea And Vomiting     Patient states allergic to TYLENOL brand name.  Blood pressure becomes hypertensive.    Levofloxacin Other (See Comments)     Pt states it causes tendon tears.    Metronidazole Other (See Comments)     DISORIENTED       Statins-hmg-coa reductase inhibitors Tinitus     Joint pain       Performance Status:The patient's activity level is no limits with regular activity.      Review of Systems:  a review of eleven systems covering constitutional, Eye, HEENT, Psych, Respiratory, Cardiac, GI, , Musculoskeletal, Endocrine, Dermatologic was negative except for pertinent findings as listed ABOVE and below: night sweats,   pertinent positive as above, rest is good      Exam:Comprehensive exam done. BP (!) 172/84 (BP Location: Right arm, Patient Position: Sitting)   Pulse 69   Ht 5' 1" (1.549 m)   Wt 84.3 kg (185 lb 13.6 oz)   LMP 09/01/1986   SpO2 96% Comment: on room air at rest  BMI 35.12 kg/m²   Exam included Vitals as listed, and patient's appearance and affect and alertness and mood, oral exam for yeast and hygiene and pharynx lesions and Mallapatti (M) score, neck with inspection for jvd and masses and thyroid abnormalities and lymph nodes (supraclavicular and infraclavicular nodes and axillary also examined and noted if abn), chest exam included symmetry and effort and fremitus and percussion and auscultation, cardiac exam " included rhythm and gallops and murmur and rubs and jvd and edema, abdominal exam for mass and hepatosplenomegaly and tenderness and hernias and bowel sounds, Musculoskeletal exam with muscle tone and posture and mobility/gait and  strength, and skin for rashes and cyanosis and pallor and turgor, extremity for clubbing.  Findings were normal except for pertinent findings listed below:   M2, chest is symmetric, no distress, normal percussion, normal fremitus and bilat slight rales lung bases  No clubbing nor edema    Radiographs (ct chest and cxr) reviewed: view by direct vision  March 15 , 2019 nad, post cabg    Ct abd 1/2/2022 ild seen lower lungs with ? Honeycombing.  Ct chest 4/12/2022 ild seen with honeycombing.     Labs reviewed            PFT   fvc 92%, tlc 77%, dlco 65% 4/7/2022  Six min walk 93% rest , 88% 2 minutes, and 94% end of walk.        Results for SAKSHI BALLARD (MRN [id number]) as of 10/15/2019 09:59   Ref. Range 6/11/2012 04:51   Eosinophils Relative Latest Ref Range: 0.0 - 3.0 % 5.5 (H)   Basophil% Latest Ref Range: 0.0 - 3.0 % 0.5       Plan:  Clinical impression is apparently straight forward and impression with management as below.     Sakshi was seen today for 6m f/u, pulmonary fibrosis and cough.    Diagnoses and all orders for this visit:    ILD (interstitial lung disease)  -     oseltamivir (TAMIFLU) 75 MG capsule; Take 1 capsule (75 mg total) by mouth 2 (two) times daily. for 5 days    Hypersomnolence disorder  -     modafiniL (PROVIGIL) 200 MG Tab; Take 1 tablet (200 mg total) by mouth 2 (two) times daily as needed (excess sleepiness).    Chronic hypoxemic respiratory failure    Obstructive sleep apnea syndrome  -     modafiniL (PROVIGIL) 200 MG Tab; Take 1 tablet (200 mg total) by mouth 2 (two) times daily as needed (excess sleepiness).    Bronchiectasis, uncomplicated  -     azithromycin (ZITHROMAX) 500 MG tablet; Take 1 tablet by mouth daily for yellow mucous (repeat if needed)  -      levalbuterol (XOPENEX) 1.25 mg/3 mL nebulizer solution; Take 3 mLs (1.25 mg total) by nebulization every 4 (four) hours as needed for Wheezing or Shortness of Breath. Rescue    IPF (idiopathic pulmonary fibrosis)  -     modafiniL (PROVIGIL) 200 MG Tab; Take 1 tablet (200 mg total) by mouth 2 (two) times daily as needed (excess sleepiness).    Vaginal yeast infection  -     fluconazole (DIFLUCAN) 200 MG Tab; Take 1 tablet (200 mg total) by mouth once daily. for 7 days    Interstitial pulmonary disease, unspecified  -     CT Chest Without Contrast; Future  -     Complete PFT without bronchodilator; Future  -     Stress test, pulmonary; Future                    Follow up in about 6 months (around 9/10/2025), or if symptoms worsen or fail to improve.    Discussed with patient above for education the following:      Patient Instructions   Last ct and pft done 9/2024 -- breathing stable-- check ct/pft 9/2025 -- may go to every yr if stable?     Flu vaccine recommended, use  tamiflu if flu    Use diflucan for yeast    Use ox and cpap for sleep     Provigil renewed        Eval took 27 min

## 2025-03-10 NOTE — PATIENT INSTRUCTIONS
Last ct and pft done 9/2024 -- breathing stable-- check ct/pft 9/2025 -- may go to every yr if stable?     Flu vaccine recommended, use  tamiflu if flu    Use diflucan for yeast    Use ox and cpap for sleep     Provigil renewed

## 2025-03-11 RX ORDER — BUDESONIDE, GLYCOPYRROLATE, AND FORMOTEROL FUMARATE 160; 9; 4.8 UG/1; UG/1; UG/1
AEROSOL, METERED RESPIRATORY (INHALATION)
Qty: 10.7 G | Refills: 9 | Status: SHIPPED | OUTPATIENT
Start: 2025-03-11

## 2025-04-09 DIAGNOSIS — J44.9 CHRONIC OBSTRUCTIVE PULMONARY DISEASE, UNSPECIFIED COPD TYPE: ICD-10-CM

## 2025-04-09 DIAGNOSIS — J47.9 BRONCHIECTASIS, UNCOMPLICATED: ICD-10-CM

## 2025-04-09 RX ORDER — LEVALBUTEROL INHALATION SOLUTION 1.25 MG/3ML
1 SOLUTION RESPIRATORY (INHALATION) EVERY 4 HOURS PRN
Qty: 90 EACH | Refills: 11 | Status: ON HOLD | OUTPATIENT
Start: 2025-04-09 | End: 2026-04-09

## 2025-04-09 RX ORDER — PREDNISONE 20 MG/1
TABLET ORAL
Qty: 12 TABLET | Refills: 0 | Status: ON HOLD | OUTPATIENT
Start: 2025-04-09

## 2025-04-09 RX ORDER — AZITHROMYCIN 500 MG/1
TABLET, FILM COATED ORAL
Qty: 3 TABLET | Refills: 1 | Status: ON HOLD | OUTPATIENT
Start: 2025-04-09

## 2025-04-09 NOTE — TELEPHONE ENCOUNTER
----- Message from [employer] sent at 4/9/2025  2:57 PM CDT -----  Regarding: Refill  Contact: [phone]  Rx Refill/RequestIs this a Refill or New Rx:  RefillRx Name and Strength:  predniSONE tablet 40 mg, azithromycin (ZITHROMAX) 500 MG tablet, levalbuterol (XOPENEX) 1.25 mg/3 mL nebulizer solution and evolocumab (REPATHA SYRINGE) 140 mg/mL Syrg(if possible)Preferred Pharmacy with phone number:[facility] [place] [address] [facility] [address] [address]Ibodo: [phone] Fax: [phone] Information:Asking if provider can refill Evolocumab(Repatha). Please call [phone]

## 2025-04-10 ENCOUNTER — HOSPITAL ENCOUNTER (INPATIENT)
Facility: HOSPITAL | Age: 70
LOS: 1 days | Discharge: HOME | DRG: 198 | End: 2025-04-14
Attending: EMERGENCY MEDICINE | Admitting: STUDENT IN AN ORGANIZED HEALTH CARE EDUCATION/TRAINING PROGRAM
Payer: MEDICARE

## 2025-04-10 DIAGNOSIS — R07.9 CHEST PAIN: Primary | ICD-10-CM

## 2025-04-10 PROBLEM — J44.9 COPD (CHRONIC OBSTRUCTIVE PULMONARY DISEASE): Status: ACTIVE | Noted: 2025-04-10

## 2025-04-10 PROBLEM — I20.0 UNSTABLE ANGINA: Status: ACTIVE | Noted: 2025-04-10

## 2025-04-10 PROBLEM — G89.29 CHRONIC PAIN: Status: ACTIVE | Noted: 2025-04-10

## 2025-04-10 LAB
ABSOLUTE EOSINOPHIL (SMH): 0.47 K/UL
ABSOLUTE MONOCYTE (SMH): 0.83 K/UL (ref 0.3–1)
ABSOLUTE NEUTROPHIL COUNT (SMH): 4.1 K/UL (ref 1.8–7.7)
ALBUMIN SERPL-MCNC: 4.1 G/DL (ref 3.5–5.2)
ALP SERPL-CCNC: 78 UNIT/L (ref 55–135)
ALT SERPL-CCNC: 10 UNIT/L (ref 10–44)
ANION GAP (SMH): 5 MMOL/L (ref 8–16)
AST SERPL-CCNC: 17 UNIT/L (ref 10–40)
BASOPHILS # BLD AUTO: 0.05 K/UL
BASOPHILS NFR BLD AUTO: 0.6 %
BILIRUB SERPL-MCNC: 0.3 MG/DL (ref 0.1–1)
BILIRUB UR QL STRIP.AUTO: NEGATIVE
BNP SERPL-MCNC: 52 PG/ML
BUN SERPL-MCNC: 22 MG/DL (ref 8–23)
CALCIUM SERPL-MCNC: 9 MG/DL (ref 8.7–10.5)
CHLORIDE SERPL-SCNC: 103 MMOL/L (ref 95–110)
CLARITY UR: CLEAR
CO2 SERPL-SCNC: 27 MMOL/L (ref 23–29)
COLOR UR AUTO: YELLOW
CREAT SERPL-MCNC: 1.2 MG/DL (ref 0.5–1.4)
ERYTHROCYTE [DISTWIDTH] IN BLOOD BY AUTOMATED COUNT: 14.3 % (ref 11.5–14.5)
GFR SERPLBLD CREATININE-BSD FMLA CKD-EPI: 49 ML/MIN/1.73/M2
GLUCOSE SERPL-MCNC: 126 MG/DL (ref 70–110)
GLUCOSE UR QL STRIP: NEGATIVE
HCT VFR BLD AUTO: 38.7 % (ref 37–48.5)
HGB BLD-MCNC: 12.5 GM/DL (ref 12–16)
HGB UR QL STRIP: NEGATIVE
IMM GRANULOCYTES # BLD AUTO: 0.04 K/UL (ref 0–0.04)
IMM GRANULOCYTES NFR BLD AUTO: 0.5 % (ref 0–0.5)
KETONES UR QL STRIP: NEGATIVE
LEUKOCYTE ESTERASE UR QL STRIP: NEGATIVE
LYMPHOCYTES # BLD AUTO: 2.82 K/UL (ref 1–4.8)
MAGNESIUM SERPL-MCNC: 1.9 MG/DL (ref 1.6–2.6)
MCH RBC QN AUTO: 29.3 PG (ref 27–31)
MCHC RBC AUTO-ENTMCNC: 32.3 G/DL (ref 32–36)
MCV RBC AUTO: 91 FL (ref 82–98)
NITRITE UR QL STRIP: NEGATIVE
NUCLEATED RBC (/100WBC) (SMH): 0 /100 WBC
PH UR STRIP: 6 [PH]
PLATELET # BLD AUTO: 246 K/UL (ref 150–450)
PMV BLD AUTO: 9.4 FL (ref 9.2–12.9)
POTASSIUM SERPL-SCNC: 4.2 MMOL/L (ref 3.5–5.1)
PROT SERPL-MCNC: 6.7 GM/DL (ref 6–8.4)
PROT UR QL STRIP: NEGATIVE
RBC # BLD AUTO: 4.27 M/UL (ref 4–5.4)
RELATIVE EOSINOPHIL (SMH): 5.7 % (ref 0–8)
RELATIVE LYMPHOCYTE (SMH): 34.1 % (ref 18–48)
RELATIVE MONOCYTE (SMH): 10 % (ref 4–15)
RELATIVE NEUTROPHIL (SMH): 49.1 % (ref 38–73)
SODIUM SERPL-SCNC: 135 MMOL/L (ref 136–145)
SP GR UR STRIP: 1.02
TROPONIN HIGH SENSITIVE (SMH): 19.9 PG/ML
TROPONIN HIGH SENSITIVE (SMH): 21.7 PG/ML
UROBILINOGEN UR STRIP-ACNC: NEGATIVE EU/DL
WBC # BLD AUTO: 8.28 K/UL (ref 3.9–12.7)

## 2025-04-10 PROCEDURE — 96372 THER/PROPH/DIAG INJ SC/IM: CPT

## 2025-04-10 PROCEDURE — 25000242 PHARM REV CODE 250 ALT 637 W/ HCPCS: Performed by: EMERGENCY MEDICINE

## 2025-04-10 PROCEDURE — 93005 ELECTROCARDIOGRAM TRACING: CPT | Performed by: GENERAL PRACTICE

## 2025-04-10 PROCEDURE — 63600175 PHARM REV CODE 636 W HCPCS

## 2025-04-10 PROCEDURE — 85025 COMPLETE CBC W/AUTO DIFF WBC: CPT | Performed by: NURSE PRACTITIONER

## 2025-04-10 PROCEDURE — 99285 EMERGENCY DEPT VISIT HI MDM: CPT | Mod: 25

## 2025-04-10 PROCEDURE — 83880 ASSAY OF NATRIURETIC PEPTIDE: CPT | Performed by: NURSE PRACTITIONER

## 2025-04-10 PROCEDURE — 87389 HIV-1 AG W/HIV-1&-2 AB AG IA: CPT | Performed by: EMERGENCY MEDICINE

## 2025-04-10 PROCEDURE — 84484 ASSAY OF TROPONIN QUANT: CPT | Mod: 91 | Performed by: NURSE PRACTITIONER

## 2025-04-10 PROCEDURE — 80053 COMPREHEN METABOLIC PANEL: CPT | Performed by: NURSE PRACTITIONER

## 2025-04-10 PROCEDURE — 86803 HEPATITIS C AB TEST: CPT | Performed by: EMERGENCY MEDICINE

## 2025-04-10 PROCEDURE — 25000003 PHARM REV CODE 250

## 2025-04-10 PROCEDURE — 93010 ELECTROCARDIOGRAM REPORT: CPT | Mod: ,,, | Performed by: GENERAL PRACTICE

## 2025-04-10 PROCEDURE — 94761 N-INVAS EAR/PLS OXIMETRY MLT: CPT

## 2025-04-10 PROCEDURE — 83735 ASSAY OF MAGNESIUM: CPT | Performed by: NURSE PRACTITIONER

## 2025-04-10 PROCEDURE — 96374 THER/PROPH/DIAG INJ IV PUSH: CPT

## 2025-04-10 PROCEDURE — G0378 HOSPITAL OBSERVATION PER HR: HCPCS

## 2025-04-10 PROCEDURE — 81003 URINALYSIS AUTO W/O SCOPE: CPT | Performed by: NURSE PRACTITIONER

## 2025-04-10 PROCEDURE — 94640 AIRWAY INHALATION TREATMENT: CPT | Mod: XB

## 2025-04-10 RX ORDER — NITROGLYCERIN 0.4 MG/1
0.4 TABLET SUBLINGUAL EVERY 5 MIN PRN
Status: DISCONTINUED | OUTPATIENT
Start: 2025-04-10 | End: 2025-04-14 | Stop reason: HOSPADM

## 2025-04-10 RX ORDER — SODIUM,POTASSIUM PHOSPHATES 280-250MG
2 POWDER IN PACKET (EA) ORAL
Status: DISCONTINUED | OUTPATIENT
Start: 2025-04-10 | End: 2025-04-14 | Stop reason: HOSPADM

## 2025-04-10 RX ORDER — AMOXICILLIN 250 MG
1 CAPSULE ORAL DAILY PRN
Status: DISCONTINUED | OUTPATIENT
Start: 2025-04-10 | End: 2025-04-14 | Stop reason: HOSPADM

## 2025-04-10 RX ORDER — ONDANSETRON HYDROCHLORIDE 2 MG/ML
4 INJECTION, SOLUTION INTRAVENOUS EVERY 6 HOURS PRN
Status: DISCONTINUED | OUTPATIENT
Start: 2025-04-10 | End: 2025-04-14 | Stop reason: HOSPADM

## 2025-04-10 RX ORDER — LEVALBUTEROL INHALATION SOLUTION 1.25 MG/3ML
1.25 SOLUTION RESPIRATORY (INHALATION)
Status: COMPLETED | OUTPATIENT
Start: 2025-04-10 | End: 2025-04-10

## 2025-04-10 RX ORDER — KETOROLAC TROMETHAMINE 30 MG/ML
15 INJECTION, SOLUTION INTRAMUSCULAR; INTRAVENOUS EVERY 6 HOURS PRN
Status: COMPLETED | OUTPATIENT
Start: 2025-04-10 | End: 2025-04-11

## 2025-04-10 RX ORDER — ASPIRIN 81 MG/1
81 TABLET ORAL DAILY
Status: DISCONTINUED | OUTPATIENT
Start: 2025-04-11 | End: 2025-04-14 | Stop reason: HOSPADM

## 2025-04-10 RX ORDER — LANOLIN ALCOHOL/MO/W.PET/CERES
800 CREAM (GRAM) TOPICAL
Status: DISCONTINUED | OUTPATIENT
Start: 2025-04-10 | End: 2025-04-14 | Stop reason: HOSPADM

## 2025-04-10 RX ORDER — IBUPROFEN 200 MG
16 TABLET ORAL
Status: DISCONTINUED | OUTPATIENT
Start: 2025-04-10 | End: 2025-04-14 | Stop reason: HOSPADM

## 2025-04-10 RX ORDER — GLUCAGON 1 MG
1 KIT INJECTION
Status: DISCONTINUED | OUTPATIENT
Start: 2025-04-10 | End: 2025-04-14 | Stop reason: HOSPADM

## 2025-04-10 RX ORDER — LEVALBUTEROL INHALATION SOLUTION 1.25 MG/3ML
1.25 SOLUTION RESPIRATORY (INHALATION) EVERY 6 HOURS
Status: DISCONTINUED | OUTPATIENT
Start: 2025-04-11 | End: 2025-04-12

## 2025-04-10 RX ORDER — ENOXAPARIN SODIUM 100 MG/ML
40 INJECTION SUBCUTANEOUS EVERY 24 HOURS
Status: DISCONTINUED | OUTPATIENT
Start: 2025-04-10 | End: 2025-04-14 | Stop reason: HOSPADM

## 2025-04-10 RX ORDER — SODIUM CHLORIDE 0.9 % (FLUSH) 0.9 %
10 SYRINGE (ML) INJECTION EVERY 12 HOURS PRN
Status: DISCONTINUED | OUTPATIENT
Start: 2025-04-10 | End: 2025-04-14 | Stop reason: HOSPADM

## 2025-04-10 RX ORDER — IBUPROFEN 200 MG
24 TABLET ORAL
Status: DISCONTINUED | OUTPATIENT
Start: 2025-04-10 | End: 2025-04-14 | Stop reason: HOSPADM

## 2025-04-10 RX ORDER — ASPIRIN 81 MG/1
243 TABLET ORAL ONCE
Status: COMPLETED | OUTPATIENT
Start: 2025-04-10 | End: 2025-04-10

## 2025-04-10 RX ORDER — ALUMINUM HYDROXIDE, MAGNESIUM HYDROXIDE, AND SIMETHICONE 1200; 120; 1200 MG/30ML; MG/30ML; MG/30ML
30 SUSPENSION ORAL 4 TIMES DAILY PRN
Status: DISCONTINUED | OUTPATIENT
Start: 2025-04-10 | End: 2025-04-14 | Stop reason: HOSPADM

## 2025-04-10 RX ORDER — TALC
6 POWDER (GRAM) TOPICAL NIGHTLY PRN
Status: DISCONTINUED | OUTPATIENT
Start: 2025-04-10 | End: 2025-04-14 | Stop reason: HOSPADM

## 2025-04-10 RX ORDER — INSULIN ASPART 100 [IU]/ML
0-5 INJECTION, SOLUTION INTRAVENOUS; SUBCUTANEOUS
Status: DISCONTINUED | OUTPATIENT
Start: 2025-04-10 | End: 2025-04-14

## 2025-04-10 RX ORDER — NALOXONE HCL 0.4 MG/ML
0.02 VIAL (ML) INJECTION
Status: DISCONTINUED | OUTPATIENT
Start: 2025-04-10 | End: 2025-04-14 | Stop reason: HOSPADM

## 2025-04-10 RX ADMIN — KETOROLAC TROMETHAMINE 15 MG: 30 INJECTION, SOLUTION INTRAMUSCULAR; INTRAVENOUS at 11:04

## 2025-04-10 RX ADMIN — ENOXAPARIN SODIUM 40 MG: 40 INJECTION SUBCUTANEOUS at 10:04

## 2025-04-10 RX ADMIN — ASPIRIN 243 MG: 81 TABLET, COATED ORAL at 10:04

## 2025-04-10 RX ADMIN — LEVALBUTEROL HYDROCHLORIDE 1.25 MG: 1.25 SOLUTION RESPIRATORY (INHALATION) at 08:04

## 2025-04-10 NOTE — FIRST PROVIDER EVALUATION
Emergency Department TeleTriage Encounter Note      CHIEF COMPLAINT    Chief Complaint   Patient presents with    Chest Pain     Chest pain x 3 days and SOB x 2 weeks - hx of COPD - states she gained @ 6 lbs in a week but took lasix last week and lost the weight -     Shortness of Breath       VITAL SIGNS   Initial Vitals [04/10/25 1801]   BP Pulse Resp Temp SpO2   110/70 63 (!) 22 98.3 °F (36.8 °C) (!) 91 %      MAP       --            ALLERGIES    Review of patient's allergies indicates:   Allergen Reactions    Acetaminophen Other (See Comments) and Nausea And Vomiting     Patient states allergic to TYLENOL brand name.  Blood pressure becomes hypertensive.    Levofloxacin Other (See Comments)     Pt states it causes tendon tears.    Metronidazole Other (See Comments)     DISORIENTED       Statins-hmg-coa reductase inhibitors Tinitus     Joint pain       PROVIDER TRIAGE NOTE  Verbal consent for the teletriage evaluation was given by the patient at the start of the evaluation.  All efforts will be made to maintain patient's privacy during the evaluation.      This is a teletriage evaluation of a 70 y.o. female presenting to the ED with c/o SOB and CP for 4 days. Limited physical exam via telehealth: The patient is awake, alert, answering questions appropriately and is not in respiratory distress.  As the Teletriage provider, I performed an initial assessment and ordered appropriate labs and imaging studies, if any, to facilitate the patient's care once placed in the ED. Once a room is available, care and a full evaluation will be completed by an alternate ED provider.  Any additional orders and the final disposition will be determined by that provider.  All imaging and labs will not be followed-up by the Teletriage Team, including myself.        ORDERS  Labs Reviewed   HEPATITIS C ANTIBODY   HIV 1 / 2 ANTIBODY   MAGNESIUM   CBC W/ AUTO DIFFERENTIAL    Narrative:     The following orders were created for panel order  CBC auto differential.  Procedure                               Abnormality         Status                     ---------                               -----------         ------                     CBC with Differential[[id number]]                                                        Please view results for these tests on the individual orders.   COMPREHENSIVE METABOLIC PANEL   TROPONIN I HIGH SENSITIVITY   TROPONIN I HIGH SENSITIVITY   B-TYPE NATRIURETIC PEPTIDE   CBC WITH DIFFERENTIAL   URINALYSIS, REFLEX TO URINE CULTURE       ED Orders (720h ago, onward)      Start Ordered     Status Ordering Provider    04/10/25 2013 04/10/25 1812  Troponin I High Sensitivity #2  Once Timed         Ordered CATHLEEN KURT CHEMO    04/10/25 1813 04/10/25 1812  Magnesium  STAT         Ordered CATHLEENKURT PERSAUD    04/10/25 1813 04/10/25 1812  Vital signs  Every 15 min         Ordered KURT CONNOLLY    04/10/25 1813 04/10/25 1812  Cardiac Monitoring - Adult  Continuous        Comments: Notify Physician If:    Ordered KURT CONNOLLY    04/10/25 1813 04/10/25 1812  Pulse Oximetry Continuous  Continuous         Ordered KURT CONNOLLY    04/10/25 1813 04/10/25 1812  Diet NPO  Diet effective now         Ordered CATHLEENKURT CHEMO    04/10/25 1813 04/10/25 1812  Saline lock IV  Once         Ordered CATHLEENKURT PERSAUD    04/10/25 1813 04/10/25 1812  EKG 12-lead  Once        Comments: Do not perform if previously done during this visit/ triage    Ordered KURT CONNOLLY CHEMO    04/10/25 1813 04/10/25 1812  CBC auto differential  STAT         Ordered KURT CONNOLLY    04/10/25 1813 04/10/25 1812  Comprehensive metabolic panel  STAT         Ordered KURT CONNOLLY    04/10/25 1813 04/10/25 1812  Troponin I High Sensitivity #1  STAT         Ordered KURT CONNOLLY    04/10/25 1813 04/10/25 1812  B-Type natriuretic peptide (BNP)  STAT         Ordered KURT CONNOLLY    04/10/25 1813 04/10/25 1812  X-Ray Chest PA And Lateral  1 time imaging         Ordered KURT CONNOLLY     04/10/25 1813 04/10/25 1812  Urinalysis, Reflex to Urine Culture  STAT         Ordered KURT CONNOLLY CHEMO    04/10/25 1813 04/10/25 1812  CBC with Differential  PROCEDURE ONCE         Ordered KURT CONNOLLY CHEMO    04/10/25 1806 04/10/25 1806  Hepatitis C Antibody  STAT         Ordered AMITA VICK    04/10/25 1806 04/10/25 1806  HIV 1/2 Ag/Ab (4th Gen)  STAT         Ordered AMITA VICK    04/10/25 1752 04/10/25 1751  EKG 12-lead  Once         Completed by ARCHIE KO on 4/10/2025 at  5:54 PM YAHIR HAGER              Virtual Visit Note: The provider triage portion of this emergency department evaluation and documentation was performed via [employer], a HIPAA-compliant telemedicine application, in concert with a tele-presenter in the room. A face to face patient evaluation with one of my colleagues will occur once the patient is placed in an emergency department room.      DISCLAIMER: This note was prepared with [employer] voice recognition transcription software. Garbled syntax, mangled pronouns, and other bizarre constructions may be attributed to that software system.

## 2025-04-11 ENCOUNTER — CLINICAL SUPPORT (OUTPATIENT)
Dept: CARDIOLOGY | Facility: HOSPITAL | Age: 70
End: 2025-04-11
Attending: EMERGENCY MEDICINE
Payer: MEDICARE

## 2025-04-11 LAB
ABSOLUTE EOSINOPHIL (SMH): 0.31 K/UL
ABSOLUTE MONOCYTE (SMH): 0.71 K/UL (ref 0.3–1)
ABSOLUTE NEUTROPHIL COUNT (SMH): 4.3 K/UL (ref 1.8–7.7)
ALBUMIN SERPL-MCNC: 3.8 G/DL (ref 3.5–5.2)
ALP SERPL-CCNC: 70 UNIT/L (ref 55–135)
ALT SERPL-CCNC: 7 UNIT/L (ref 10–44)
ANION GAP (SMH): 7 MMOL/L (ref 8–16)
AORTIC ROOT ANNULUS: 2.3 CM
AORTIC VALVE CUSP SEPERATION: 1.6 CM
APICAL FOUR CHAMBER EJECTION FRACTION: 59 %
APICAL TWO CHAMBER EJECTION FRACTION: 62 %
AST SERPL-CCNC: 10 UNIT/L (ref 10–40)
AV INDEX (PROSTH): 0.4
AV MEAN GRADIENT: 10 MMHG
AV PEAK GRADIENT: 19 MMHG
AV VALVE AREA BY VELOCITY RATIO: 1.4 CM²
AV VALVE AREA: 1.4 CM²
AV VELOCITY RATIO: 0.41
BASOPHILS # BLD AUTO: 0.04 K/UL
BASOPHILS NFR BLD AUTO: 0.5 %
BILIRUB SERPL-MCNC: 0.3 MG/DL (ref 0.1–1)
BUN SERPL-MCNC: 16 MG/DL (ref 8–23)
CALCIUM SERPL-MCNC: 8.8 MG/DL (ref 8.7–10.5)
CHLORIDE SERPL-SCNC: 104 MMOL/L (ref 95–110)
CO2 SERPL-SCNC: 31 MMOL/L (ref 23–29)
CREAT SERPL-MCNC: 0.9 MG/DL (ref 0.5–1.4)
CV ECHO LV RWT: 0.47 CM
DOP CALC AO PEAK VEL: 2.2 M/S
DOP CALC AO VTI: 43 CM
DOP CALC LVOT AREA: 3.5 CM2
DOP CALC LVOT DIAMETER: 2.1 CM
DOP CALC LVOT PEAK VEL: 0.9 M/S
DOP CALC LVOT STROKE VOLUME: 60.2 CM3
DOP CALC MV VTI: 48.1 CM
DOP CALCLVOT PEAK VEL VTI: 17.4 CM
E WAVE DECELERATION TIME: 356 MSEC
E/A RATIO: 0.69
E/E' RATIO: 13 M/S
ECHO LV POSTERIOR WALL: 1.1 CM (ref 0.6–1.1)
ERYTHROCYTE [DISTWIDTH] IN BLOOD BY AUTOMATED COUNT: 14.1 % (ref 11.5–14.5)
FRACTIONAL SHORTENING: 29.8 % (ref 28–44)
GFR SERPLBLD CREATININE-BSD FMLA CKD-EPI: >60 ML/MIN/1.73/M2
GLUCOSE SERPL-MCNC: 149 MG/DL (ref 70–110)
HCT VFR BLD AUTO: 37.6 % (ref 37–48.5)
HCV AB SERPL QL IA: NORMAL
HGB BLD-MCNC: 11.9 GM/DL (ref 12–16)
HIV 1+2 AB+HIV1 P24 AG SERPL QL IA: NORMAL
IMM GRANULOCYTES # BLD AUTO: 0.03 K/UL (ref 0–0.04)
IMM GRANULOCYTES NFR BLD AUTO: 0.4 % (ref 0–0.5)
INFLUENZA A MOLECULAR (OHS): NEGATIVE
INFLUENZA B MOLECULAR (OHS): NEGATIVE
INTERVENTRICULAR SEPTUM: 1.2 CM (ref 0.6–1.1)
LEFT ATRIUM AREA SYSTOLIC (APICAL 2 CHAMBER): 19.7 CM2
LEFT ATRIUM AREA SYSTOLIC (APICAL 4 CHAMBER): 21.9 CM2
LEFT ATRIUM SIZE: 3.9 CM
LEFT ATRIUM VOLUME MOD: 65 ML
LEFT INTERNAL DIMENSION IN SYSTOLE: 3.3 CM (ref 2.1–4)
LEFT VENTRICLE DIASTOLIC VOLUME: 104 ML
LEFT VENTRICLE END DIASTOLIC VOLUME APICAL 2 CHAMBER: 132 ML
LEFT VENTRICLE END DIASTOLIC VOLUME APICAL 4 CHAMBER: 122 ML
LEFT VENTRICLE END SYSTOLIC VOLUME APICAL 2 CHAMBER: 58.5 ML
LEFT VENTRICLE END SYSTOLIC VOLUME APICAL 4 CHAMBER: 65.8 ML
LEFT VENTRICLE SYSTOLIC VOLUME: 45 ML
LEFT VENTRICULAR INTERNAL DIMENSION IN DIASTOLE: 4.7 CM (ref 3.5–6)
LEFT VENTRICULAR MASS: 199.6 G
LV LATERAL E/E' RATIO: 10.3 M/S
LV SEPTAL E/E' RATIO: 16.1 M/S
LVED V (TEICH): 104 ML
LVES V (TEICH): 45.4 ML
LVOT MG: 2 MMHG
LVOT MV: 0.61 CM/S
LYMPHOCYTES # BLD AUTO: 2.02 K/UL (ref 1–4.8)
MAGNESIUM SERPL-MCNC: 1.9 MG/DL (ref 1.6–2.6)
MCH RBC QN AUTO: 29.1 PG (ref 27–31)
MCHC RBC AUTO-ENTMCNC: 31.6 G/DL (ref 32–36)
MCV RBC AUTO: 92 FL (ref 82–98)
MV MEAN GRADIENT: 4 MMHG
MV PEAK A VEL: 1.64 M/S
MV PEAK E VEL: 1.13 M/S
MV PEAK GRADIENT: 9 MMHG
MV STENOSIS PRESSURE HALF TIME: 104 MS
MV VALVE AREA BY CONTINUITY EQUATION: 1.25 CM2
MV VALVE AREA P 1/2 METHOD: 2.12 CM2
NUCLEATED RBC (/100WBC) (SMH): 0 /100 WBC
OHS CV RV/LV RATIO: 0.49 CM
OHS LV EJECTION FRACTION SIMPSONS BIPLANE MOD: 62 %
PISA MRMAX VEL: 3.78 M/S
PISA TR MAX VEL: 2.6 M/S
PLATELET # BLD AUTO: 210 K/UL (ref 150–450)
PMV BLD AUTO: 9 FL (ref 9.2–12.9)
POCT GLUCOSE: 122 MG/DL (ref 70–110)
POCT GLUCOSE: 131 MG/DL (ref 70–110)
POCT GLUCOSE: 136 MG/DL (ref 70–110)
POCT GLUCOSE: 168 MG/DL (ref 70–110)
POTASSIUM SERPL-SCNC: 3.9 MMOL/L (ref 3.5–5.1)
PROT SERPL-MCNC: 6.3 GM/DL (ref 6–8.4)
PV MV: 1.31 M/S
PV PEAK GRADIENT: 10 MMHG
PV PEAK VELOCITY: 1.58 M/S
RA PRESSURE ESTIMATED: 3 MMHG
RA WIDTH: 2.71 CM
RBC # BLD AUTO: 4.09 M/UL (ref 4–5.4)
RELATIVE EOSINOPHIL (SMH): 4.2 % (ref 0–8)
RELATIVE LYMPHOCYTE (SMH): 27.3 % (ref 18–48)
RELATIVE MONOCYTE (SMH): 9.6 % (ref 4–15)
RELATIVE NEUTROPHIL (SMH): 58 % (ref 38–73)
RIGHT ATRIUM VOLUME AREA LENGTH APICAL 4 CHAMBER: 18.1 ML
RIGHT VENTRICLE DIASTOLIC BASEL DIMENSION: 2.3 CM
RIGHT VENTRICULAR END-DIASTOLIC DIMENSION: 2.3 CM
RV TB RVSP: 6 MMHG
RV TISSUE DOPPLER FREE WALL SYSTOLIC VELOCITY 1 (APICAL 4 CHAMBER VIEW): 7.51 CM/S
SARS-COV-2 RDRP RESP QL NAA+PROBE: NEGATIVE
SODIUM SERPL-SCNC: 142 MMOL/L (ref 136–145)
T4 FREE SERPL-MCNC: 0.86 NG/DL (ref 0.71–1.51)
TDI LATERAL: 0.11 M/S
TDI SEPTAL: 0.07 M/S
TDI: 0.09 M/S
TR MAX PG: 27 MMHG
TRICUSPID ANNULAR PLANE SYSTOLIC EXCURSION: 2.19 CM
TROPONIN HIGH SENSITIVE (SMH): 20.9 PG/ML
TROPONIN HIGH SENSITIVE (SMH): 23.9 PG/ML
TSH SERPL-ACNC: 0.74 UIU/ML (ref 0.34–5.6)
TV REST PULMONARY ARTERY PRESSURE: 30 MMHG
WBC # BLD AUTO: 7.39 K/UL (ref 3.9–12.7)

## 2025-04-11 PROCEDURE — 82962 GLUCOSE BLOOD TEST: CPT

## 2025-04-11 PROCEDURE — 84484 ASSAY OF TROPONIN QUANT: CPT

## 2025-04-11 PROCEDURE — 93306 TTE W/DOPPLER COMPLETE: CPT | Mod: 26,,, | Performed by: INTERNAL MEDICINE

## 2025-04-11 PROCEDURE — 63600175 PHARM REV CODE 636 W HCPCS

## 2025-04-11 PROCEDURE — 96376 TX/PRO/DX INJ SAME DRUG ADON: CPT

## 2025-04-11 PROCEDURE — 96375 TX/PRO/DX INJ NEW DRUG ADDON: CPT

## 2025-04-11 PROCEDURE — A9502 TC99M TETROFOSMIN: HCPCS

## 2025-04-11 PROCEDURE — U0002 COVID-19 LAB TEST NON-CDC: HCPCS

## 2025-04-11 PROCEDURE — 63600175 PHARM REV CODE 636 W HCPCS: Performed by: STUDENT IN AN ORGANIZED HEALTH CARE EDUCATION/TRAINING PROGRAM

## 2025-04-11 PROCEDURE — 25000242 PHARM REV CODE 250 ALT 637 W/ HCPCS: Performed by: STUDENT IN AN ORGANIZED HEALTH CARE EDUCATION/TRAINING PROGRAM

## 2025-04-11 PROCEDURE — 25000242 PHARM REV CODE 250 ALT 637 W/ HCPCS

## 2025-04-11 PROCEDURE — 84443 ASSAY THYROID STIM HORMONE: CPT

## 2025-04-11 PROCEDURE — 96372 THER/PROPH/DIAG INJ SC/IM: CPT

## 2025-04-11 PROCEDURE — 36415 COLL VENOUS BLD VENIPUNCTURE: CPT

## 2025-04-11 PROCEDURE — 87502 INFLUENZA DNA AMP PROBE: CPT

## 2025-04-11 PROCEDURE — 83735 ASSAY OF MAGNESIUM: CPT

## 2025-04-11 PROCEDURE — 84439 ASSAY OF FREE THYROXINE: CPT

## 2025-04-11 PROCEDURE — 25000003 PHARM REV CODE 250

## 2025-04-11 PROCEDURE — 99900031 HC PATIENT EDUCATION (STAT)

## 2025-04-11 PROCEDURE — 85025 COMPLETE CBC W/AUTO DIFF WBC: CPT

## 2025-04-11 PROCEDURE — 94761 N-INVAS EAR/PLS OXIMETRY MLT: CPT

## 2025-04-11 PROCEDURE — 99900035 HC TECH TIME PER 15 MIN (STAT)

## 2025-04-11 PROCEDURE — G0378 HOSPITAL OBSERVATION PER HR: HCPCS

## 2025-04-11 PROCEDURE — 63600175 PHARM REV CODE 636 W HCPCS: Mod: JZ

## 2025-04-11 PROCEDURE — 94799 UNLISTED PULMONARY SVC/PX: CPT

## 2025-04-11 PROCEDURE — 94640 AIRWAY INHALATION TREATMENT: CPT | Mod: XB

## 2025-04-11 PROCEDURE — 80053 COMPREHEN METABOLIC PANEL: CPT

## 2025-04-11 PROCEDURE — 93306 TTE W/DOPPLER COMPLETE: CPT

## 2025-04-11 RX ORDER — ARFORMOTEROL TARTRATE 15 UG/2ML
15 SOLUTION RESPIRATORY (INHALATION) 2 TIMES DAILY
Status: DISCONTINUED | OUTPATIENT
Start: 2025-04-11 | End: 2025-04-14 | Stop reason: HOSPADM

## 2025-04-11 RX ORDER — IPRATROPIUM BROMIDE 0.5 MG/2.5ML
0.5 SOLUTION RESPIRATORY (INHALATION) EVERY 6 HOURS
Status: DISCONTINUED | OUTPATIENT
Start: 2025-04-11 | End: 2025-04-12

## 2025-04-11 RX ORDER — LOSARTAN POTASSIUM 25 MG/1
25 TABLET ORAL DAILY
Status: DISCONTINUED | OUTPATIENT
Start: 2025-04-11 | End: 2025-04-14 | Stop reason: HOSPADM

## 2025-04-11 RX ORDER — LABETALOL HYDROCHLORIDE 5 MG/ML
10 INJECTION, SOLUTION INTRAVENOUS EVERY 6 HOURS PRN
Status: DISCONTINUED | OUTPATIENT
Start: 2025-04-11 | End: 2025-04-14 | Stop reason: HOSPADM

## 2025-04-11 RX ORDER — BUDESONIDE 0.5 MG/2ML
0.5 INHALANT ORAL EVERY 12 HOURS
Status: DISCONTINUED | OUTPATIENT
Start: 2025-04-11 | End: 2025-04-14 | Stop reason: HOSPADM

## 2025-04-11 RX ADMIN — LOSARTAN POTASSIUM 25 MG: 25 TABLET, FILM COATED ORAL at 06:04

## 2025-04-11 RX ADMIN — LABETALOL HYDROCHLORIDE 10 MG: 5 INJECTION, SOLUTION INTRAVENOUS at 08:04

## 2025-04-11 RX ADMIN — TETROFOSMIN 11.3 MILLICURIE: 1.38 INJECTION, POWDER, LYOPHILIZED, FOR SOLUTION INTRAVENOUS at 01:04

## 2025-04-11 RX ADMIN — ALUMINUM HYDROXIDE, MAGNESIUM HYDROXIDE, AND SIMETHICONE 30 ML: 200; 200; 20 SUSPENSION ORAL at 07:04

## 2025-04-11 RX ADMIN — BUDESONIDE INHALATION 0.5 MG: 0.5 SUSPENSION RESPIRATORY (INHALATION) at 07:04

## 2025-04-11 RX ADMIN — IPRATROPIUM BROMIDE 0.5 MG: 0.5 SOLUTION RESPIRATORY (INHALATION) at 06:04

## 2025-04-11 RX ADMIN — KETOROLAC TROMETHAMINE 15 MG: 30 INJECTION, SOLUTION INTRAMUSCULAR; INTRAVENOUS at 09:04

## 2025-04-11 RX ADMIN — LEVALBUTEROL HYDROCHLORIDE 1.25 MG: 1.25 SOLUTION RESPIRATORY (INHALATION) at 12:04

## 2025-04-11 RX ADMIN — IPRATROPIUM BROMIDE 0.5 MG: 0.5 SOLUTION RESPIRATORY (INHALATION) at 12:04

## 2025-04-11 RX ADMIN — KETOROLAC TROMETHAMINE 15 MG: 30 INJECTION, SOLUTION INTRAMUSCULAR; INTRAVENOUS at 10:04

## 2025-04-11 RX ADMIN — IPRATROPIUM BROMIDE 0.5 MG: 0.5 SOLUTION RESPIRATORY (INHALATION) at 07:04

## 2025-04-11 RX ADMIN — ARFORMOTEROL TARTRATE 15 MCG: 15 SOLUTION RESPIRATORY (INHALATION) at 07:04

## 2025-04-11 RX ADMIN — OXYCODONE HYDROCHLORIDE 15 MG: 10 TABLET ORAL at 05:04

## 2025-04-11 RX ADMIN — ASPIRIN 81 MG: 81 TABLET, COATED ORAL at 08:04

## 2025-04-11 RX ADMIN — ARFORMOTEROL TARTRATE 15 MCG: 15 SOLUTION RESPIRATORY (INHALATION) at 06:04

## 2025-04-11 RX ADMIN — ONDANSETRON 4 MG: 2 INJECTION INTRAMUSCULAR; INTRAVENOUS at 05:04

## 2025-04-11 RX ADMIN — LEVALBUTEROL HYDROCHLORIDE 1.25 MG: 1.25 SOLUTION RESPIRATORY (INHALATION) at 06:04

## 2025-04-11 RX ADMIN — ENOXAPARIN SODIUM 40 MG: 40 INJECTION SUBCUTANEOUS at 05:04

## 2025-04-11 RX ADMIN — BUDESONIDE INHALATION 0.5 MG: 0.5 SUSPENSION RESPIRATORY (INHALATION) at 06:04

## 2025-04-11 RX ADMIN — LEVALBUTEROL HYDROCHLORIDE 1.25 MG: 1.25 SOLUTION RESPIRATORY (INHALATION) at 03:04

## 2025-04-11 RX ADMIN — OXYCODONE HYDROCHLORIDE 15 MG: 10 TABLET ORAL at 11:04

## 2025-04-11 RX ADMIN — LEVALBUTEROL HYDROCHLORIDE 1.25 MG: 1.25 SOLUTION RESPIRATORY (INHALATION) at 07:04

## 2025-04-11 NOTE — HOSPITAL COURSE
Sakshi Hess is a 70 year old female with a past medical history of IPF, obesity, MIRA, and DM who presented with chest pain. Troponin was mildly elevated, yet TTE and stress testing were unremarkable. Cardiology , nuclear stress negative for reversible ischemia. Echo with EF 62% with no acute valvular abnormalities, mild AS. Cardiology signed off. She is being treated for an exacerbation of IPF with steroids and Duonebs.  Patient's respiratory status returned to baseline and she was discharged on extended steroid taper with instructions to follow up with her pulmonologist with whom she already follows closely.  She is seen and examined prior to discharge in appropriate condition to do so.

## 2025-04-11 NOTE — ASSESSMENT & PLAN NOTE
"Patient's FSGs are controlled on current medication regimen.  Last A1c reviewed- No results found for: "LABA1C", "HGBA1C"  Most recent fingerstick glucose reviewed- No results for input(s): "POCTGLUCOSE" in the last 24 hours.  Current correctional scale  Low  Maintain anti-hyperglycemic dose as follows-   Antihyperglycemics (From admission, onward)      Start     Stop Route Frequency Ordered    04/10/25 2329  insulin aspart U-100 pen 0-5 Units         -- SubQ Before meals & nightly PRN 04/10/25 2240          Hold Oral hypoglycemics while patient is in the hospital.  "

## 2025-04-11 NOTE — PLAN OF CARE
PCP clinic closed - Patient to call on Monday to schedule hospital follow up appointment. AVS updated appropriately     04/11/25 1330   Post-Acute Status   Hospital Resources/Appts/Education Provided Appointment suggestion unavailable

## 2025-04-11 NOTE — ASSESSMENT & PLAN NOTE
Hx CAD s/p CABG  Serial troponins x 3, TSH, free T4  Flu/covid swabs  EKG PRN  Echo  ASA  NTG PRN  Tele monitoring  Consult cardiology

## 2025-04-11 NOTE — ASSESSMENT & PLAN NOTE
"Patient's FSGs are controlled on current medication regimen.  Last A1c reviewed- No results found for: "LABA1C", "HGBA1C"  Most recent fingerstick glucose reviewed-   Recent Labs   Lab 04/11/25  0803 04/11/25  1119   POCTGLUCOSE 168* 131*     Current correctional scale  Low  Maintain anti-hyperglycemic dose as follows-   Antihyperglycemics (From admission, onward)      Start     Stop Route Frequency Ordered    04/10/25 2329  insulin aspart U-100 pen 0-5 Units         -- SubQ Before meals & nightly PRN 04/10/25 2240          Hold Oral hypoglycemics while patient is in the hospital.  "

## 2025-04-11 NOTE — ED PROVIDER NOTES
Encounter Date: 4/10/2025       History     Chief Complaint   Patient presents with    Chest Pain     Chest pain x 3 days and SOB x 2 weeks - hx of COPD - states she gained @ 6 lbs in a week but took lasix last week and lost the weight -     Shortness of Breath     HPI 70 year old woman with a history of idiopathic pulmonary fibrosis with bronchiectasis, left rotator cuff tear, obstructive sleep apnea, CHF?  Who presents emergency department for evaluation of 2 weeks of shortness of breath with left-sided chest pain radiating to her left upper back described as pressure-like for 3 days.  She also noticed 6 lb weight gain and took 20 mg of Lasix for 2 days which improved her shortness of breath and her swelling.  She also took her breathing treatments with Xopenex it also felt improvement.  States she wears her CPAP at night and occasionally uses home oxygen to maintain her sats around 94%.  Denies any fever.  Review of patient's allergies indicates:   Allergen Reactions    Acetaminophen Other (See Comments) and Nausea And Vomiting     Patient states allergic to TYLENOL brand name.  Blood pressure becomes hypertensive.    Levofloxacin Other (See Comments)     Pt states it causes tendon tears.    Metronidazole Other (See Comments)     DISORIENTED       Statins-hmg-coa reductase inhibitors Tinitus     Joint pain     Past Medical History:   Diagnosis Date    Arthritis     COPD (chronic obstructive pulmonary disease)     Coronary artery disease     Hypertension     Sleep apnea     use CPAP at night      Past Surgical History:   Procedure Laterality Date    CORONARY ANGIOGRAPHY N/A 2/15/2021    Procedure: ANGIOGRAM, CORONARY ARTERY;  Surgeon: Aidan Buchanan MD;  Location: [facility] CATH/EP LAB;  Service: Cardiology;  Laterality: N/A;    CORONARY ARTERY BYPASS GRAFT      CORONARY BYPASS GRAFT ANGIOGRAPHY  2/15/2021    Procedure: Bypass graft study;  Surgeon: Aidan Buchanan MD;  Location: [facility] CATH/EP LAB;  Service:  Cardiology;;    HYSTERECTOMY      JOINT REPLACEMENT      bilateral knee replacement     LEFT HEART CATHETERIZATION Left 2/15/2021    Procedure: Left heart cath;  Surgeon: Aidan Buchanan MD;  Location: [facility] CATH/EP LAB;  Service: Cardiology;  Laterality: Left;     Family History   Problem Relation Name Age of Onset    Pulmonary embolism Mother       Social History[1]  Review of Systems   Constitutional:  Negative for fever.   HENT:  Negative for sore throat.    Respiratory:  Positive for shortness of breath.    Cardiovascular:  Positive for chest pain and leg swelling.   Gastrointestinal:  Negative for nausea.   Genitourinary:  Negative for dysuria.   Musculoskeletal:  Negative for back pain.   Skin:  Negative for rash.   Neurological:  Negative for weakness.   Hematological:  Does not bruise/bleed easily.       Physical Exam     Initial Vitals [04/10/25 1801]   BP Pulse Resp Temp SpO2   110/70 63 (!) 22 98.3 °F (36.8 °C) (!) 91 %      MAP       --         Physical Exam    Constitutional: Vital signs are normal. She appears well-developed and well-nourished.  Non-toxic appearance. No distress.   HENT:   Head: Normocephalic and atraumatic.   Eyes: EOM are normal. Pupils are equal, round, and reactive to light.   Neck: Neck supple. No JVD present.   Normal range of motion.  Cardiovascular:  Normal rate, regular rhythm and intact distal pulses.     Exam reveals no gallop and no friction rub.       Murmur heard.  Pulmonary/Chest: She has no wheezes. She has no rhonchi. She has rales (Faint bibasilar crackles).   Abdominal: Abdomen is soft. Bowel sounds are normal. There is no abdominal tenderness. There is no rebound and no guarding.   Musculoskeletal:         General: Normal range of motion.      Cervical back: Normal range of motion and neck supple. No rigidity.     Neurological: She is alert and oriented to person, place, and time. She has normal strength and normal reflexes. No cranial nerve deficit or sensory  deficit. She exhibits normal muscle tone. Coordination normal. GCS eye subscore is 4. GCS verbal subscore is 5. GCS motor subscore is 6.   Skin: Skin is warm and dry.   Psychiatric: She has a normal mood and affect. Her speech is normal and behavior is normal. She is not actively hallucinating.         ED Course   Procedures  Labs Reviewed   COMPREHENSIVE METABOLIC PANEL - Abnormal       Result Value    Sodium 135 (*)     Potassium 4.2      Chloride 103      CO2 27      Glucose 126 (*)     BUN 22      Creatinine 1.2      Calcium 9.0      Protein Total 6.7      Albumin 4.1      Bilirubin Total 0.3      ALP 78      AST 17      ALT 10      Anion Gap 5 (*)     eGFR 49 (*)    TROPONIN I HIGH SENSITIVITY - Abnormal    Troponin High Sensitive 21.7 (*)    TROPONIN I HIGH SENSITIVITY - Abnormal    Troponin High Sensitive 19.9 (*)    COMPREHENSIVE METABOLIC PANEL - Abnormal    Sodium 142      Potassium 3.9      Chloride 104      CO2 31 (*)     Glucose 149 (*)     BUN 16      Creatinine 0.9      Calcium 8.8      Protein Total 6.3      Albumin 3.8      Bilirubin Total 0.3      ALP 70      AST 10      ALT 7 (*)     Anion Gap 7 (*)     eGFR >60     TROPONIN I HIGH SENSITIVITY - Abnormal    Troponin High Sensitive 20.9 (*)    CBC WITH DIFFERENTIAL - Abnormal    WBC 7.39      RBC 4.09      Hgb 11.9 (*)     Hct 37.6      MCV 92      MCH 29.1      MCHC 31.6 (*)     RDW 14.1      Platelet Count 210      MPV 9.0 (*)     Nucleated RBC 0      Neut % 58.0      Lymph % 27.3      Mono % 9.6      Eos % 4.2      Basophil % 0.5      Imm Grans % 0.4      Neut # 4.3      Lymph # 2.02      Mono # 0.71      Eos # 0.31      Baso # 0.04      Imm Grans # 0.03     POCT GLUCOSE - Abnormal    POCT Glucose 168 (*)    POCT GLUCOSE - Abnormal    POCT Glucose 131 (*)    INFLUENZA A & B BY MOLECULAR - Normal    INFLUENZA A MOLECULAR Negative      INFLUENZA B MOLECULAR  Negative     MAGNESIUM - Normal    Magnesium 1.9     B-TYPE NATRIURETIC PEPTIDE - Normal     BNP 52     CBC WITH DIFFERENTIAL - Normal    WBC 8.28      RBC 4.27      Hgb 12.5      Hct 38.7      MCV 91      MCH 29.3      MCHC 32.3      RDW 14.3      Platelet Count 246      MPV 9.4      Nucleated RBC 0      Neut % 49.1      Lymph % 34.1      Mono % 10.0      Eos % 5.7      Basophil % 0.6      Imm Grans % 0.5      Neut # 4.1      Lymph # 2.82      Mono # 0.83      Eos # 0.47      Baso # 0.05      Imm Grans # 0.04     URINALYSIS, REFLEX TO URINE CULTURE - Normal    Color, UA Yellow      Appearance, UA Clear      Spec Grav UA 1.025      pH, UA 6.0      Protein, UA Negative      Glucose, UA Negative      Ketones, UA Negative      Blood, UA Negative      Bilirubin, UA Negative      Urobilinogen, UA Negative      Nitrites, UA Negative      Leukocyte Esterase, UA Negative     SARS-COV-2 RNA AMPLIFICATION, QUAL - Normal    SARS COV-2 Molecular Negative     MAGNESIUM - Normal    Magnesium 1.9     TSH - Normal    TSH 0.744     T4, FREE - Normal    Free T4 0.86     CBC W/ AUTO DIFFERENTIAL    Narrative:     The following orders were created for panel order CBC auto differential.  Procedure                               Abnormality         Status                     ---------                               -----------         ------                     CBC with Differential[[id number]]       Normal              Final result                 Please view results for these tests on the individual orders.   CBC W/ AUTO DIFFERENTIAL    Narrative:     The following orders were created for panel order CBC with Automated Differential.  Procedure                               Abnormality         Status                     ---------                               -----------         ------                     CBC with Differential[[id number]]       Abnormal            Final result                 Please view results for these tests on the individual orders.   HEPATITIS C ANTIBODY   HIV 1 / 2 ANTIBODY   TROPONIN I HIGH SENSITIVITY    TROPONIN I HIGH SENSITIVITY   POCT GLUCOSE, HAND-HELD DEVICE   POCT GLUCOSE, HAND-HELD DEVICE     EKG Readings: (Independently Interpreted)   Heart Rate: 71.   Sinus rhythm with Premature atrial complexes with Aberrant conduction  Possible Left atrial enlargement  LVH with repolarization abnormality         ECG Results              EKG 12-lead (In process)        Collection Time Result Time QRS Duration OHS QTC Calculation    04/10/25 17:54:08 04/11/25 05:11:12 86 397                     In process by Interface, Lab In [facility] (04/11/25 05:11:13)                   Narrative:    Test Reason : R07.9,    Vent. Rate :  71 BPM     Atrial Rate :  71 BPM     P-R Int : 178 ms          QRS Dur :  86 ms      QT Int : 366 ms       P-R-T Axes :  40 -23 103 degrees    QTcB Int : 397 ms    Sinus rhythm with Premature atrial complexes with Aberrant conduction  Possible Left atrial enlargement  LVH with repolarization abnormality ( R in aVL , Bricelyn product )  Cannot rule out Septal infarct (cited on or before 26-Feb-2024)  Abnormal ECG  When compared with ECG of 12-Jun-2024 17:39,  Fusion complexes are no longer Present  Questionable change in initial forces of Septal leads    Referred By: AAAREFERRAL SELF           Confirmed By:                                   Imaging Results              NM Myocardial Perfusion Spect Multi Pharmacologic (In process)  Result time 04/11/25 13:36:43   Procedure changed from NM Myocardial Perfusion Spect Multi Exer                    X-Ray Chest PA And Lateral (Final result)  Result time 04/10/25 19:25:13      Final result by Eloisa Hercules MD (04/10/25 19:25:13)                   Impression:      As above      Electronically signed by: Eloisa Hercules  Date:    04/10/2025  Time:    19:25               Narrative:    EXAMINATION:  XR CHEST PA AND LATERAL    CLINICAL HISTORY:  Chest Pain;    TECHNIQUE:  PA and lateral views of the chest were  performed.    COMPARISON:  06/12/2024    FINDINGS:  Chronic interstitial changes.  No focal consolidation.  Normal heart size.                                       Medications   sodium chloride 0.9% flush 10 mL (has no administration in time range)   melatonin tablet 6 mg (has no administration in time range)   ondansetron injection 4 mg (has no administration in time range)   senna-docusate 8.6-50 mg per tablet 1 tablet (has no administration in time range)   aluminum-magnesium hydroxide-simethicone 200-200-20 mg/5 mL suspension 30 mL (has no administration in time range)   naloxone 0.4 mg/mL injection 0.02 mg (has no administration in time range)   potassium bicarbonate disintegrating tablet 50 mEq (has no administration in time range)   potassium bicarbonate disintegrating tablet 35 mEq (has no administration in time range)   potassium bicarbonate disintegrating tablet 60 mEq (has no administration in time range)   magnesium oxide tablet 800 mg (has no administration in time range)   magnesium oxide tablet 800 mg (has no administration in time range)   potassium, sodium phosphates 280-160-250 mg packet 2 packet (has no administration in time range)   potassium, sodium phosphates 280-160-250 mg packet 2 packet (has no administration in time range)   potassium, sodium phosphates 280-160-250 mg packet 2 packet (has no administration in time range)   glucose chewable tablet 16 g (has no administration in time range)   glucose chewable tablet 24 g (has no administration in time range)   dextrose 50% injection 12.5 g (has no administration in time range)   dextrose 50% injection 25 g (has no administration in time range)   glucagon (human recombinant) injection 1 mg (has no administration in time range)   nitroGLYCERIN SL tablet 0.4 mg (has no administration in time range)   levalbuterol nebulizer solution 1.25 mg (1.25 mg Nebulization Given 4/11/25 1212)   enoxaparin injection 40 mg (40 mg Subcutaneous Given 4/10/25  2258)   insulin aspart U-100 pen 0-5 Units (has no administration in time range)   ketorolac injection 15 mg (15 mg Intravenous Given 4/11/25 1008)   aspirin EC tablet 81 mg (81 mg Oral Given 4/11/25 0806)   budesonide nebulizer solution 0.5 mg (0.5 mg Nebulization Given 4/11/25 0659)   arformoteroL nebulizer solution 15 mcg (15 mcg Nebulization Given 4/11/25 0659)   ipratropium 0.02 % nebulizer solution 0.5 mg (0.5 mg Nebulization Given 4/11/25 1212)   oxyCODONE immediate release tablet 15 mg (15 mg Oral Given 4/11/25 1128)   levalbuterol nebulizer solution 1.25 mg (1.25 mg Nebulization Given 4/10/25 2015)   aspirin EC tablet 243 mg (243 mg Oral Given 4/10/25 2257)   kit prep of Tc-99m-tetrofosmin 1.38 mg SolR 11.3 millicurie (11.3 millicuries Intravenous Given 4/11/25 1302)     Medical Decision Making  Assessment/Plan:  Sakshi Hess is a 70 y.o. female with chest pain.    This is an emergent evaluation.  Patient is complaining of chest pain.  My differential diagnosis includes: Acute MI, unstable angina, stable angina, congestive heart failure, pneumonia, pneumothorax, COPD/asthma, bronchitis, and mass.      An EKG was performed and was negative for ST segment elevation.  A chest x-ray was performed and was negative for signs of heart failure or pulmonary edema per my interpretation.  There was no evidence of pneumonia or pneumothorax or mass lesion.    Cardiac markers-troponin is mildly elevated but she appears to have chronically elevated troponins .  BNP is normal at 52, I doubt decompensated CHF.  Renal function is normal with creatinine 1.2 and BUN of 22.  Patient is not anemic with a hemoglobin of 12.5.      This patient has multiple cardiac risk factors. Risk stratification with serial cardiac markers and stress testing is warranted in this patient.  Heart score is 6-7. I believe the patient should be admitted for observation to the definitively rule out acute coronary syndrome.    I discussed the  patient's presentation and workup with the hospitalist who agrees with placing the patient in the hospital.  I have placed orders for the hospitalist.     Chun Crowe M.D. 2:23 PM 4/11/2025         Risk  Prescription drug management.      Additional MDM:   Heart Score:    History:          Moderately suspicious.  ECG:             Nonspecific repolarisation disturbance  Age:               >65 years  Risk factors: >= 3 risk factors or history of atherosclerotic disease  Troponin:       1-2x normal limit  Heart Score = 7                                       Clinical Impression:  Final diagnoses:  [R07.9] Chest pain (Primary)          ED Disposition Condition    Observation Stable                    [1]   Social History  Tobacco Use    Smoking status: Never     Passive exposure: Past    Smokeless tobacco: Never   Substance Use Topics    Alcohol use: No    Drug use: No        Chun Crowe MD  04/11/25 0865

## 2025-04-11 NOTE — PLAN OF CARE
Pending sale to [facility] - Emergency Dept  Initial Discharge Assessment       Primary Care Provider: Wenceslao Deras NP    Admission Diagnosis: Chest pain [R07.9]    Admission Date: 4/10/2025  Expected Discharge Date: 4/12/2025    SW met with patient bedside. Gabriella Jiménez, daughter, was present in the room assisting with assessment. SW verified demographics, insurance, supports, and PCP.  Patient reported living alone and either she drives or family brings her to appointments. SW assessed patient's needs. Patient is able to complete ADLs independently. Patient verified at home DME: CPAP, rollator, cane, shower chair, oxygen, glucometer.  Patient verified No HH, No Dialysis, No Blood Thinners. Patient reported using 3 liters of oxygen at home through [employer].     Patient verified pharmacy of choice: [facility]  Patient confirmed Gabriella Jiménez, adam, will be source of transportation at the time of discharge.     Transition of Care Barriers: None    Payor: [employer] MANAGED MEDICARE / Plan: depict HMO PPO SPECIAL NEEDS / Product Type: Medicare Advantage /     Extended Emergency Contact Information  Primary Emergency Contact: Gabriella Jiménez  Address: [address], [place] [id number] [place] [address] [facility]  Home Phone: [phone]  Mobile Phone: [phone]  Relation: Daughter  Secondary Emergency Contact: Eyad Loza  Address: [address], [place] [id number] [place] [address]  Mobile Phone: [phone]  Relation: Friend    Discharge Plan A: Home  Discharge Plan B: Home      [facility]  [address]  [facility] [address]  Phone: [phone] Fax: [phone]      Initial Assessment (most recent)       Adult Discharge Assessment - 04/11/25 1616          Discharge Assessment    Assessment Type Discharge Planning Assessment     Confirmed/corrected address, phone number and insurance Yes     Confirmed Demographics Correct on Facesheet     Source of  Information patient     When was your last doctors appointment? --   Dec. 2024    Does patient/caregiver understand observation status Yes     Communicated RENATO with patient/caregiver Date not available/Unable to determine     Reason For Admission Chest pain     People in Home alone     Do you expect to return to your current living situation? Yes     Do you have help at home or someone to help you manage your care at home? No     Prior to hospitilization cognitive status: Unable to Assess     Current cognitive status: Alert/Oriented     Walking or Climbing Stairs Difficulty no     Dressing/Bathing Difficulty no     Home Accessibility stairs to enter home     Number of Stairs, Main Entrance five     Home Layout Able to live on 1st floor     Equipment Currently Used at Home cane, straight;CPAP;glucometer;oxygen;rollator;shower chair     Readmission within 30 days? No     Patient currently being followed by outpatient case management? No     Do you currently have service(s) that help you manage your care at home? No     Do you take prescription medications? Yes     Do you have prescription coverage? Yes     Coverage [employer] and Medicaid     Do you have any problems affording any of your prescribed medications? No     Is the patient taking medications as prescribed? yes     Who is going to help you get home at discharge? Gabriella Paizon, daughter     How do you get to doctors appointments? car, drives self;family or friend will provide     Are you on dialysis? No     Do you take coumadin? No     Discharge Plan A Home     Discharge Plan B Home     DME Needed Upon Discharge  none     Discharge Plan discussed with: Patient     Transition of Care Barriers None

## 2025-04-11 NOTE — ASSESSMENT & PLAN NOTE
Hx CAD s/p CABG  Serial troponins x 3 mildly elevated   TSH, free T4 - normal  Flu/covid swabs  EKG PRN  Echo ordered  ASA  NTG PRN  Tele monitoring  Consult cardiology  Stress test ordered  NPO past midnight 4/12

## 2025-04-11 NOTE — ASSESSMENT & PLAN NOTE
Patient's COPD is controlled currently.  Patient is currently off COPD Pathway. Continue scheduled inhalers Supplemental oxygen PRN and monitor respiratory status closely.   RT assess and treat

## 2025-04-11 NOTE — PLAN OF CARE
Patient unable to complete both parts of stress test d/t eating breakfast brought in by family despite NPO status - POC discussed    CM anticipating hospital discharge on tomorrow, pending negative stress test. Patient NPO at midnight tonight     04/11/25 1327   Post-Acute Status   Discharge Delays (!) Procedure Scheduling (IR, OR, Labs, Echo, Cath, Echo, EEG)

## 2025-04-11 NOTE — SUBJECTIVE & OBJECTIVE
Interval History:  Patient is seen and examined.  Clinically she mentions her chest pain improved.  See hospital course for full details.    Review of Systems  Objective:     Vital Signs (Most Recent):  Temp: 98.2 °F (36.8 °C) (04/11/25 0720)  Pulse: 72 (04/11/25 1212)  Resp: 18 (04/11/25 1212)  BP: (!) 165/71 (04/10/25 2330)  SpO2: 96 % (04/11/25 1212) Vital Signs (24h Range):  Temp:  [98.2 °F (36.8 °C)-98.3 °F (36.8 °C)] 98.2 °F (36.8 °C)  Pulse:  [63-86] 72  Resp:  [16-26] 18  SpO2:  [91 %-99 %] 96 %  BP: (110-165)/(65-79) 165/71     Weight: 84 kg (185 lb 3.2 oz)  Body mass index is 34.99 kg/m².  No intake or output data in the 24 hours ending 04/11/25 1558      Physical Exam  Vitals reviewed.   Constitutional:       General: She is not in acute distress.     Interventions: Nasal cannula in place.   HENT:      Head: Normocephalic and atraumatic.      Mouth/Throat:      Mouth: Mucous membranes are moist.   Eyes:      Conjunctiva/sclera: Conjunctivae normal.   Cardiovascular:      Rate and Rhythm: Normal rate and regular rhythm.   Pulmonary:      Effort: Pulmonary effort is normal. No respiratory distress.      Breath sounds: Normal breath sounds.   Abdominal:      General: Bowel sounds are normal.      Palpations: Abdomen is soft.      Tenderness: There is no abdominal tenderness.   Musculoskeletal:         General: Normal range of motion.      Cervical back: Normal range of motion.      Right lower leg: Edema present.      Left lower leg: Edema present.   Skin:     General: Skin is warm and dry.   Neurological:      Mental Status: She is alert and oriented to person, place, and time.   Psychiatric:         Mood and Affect: Mood normal.               Significant Labs: All pertinent labs within the past 24 hours have been reviewed.    Significant Imaging: I have reviewed all pertinent imaging results/findings within the past 24 hours.

## 2025-04-11 NOTE — PLAN OF CARE
DOE signed and scanned into [employer].      04/11/25 1619   DOE Message   Medicare Outpatient and Observation Notification regarding financial responsibility Explained to patient/caregiver;Signed/date by patient/caregiver   Date DOE was signed 04/11/25   Time DOE was signed [id number]

## 2025-04-11 NOTE — SUBJECTIVE & OBJECTIVE
Past Medical History:   Diagnosis Date    Arthritis     COPD (chronic obstructive pulmonary disease)     Coronary artery disease     Hypertension     Sleep apnea     use CPAP at night        Past Surgical History:   Procedure Laterality Date    CORONARY ANGIOGRAPHY N/A 2/15/2021    Procedure: ANGIOGRAM, CORONARY ARTERY;  Surgeon: Aidan Buchanan MD;  Location: [facility] CATH/EP LAB;  Service: Cardiology;  Laterality: N/A;    CORONARY ARTERY BYPASS GRAFT      CORONARY BYPASS GRAFT ANGIOGRAPHY  2/15/2021    Procedure: Bypass graft study;  Surgeon: Aidan Buchanan MD;  Location: [facility] CATH/EP LAB;  Service: Cardiology;;    HYSTERECTOMY      JOINT REPLACEMENT      bilateral knee replacement     LEFT HEART CATHETERIZATION Left 2/15/2021    Procedure: Left heart cath;  Surgeon: Aidan Buchanan MD;  Location: [facility] CATH/EP LAB;  Service: Cardiology;  Laterality: Left;       Review of patient's allergies indicates:   Allergen Reactions    Acetaminophen Other (See Comments) and Nausea And Vomiting     Patient states allergic to TYLENOL brand name.  Blood pressure becomes hypertensive.    Levofloxacin Other (See Comments)     Pt states it causes tendon tears.    Metronidazole Other (See Comments)     DISORIENTED       Statins-hmg-coa reductase inhibitors Tinitus     Joint pain       No current facility-administered medications on file prior to encounter.     Current Outpatient Medications on File Prior to Encounter   Medication Sig    aspirin (ECOTRIN) 81 MG EC tablet Take 1 tablet (81 mg total) by mouth once daily.    aspirin-acetaminophen-caffeine (GOODY'S EXTRA STRENGTH) 500-325-65 mg PwPk Take 1 Dose by mouth as needed (pain).    azithromycin (ZITHROMAX) 500 MG tablet Take 1 tablet by mouth daily for yellow mucous (repeat if needed)    baclofen (LIORESAL) 10 MG tablet Take 1 tablet by mouth up to 2 times daily as needed for shoulder pain    blood sugar diagnostic (TRUE METRIX GLUCOSE TEST STRIP) Strp test blood sugar 2 times  daily    budesonide-glycopyr-formoterol (BREZTRI AEROSPHERE) 160-9-4.8 mcg/actuation HFAA inhale 2 puffs into the lungs 2 times daily    chlorthalidone (HYGROTEN) 25 MG Tab Take 1 tablet (25 mg total) by mouth once daily.    ergocalciferol (VITAMIN D2) 50,000 unit Cap Take 1 tablet weekly by mouth for 8 weeks, then every other week thereafter.    evolocumab (REPATHA SYRINGE) 140 mg/mL Syrg INJECT 1 PEN INTO THE SKIN EVERY 2 WEEKS    furosemide (LASIX) 20 MG tablet 1 tablet by mouth once a day as needed    ibuprofen (ADVIL,MOTRIN) 200 MG tablet Take 400 mg by mouth every 6 (six) hours as needed for Pain.    isosorbide mononitrate (IMDUR) 30 MG 24 hr tablet Take 1 tablet (30 mg total) by mouth once daily.    lancets (TRUEPLUS LANCETS) 30 gauge Misc Check blood sugar 2 times daily    levalbuterol (XOPENEX HFA) 45 mcg/actuation inhaler Inhale 1 puff into the lungs every 8 (eight) hours as needed for Wheezing.    levalbuterol (XOPENEX) 1.25 mg/3 mL nebulizer solution Take 3 mLs (1.25 mg total) by nebulization every 4 (four) hours as needed for Wheezing or Shortness of Breath. Rescue    losartan (COZAAR) 25 MG tablet Take 1 tablet by mouth every day    metFORMIN (GLUCOPHAGE-XR) 500 MG ER 24hr tablet Take 1 tablet by mouth every day    modafiniL (PROVIGIL) 200 MG Tab Take 1 tablet (200 mg total) by mouth 2 (two) times daily as needed (excess sleepiness).    nitroGLYCERIN 0.2 mg/hr TD PT24 (NITRO-DUR) 0.2 mg/hr Place 1 patch onto the skin every 24 hours    oxyCODONE (ROXICODONE) 15 MG Tab Take 1 tablet at 6 AM, Take 1 tablet at 12 Noon, Take 1 tablet at 6 PM, Take 1 tablet at 12 Midnight    predniSONE (DELTASONE) 20 MG tablet Take one daily for 3 days and may repeat for shortness of breath.    scopolamine (TRANSDERM-SCOP) 1.3-1.5 mg (1 mg over 3 days) Place 1 patch onto the skin every third day. Apply 4 hours prior to event     Family History       Problem Relation (Age of Onset)    Pulmonary embolism Mother           Tobacco Use    Smoking status: Never     Passive exposure: Past    Smokeless tobacco: Never   Substance and Sexual Activity    Alcohol use: No    Drug use: No    Sexual activity: Not Currently     Review of Systems   Constitutional:  Positive for unexpected weight change. Negative for chills and fever.   HENT:  Negative for congestion and sore throat.    Respiratory:  Positive for shortness of breath.    Cardiovascular:  Positive for chest pain and leg swelling.   Gastrointestinal:  Negative for abdominal pain, constipation, diarrhea, nausea and vomiting.   Genitourinary:  Negative for flank pain.   Musculoskeletal:  Positive for arthralgias.   Neurological:  Negative for syncope.   Psychiatric/Behavioral:  Negative for confusion.      Objective:     Vital Signs (Most Recent):  Temp: 98.3 °F (36.8 °C) (04/10/25 1801)  Pulse: 73 (04/10/25 2150)  Resp: 16 (04/10/25 2150)  BP: 115/79 (04/10/25 2150)  SpO2: 96 % (04/10/25 2150) Vital Signs (24h Range):  Temp:  [98.3 °F (36.8 °C)] 98.3 °F (36.8 °C)  Pulse:  [63-78] 73  Resp:  [16-22] 16  SpO2:  [91 %-96 %] 96 %  BP: (110-115)/(70-79) 115/79     Weight: 84 kg (185 lb 3.2 oz)  Body mass index is 34.99 kg/m².     Physical Exam  Vitals reviewed.   Constitutional:       General: She is not in acute distress.     Interventions: Nasal cannula in place.   HENT:      Head: Normocephalic and atraumatic.      Mouth/Throat:      Mouth: Mucous membranes are moist.   Eyes:      Conjunctiva/sclera: Conjunctivae normal.   Cardiovascular:      Rate and Rhythm: Normal rate and regular rhythm.   Pulmonary:      Effort: Pulmonary effort is normal. No respiratory distress.      Breath sounds: Normal breath sounds.   Abdominal:      General: Bowel sounds are normal.      Palpations: Abdomen is soft.      Tenderness: There is no abdominal tenderness.   Musculoskeletal:         General: Normal range of motion.      Cervical back: Normal range of motion.      Right lower leg: Edema present.  "     Left lower leg: Edema present.   Skin:     General: Skin is warm and dry.   Neurological:      Mental Status: She is alert and oriented to person, place, and time.   Psychiatric:         Mood and Affect: Mood normal.                Significant Labs: All pertinent labs within the past 24 hours have been reviewed.  CBC:   Recent Labs   Lab 04/10/25  1928   WBC 8.28   HGB 12.5   HCT 38.7        CMP:   Recent Labs   Lab 04/10/25  1928   *   K 4.2   CO2 27   BUN 22   CREATININE 1.2   CALCIUM 9.0   ALBUMIN 4.1   BILITOT 0.3   ALKPHOS 78   AST 17   ALT 10     Cardiac Markers:   Recent Labs   Lab 04/10/25  1928   BNP 52     Troponin: No results for input(s): "TROPONINI", "TROPONINIHS" in the last 48 hours.  TSH: No results for input(s): "TSH" in the last 4320 hours.  Urine Studies:   Recent Labs   Lab 04/10/25  1928   APPEARANCEUA Clear   SPECGRAV 1.025   PROTEINUA Negative   BILIRUBINUA Negative   UROBILINOGEN Negative   LEUKOCYTESUR Negative       Significant Imaging: I have reviewed all pertinent imaging results/findings within the past 24 hours.  "

## 2025-04-11 NOTE — H&P
[facility] - Emergency Dept  Hospital Medicine  History & Physical    Patient Name: Sakshi Hess  MRN: [id number]  Patient Class: OP- Observation  Admission Date: 4/10/2025  Attending Physician: Joselin Hameed MD   Primary Care Provider: Wenceslao Deras NP         Patient information was obtained from patient, past medical records, and ER records.     Subjective:     Principal Problem:Chest pain    Chief Complaint:   Chief Complaint   Patient presents with    Chest Pain     Chest pain x 3 days and SOB x 2 weeks - hx of COPD - states she gained @ 6 lbs in a week but took lasix last week and lost the weight -     Shortness of Breath        HPI: 70-year-old female presented to ED for eval of chest pain. pMHx CAD s/p CABG, HTN, COPD, MIRA, chronic pain, DM.  Patient reported over the last several weeks she has been experiencing increased shortness of breath, for the last several days she has also been experiencing intermittent left-sided chest pressure that radiates to her left upper back with associated 6 lb weight gain and lower extremity edema.  Patient states chest pain occurred both at rest and with activity, nothing makes it better or worse.  Denies abdominal pain, nausea, vomiting, changes in bowel habits.  Denies syncope.  Denies fever, chills.  Patient states she took dose p.r.n. Lasix at home with mild improvement in lower extremity edema.  Patient does have history of COPD and MIRA, wears p.r.n. O2 via NC at home to maintain sats 94%, wears CPAP HS.  Denies current outpatient PO steroid or antibiotic treatment.  Patient also with history CAD s/p CABG, had been following with Dr. Buchanan for Cardiology but states she has to switch 2/2 insurance coverage and has pending upcoming appointment with Dr. Griggs.  Echo February 2024 with EF 55-60%.  Patient did take 81 mg ASA prior to arrival.  In ED, initial troponin 21.7.  BNP unremarkable.  CXR impression with no acute abnormality.  Admit to  [facility] medicine for further eval.    Past Medical History:   Diagnosis Date    Arthritis     COPD (chronic obstructive pulmonary disease)     Coronary artery disease     Hypertension     Sleep apnea     use CPAP at night        Past Surgical History:   Procedure Laterality Date    CORONARY ANGIOGRAPHY N/A 2/15/2021    Procedure: ANGIOGRAM, CORONARY ARTERY;  Surgeon: Aidan Buchanan MD;  Location: [facility] CATH/EP LAB;  Service: Cardiology;  Laterality: N/A;    CORONARY ARTERY BYPASS GRAFT      CORONARY BYPASS GRAFT ANGIOGRAPHY  2/15/2021    Procedure: Bypass graft study;  Surgeon: Aidan Buchanan MD;  Location: [facility] CATH/EP LAB;  Service: Cardiology;;    HYSTERECTOMY      JOINT REPLACEMENT      bilateral knee replacement     LEFT HEART CATHETERIZATION Left 2/15/2021    Procedure: Left heart cath;  Surgeon: Aidan Buchanan MD;  Location: [facility] CATH/EP LAB;  Service: Cardiology;  Laterality: Left;       Review of patient's allergies indicates:   Allergen Reactions    Acetaminophen Other (See Comments) and Nausea And Vomiting     Patient states allergic to TYLENOL brand name.  Blood pressure becomes hypertensive.    Levofloxacin Other (See Comments)     Pt states it causes tendon tears.    Metronidazole Other (See Comments)     DISORIENTED       Statins-hmg-coa reductase inhibitors Tinitus     Joint pain       No current facility-administered medications on file prior to encounter.     Current Outpatient Medications on File Prior to Encounter   Medication Sig    aspirin (ECOTRIN) 81 MG EC tablet Take 1 tablet (81 mg total) by mouth once daily.    aspirin-acetaminophen-caffeine (GOODY'S EXTRA STRENGTH) 500-325-65 mg PwPk Take 1 Dose by mouth as needed (pain).    azithromycin (ZITHROMAX) 500 MG tablet Take 1 tablet by mouth daily for yellow mucous (repeat if needed)    baclofen (LIORESAL) 10 MG tablet Take 1 tablet by mouth up to 2 times daily as needed for shoulder pain    blood sugar diagnostic (TRUE METRIX GLUCOSE TEST  STRIP) Strp test blood sugar 2 times daily    budesonide-glycopyr-formoterol (BREZTRI AEROSPHERE) 160-9-4.8 mcg/actuation HFAA inhale 2 puffs into the lungs 2 times daily    chlorthalidone (HYGROTEN) 25 MG Tab Take 1 tablet (25 mg total) by mouth once daily.    ergocalciferol (VITAMIN D2) 50,000 unit Cap Take 1 tablet weekly by mouth for 8 weeks, then every other week thereafter.    evolocumab (REPATHA SYRINGE) 140 mg/mL Syrg INJECT 1 PEN INTO THE SKIN EVERY 2 WEEKS    furosemide (LASIX) 20 MG tablet 1 tablet by mouth once a day as needed    ibuprofen (ADVIL,MOTRIN) 200 MG tablet Take 400 mg by mouth every 6 (six) hours as needed for Pain.    isosorbide mononitrate (IMDUR) 30 MG 24 hr tablet Take 1 tablet (30 mg total) by mouth once daily.    lancets (TRUEPLUS LANCETS) 30 gauge Misc Check blood sugar 2 times daily    levalbuterol (XOPENEX HFA) 45 mcg/actuation inhaler Inhale 1 puff into the lungs every 8 (eight) hours as needed for Wheezing.    levalbuterol (XOPENEX) 1.25 mg/3 mL nebulizer solution Take 3 mLs (1.25 mg total) by nebulization every 4 (four) hours as needed for Wheezing or Shortness of Breath. Rescue    losartan (COZAAR) 25 MG tablet Take 1 tablet by mouth every day    metFORMIN (GLUCOPHAGE-XR) 500 MG ER 24hr tablet Take 1 tablet by mouth every day    modafiniL (PROVIGIL) 200 MG Tab Take 1 tablet (200 mg total) by mouth 2 (two) times daily as needed (excess sleepiness).    nitroGLYCERIN 0.2 mg/hr TD PT24 (NITRO-DUR) 0.2 mg/hr Place 1 patch onto the skin every 24 hours    oxyCODONE (ROXICODONE) 15 MG Tab Take 1 tablet at 6 AM, Take 1 tablet at 12 Noon, Take 1 tablet at 6 PM, Take 1 tablet at 12 Midnight    predniSONE (DELTASONE) 20 MG tablet Take one daily for 3 days and may repeat for shortness of breath.    scopolamine (TRANSDERM-SCOP) 1.3-1.5 mg (1 mg over 3 days) Place 1 patch onto the skin every third day. Apply 4 hours prior to event     Family History       Problem Relation (Age of Onset)     Pulmonary embolism Mother          Tobacco Use    Smoking status: Never     Passive exposure: Past    Smokeless tobacco: Never   Substance and Sexual Activity    Alcohol use: No    Drug use: No    Sexual activity: Not Currently     Review of Systems   Constitutional:  Positive for unexpected weight change. Negative for chills and fever.   HENT:  Negative for congestion and sore throat.    Respiratory:  Positive for shortness of breath.    Cardiovascular:  Positive for chest pain and leg swelling.   Gastrointestinal:  Negative for abdominal pain, constipation, diarrhea, nausea and vomiting.   Genitourinary:  Negative for flank pain.   Musculoskeletal:  Positive for arthralgias.   Neurological:  Negative for syncope.   Psychiatric/Behavioral:  Negative for confusion.      Objective:     Vital Signs (Most Recent):  Temp: 98.3 °F (36.8 °C) (04/10/25 1801)  Pulse: 73 (04/10/25 2150)  Resp: 16 (04/10/25 2150)  BP: 115/79 (04/10/25 2150)  SpO2: 96 % (04/10/25 2150) Vital Signs (24h Range):  Temp:  [98.3 °F (36.8 °C)] 98.3 °F (36.8 °C)  Pulse:  [63-78] 73  Resp:  [16-22] 16  SpO2:  [91 %-96 %] 96 %  BP: (110-115)/(70-79) 115/79     Weight: 84 kg (185 lb 3.2 oz)  Body mass index is 34.99 kg/m².     Physical Exam  Vitals reviewed.   Constitutional:       General: She is not in acute distress.     Interventions: Nasal cannula in place.   HENT:      Head: Normocephalic and atraumatic.      Mouth/Throat:      Mouth: Mucous membranes are moist.   Eyes:      Conjunctiva/sclera: Conjunctivae normal.   Cardiovascular:      Rate and Rhythm: Normal rate and regular rhythm.   Pulmonary:      Effort: Pulmonary effort is normal. No respiratory distress.      Breath sounds: Normal breath sounds.   Abdominal:      General: Bowel sounds are normal.      Palpations: Abdomen is soft.      Tenderness: There is no abdominal tenderness.   Musculoskeletal:         General: Normal range of motion.      Cervical back: Normal range of motion.       "Right lower leg: Edema present.      Left lower leg: Edema present.   Skin:     General: Skin is warm and dry.   Neurological:      Mental Status: She is alert and oriented to person, place, and time.   Psychiatric:         Mood and Affect: Mood normal.                Significant Labs: All pertinent labs within the past 24 hours have been reviewed.  CBC:   Recent Labs   Lab 04/10/25  1928   WBC 8.28   HGB 12.5   HCT 38.7        CMP:   Recent Labs   Lab 04/10/25  1928   *   K 4.2   CO2 27   BUN 22   CREATININE 1.2   CALCIUM 9.0   ALBUMIN 4.1   BILITOT 0.3   ALKPHOS 78   AST 17   ALT 10     Cardiac Markers:   Recent Labs   Lab 04/10/25  1928   BNP 52     Troponin: No results for input(s): "TROPONINI", "TROPONINIHS" in the last 48 hours.  TSH: No results for input(s): "TSH" in the last 4320 hours.  Urine Studies:   Recent Labs   Lab 04/10/25  1928   APPEARANCEUA Clear   SPECGRAV 1.025   PROTEINUA Negative   BILIRUBINUA Negative   UROBILINOGEN Negative   LEUKOCYTESUR Negative       Significant Imaging: I have reviewed all pertinent imaging results/findings within the past 24 hours.  Assessment/Plan:     Assessment & Plan  Chest pain  Hx CAD s/p CABG  Serial troponins x 3, TSH, free T4  Flu/covid swabs  EKG PRN  Echo  ASA  NTG PRN  Tele monitoring  Consult cardiology    Obstructive sleep apnea syndrome  CPAP HS per RT  Monitor O2 saturation    Diabetes  Patient's FSGs are controlled on current medication regimen.  Last A1c reviewed- No results found for: "LABA1C", "HGBA1C"  Most recent fingerstick glucose reviewed- No results for input(s): "POCTGLUCOSE" in the last 24 hours.  Current correctional scale  Low  Maintain anti-hyperglycemic dose as follows-   Antihyperglycemics (From admission, onward)      Start     Stop Route Frequency Ordered    04/10/25 2329  insulin aspart U-100 pen 0-5 Units         -- SubQ Before meals & nightly PRN 04/10/25 2240          Hold Oral hypoglycemics while patient is in the " hospital.  COPD (chronic obstructive pulmonary disease)  Patient's COPD is controlled currently.  Patient is currently off COPD Pathway. Continue scheduled inhalers Supplemental oxygen PRN and monitor respiratory status closely.   RT assess and treat  Chronic pain  Pain control    VTE Risk Mitigation (From admission, onward)           Ordered     enoxaparin injection 40 mg  Daily         04/10/25 2240     IP VTE HIGH RISK PATIENT  Once         04/10/25 2240     Place sequential compression device  Until discontinued         04/10/25 2240                   **Review/resume home meds once reconciled**      On 04/10/2025, patient should be placed in hospital observation services under my care in collaboration with Dr. Hameed.           Leah Wallace NP  Department of Hospital Medicine  [facility] - Emergency Dept

## 2025-04-11 NOTE — PROGRESS NOTES
[facility] - Emergency Dept  Hospital Medicine  Progress Note    Patient Name: Sakshi Hess  MRN: [id number]  Patient Class: OP- Observation   Admission Date: 4/10/2025  Length of Stay: 0 days  Attending Physician: Gaurav Vazquez, *  Primary Care Provider: Wenceslao Deras NP        Subjective     Principal Problem:Chest pain        HPI:  70-year-old female presented to ED for eval of chest pain. pMHx CAD s/p CABG, HTN, COPD, MIRA, chronic pain, DM.  Patient reported over the last several weeks she has been experiencing increased shortness of breath, for the last several days she has also been experiencing intermittent left-sided chest pressure that radiates to her left upper back with associated 6 lb weight gain and lower extremity edema.  Patient states chest pain occurred both at rest and with activity, nothing makes it better or worse.  Denies abdominal pain, nausea, vomiting, changes in bowel habits.  Denies syncope.  Denies fever, chills.  Patient states she took dose p.r.n. Lasix at home with mild improvement in lower extremity edema.  Patient does have history of COPD and MIRA, wears p.r.n. O2 via NC at home to maintain sats 94%, wears CPAP HS.  Denies current outpatient PO steroid or antibiotic treatment.  Patient also with history CAD s/p CABG, had been following with Dr. Buchanan for Cardiology but states she has to switch 2/2 insurance coverage and has pending upcoming appointment with Dr. Griggs.  Echo February 2024 with EF 55-60%.  Patient did take 81 mg ASA prior to arrival.  In ED, initial troponin 21.7.  BNP unremarkable.  CXR impression with no acute abnormality.  Admit to hospital medicine for further eval.    Overview/Hospital Course:  Patient closely monitored after hospitalization.  Given her presentation with chest pain, 2D echocardiogram repeated.  Stress test was ordered.  Cardiology was consulted.  Patient ate breakfast 4/11 and hence stress test postponed to  tomorrow.  Updated family at bedside.    Interval History:  Patient is seen and examined.  Clinically she mentions her chest pain improved.  See hospital course for full details.    Review of Systems  Objective:     Vital Signs (Most Recent):  Temp: 98.2 °F (36.8 °C) (04/11/25 0720)  Pulse: 72 (04/11/25 1212)  Resp: 18 (04/11/25 1212)  BP: (!) 165/71 (04/10/25 2330)  SpO2: 96 % (04/11/25 1212) Vital Signs (24h Range):  Temp:  [98.2 °F (36.8 °C)-98.3 °F (36.8 °C)] 98.2 °F (36.8 °C)  Pulse:  [63-86] 72  Resp:  [16-26] 18  SpO2:  [91 %-99 %] 96 %  BP: (110-165)/(65-79) 165/71     Weight: 84 kg (185 lb 3.2 oz)  Body mass index is 34.99 kg/m².  No intake or output data in the 24 hours ending 04/11/25 1558      Physical Exam  Vitals reviewed.   Constitutional:       General: She is not in acute distress.     Interventions: Nasal cannula in place.   HENT:      Head: Normocephalic and atraumatic.      Mouth/Throat:      Mouth: Mucous membranes are moist.   Eyes:      Conjunctiva/sclera: Conjunctivae normal.   Cardiovascular:      Rate and Rhythm: Normal rate and regular rhythm.   Pulmonary:      Effort: Pulmonary effort is normal. No respiratory distress.      Breath sounds: Normal breath sounds.   Abdominal:      General: Bowel sounds are normal.      Palpations: Abdomen is soft.      Tenderness: There is no abdominal tenderness.   Musculoskeletal:         General: Normal range of motion.      Cervical back: Normal range of motion.      Right lower leg: Edema present.      Left lower leg: Edema present.   Skin:     General: Skin is warm and dry.   Neurological:      Mental Status: She is alert and oriented to person, place, and time.   Psychiatric:         Mood and Affect: Mood normal.               Significant Labs: All pertinent labs within the past 24 hours have been reviewed.    Significant Imaging: I have reviewed all pertinent imaging results/findings within the past 24 hours.      Assessment & Plan  Chest pain  Hx  "CAD s/p CABG  Serial troponins x 3 mildly elevated   TSH, free T4 - normal  Flu/covid swabs  EKG PRN  Echo ordered  ASA  NTG PRN  Tele monitoring  Consult cardiology  Stress test ordered  NPO past midnight 4/12    Obstructive sleep apnea syndrome  CPAP HS per RT  Monitor O2 saturation    Diabetes  Patient's FSGs are controlled on current medication regimen.  Last A1c reviewed- No results found for: "LABA1C", "HGBA1C"  Most recent fingerstick glucose reviewed-   Recent Labs   Lab 04/11/25  0803 04/11/25  1119   POCTGLUCOSE 168* 131*     Current correctional scale  Low  Maintain anti-hyperglycemic dose as follows-   Antihyperglycemics (From admission, onward)      Start     Stop Route Frequency Ordered    04/10/25 2329  insulin aspart U-100 pen 0-5 Units         -- SubQ Before meals & nightly PRN 04/10/25 2240          Hold Oral hypoglycemics while patient is in the hospital.  COPD (chronic obstructive pulmonary disease)  Patient's COPD is controlled currently.  Patient is currently off COPD Pathway. Continue scheduled inhalers Supplemental oxygen PRN and monitor respiratory status closely.   RT assess and treat  Chronic pain  Pain control    VTE Risk Mitigation (From admission, onward)           Ordered     enoxaparin injection 40 mg  Daily         04/10/25 2240     IP VTE HIGH RISK PATIENT  Once         04/10/25 2240     Place sequential compression device  Until discontinued         04/10/25 2240                    Discharge Planning   RENATO: 4/12/2025     Code Status: Full Code   Medical Readiness for Discharge Date:       Discharge Delays: (!) Procedure Scheduling (IR, OR, Labs, Echo, Cath, Echo, EEG)                    Gaurav Vazquez MD  Department of Hospital Medicine   [facility] - Emergency Dept    "

## 2025-04-11 NOTE — PLAN OF CARE
04/11/25 0335   Patient Assessment/Suction   Level of Consciousness (AVPU) alert   Respiratory Effort Normal;Unlabored   Expansion/Accessory Muscles/Retractions subcostal retractions;no use of accessory muscles;expansion symmetric   All Lung Fields Breath Sounds diminished   Rhythm/Pattern, Respiratory depth regular;pattern regular   Cough Frequency infrequent   Cough Type good   PRE-TX-O2   Device (Oxygen Therapy) room air   SpO2 (!) 94 %   Pulse Oximetry Type Continuous   $ Pulse Oximetry - Multiple Charge Pulse Oximetry - Multiple   Pulse 65   Resp 16   Aerosol Therapy   $ Aerosol Therapy Charges Aerosol Treatment   Daily Review of Necessity (SVN) completed   Respiratory Treatment Status (SVN) given   Treatment Route (SVN) mask;oxygen   Patient Position HOB elevated   Post Treatment Assessment (SVN) breath sounds improved;increased aeration;work of breathing decreased   Signs of Intolerance (SVN) none   Education   $ Education Bronchodilator;15 min   Tobacco Cessation Intervention   Do you use any type of tobacco product? No   Respiratory Evaluation   $ Care Plan Tech Time 15 min   $ Respiratory Evaluation Complete   Evaluation For New Orders   Admitting Diagnosis chest pain   Cardiac Diagnosis CAD post CABG, HTN   Pulmonary Diagnosis COPD, MIRA   Home Oxygen   Has Home Oxygen? Yes   Liter Flow 3   Duration as needed   Route nasal cannula   Mode continuous   Device home concentrator with portable unit   Home Aerosol, MDI, DPI, and Other Treatments/Therapies   Home Respiratory Therapy Per Patient/Review of Chart Yes   Aerosol Home Meds/Freq XOPENEX Q4PRN OR Q8PRN   MDI Home Meds/Freq BREZTRI QD   Other Home Respiratory Therapies CPAP   Oxygen Care Plan   Oxygen Care Plan Per Protocol   SPO2 Goal (%) 95% cardiac   Rationale Maintain Home oxygen   Bronchodilator Care Plan   Bronchodilator Care Plan Aerosol   Aerosol Meds w/ frequency Pulmicort(Budenoside) 0.5mg BID;Albuterol - Ipratropium (DUO-NEB)  0.5mg-3mg(2.5ml) 3ml Nebulizer Solution2.5mg Q 6Hr   Rationale Maintain home respiratory medicine   Atelectasis Care Plan   Rationale No Rational Found   Airway Clearance Care Plan   Rationale No rationale found

## 2025-04-11 NOTE — HPI
70-year-old female presented to ED for eval of chest pain. pMHx CAD s/p CABG, HTN, COPD, MIRA, chronic pain, DM.  Patient reported over the last several weeks she has been experiencing increased shortness of breath, for the last several days she has also been experiencing intermittent left-sided chest pressure that radiates to her left upper back with associated 6 lb weight gain and lower extremity edema.  Patient states chest pain occurred both at rest and with activity, nothing makes it better or worse.  Denies abdominal pain, nausea, vomiting, changes in bowel habits.  Denies syncope.  Denies fever, chills.  Patient states she took dose p.r.n. Lasix at home with mild improvement in lower extremity edema.  Patient does have history of COPD and MIRA, wears p.r.n. O2 via NC at home to maintain sats 94%, wears CPAP HS.  Denies current outpatient PO steroid or antibiotic treatment.  Patient also with history CAD s/p CABG, had been following with Dr. Buchanan for Cardiology but states she has to switch 2/2 insurance coverage and has pending upcoming appointment with Dr. Griggs.  Echo February 2024 with EF 55-60%.  Patient did take 81 mg ASA prior to arrival.  In ED, initial troponin 21.7.  BNP unremarkable.  CXR impression with no acute abnormality.  Admit to hospital medicine for further eval.

## 2025-04-12 ENCOUNTER — CLINICAL SUPPORT (OUTPATIENT)
Dept: CARDIOLOGY | Facility: HOSPITAL | Age: 70
End: 2025-04-12
Payer: MEDICARE

## 2025-04-12 PROBLEM — I16.0 HYPERTENSIVE URGENCY: Status: RESOLVED | Noted: 2024-06-12 | Resolved: 2025-04-12

## 2025-04-12 PROBLEM — J84.112 ACUTE EXACERBATION OF IDIOPATHIC PULMONARY FIBROSIS: Status: ACTIVE | Noted: 2025-04-10

## 2025-04-12 PROBLEM — N39.0 UTI (URINARY TRACT INFECTION): Status: RESOLVED | Noted: 2021-12-31 | Resolved: 2025-04-12

## 2025-04-12 PROBLEM — J44.1 COPD EXACERBATION: Status: RESOLVED | Noted: 2020-01-03 | Resolved: 2025-04-12

## 2025-04-12 PROBLEM — I20.0 UNSTABLE ANGINA: Status: RESOLVED | Noted: 2025-04-10 | Resolved: 2025-04-12

## 2025-04-12 PROBLEM — R07.89 MUSCULOSKELETAL CHEST PAIN: Status: RESOLVED | Noted: 2020-01-03 | Resolved: 2025-04-12

## 2025-04-12 PROBLEM — I24.89 DEMAND ISCHEMIA: Status: RESOLVED | Noted: 2024-06-12 | Resolved: 2025-04-12

## 2025-04-12 LAB
ABSOLUTE EOSINOPHIL (SMH): 0.3 K/UL
ABSOLUTE MONOCYTE (SMH): 0.57 K/UL (ref 0.3–1)
ABSOLUTE NEUTROPHIL COUNT (SMH): 2.9 K/UL (ref 1.8–7.7)
ALBUMIN SERPL-MCNC: 3.6 G/DL (ref 3.5–5.2)
ALP SERPL-CCNC: 63 UNIT/L (ref 55–135)
ALT SERPL-CCNC: 9 UNIT/L (ref 10–44)
ANION GAP (SMH): 5 MMOL/L (ref 8–16)
AST SERPL-CCNC: 12 UNIT/L (ref 10–40)
BASOPHILS # BLD AUTO: 0.04 K/UL
BASOPHILS NFR BLD AUTO: 0.7 %
BILIRUB SERPL-MCNC: 0.6 MG/DL (ref 0.1–1)
BUN SERPL-MCNC: 16 MG/DL (ref 8–23)
CALCIUM SERPL-MCNC: 9 MG/DL (ref 8.7–10.5)
CHLORIDE SERPL-SCNC: 107 MMOL/L (ref 95–110)
CO2 SERPL-SCNC: 31 MMOL/L (ref 23–29)
CREAT SERPL-MCNC: 0.8 MG/DL (ref 0.5–1.4)
CV STRESS BASE HR: 77 BPM
DIASTOLIC BLOOD PRESSURE: 72 MMHG
ERYTHROCYTE [DISTWIDTH] IN BLOOD BY AUTOMATED COUNT: 14.2 % (ref 11.5–14.5)
GFR SERPLBLD CREATININE-BSD FMLA CKD-EPI: >60 ML/MIN/1.73/M2
GLUCOSE SERPL-MCNC: 118 MG/DL (ref 70–110)
HCT VFR BLD AUTO: 37.3 % (ref 37–48.5)
HGB BLD-MCNC: 11.9 GM/DL (ref 12–16)
IMM GRANULOCYTES # BLD AUTO: 0.03 K/UL (ref 0–0.04)
IMM GRANULOCYTES NFR BLD AUTO: 0.5 % (ref 0–0.5)
LYMPHOCYTES # BLD AUTO: 1.96 K/UL (ref 1–4.8)
MAGNESIUM SERPL-MCNC: 2.2 MG/DL (ref 1.6–2.6)
MCH RBC QN AUTO: 29 PG (ref 27–31)
MCHC RBC AUTO-ENTMCNC: 31.9 G/DL (ref 32–36)
MCV RBC AUTO: 91 FL (ref 82–98)
NUCLEATED RBC (/100WBC) (SMH): 0 /100 WBC
OHS CV CPX 1 MINUTE RECOVERY HEART RATE: 113 BPM
OHS CV CPX 85 PERCENT MAX PREDICTED HEART RATE MALE: 128
OHS CV CPX MAX PREDICTED HEART RATE: 150
OHS CV CPX PATIENT IS FEMALE: 1
OHS CV CPX PATIENT IS MALE: 0
OHS CV CPX PEAK DIASTOLIC BLOOD PRESSURE: 86 MMHG
OHS CV CPX PEAK HEAR RATE: 116 BPM
OHS CV CPX PEAK RATE PRESSURE PRODUCT: NORMAL
OHS CV CPX PEAK SYSTOLIC BLOOD PRESSURE: 166 MMHG
OHS CV CPX PERCENT MAX PREDICTED HEART RATE ACHIEVED: 80
OHS CV CPX RATE PRESSURE PRODUCT PRESENTING: NORMAL
PLATELET # BLD AUTO: 190 K/UL (ref 150–450)
PMV BLD AUTO: 8.9 FL (ref 9.2–12.9)
POCT GLUCOSE: 107 MG/DL (ref 70–110)
POCT GLUCOSE: 109 MG/DL (ref 70–110)
POCT GLUCOSE: 119 MG/DL (ref 70–110)
POCT GLUCOSE: 122 MG/DL (ref 70–110)
POTASSIUM SERPL-SCNC: 4 MMOL/L (ref 3.5–5.1)
PROT SERPL-MCNC: 6.1 GM/DL (ref 6–8.4)
RBC # BLD AUTO: 4.1 M/UL (ref 4–5.4)
RELATIVE EOSINOPHIL (SMH): 5.2 % (ref 0–8)
RELATIVE LYMPHOCYTE (SMH): 33.7 % (ref 18–48)
RELATIVE MONOCYTE (SMH): 9.8 % (ref 4–15)
RELATIVE NEUTROPHIL (SMH): 50.1 % (ref 38–73)
SODIUM SERPL-SCNC: 143 MMOL/L (ref 136–145)
SYSTOLIC BLOOD PRESSURE: 143 MMHG
WBC # BLD AUTO: 5.81 K/UL (ref 3.9–12.7)

## 2025-04-12 PROCEDURE — 25000242 PHARM REV CODE 250 ALT 637 W/ HCPCS: Performed by: STUDENT IN AN ORGANIZED HEALTH CARE EDUCATION/TRAINING PROGRAM

## 2025-04-12 PROCEDURE — 94640 AIRWAY INHALATION TREATMENT: CPT | Mod: XB

## 2025-04-12 PROCEDURE — 96372 THER/PROPH/DIAG INJ SC/IM: CPT

## 2025-04-12 PROCEDURE — G0378 HOSPITAL OBSERVATION PER HR: HCPCS

## 2025-04-12 PROCEDURE — 25000242 PHARM REV CODE 250 ALT 637 W/ HCPCS

## 2025-04-12 PROCEDURE — 99900035 HC TECH TIME PER 15 MIN (STAT)

## 2025-04-12 PROCEDURE — 80053 COMPREHEN METABOLIC PANEL: CPT

## 2025-04-12 PROCEDURE — 94761 N-INVAS EAR/PLS OXIMETRY MLT: CPT

## 2025-04-12 PROCEDURE — 63600175 PHARM REV CODE 636 W HCPCS: Performed by: STUDENT IN AN ORGANIZED HEALTH CARE EDUCATION/TRAINING PROGRAM

## 2025-04-12 PROCEDURE — 25000003 PHARM REV CODE 250

## 2025-04-12 PROCEDURE — 83735 ASSAY OF MAGNESIUM: CPT

## 2025-04-12 PROCEDURE — 99900031 HC PATIENT EDUCATION (STAT)

## 2025-04-12 PROCEDURE — 63600175 PHARM REV CODE 636 W HCPCS

## 2025-04-12 PROCEDURE — 85025 COMPLETE CBC W/AUTO DIFF WBC: CPT

## 2025-04-12 PROCEDURE — 93017 CV STRESS TEST TRACING ONLY: CPT

## 2025-04-12 PROCEDURE — A9502 TC99M TETROFOSMIN: HCPCS | Performed by: STUDENT IN AN ORGANIZED HEALTH CARE EDUCATION/TRAINING PROGRAM

## 2025-04-12 PROCEDURE — 93018 CV STRESS TEST I&R ONLY: CPT | Mod: ,,, | Performed by: INTERNAL MEDICINE

## 2025-04-12 PROCEDURE — 27000221 HC OXYGEN, UP TO 24 HOURS

## 2025-04-12 PROCEDURE — 93016 CV STRESS TEST SUPVJ ONLY: CPT | Mod: ,,, | Performed by: INTERNAL MEDICINE

## 2025-04-12 PROCEDURE — 99223 1ST HOSP IP/OBS HIGH 75: CPT | Mod: ,,, | Performed by: GENERAL PRACTICE

## 2025-04-12 PROCEDURE — 25000003 PHARM REV CODE 250: Performed by: STUDENT IN AN ORGANIZED HEALTH CARE EDUCATION/TRAINING PROGRAM

## 2025-04-12 PROCEDURE — 36415 COLL VENOUS BLD VENIPUNCTURE: CPT

## 2025-04-12 RX ORDER — IPRATROPIUM BROMIDE AND ALBUTEROL SULFATE 2.5; .5 MG/3ML; MG/3ML
3 SOLUTION RESPIRATORY (INHALATION)
Status: DISCONTINUED | OUTPATIENT
Start: 2025-04-12 | End: 2025-04-12

## 2025-04-12 RX ORDER — REGADENOSON 0.08 MG/ML
0.4 INJECTION, SOLUTION INTRAVENOUS ONCE
Status: COMPLETED | OUTPATIENT
Start: 2025-04-12 | End: 2025-04-12

## 2025-04-12 RX ORDER — PREDNISONE 20 MG/1
40 TABLET ORAL DAILY
Status: DISCONTINUED | OUTPATIENT
Start: 2025-04-13 | End: 2025-04-12

## 2025-04-12 RX ORDER — PANTOPRAZOLE SODIUM 40 MG/1
40 TABLET, DELAYED RELEASE ORAL DAILY
Status: DISCONTINUED | OUTPATIENT
Start: 2025-04-13 | End: 2025-04-14 | Stop reason: HOSPADM

## 2025-04-12 RX ORDER — PREDNISONE 20 MG/1
40 TABLET ORAL DAILY
Status: DISCONTINUED | OUTPATIENT
Start: 2025-04-12 | End: 2025-04-14 | Stop reason: HOSPADM

## 2025-04-12 RX ORDER — ISOSORBIDE MONONITRATE 30 MG/1
30 TABLET, EXTENDED RELEASE ORAL DAILY
Status: DISCONTINUED | OUTPATIENT
Start: 2025-04-12 | End: 2025-04-14 | Stop reason: HOSPADM

## 2025-04-12 RX ADMIN — BUDESONIDE INHALATION 0.5 MG: 0.5 SUSPENSION RESPIRATORY (INHALATION) at 07:04

## 2025-04-12 RX ADMIN — OXYCODONE HYDROCHLORIDE 15 MG: 10 TABLET ORAL at 08:04

## 2025-04-12 RX ADMIN — ASPIRIN 81 MG: 81 TABLET, COATED ORAL at 10:04

## 2025-04-12 RX ADMIN — ISOSORBIDE MONONITRATE 30 MG: 30 TABLET, EXTENDED RELEASE ORAL at 10:04

## 2025-04-12 RX ADMIN — REGADENOSON 0.4 MG: 0.08 INJECTION, SOLUTION INTRAVENOUS at 08:04

## 2025-04-12 RX ADMIN — PREDNISONE 40 MG: 20 TABLET ORAL at 08:04

## 2025-04-12 RX ADMIN — OXYCODONE HYDROCHLORIDE 15 MG: 10 TABLET ORAL at 01:04

## 2025-04-12 RX ADMIN — IPRATROPIUM BROMIDE 0.5 MG: 0.5 SOLUTION RESPIRATORY (INHALATION) at 12:04

## 2025-04-12 RX ADMIN — LOSARTAN POTASSIUM 25 MG: 25 TABLET, FILM COATED ORAL at 10:04

## 2025-04-12 RX ADMIN — IPRATROPIUM BROMIDE 0.5 MG: 0.5 SOLUTION RESPIRATORY (INHALATION) at 07:04

## 2025-04-12 RX ADMIN — OXYCODONE HYDROCHLORIDE 15 MG: 10 TABLET ORAL at 02:04

## 2025-04-12 RX ADMIN — OXYCODONE HYDROCHLORIDE 15 MG: 10 TABLET ORAL at 07:04

## 2025-04-12 RX ADMIN — LEVALBUTEROL HYDROCHLORIDE 1.25 MG: 1.25 SOLUTION RESPIRATORY (INHALATION) at 07:04

## 2025-04-12 RX ADMIN — LEVALBUTEROL HYDROCHLORIDE 1.25 MG: 1.25 SOLUTION RESPIRATORY (INHALATION) at 12:04

## 2025-04-12 RX ADMIN — IPRATROPIUM BROMIDE 0.5 MG: 0.5 SOLUTION RESPIRATORY (INHALATION) at 01:04

## 2025-04-12 RX ADMIN — LEVALBUTEROL HYDROCHLORIDE 1.25 MG: 1.25 SOLUTION RESPIRATORY (INHALATION) at 01:04

## 2025-04-12 RX ADMIN — TETROFOSMIN 25.5 MILLICURIE: 0.23 INJECTION, POWDER, LYOPHILIZED, FOR SOLUTION INTRAVENOUS at 08:04

## 2025-04-12 RX ADMIN — ARFORMOTEROL TARTRATE 15 MCG: 15 SOLUTION RESPIRATORY (INHALATION) at 07:04

## 2025-04-12 RX ADMIN — ENOXAPARIN SODIUM 40 MG: 40 INJECTION SUBCUTANEOUS at 06:04

## 2025-04-12 NOTE — ASSESSMENT & PLAN NOTE
Body mass index is 34.99 kg/m². Morbid obesity complicates all aspects of disease management from diagnostic modalities to treatment.

## 2025-04-12 NOTE — CARE UPDATE
04/11/25 1914   Patient Assessment/Suction   Level of Consciousness (AVPU) alert   Respiratory Effort Normal;Unlabored   Expansion/Accessory Muscles/Retractions no use of accessory muscles   All Lung Fields Breath Sounds diminished   Rhythm/Pattern, Respiratory unlabored;pattern regular   Cough Frequency infrequent   Cough Type good   PRE-TX-O2   Device (Oxygen Therapy) room air   SpO2 97 %   Pulse Oximetry Type Intermittent   $ Pulse Oximetry - Multiple Charge Pulse Oximetry - Multiple   Pulse 76   Resp 18   Aerosol Therapy   $ Aerosol Therapy Charges Aerosol Treatment   Daily Review of Necessity (SVN) completed   Respiratory Treatment Status (SVN) given   Treatment Route (SVN) mask;oxygen   Patient Position HOB elevated   Post Treatment Assessment (SVN) increased aeration   Signs of Intolerance (SVN) none   Breath Sounds Post-Respiratory Treatment   Throughout All Fields Post-Treatment All Fields   Throughout All Fields Post-Treatment aeration increased   Post-treatment Heart Rate (beats/min) 74   Post-treatment Resp Rate (breaths/min) 18   Preset CPAP/BiPAP Settings   $ Is patient using? No/refused   Reason patient is not wearing? Patient refused   Education   $ Education Bronchodilator;15 min

## 2025-04-12 NOTE — CARE UPDATE
04/12/25 0703   Patient Assessment/Suction   Level of Consciousness (AVPU) alert   Respiratory Effort Normal;Unlabored   Expansion/Accessory Muscles/Retractions expansion symmetric;no retractions;no use of accessory muscles   All Lung Fields Breath Sounds Anterior:;Lateral:;diminished   Rhythm/Pattern, Respiratory pattern regular;unlabored   PRE-TX-O2   Device (Oxygen Therapy) room air   SpO2 97 %   Pulse Oximetry Type Intermittent   $ Pulse Oximetry - Multiple Charge Pulse Oximetry - Multiple   Pulse 71   Resp 18   Aerosol Therapy   $ Aerosol Therapy Charges Aerosol Treatment   Daily Review of Necessity (SVN) completed   Respiratory Treatment Status (SVN) given   Treatment Route (SVN) oxygen;mouthpiece utilized   Patient Position HOB elevated   Post Treatment Assessment (SVN) breath sounds improved   Signs of Intolerance (SVN) none   Education   $ Education Bronchodilator;15 min

## 2025-04-12 NOTE — PROGRESS NOTES
[facility] Medicine  Progress Note    Patient Name: Sakshi Hess  MRN: [id number]  Patient Class: OP- Observation   Admission Date: 4/10/2025  Length of Stay: 0 days  Attending Physician: Puma Page MD  Primary Care Provider: Wenceslao Deras NP        Subjective     Principal Problem:Acute exacerbation of idiopathic pulmonary fibrosis        HPI:  70-year-old female presented to ED for eval of chest pain. pMHx CAD s/p CABG, HTN, COPD, MIRA, chronic pain, DM.  Patient reported over the last several weeks she has been experiencing increased shortness of breath, for the last several days she has also been experiencing intermittent left-sided chest pressure that radiates to her left upper back with associated 6 lb weight gain and lower extremity edema.  Patient states chest pain occurred both at rest and with activity, nothing makes it better or worse.  Denies abdominal pain, nausea, vomiting, changes in bowel habits.  Denies syncope.  Denies fever, chills.  Patient states she took dose p.r.n. Lasix at home with mild improvement in lower extremity edema.  Patient does have history of COPD and MIRA, wears p.r.n. O2 via NC at home to maintain sats 94%, wears CPAP HS.  Denies current outpatient PO steroid or antibiotic treatment.  Patient also with history CAD s/p CABG, had been following with Dr. Buchanan for Cardiology but states she has to switch 2/2 insurance coverage and has pending upcoming appointment with Dr. Griggs.  Echo February 2024 with EF 55-60%.  Patient did take 81 mg ASA prior to arrival.  In ED, initial troponin 21.7.  BNP unremarkable.  CXR impression with no acute abnormality.  Admit to hospital medicine for further eval.    Overview/Hospital Course:  Sakshi Hess is a 70 year old female with a past medical history of IPF, obesity, MIRA, and DM who presented with chest pain. Troponin was mildly elevated, yet TTE and stress testing were unremarkable. Cardiology was  "consulted. She is being treated for an exacerbation of IPF with steroids and Duonebs.     Interval History: see "Hospital Course"    Review of Systems   Respiratory:  Positive for chest tightness.    Cardiovascular:  Positive for chest pain.     Objective:     Vital Signs (Most Recent):  Temp: 98.1 °F (36.7 °C) (04/12/25 1654)  Pulse: 69 (04/12/25 1654)  Resp: 18 (04/12/25 1654)  BP: 133/73 (04/12/25 1654)  SpO2: 97 % (04/12/25 1654) Vital Signs (24h Range):  Temp:  [97.6 °F (36.4 °C)-98.3 °F (36.8 °C)] 98.1 °F (36.7 °C)  Pulse:  [62-90] 69  Resp:  [17-19] 18  SpO2:  [90 %-98 %] 97 %  BP: (128-189)/(67-79) 133/73     Weight: 84 kg (185 lb 3.2 oz)  Body mass index is 34.99 kg/m².    Intake/Output Summary (Last 24 hours) at 4/12/2025 1821  Last data filed at 4/12/2025 1200  Gross per 24 hour   Intake 740 ml   Output 3 ml   Net 737 ml         Physical Exam  Vitals and nursing note reviewed.   Constitutional:       General: She is not in acute distress.     Appearance: She is obese.   HENT:      Head: Normocephalic and atraumatic.      Right Ear: External ear normal.      Left Ear: External ear normal.      Nose: Nose normal.      Mouth/Throat:      Mouth: Mucous membranes are moist.   Eyes:      Extraocular Movements: Extraocular movements intact.      Conjunctiva/sclera: Conjunctivae normal.   Cardiovascular:      Rate and Rhythm: Normal rate and regular rhythm.      Pulses: Normal pulses.      Heart sounds: Normal heart sounds.   Pulmonary:      Effort: Pulmonary effort is normal.      Breath sounds: Normal breath sounds. No wheezing, rhonchi or rales.   Abdominal:      General: Bowel sounds are normal. There is no distension.      Palpations: Abdomen is soft.      Tenderness: There is no abdominal tenderness.   Musculoskeletal:         General: Normal range of motion.      Cervical back: Normal range of motion and neck supple.   Skin:     General: Skin is warm and dry.   Neurological:      Mental Status: She is " "alert. Mental status is at baseline.   Psychiatric:         Mood and Affect: Mood normal.         Behavior: Behavior normal.               Significant Labs: All pertinent labs within the past 24 hours have been reviewed.    Significant Imaging: I have reviewed all pertinent imaging results/findings within the past 24 hours.      Assessment & Plan  Acute exacerbation of idiopathic pulmonary fibrosis  -Duonebs  -Steroids  -LABA and ICS nebulizers    Obstructive sleep apnea syndrome  -CPAP QHS    Diabetes  Patient's FSGs are controlled on current medication regimen.  Last A1c reviewed- No results found for: "LABA1C", "HGBA1C"  Most recent fingerstick glucose reviewed-   Recent Labs   Lab 04/11/25 2029 04/12/25  0618 04/12/25  1137 04/12/25  1655   POCTGLUCOSE 136* 122* 107 119*     Current correctional scale  Low  Maintain anti-hyperglycemic dose as follows-   Antihyperglycemics (From admission, onward)      Start     Stop Route Frequency Ordered    04/10/25 2329  insulin aspart U-100 pen 0-5 Units         -- SubQ Before meals & nightly PRN 04/10/25 2240          Hold Oral hypoglycemics while patient is in the hospital.  Chronic pain  Stable.    Severe obesity (BMI 35.0-39.9) with comorbidity  Body mass index is 34.99 kg/m². Morbid obesity complicates all aspects of disease management from diagnostic modalities to treatment.     VTE Risk Mitigation (From admission, onward)           Ordered     enoxaparin injection 40 mg  Daily         04/10/25 2240     IP VTE HIGH RISK PATIENT  Once         04/10/25 2240     Place sequential compression device  Until discontinued         04/10/25 2240                    Discharge Planning   RENATO: 4/13/2025     Code Status: Full Code   Medical Readiness for Discharge Date:   Discharge Plan A: Home   Discharge Delays: (!) Procedure Scheduling (IR, OR, Labs, Echo, Cath, Echo, EEG)                    Puma Page MD  Department of Hospital Medicine   [facility]    "

## 2025-04-12 NOTE — SUBJECTIVE & OBJECTIVE
"Interval History: see "Hospital Course"    Review of Systems   Respiratory:  Positive for chest tightness.    Cardiovascular:  Positive for chest pain.     Objective:     Vital Signs (Most Recent):  Temp: 98.1 °F (36.7 °C) (04/12/25 1654)  Pulse: 69 (04/12/25 1654)  Resp: 18 (04/12/25 1654)  BP: 133/73 (04/12/25 1654)  SpO2: 97 % (04/12/25 1654) Vital Signs (24h Range):  Temp:  [97.6 °F (36.4 °C)-98.3 °F (36.8 °C)] 98.1 °F (36.7 °C)  Pulse:  [62-90] 69  Resp:  [17-19] 18  SpO2:  [90 %-98 %] 97 %  BP: (128-189)/(67-79) 133/73     Weight: 84 kg (185 lb 3.2 oz)  Body mass index is 34.99 kg/m².    Intake/Output Summary (Last 24 hours) at 4/12/2025 1821  Last data filed at 4/12/2025 1200  Gross per 24 hour   Intake 740 ml   Output 3 ml   Net 737 ml         Physical Exam  Vitals and nursing note reviewed.   Constitutional:       General: She is not in acute distress.     Appearance: She is obese.   HENT:      Head: Normocephalic and atraumatic.      Right Ear: External ear normal.      Left Ear: External ear normal.      Nose: Nose normal.      Mouth/Throat:      Mouth: Mucous membranes are moist.   Eyes:      Extraocular Movements: Extraocular movements intact.      Conjunctiva/sclera: Conjunctivae normal.   Cardiovascular:      Rate and Rhythm: Normal rate and regular rhythm.      Pulses: Normal pulses.      Heart sounds: Normal heart sounds.   Pulmonary:      Effort: Pulmonary effort is normal.      Breath sounds: Normal breath sounds. No wheezing, rhonchi or rales.   Abdominal:      General: Bowel sounds are normal. There is no distension.      Palpations: Abdomen is soft.      Tenderness: There is no abdominal tenderness.   Musculoskeletal:         General: Normal range of motion.      Cervical back: Normal range of motion and neck supple.   Skin:     General: Skin is warm and dry.   Neurological:      Mental Status: She is alert. Mental status is at baseline.   Psychiatric:         Mood and Affect: Mood normal.    "      Behavior: Behavior normal.               Significant Labs: All pertinent labs within the past 24 hours have been reviewed.    Significant Imaging: I have reviewed all pertinent imaging results/findings within the past 24 hours.

## 2025-04-12 NOTE — ASSESSMENT & PLAN NOTE
"Patient's FSGs are controlled on current medication regimen.  Last A1c reviewed- No results found for: "LABA1C", "HGBA1C"  Most recent fingerstick glucose reviewed-   Recent Labs   Lab 04/11/25 2029 04/12/25  0618 04/12/25  1137 04/12/25  1655   POCTGLUCOSE 136* 122* 107 119*     Current correctional scale  Low  Maintain anti-hyperglycemic dose as follows-   Antihyperglycemics (From admission, onward)      Start     Stop Route Frequency Ordered    04/10/25 2329  insulin aspart U-100 pen 0-5 Units         -- SubQ Before meals & nightly PRN 04/10/25 2240          Hold Oral hypoglycemics while patient is in the hospital.  "

## 2025-04-12 NOTE — CONSULTS
[facility]  Department of Cardiology  Consult Note      PATIENT NAME: Sakshi Hess  MRN: [id number]  TODAY'S DATE: 04/12/2025  ADMIT DATE: 4/10/2025                          CONSULT REQUESTED BY: Puma Page MD    SUBJECTIVE     PRINCIPAL PROBLEM: Chest pain      REASON FOR CONSULT:    Chest pain s/p CABG        HPI:    Patient was known to Dr. Buchanan's group but has switched to Dr. Griggs's group due to insurance issues.    Patient is a 70 y.o. female with PMH HTN, HLD, CAD s/p CABG, DM, obesity, COPD from second hand smoke exposure who presented to the ED with c/o L sided chest pain and shortness of breath.  She reports CP x 1-2 weeks that occur randomly, with associated chest soreness.  CP is tightness/squeezing in nature.  Denies aggravating or alleviating factors.  She takes Lasix 2-3 times a week when she has noticeable weight gain. She did notice weight gain, approximately 3-5 lbs last week but this has since resolved with diuresis. She has noticed GORDON.  She cannot walk her dog as far as she used to.  She states that her current symptoms are not similar to when she had CABG.  Reports only back pain when she had CABG.  She does have chronic L shoulder pain secondary to arthritis and thought that her CP could be related to this.  EKG SR with PACs, no acute ST-T wave changes. Troponin HS flat this admission 21 > 19, > 23.  Appears chronically elevated.   No acute findings on CXR.      Patient examined in stress lab this am. Euvolemic.  2 L NC.  SR with PACs and occasional PVCs noted.      ECHO 4/11/25    Left Ventricle: The left ventricle is normal in size. There is mild concentric hypertrophy. There is normal systolic function. Quantitated ejection fraction is 62%.    Right Ventricle: The right ventricle is normal in size. Systolic function is normal.    Left Atrium: Mildly dilated    Aortic Valve: Not well visualized due to poor acoustic window. There is probable mild stenosis. Aortic  valve area by VTI is 1.4 cm². Aortic valve peak velocity is 2.2 m/s. Mean gradient is 10 mmHg. The dimensionless index is 0.40. There is no significant regurgitation.    Mitral Valve: There is moderate mitral annular calcification.    IVC/SVC: Normal venous pressure at 3 mmHg.    CARDIAC CATH 2/15/21  The pre-procedure left ventricular end diastolic pressure was 6.  The ejection fraction was greater than 55% by visual estimate.  70% left main with total occlusion pulsatile flow of the distal left anterior descending artery  70% stenosis of the proximal circumflex  Ninety and 95% stenosis of the mid right coronary artery  Normal SVG to OM 1 with retrograde filling of the circumflex system  Normal SVG to the right coronary artery filling the posterior descending and distal right coronary arteries  Normal LIMA to the LAD  Continue medical therapy         Review of patient's allergies indicates:   Allergen Reactions    Acetaminophen Other (See Comments) and Nausea And Vomiting     Patient states allergic to TYLENOL brand name.  Blood pressure becomes hypertensive.    Levofloxacin Other (See Comments)     Pt states it causes tendon tears.    Metronidazole Other (See Comments)     DISORIENTED       Statins-hmg-coa reductase inhibitors Tinitus     Joint pain       Past Medical History:   Diagnosis Date    Arthritis     COPD (chronic obstructive pulmonary disease)     Coronary artery disease     Hypertension     Sleep apnea     use CPAP at night      Past Surgical History:   Procedure Laterality Date    CORONARY ANGIOGRAPHY N/A 2/15/2021    Procedure: ANGIOGRAM, CORONARY ARTERY;  Surgeon: Aidan Buchanan MD;  Location: [facility] CATH/EP LAB;  Service: Cardiology;  Laterality: N/A;    CORONARY ARTERY BYPASS GRAFT      CORONARY BYPASS GRAFT ANGIOGRAPHY  2/15/2021    Procedure: Bypass graft study;  Surgeon: Aidan Buchanan MD;  Location: [facility] CATH/EP LAB;  Service: Cardiology;;    HYSTERECTOMY      JOINT REPLACEMENT      bilateral  knee replacement     LEFT HEART CATHETERIZATION Left 2/15/2021    Procedure: Left heart cath;  Surgeon: Aidan Buchanan MD;  Location: [facility] CATH/EP LAB;  Service: Cardiology;  Laterality: Left;     Social History[1]     REVIEW OF SYSTEMS    As mentioned in HPI    OBJECTIVE     VITAL SIGNS (Most Recent)  Temp: 97.8 °F (36.6 °C) (04/12/25 0747)  Pulse: 75 (04/12/25 0747)  Resp: 18 (04/12/25 0747)  BP: (!) 145/67 (04/12/25 0747)  SpO2: 96 % (04/12/25 0747)    VENTILATION STATUS  Resp: 18 (04/12/25 0747)  SpO2: 96 % (04/12/25 0747)  Resp Rate Total:  [20 br/min] 20 br/min        I & O (Last 24H):  Intake/Output Summary (Last 24 hours) at 4/12/2025 0842  Last data filed at 4/12/2025 0438  Gross per 24 hour   Intake 340 ml   Output 3 ml   Net 337 ml       WEIGHTS  Wt Readings from Last 3 Encounters:   04/10/25 1801 84 kg (185 lb 3.2 oz)   03/10/25 1003 84.3 kg (185 lb 13.6 oz)   09/09/24 0923 81.7 kg (180 lb 0.1 oz)       PHYSICAL EXAM    CONSTITUTIONAL: NAD  HEENT: Normocephalic. No pallor  NECK: no JVD  LUNGS: scattered rhonchi 2 L NC  HEART: regular rate and rhythm, S1, S2 normal, soft systolic murmur; SR with frequent PACs  ABDOMEN: soft, non-tender, bowel sounds normal  EXTREMITIES: No edema  SKIN: No rash  NEURO: AAO X 3  PSYCH: normal affect      HOME MEDICATIONS:Medications Ordered Prior to Encounter[2]    SCHEDULED MEDS:   arformoteroL  15 mcg Nebulization BID    aspirin  81 mg Oral Daily    budesonide  0.5 mg Nebulization Q12H    enoxparin  40 mg Subcutaneous Daily    ipratropium  0.5 mg Nebulization Q6H    isosorbide mononitrate  30 mg Oral Daily    levalbuterol  1.25 mg Nebulization Q6H    losartan  25 mg Oral Daily       CONTINUOUS INFUSIONS:    PRN MEDS:  Current Facility-Administered Medications:     aluminum-magnesium hydroxide-simethicone, 30 mL, Oral, QID PRN    dextrose 50%, 12.5 g, Intravenous, PRN    dextrose 50%, 25 g, Intravenous, PRN    glucagon (human recombinant), 1 mg, Intramuscular, PRN     "glucose, 16 g, Oral, PRN    glucose, 24 g, Oral, PRN    insulin aspart U-100, 0-5 Units, Subcutaneous, QID (AC + HS) PRN    labetalol, 10 mg, Intravenous, Q6H PRN    magnesium oxide, 800 mg, Oral, PRN    magnesium oxide, 800 mg, Oral, PRN    melatonin, 6 mg, Oral, Nightly PRN    naloxone, 0.02 mg, Intravenous, PRN    nitroGLYCERIN, 0.4 mg, Sublingual, Q5 Min PRN    ondansetron, 4 mg, Intravenous, Q6H PRN    oxyCODONE, 15 mg, Oral, Q6H PRN    potassium bicarbonate, 35 mEq, Oral, PRN    potassium bicarbonate, 50 mEq, Oral, PRN    potassium bicarbonate, 60 mEq, Oral, PRN    potassium, sodium phosphates, 2 packet, Oral, PRN    potassium, sodium phosphates, 2 packet, Oral, PRN    potassium, sodium phosphates, 2 packet, Oral, PRN    senna-docusate, 1 tablet, Oral, Daily PRN    sodium chloride 0.9%, 10 mL, Intravenous, Q12H PRN    LABS AND DIAGNOSTICS     CBC LAST 3 DAYS  Recent Labs   Lab 04/10/25  1928 04/11/25  0728 04/12/25  0536   WBC 8.28 7.39 5.81   RBC 4.27 4.09 4.10   HGB 12.5 11.9* 11.9*   HCT 38.7 37.6 37.3   MCV 91 92 91   MCH 29.3 29.1 29.0   MCHC 32.3 31.6* 31.9*   RDW 14.3 14.1 14.2    210 190   MPV 9.4 9.0* 8.9*   NRBC 0 0 0       COAGULATION LAST 3 DAYS  No results for input(s): "LABPT", "INR", "APTT" in the last 168 hours.    CHEMISTRY LAST 3 DAYS  Recent Labs   Lab 04/10/25  1928 04/11/25  0728 04/12/25  0536   * 142 143   K 4.2 3.9 4.0   CO2 27 31* 31*   BUN 22 16 16   CREATININE 1.2 0.9 0.8   CALCIUM 9.0 8.8 9.0   MG 1.9 1.9 2.2   ALBUMIN 4.1 3.8 3.6   ALKPHOS 78 70 63   ALT 10 7* 9*   AST 17 10 12   BILITOT 0.3 0.3 0.6       CARDIAC PROFILE LAST 3 DAYS  Recent Labs   Lab 04/10/25  1928   BNP 52       ENDOCRINE LAST 3 DAYS  Recent Labs   Lab 04/11/25  0728   TSH 0.744       LAST ARTERIAL BLOOD GAS  ABG  No results for input(s): "PH", "PO2", "PCO2", "HCO3", "BE" in the last 168 hours.    LAST 7 DAYS MICROBIOLOGY   Microbiology Results (last 7 days)       Procedure Component Value Units " Date/Time    Influenza A & B by Molecular [[id number]]  (Normal) Collected: 04/11/25 0759    Order Status: Completed Specimen: Nasal Swab Updated: 04/11/25 0853     INFLUENZA A MOLECULAR Negative     INFLUENZA B MOLECULAR  Negative            MOST RECENT IMAGING  Echo    Left Ventricle: The left ventricle is normal in size. There is mild   concentric hypertrophy. There is normal systolic function. Quantitated   ejection fraction is 62%.    Right Ventricle: The right ventricle is normal in size. Systolic   function is normal.    Left Atrium: Mildly dilated    Aortic Valve: Not well visualized due to poor acoustic window. There is   probable mild stenosis. Aortic valve area by VTI is 1.4 cm². Aortic valve   peak velocity is 2.2 m/s. Mean gradient is 10 mmHg. The dimensionless   index is 0.40. There is no significant regurgitation.    Mitral Valve: There is moderate mitral annular calcification.    IVC/SVC: Normal venous pressure at 3 mmHg.      ECHOCARDIOGRAM RESULTS (last 5)  Results for orders placed during the hospital encounter of 04/10/25    Echo    Interpretation Summary    Left Ventricle: The left ventricle is normal in size. There is mild concentric hypertrophy. There is normal systolic function. Quantitated ejection fraction is 62%.    Right Ventricle: The right ventricle is normal in size. Systolic function is normal.    Left Atrium: Mildly dilated    Aortic Valve: Not well visualized due to poor acoustic window. There is probable mild stenosis. Aortic valve area by VTI is 1.4 cm². Aortic valve peak velocity is 2.2 m/s. Mean gradient is 10 mmHg. The dimensionless index is 0.40. There is no significant regurgitation.    Mitral Valve: There is moderate mitral annular calcification.    IVC/SVC: Normal venous pressure at 3 mmHg.      Results for orders placed during the hospital encounter of 02/26/24    Echo    Interpretation Summary    Left Ventricle: Not well visualized due to poor sonic window. There is  normal systolic function with a visually estimated ejection fraction of 55 - 60%. Diastolic function cannot be reliably determined in the presence of mitral annular calcification.    Right Ventricle: Normal right ventricular cavity size. Wall thickness is normal. Right ventricle wall motion  is normal. Systolic function is normal.    Left Atrium: Left atrium is mildly dilated.    Aortic Valve: There is aortic valve sclerosis. Mildly calcified cusps.    Mitral Valve: There is moderate mitral annular calcification present. There is mild regurgitation.    Tricuspid Valve: There is mild to moderate regurgitation. The estimated PA systolic pressure is at least 28 mmHg.    IVC/SVC: Normal venous pressure at 3 mmHg.      CURRENT/PREVIOUS VISIT EKG  Results for orders placed or performed during the hospital encounter of 04/10/25   EKG 12-lead    Collection Time: 04/10/25  5:54 PM   Result Value Ref Range    QRS Duration 86 ms    [facility] QTC Calculation 397 ms    Narrative    Test Reason : R07.9,    Vent. Rate :  71 BPM     Atrial Rate :  71 BPM     P-R Int : 178 ms          QRS Dur :  86 ms      QT Int : 366 ms       P-R-T Axes :  40 -23 103 degrees    QTcB Int : 397 ms    Sinus rhythm with Premature atrial complexes with Aberrant conduction  Possible Left atrial enlargement  LVH with repolarization abnormality ( R in aVL , Levels product )  Cannot rule out Septal infarct (cited on or before 26-Feb-2024)  Abnormal ECG  When compared with ECG of 12-Jun-2024 17:39,  Fusion complexes are no longer Present  Questionable change in initial forces of Septal leads    Referred By: AAAREFERRAL SELF           Confirmed By:            ASSESSMENT/PLAN:     Active Hospital Problems    Diagnosis    *Chest pain    COPD (chronic obstructive pulmonary disease)    Chronic pain    Diabetes    Obstructive sleep apnea syndrome       ASSESSMENT & PLAN:     Chest Pain  GORDON  COPD  HTN  HLD  CAD s/p CABG by Dr. Streeter November 8, 2012 with LIMA to the  LAD, SVG to OM, and SVG to RCA.       RECOMMENDATIONS:    Reports CP that occurs at rest, not exacerbated with exertion.  + GORDON.  Hx COPD.  Chest wall tender to palpation. No acute ST-T wave changes on EKG.  Troponin HS trivially elevated, chronically.    Nuclear stress test today. Negative for reversible ischemia.    Echo this admission - EF 62% with no acute valvular abnormalities.  Mild AS.    Optimize COPD management this admission as this could be contributing to patient's CP/shortness of breath.  No further recommendations from cardiac standpoint. We will sign off at this time.     Leigh Tee NP  Department of Cardiology  Date of Service: 04/12/2025        I have personally interviewed and examined the patient, I have reviewed the Nurse Practitioner's history and physical, assessment, and plan. I agree with the findings and plan.    Patient was then cardiac chest pain  Stress test negative for ischemia  Agree with a trial of steroids    Will be available p.r.n.  Dr. Charles  Department of Cardiology  Date of Service: 04/12/2025           [1]   Social History  Tobacco Use    Smoking status: Never     Passive exposure: Past    Smokeless tobacco: Never   Substance Use Topics    Alcohol use: No    Drug use: No   [2]   No current facility-administered medications on file prior to encounter.     Current Outpatient Medications on File Prior to Encounter   Medication Sig Dispense Refill    aspirin (ECOTRIN) 81 MG EC tablet Take 1 tablet (81 mg total) by mouth once daily. 30 tablet 0    aspirin-acetaminophen-caffeine (GOODY'S EXTRA STRENGTH) 500-325-65 mg PwPk Take 1 Dose by mouth as needed (pain). (Patient taking differently: Take 1 tablet by mouth as needed (pain).)      azithromycin (ZITHROMAX) 500 MG tablet Take 1 tablet by mouth daily for yellow mucous (repeat if needed) (Patient taking differently: Take 500 mg by mouth daily as needed (yellow mucus).) 3 tablet 1    baclofen (LIORESAL) 10 MG tablet Take 1 tablet  by mouth up to 2 times daily as needed for shoulder pain (Patient taking differently: Take 10 mg by mouth 2 (two) times daily as needed (shoulder pain).) 45 tablet 0    budesonide-glycopyr-formoterol (BREZTRI AEROSPHERE) 160-9-4.8 mcg/actuation HFAA inhale 2 puffs into the lungs 2 times daily (Patient taking differently: Inhale 2 puffs into the lungs 2 (two) times a day.) 10.7 g 9    chlorthalidone (HYGROTEN) 25 MG Tab Take 1 tablet (25 mg total) by mouth once daily.      ergocalciferol (VITAMIN D2) 50,000 unit Cap Take 1 tablet weekly by mouth for 8 weeks, then every other week thereafter. (Patient taking differently: Take 50,000 Units by mouth every 7 days.) 12 capsule 3    evolocumab (REPATHA SYRINGE) 140 mg/mL Syrg INJECT 1 PEN INTO THE SKIN EVERY 2 WEEKS (Patient taking differently: Inject 140 mg into the skin every 14 (fourteen) days.) 2 mL 4    furosemide (LASIX) 20 MG tablet 1 tablet by mouth once a day as needed (Patient taking differently: Take 20 mg by mouth daily as needed (swelling).) 30 tablet 1    GINSENG ORAL Take 250 mg by mouth once daily.      ibuprofen (ADVIL,MOTRIN) 200 MG tablet Take 400 mg by mouth every 6 (six) hours as needed for Pain.      isosorbide mononitrate (IMDUR) 30 MG 24 hr tablet Take 1 tablet (30 mg total) by mouth once daily. 30 tablet 2    levalbuterol (XOPENEX HFA) 45 mcg/actuation inhaler Inhale 1 puff into the lungs every 8 (eight) hours as needed for Wheezing. (Patient taking differently: Inhale 1 puff into the lungs every 8 (eight) hours as needed for Wheezing or Shortness of Breath.) 15 g 1    levalbuterol (XOPENEX) 1.25 mg/3 mL nebulizer solution Take 3 mLs (1.25 mg total) by nebulization every 4 (four) hours as needed for Wheezing or Shortness of Breath. Rescue 90 each 11    losartan (COZAAR) 25 MG tablet Take 1 tablet by mouth every day (Patient taking differently: Take 25 mg by mouth once daily.) 30 tablet 4    metFORMIN (GLUCOPHAGE-XR) 500 MG ER 24hr tablet Take 1  tablet by mouth every day (Patient taking differently: Take 500 mg by mouth 2 (two) times daily with meals.) 90 tablet 3    modafiniL (PROVIGIL) 200 MG Tab Take 1 tablet (200 mg total) by mouth 2 (two) times daily as needed (excess sleepiness). 60 tablet 5    nitroGLYCERIN 0.2 mg/hr TD PT24 (NITRO-DUR) 0.2 mg/hr Place 1 patch onto the skin every 24 hours (Patient taking differently: Place 1 patch onto the skin once daily.) 30 patch 2    oxyCODONE (ROXICODONE) 15 MG Tab Take 1 tablet at 6 AM, Take 1 tablet at 12 Noon, Take 1 tablet at 6 PM, Take 1 tablet at 12 Midnight (Patient taking differently: Take 15 mg by mouth every 6 (six) hours as needed for Pain.) 120 tablet 0    predniSONE (DELTASONE) 20 MG tablet Take one daily for 3 days and may repeat for shortness of breath. (Patient taking differently: Take 20 mg by mouth once daily. Take one daily for 3 days and may repeat for shortness of breath.) 12 tablet 0    blood sugar diagnostic (TRUE METRIX GLUCOSE TEST STRIP) Strp test blood sugar 2 times daily 100 each 11    lancets (TRUEPLUS LANCETS) 30 gauge Misc Check blood sugar 2 times daily 100 each 11    scopolamine (TRANSDERM-SCOP) 1.3-1.5 mg (1 mg over 3 days) Place 1 patch onto the skin every third day. Apply 4 hours prior to event (Patient not taking: Reported on 4/10/2025) 4 patch 1

## 2025-04-13 LAB
ABSOLUTE EOSINOPHIL (SMH): 0 K/UL
ABSOLUTE MONOCYTE (SMH): 0.13 K/UL (ref 0.3–1)
ABSOLUTE NEUTROPHIL COUNT (SMH): 5.2 K/UL (ref 1.8–7.7)
ALBUMIN SERPL-MCNC: 3.8 G/DL (ref 3.5–5.2)
ALP SERPL-CCNC: 61 UNIT/L (ref 55–135)
ALT SERPL-CCNC: 7 UNIT/L (ref 10–44)
ANION GAP (SMH): 4 MMOL/L (ref 8–16)
AST SERPL-CCNC: 12 UNIT/L (ref 10–40)
BASOPHILS # BLD AUTO: 0.02 K/UL
BASOPHILS NFR BLD AUTO: 0.3 %
BILIRUB SERPL-MCNC: 0.3 MG/DL (ref 0.1–1)
BUN SERPL-MCNC: 18 MG/DL (ref 8–23)
CALCIUM SERPL-MCNC: 9 MG/DL (ref 8.7–10.5)
CHLORIDE SERPL-SCNC: 104 MMOL/L (ref 95–110)
CO2 SERPL-SCNC: 31 MMOL/L (ref 23–29)
CREAT SERPL-MCNC: 0.8 MG/DL (ref 0.5–1.4)
ERYTHROCYTE [DISTWIDTH] IN BLOOD BY AUTOMATED COUNT: 14 % (ref 11.5–14.5)
GFR SERPLBLD CREATININE-BSD FMLA CKD-EPI: >60 ML/MIN/1.73/M2
GLUCOSE SERPL-MCNC: 153 MG/DL (ref 70–110)
HCT VFR BLD AUTO: 37.8 % (ref 37–48.5)
HGB BLD-MCNC: 12 GM/DL (ref 12–16)
IMM GRANULOCYTES # BLD AUTO: 0.02 K/UL (ref 0–0.04)
IMM GRANULOCYTES NFR BLD AUTO: 0.3 % (ref 0–0.5)
LYMPHOCYTES # BLD AUTO: 0.83 K/UL (ref 1–4.8)
MAGNESIUM SERPL-MCNC: 2 MG/DL (ref 1.6–2.6)
MCH RBC QN AUTO: 28.6 PG (ref 27–31)
MCHC RBC AUTO-ENTMCNC: 31.7 G/DL (ref 32–36)
MCV RBC AUTO: 90 FL (ref 82–98)
NUCLEATED RBC (/100WBC) (SMH): 0 /100 WBC
OHS QRS DURATION: 86 MS
OHS QTC CALCULATION: 397 MS
PLATELET # BLD AUTO: 232 K/UL (ref 150–450)
PMV BLD AUTO: 9.2 FL (ref 9.2–12.9)
POCT GLUCOSE: 123 MG/DL (ref 70–110)
POCT GLUCOSE: 141 MG/DL (ref 70–110)
POCT GLUCOSE: 147 MG/DL (ref 70–110)
POCT GLUCOSE: 160 MG/DL (ref 70–110)
POTASSIUM SERPL-SCNC: 5.1 MMOL/L (ref 3.5–5.1)
PROT SERPL-MCNC: 6.3 GM/DL (ref 6–8.4)
RBC # BLD AUTO: 4.19 M/UL (ref 4–5.4)
RELATIVE EOSINOPHIL (SMH): 0 % (ref 0–8)
RELATIVE LYMPHOCYTE (SMH): 13.5 % (ref 18–48)
RELATIVE MONOCYTE (SMH): 2.1 % (ref 4–15)
RELATIVE NEUTROPHIL (SMH): 83.8 % (ref 38–73)
SODIUM SERPL-SCNC: 139 MMOL/L (ref 136–145)
WBC # BLD AUTO: 6.15 K/UL (ref 3.9–12.7)

## 2025-04-13 PROCEDURE — 36415 COLL VENOUS BLD VENIPUNCTURE: CPT

## 2025-04-13 PROCEDURE — 99900035 HC TECH TIME PER 15 MIN (STAT)

## 2025-04-13 PROCEDURE — 25000003 PHARM REV CODE 250: Performed by: STUDENT IN AN ORGANIZED HEALTH CARE EDUCATION/TRAINING PROGRAM

## 2025-04-13 PROCEDURE — 12000002 HC ACUTE/MED SURGE SEMI-PRIVATE ROOM

## 2025-04-13 PROCEDURE — 83735 ASSAY OF MAGNESIUM: CPT

## 2025-04-13 PROCEDURE — 63600175 PHARM REV CODE 636 W HCPCS: Performed by: STUDENT IN AN ORGANIZED HEALTH CARE EDUCATION/TRAINING PROGRAM

## 2025-04-13 PROCEDURE — 99900031 HC PATIENT EDUCATION (STAT)

## 2025-04-13 PROCEDURE — 94761 N-INVAS EAR/PLS OXIMETRY MLT: CPT

## 2025-04-13 PROCEDURE — 82040 ASSAY OF SERUM ALBUMIN: CPT

## 2025-04-13 PROCEDURE — 94640 AIRWAY INHALATION TREATMENT: CPT | Mod: XB

## 2025-04-13 PROCEDURE — 25000003 PHARM REV CODE 250

## 2025-04-13 PROCEDURE — 63600175 PHARM REV CODE 636 W HCPCS

## 2025-04-13 PROCEDURE — 85025 COMPLETE CBC W/AUTO DIFF WBC: CPT

## 2025-04-13 PROCEDURE — 27000221 HC OXYGEN, UP TO 24 HOURS

## 2025-04-13 PROCEDURE — 25000242 PHARM REV CODE 250 ALT 637 W/ HCPCS: Performed by: STUDENT IN AN ORGANIZED HEALTH CARE EDUCATION/TRAINING PROGRAM

## 2025-04-13 RX ADMIN — OXYCODONE HYDROCHLORIDE 15 MG: 10 TABLET ORAL at 09:04

## 2025-04-13 RX ADMIN — ISOSORBIDE MONONITRATE 30 MG: 30 TABLET, EXTENDED RELEASE ORAL at 08:04

## 2025-04-13 RX ADMIN — LOSARTAN POTASSIUM 25 MG: 25 TABLET, FILM COATED ORAL at 08:04

## 2025-04-13 RX ADMIN — ARFORMOTEROL TARTRATE 15 MCG: 15 SOLUTION RESPIRATORY (INHALATION) at 07:04

## 2025-04-13 RX ADMIN — ASPIRIN 81 MG: 81 TABLET, COATED ORAL at 08:04

## 2025-04-13 RX ADMIN — PREDNISONE 40 MG: 20 TABLET ORAL at 08:04

## 2025-04-13 RX ADMIN — OXYCODONE HYDROCHLORIDE 15 MG: 10 TABLET ORAL at 08:04

## 2025-04-13 RX ADMIN — BUDESONIDE INHALATION 0.5 MG: 0.5 SUSPENSION RESPIRATORY (INHALATION) at 07:04

## 2025-04-13 RX ADMIN — PANTOPRAZOLE SODIUM 40 MG: 40 TABLET, DELAYED RELEASE ORAL at 05:04

## 2025-04-13 RX ADMIN — ENOXAPARIN SODIUM 40 MG: 40 INJECTION SUBCUTANEOUS at 05:04

## 2025-04-13 RX ADMIN — OXYCODONE HYDROCHLORIDE 15 MG: 10 TABLET ORAL at 03:04

## 2025-04-13 RX ADMIN — OXYCODONE HYDROCHLORIDE 15 MG: 10 TABLET ORAL at 02:04

## 2025-04-13 NOTE — PROGRESS NOTES
Formerly [facility], later [facility]  Progress Note    Patient Name: Sakshi Hess  MRN: [id number]  Patient Class: OP- Observation   Admission Date: 4/10/2025  Length of Stay: 0 days  Attending Physician: Lucien Giles MD  Primary Care Provider: Wenceslao Deras NP        Subjective     Principal Problem:Acute exacerbation of idiopathic pulmonary fibrosis        HPI:  70-year-old female presented to ED for eval of chest pain. pMHx CAD s/p CABG, HTN, COPD, MIRA, chronic pain, DM.  Patient reported over the last several weeks she has been experiencing increased shortness of breath, for the last several days she has also been experiencing intermittent left-sided chest pressure that radiates to her left upper back with associated 6 lb weight gain and lower extremity edema.  Patient states chest pain occurred both at rest and with activity, nothing makes it better or worse.  Denies abdominal pain, nausea, vomiting, changes in bowel habits.  Denies syncope.  Denies fever, chills.  Patient states she took dose p.r.n. Lasix at home with mild improvement in lower extremity edema.  Patient does have history of COPD and MIRA, wears p.r.n. O2 via NC at home to maintain sats 94%, wears CPAP HS.  Denies current outpatient PO steroid or antibiotic treatment.  Patient also with history CAD s/p CABG, had been following with Dr. Buchanan for Cardiology but states she has to switch 2/2 insurance coverage and has pending upcoming appointment with Dr. Griggs.  Echo February 2024 with EF 55-60%.  Patient did take 81 mg ASA prior to arrival.  In ED, initial troponin 21.7.  BNP unremarkable.  CXR impression with no acute abnormality.  Admit to hospital medicine for further eval.    Overview/Hospital Course:  Sakshi Hess is a 70 year old female with a past medical history of IPF, obesity, MIRA, and DM who presented with chest pain. Troponin was mildly elevated, yet TTE and stress testing were unremarkable. Cardiology ,  nuclear stress negative for reversible ischemia. Echo with EF 62% with no acute valvular abnormalities, mild AS. Cardiology signed off. She is being treated for an exacerbation of IPF with steroids and Duonebs.      Interval History: patient seen and examined. NAD. Cardiology workup negative, signed off.     Review of Systems   All other systems reviewed and are negative.    Objective:     Vital Signs (Most Recent):  Temp: 97.8 °F (36.6 °C) (04/13/25 0729)  Pulse: 66 (04/13/25 0729)  Resp: 17 (04/13/25 0849)  BP: 131/62 (04/13/25 0729)  SpO2: 96 % (04/13/25 0729) Vital Signs (24h Range):  Temp:  [97.7 °F (36.5 °C)-98.2 °F (36.8 °C)] 97.8 °F (36.6 °C)  Pulse:  [66-79] 66  Resp:  [15-19] 17  SpO2:  [96 %-98 %] 96 %  BP: (116-150)/(54-73) 131/62     Weight: 84 kg (185 lb 3.2 oz)  Body mass index is 34.99 kg/m².    Intake/Output Summary (Last 24 hours) at 4/13/2025 1040  Last data filed at 4/13/2025 0557  Gross per 24 hour   Intake 640 ml   Output --   Net 640 ml         Physical Exam  Vitals reviewed.   Constitutional:       General: She is not in acute distress.     Appearance: Normal appearance. She is not ill-appearing.   HENT:      Head: Normocephalic and atraumatic.   Eyes:      Pupils: Pupils are equal, round, and reactive to light.   Cardiovascular:      Rate and Rhythm: Normal rate and regular rhythm.      Pulses: Normal pulses.      Heart sounds: Normal heart sounds.   Pulmonary:      Effort: Pulmonary effort is normal. No respiratory distress.      Comments: Diminished to bases, on supplemental oxygen.   Abdominal:      General: Bowel sounds are normal.      Palpations: Abdomen is soft.      Tenderness: There is no abdominal tenderness.   Musculoskeletal:         General: Normal range of motion.      Cervical back: Normal range of motion and neck supple.   Skin:     General: Skin is warm.      Capillary Refill: Capillary refill takes less than 2 seconds.   Neurological:      General: No focal deficit present.       Mental Status: She is alert. Mental status is at baseline.   Psychiatric:         Mood and Affect: Mood normal.               Significant Labs: All pertinent labs within the past 24 hours have been reviewed.  CBC:   Recent Labs   Lab 04/12/25  0536 04/13/25  0544   WBC 5.81 6.15   HGB 11.9* 12.0   HCT 37.3 37.8    232     CMP:   Recent Labs   Lab 04/12/25  0536 04/13/25  0544    139   K 4.0 5.1   CO2 31* 31*   BUN 16 18   CREATININE 0.8 0.8   CALCIUM 9.0 9.0   ALBUMIN 3.6 3.8   BILITOT 0.6 0.3   ALKPHOS 63 61   AST 12 12   ALT 9* 7*       Significant Imaging: I have reviewed all pertinent imaging results/findings within the past 24 hours.  Imaging Results              NM Myocardial Perfusion Spect Multi Pharmacologic (Final result)  Result time 04/12/25 09:59:13   Procedure changed from NM Myocardial Perfusion Spect Multi Exer     Final result by Kevin Harrison MD (04/12/25 09:59:13)                   Impression:      1. No scintigraphic evidence of left ventricular myocardial ischemia.  2. Fixed defect affecting anteroseptal wall suggesting sequelae of prior transmural infarct.  Scratch  3. Hypokinesis of anteroseptal wall.  4. Calculated left ventricular ejection fraction of 58%.      Electronically signed by: Kevin Harrison  Date:    04/12/2025  Time:    09:59               Narrative:    EXAMINATION:  NM MYOCARDIAL PERFUSION SPECT MULTI PHARM    CLINICAL HISTORY:  Chest pain, concern of CAD;    TECHNIQUE:  Radiopharmaceutical: 11.3 mCi Technetium 99m Myoview utilized for rest imaging. 25.5 mCi Technetium 99m Myoview utilized for stress imaging.    0.4 mg Lexiscan administered for pharmacologic stress.    COMPARISON:  Myocardial perfusion scan 04/13/2017    FINDINGS:  Tomographic images reveal fixed defect affecting apical and mid aspects of anteroseptal wall.  Remaining radiopharmaceutical distribution throughout left ventricular myocardium is symmetric on stress and rest imaging.  No reversible  "perfusion defect is suggested to suggest myocardial ischemia.    Gated SPECT images show hypokinesis of anteroseptal wall.  Calculated left ventricular ejection fraction is 58 %.                                       X-Ray Chest PA And Lateral (Final result)  Result time 04/10/25 19:25:13      Final result by Eloisa Hercules MD (04/10/25 19:25:13)                   Impression:      As above      Electronically signed by: Eloisa Hercules  Date:    04/10/2025  Time:    19:25               Narrative:    EXAMINATION:  XR CHEST PA AND LATERAL    CLINICAL HISTORY:  Chest Pain;    TECHNIQUE:  PA and lateral views of the chest were performed.    COMPARISON:  06/12/2024    FINDINGS:  Chronic interstitial changes.  No focal consolidation.  Normal heart size.                                          Assessment & Plan  Acute exacerbation of idiopathic pulmonary fibrosis  -Duonebs  - continue Steroids  -LABA and ICS nebulizers  -wean supplemental oxygen.     Obstructive sleep apnea syndrome  -CPAP QHS    Diabetes  Patient's FSGs are controlled on current medication regimen.  Last A1c reviewed- No results found for: "LABA1C", "HGBA1C"  Most recent fingerstick glucose reviewed-   Recent Labs   Lab 04/12/25  1137 04/12/25  1655 04/12/25 2014 04/13/25  0630   POCTGLUCOSE 107 119* 109 141*     Current correctional scale  Low  Maintain anti-hyperglycemic dose as follows-   Antihyperglycemics (From admission, onward)      Start     Stop Route Frequency Ordered    04/10/25 2329  insulin aspart U-100 pen 0-5 Units         -- SubQ Before meals & nightly PRN 04/10/25 2240          Hold Oral hypoglycemics while patient is in the hospital.  Chronic pain  Stable.    Severe obesity (BMI 35.0-39.9) with comorbidity  Body mass index is 34.99 kg/m². Morbid obesity complicates all aspects of disease management from diagnostic modalities to treatment.     VTE Risk Mitigation (From admission, onward)           Ordered     enoxaparin " injection 40 mg  Daily         04/10/25 2240     IP VTE HIGH RISK PATIENT  Once         04/10/25 2240     Place sequential compression device  Until discontinued         04/10/25 2240                    Discharge Planning   RENATO: 4/14/2025     Code Status: Full Code   Medical Readiness for Discharge Date:   Discharge Plan A: Home   Discharge Delays: (!) Procedure Scheduling (IR, OR, Labs, Echo, Cath, Echo, EEG)                    Pat Greenfield NP  Department of Hospital Medicine   [facility]

## 2025-04-13 NOTE — CARE UPDATE
04/12/25 1935   Patient Assessment/Suction   Level of Consciousness (AVPU) alert   Respiratory Effort Normal;Unlabored   Expansion/Accessory Muscles/Retractions no use of accessory muscles   All Lung Fields Breath Sounds diminished   Rhythm/Pattern, Respiratory unlabored;pattern regular   Skin Integrity   $ Wound Care Tech Time 15 min   Area Observed Left;Right;Behind ear;Cheek;Nares   Skin Appearance without discoloration   PRE-TX-O2   Device (Oxygen Therapy) nasal cannula   Flow (L/min) (Oxygen Therapy) 2   SpO2 97 %   Pulse Oximetry Type Intermittent   $ Pulse Oximetry - Multiple Charge Pulse Oximetry - Multiple   Pulse 72   Resp 17   Aerosol Therapy   $ Aerosol Therapy Charges Aerosol Treatment   Daily Review of Necessity (SVN) completed   Respiratory Treatment Status (SVN) given   Treatment Route (SVN) mouthpiece utilized;oxygen   Patient Position HOB elevated   Post Treatment Assessment (SVN) increased aeration   Signs of Intolerance (SVN) none   Breath Sounds Post-Respiratory Treatment   Throughout All Fields Post-Treatment All Fields   Throughout All Fields Post-Treatment aeration increased   Post-treatment Heart Rate (beats/min) 75   Post-treatment Resp Rate (breaths/min) 18   Education   $ Education Bronchodilator;Oxygen;15 min

## 2025-04-13 NOTE — SUBJECTIVE & OBJECTIVE
Interval History: patient seen and examined. NAD. Cardiology workup negative, signed off.     Review of Systems   All other systems reviewed and are negative.    Objective:     Vital Signs (Most Recent):  Temp: 97.8 °F (36.6 °C) (04/13/25 0729)  Pulse: 66 (04/13/25 0729)  Resp: 17 (04/13/25 0849)  BP: 131/62 (04/13/25 0729)  SpO2: 96 % (04/13/25 0729) Vital Signs (24h Range):  Temp:  [97.7 °F (36.5 °C)-98.2 °F (36.8 °C)] 97.8 °F (36.6 °C)  Pulse:  [66-79] 66  Resp:  [15-19] 17  SpO2:  [96 %-98 %] 96 %  BP: (116-150)/(54-73) 131/62     Weight: 84 kg (185 lb 3.2 oz)  Body mass index is 34.99 kg/m².    Intake/Output Summary (Last 24 hours) at 4/13/2025 1040  Last data filed at 4/13/2025 0557  Gross per 24 hour   Intake 640 ml   Output --   Net 640 ml         Physical Exam  Vitals reviewed.   Constitutional:       General: She is not in acute distress.     Appearance: Normal appearance. She is not ill-appearing.   HENT:      Head: Normocephalic and atraumatic.   Eyes:      Pupils: Pupils are equal, round, and reactive to light.   Cardiovascular:      Rate and Rhythm: Normal rate and regular rhythm.      Pulses: Normal pulses.      Heart sounds: Normal heart sounds.   Pulmonary:      Effort: Pulmonary effort is normal. No respiratory distress.      Comments: Diminished to bases, on supplemental oxygen.   Abdominal:      General: Bowel sounds are normal.      Palpations: Abdomen is soft.      Tenderness: There is no abdominal tenderness.   Musculoskeletal:         General: Normal range of motion.      Cervical back: Normal range of motion and neck supple.   Skin:     General: Skin is warm.      Capillary Refill: Capillary refill takes less than 2 seconds.   Neurological:      General: No focal deficit present.      Mental Status: She is alert. Mental status is at baseline.   Psychiatric:         Mood and Affect: Mood normal.               Significant Labs: All pertinent labs within the past 24 hours have been  reviewed.  CBC:   Recent Labs   Lab 04/12/25  0536 04/13/25  0544   WBC 5.81 6.15   HGB 11.9* 12.0   HCT 37.3 37.8    232     CMP:   Recent Labs   Lab 04/12/25  0536 04/13/25  0544    139   K 4.0 5.1   CO2 31* 31*   BUN 16 18   CREATININE 0.8 0.8   CALCIUM 9.0 9.0   ALBUMIN 3.6 3.8   BILITOT 0.6 0.3   ALKPHOS 63 61   AST 12 12   ALT 9* 7*       Significant Imaging: I have reviewed all pertinent imaging results/findings within the past 24 hours.  Imaging Results              NM Myocardial Perfusion Spect Multi Pharmacologic (Final result)  Result time 04/12/25 09:59:13   Procedure changed from NM Myocardial Perfusion Spect Multi Exer     Final result by Kevin Harrison MD (04/12/25 09:59:13)                   Impression:      1. No scintigraphic evidence of left ventricular myocardial ischemia.  2. Fixed defect affecting anteroseptal wall suggesting sequelae of prior transmural infarct.  Scratch  3. Hypokinesis of anteroseptal wall.  4. Calculated left ventricular ejection fraction of 58%.      Electronically signed by: Kevin Harrison  Date:    04/12/2025  Time:    09:59               Narrative:    EXAMINATION:  NM MYOCARDIAL PERFUSION SPECT MULTI PHARM    CLINICAL HISTORY:  Chest pain, concern of CAD;    TECHNIQUE:  Radiopharmaceutical: 11.3 mCi Technetium 99m Myoview utilized for rest imaging. 25.5 mCi Technetium 99m Myoview utilized for stress imaging.    0.4 mg Lexiscan administered for pharmacologic stress.    COMPARISON:  Myocardial perfusion scan 04/13/2017    FINDINGS:  Tomographic images reveal fixed defect affecting apical and mid aspects of anteroseptal wall.  Remaining radiopharmaceutical distribution throughout left ventricular myocardium is symmetric on stress and rest imaging.  No reversible perfusion defect is suggested to suggest myocardial ischemia.    Gated SPECT images show hypokinesis of anteroseptal wall.  Calculated left ventricular ejection fraction is 58 %.                                        X-Ray Chest PA And Lateral (Final result)  Result time 04/10/25 19:25:13      Final result by Eloisa Hercules MD (04/10/25 19:25:13)                   Impression:      As above      Electronically signed by: Eloisa Hercules  Date:    04/10/2025  Time:    19:25               Narrative:    EXAMINATION:  XR CHEST PA AND LATERAL    CLINICAL HISTORY:  Chest Pain;    TECHNIQUE:  PA and lateral views of the chest were performed.    COMPARISON:  06/12/2024    FINDINGS:  Chronic interstitial changes.  No focal consolidation.  Normal heart size.

## 2025-04-13 NOTE — ASSESSMENT & PLAN NOTE
"Patient's FSGs are controlled on current medication regimen.  Last A1c reviewed- No results found for: "LABA1C", "HGBA1C"  Most recent fingerstick glucose reviewed-   Recent Labs   Lab 04/12/25  1137 04/12/25  1655 04/12/25 2014 04/13/25  0630   POCTGLUCOSE 107 119* 109 141*     Current correctional scale  Low  Maintain anti-hyperglycemic dose as follows-   Antihyperglycemics (From admission, onward)      Start     Stop Route Frequency Ordered    04/10/25 2329  insulin aspart U-100 pen 0-5 Units         -- SubQ Before meals & nightly PRN 04/10/25 2240          Hold Oral hypoglycemics while patient is in the hospital.  "

## 2025-04-13 NOTE — CARE UPDATE
04/13/25 0712   Patient Assessment/Suction   Level of Consciousness (AVPU) alert   Respiratory Effort Unlabored   Expansion/Accessory Muscles/Retractions no use of accessory muscles   All Lung Fields Breath Sounds Anterior:;Lateral:;diminished   Rhythm/Pattern, Respiratory unlabored;pattern regular;depth regular   Skin Integrity   $ Wound Care Tech Time 15 min   Area Observed Left;Right;Behind ear;Cheek   Skin Appearance without discoloration   PRE-TX-O2   Device (Oxygen Therapy) nasal cannula   $ Is the patient on Low Flow Oxygen? Yes   Flow (L/min) (Oxygen Therapy) 2   SpO2 97 %   Pulse Oximetry Type Intermittent   $ Pulse Oximetry - Multiple Charge Pulse Oximetry - Multiple   Pulse 75   Resp 18   Positioning   Body Position sitting up in bed   Head of Bed (HOB) Positioning HOB lowered   Aerosol Therapy   $ Aerosol Therapy Charges Aerosol Treatment   Daily Review of Necessity (SVN) completed   Respiratory Treatment Status (SVN) given   Treatment Route (SVN) mask   Patient Position HOB elevated   Post Treatment Assessment (SVN) breath sounds unchanged   Signs of Intolerance (SVN) none   Respiratory Interventions   Cough And Deep Breathing done with encouragement   Education   $ Education 15 min;Bronchodilator

## 2025-04-13 NOTE — PLAN OF CARE
Problem: Adult Inpatient Plan of Care  Goal: Plan of Care Review  Outcome: Progressing  Goal: Patient-Specific Goal (Individualized)  Outcome: Progressing     Problem: Diabetes Comorbidity  Goal: Blood Glucose Level Within Targeted Range  Outcome: Progressing     Problem: Gas Exchange Impaired  Goal: Optimal Gas Exchange  Outcome: Progressing

## 2025-04-14 VITALS
TEMPERATURE: 98 F | RESPIRATION RATE: 16 BRPM | HEART RATE: 96 BPM | OXYGEN SATURATION: 93 % | BODY MASS INDEX: 34.96 KG/M2 | SYSTOLIC BLOOD PRESSURE: 132 MMHG | HEIGHT: 61 IN | WEIGHT: 185.19 LBS | DIASTOLIC BLOOD PRESSURE: 74 MMHG

## 2025-04-14 LAB
ABSOLUTE EOSINOPHIL (SMH): 0.08 K/UL
ABSOLUTE MONOCYTE (SMH): 0.77 K/UL (ref 0.3–1)
ABSOLUTE NEUTROPHIL COUNT (SMH): 4.6 K/UL (ref 1.8–7.7)
ALBUMIN SERPL-MCNC: 3.9 G/DL (ref 3.5–5.2)
ALP SERPL-CCNC: 59 UNIT/L (ref 55–135)
ALT SERPL-CCNC: 9 UNIT/L (ref 10–44)
ANION GAP (SMH): 6 MMOL/L (ref 8–16)
AST SERPL-CCNC: 14 UNIT/L (ref 10–40)
BASOPHILS # BLD AUTO: 0.05 K/UL
BASOPHILS NFR BLD AUTO: 0.6 %
BILIRUB SERPL-MCNC: 0.4 MG/DL (ref 0.1–1)
BUN SERPL-MCNC: 23 MG/DL (ref 8–23)
CALCIUM SERPL-MCNC: 9.1 MG/DL (ref 8.7–10.5)
CHLORIDE SERPL-SCNC: 103 MMOL/L (ref 95–110)
CO2 SERPL-SCNC: 31 MMOL/L (ref 23–29)
CREAT SERPL-MCNC: 0.9 MG/DL (ref 0.5–1.4)
EAG (SMH): 134 MG/DL (ref 68–131)
ERYTHROCYTE [DISTWIDTH] IN BLOOD BY AUTOMATED COUNT: 14.2 % (ref 11.5–14.5)
GFR SERPLBLD CREATININE-BSD FMLA CKD-EPI: >60 ML/MIN/1.73/M2
GLUCOSE SERPL-MCNC: 107 MG/DL (ref 70–110)
HBA1C MFR BLD: 6.3 % (ref 4.5–6.2)
HCT VFR BLD AUTO: 36.6 % (ref 37–48.5)
HGB BLD-MCNC: 11.7 GM/DL (ref 12–16)
IMM GRANULOCYTES # BLD AUTO: 0.04 K/UL (ref 0–0.04)
IMM GRANULOCYTES NFR BLD AUTO: 0.5 % (ref 0–0.5)
LYMPHOCYTES # BLD AUTO: 2.33 K/UL (ref 1–4.8)
MAGNESIUM SERPL-MCNC: 2 MG/DL (ref 1.6–2.6)
MCH RBC QN AUTO: 29.1 PG (ref 27–31)
MCHC RBC AUTO-ENTMCNC: 32 G/DL (ref 32–36)
MCV RBC AUTO: 91 FL (ref 82–98)
NUCLEATED RBC (/100WBC) (SMH): 0 /100 WBC
PLATELET # BLD AUTO: 218 K/UL (ref 150–450)
PMV BLD AUTO: 9.2 FL (ref 9.2–12.9)
POCT GLUCOSE: 160 MG/DL (ref 70–110)
POCT GLUCOSE: 98 MG/DL (ref 70–110)
POTASSIUM SERPL-SCNC: 4.1 MMOL/L (ref 3.5–5.1)
PROT SERPL-MCNC: 6.4 GM/DL (ref 6–8.4)
RBC # BLD AUTO: 4.02 M/UL (ref 4–5.4)
RELATIVE EOSINOPHIL (SMH): 1 % (ref 0–8)
RELATIVE LYMPHOCYTE (SMH): 29.6 % (ref 18–48)
RELATIVE MONOCYTE (SMH): 9.8 % (ref 4–15)
RELATIVE NEUTROPHIL (SMH): 58.5 % (ref 38–73)
SODIUM SERPL-SCNC: 140 MMOL/L (ref 136–145)
WBC # BLD AUTO: 7.88 K/UL (ref 3.9–12.7)

## 2025-04-14 PROCEDURE — 97165 OT EVAL LOW COMPLEX 30 MIN: CPT

## 2025-04-14 PROCEDURE — 36415 COLL VENOUS BLD VENIPUNCTURE: CPT

## 2025-04-14 PROCEDURE — 83036 HEMOGLOBIN GLYCOSYLATED A1C: CPT

## 2025-04-14 PROCEDURE — 27000221 HC OXYGEN, UP TO 24 HOURS

## 2025-04-14 PROCEDURE — 80053 COMPREHEN METABOLIC PANEL: CPT

## 2025-04-14 PROCEDURE — 25000003 PHARM REV CODE 250

## 2025-04-14 PROCEDURE — 94761 N-INVAS EAR/PLS OXIMETRY MLT: CPT

## 2025-04-14 PROCEDURE — 97535 SELF CARE MNGMENT TRAINING: CPT

## 2025-04-14 PROCEDURE — 97161 PT EVAL LOW COMPLEX 20 MIN: CPT

## 2025-04-14 PROCEDURE — 94640 AIRWAY INHALATION TREATMENT: CPT

## 2025-04-14 PROCEDURE — 25000003 PHARM REV CODE 250: Performed by: STUDENT IN AN ORGANIZED HEALTH CARE EDUCATION/TRAINING PROGRAM

## 2025-04-14 PROCEDURE — 63600175 PHARM REV CODE 636 W HCPCS: Performed by: STUDENT IN AN ORGANIZED HEALTH CARE EDUCATION/TRAINING PROGRAM

## 2025-04-14 PROCEDURE — 83735 ASSAY OF MAGNESIUM: CPT

## 2025-04-14 PROCEDURE — 25000242 PHARM REV CODE 250 ALT 637 W/ HCPCS: Performed by: STUDENT IN AN ORGANIZED HEALTH CARE EDUCATION/TRAINING PROGRAM

## 2025-04-14 PROCEDURE — 85025 COMPLETE CBC W/AUTO DIFF WBC: CPT

## 2025-04-14 RX ORDER — PREDNISONE 10 MG/1
TABLET ORAL
Qty: 34 TABLET | Refills: 0 | Status: SHIPPED | OUTPATIENT
Start: 2025-04-15 | End: 2025-05-03

## 2025-04-14 RX ADMIN — OXYCODONE HYDROCHLORIDE 15 MG: 10 TABLET ORAL at 10:04

## 2025-04-14 RX ADMIN — OXYCODONE HYDROCHLORIDE 15 MG: 10 TABLET ORAL at 03:04

## 2025-04-14 RX ADMIN — PREDNISONE 40 MG: 20 TABLET ORAL at 08:04

## 2025-04-14 RX ADMIN — ASPIRIN 81 MG: 81 TABLET, COATED ORAL at 08:04

## 2025-04-14 RX ADMIN — PANTOPRAZOLE SODIUM 40 MG: 40 TABLET, DELAYED RELEASE ORAL at 05:04

## 2025-04-14 RX ADMIN — LOSARTAN POTASSIUM 25 MG: 25 TABLET, FILM COATED ORAL at 08:04

## 2025-04-14 RX ADMIN — ARFORMOTEROL TARTRATE 15 MCG: 15 SOLUTION RESPIRATORY (INHALATION) at 07:04

## 2025-04-14 RX ADMIN — BUDESONIDE INHALATION 0.5 MG: 0.5 SUSPENSION RESPIRATORY (INHALATION) at 07:04

## 2025-04-14 RX ADMIN — ISOSORBIDE MONONITRATE 30 MG: 30 TABLET, EXTENDED RELEASE ORAL at 08:04

## 2025-04-14 NOTE — CARE UPDATE
04/13/25 1920   Patient Assessment/Suction   Level of Consciousness (AVPU) alert   Respiratory Effort Normal;Unlabored   Expansion/Accessory Muscles/Retractions no use of accessory muscles   All Lung Fields Breath Sounds diminished   Rhythm/Pattern, Respiratory unlabored;pattern regular   Skin Integrity   $ Wound Care Tech Time 15 min   Area Observed Left;Right;Behind ear;Cheek;Nares   Skin Appearance without discoloration   PRE-TX-O2   Device (Oxygen Therapy) nasal cannula   Flow (L/min) (Oxygen Therapy) 2   SpO2 95 %   Pulse Oximetry Type Intermittent   $ Pulse Oximetry - Multiple Charge Pulse Oximetry - Multiple   Pulse 95   Resp 19   Aerosol Therapy   $ Aerosol Therapy Charges Aerosol Treatment   Daily Review of Necessity (SVN) completed   Respiratory Treatment Status (SVN) given   Treatment Route (SVN) oxygen;mouthpiece utilized   Patient Position HOB elevated   Post Treatment Assessment (SVN) breath sounds unchanged   Signs of Intolerance (SVN) none   Education   $ Education Bronchodilator;15 min

## 2025-04-14 NOTE — ASSESSMENT & PLAN NOTE
Patient's FSGs are controlled on current medication regimen.  Last A1c reviewed-   Lab Results   Component Value Date    HGBA1C 6.3 (H) 04/14/2025     Most recent fingerstick glucose reviewed-   Recent Labs   Lab 04/13/25  1703 04/13/25 2021 04/14/25  0714 04/14/25  1118   POCTGLUCOSE 147* 160* 98 160*     Not requiring, DC lispro      Hold Oral hypoglycemics while patient is in the hospital.

## 2025-04-14 NOTE — DISCHARGE SUMMARY
Formerly [facility], [facility] Medicine  Discharge Summary      Patient Name: Sakshi Hess  MRN: [id number]  COLE: [id number]  Patient Class: IP- Inpatient  Admission Date: 4/10/2025  Hospital Length of Stay: 1 days  Discharge Date and Time: 04/14/2025 3:15 PM  Attending Physician: Kenisha Mcmillan MD   Discharging Provider: Kenisha Mcmillan MD  Primary Care Provider: Wenceslao Deras NP    Primary Care Team: Networked reference to record PCT     HPI:   70-year-old female presented to ED for eval of chest pain. pMHx CAD s/p CABG, HTN, COPD, MIRA, chronic pain, DM.  Patient reported over the last several weeks she has been experiencing increased shortness of breath, for the last several days she has also been experiencing intermittent left-sided chest pressure that radiates to her left upper back with associated 6 lb weight gain and lower extremity edema.  Patient states chest pain occurred both at rest and with activity, nothing makes it better or worse.  Denies abdominal pain, nausea, vomiting, changes in bowel habits.  Denies syncope.  Denies fever, chills.  Patient states she took dose p.r.n. Lasix at home with mild improvement in lower extremity edema.  Patient does have history of COPD and MIRA, wears p.r.n. O2 via NC at home to maintain sats 94%, wears CPAP HS.  Denies current outpatient PO steroid or antibiotic treatment.  Patient also with history CAD s/p CABG, had been following with Dr. Buchanan for Cardiology but states she has to switch 2/2 insurance coverage and has pending upcoming appointment with Dr. Griggs.  Echo February 2024 with EF 55-60%.  Patient did take 81 mg ASA prior to arrival.  In ED, initial troponin 21.7.  BNP unremarkable.  CXR impression with no acute abnormality.  Admit to hospital medicine for further eval.    * No surgery found *      Hospital Course:   Sakshi Hess is a 70 year old female with a past medical history of IPF, obesity, MIRA, and DM who presented with  chest pain. Troponin was mildly elevated, yet TTE and stress testing were unremarkable. Cardiology , nuclear stress negative for reversible ischemia. Echo with EF 62% with no acute valvular abnormalities, mild AS. Cardiology signed off. She is being treated for an exacerbation of IPF with steroids and Duonebs.  Patient's respiratory status returned to baseline and she was discharged on extended steroid taper with instructions to follow up with her pulmonologist with whom she already follows closely.  She is seen and examined prior to discharge in appropriate condition to do so.     Goals of Care Treatment Preferences:  Code Status: Full Code      SDOH Screening:  The patient declined to be screened for utility difficulties, food insecurity, transport difficulties, housing insecurity, and interpersonal safety, so no concerns could be identified this admission.     Consults:   Consults (From admission, onward)          Status Ordering Provider     Inpatient consult to Cardiology  Once        Provider:  Ava Ramírez MD    Completed ROBY GRANT            Assessment & Plan  Acute exacerbation of idiopathic pulmonary fibrosis    Steroid taper  Resume home inhalers  Obstructive sleep apnea syndrome  -CPAP QHS    Diabetes  Patient's FSGs are controlled on current medication regimen.  Last A1c reviewed-   Lab Results   Component Value Date    HGBA1C 6.3 (H) 04/14/2025     Most recent fingerstick glucose reviewed-   Recent Labs   Lab 04/13/25  1703 04/13/25  2021 04/14/25  0714 04/14/25  1118   POCTGLUCOSE 147* 160* 98 160*     Not requiring, DC lispro      Hold Oral hypoglycemics while patient is in the hospital.  Chronic pain  Stable.    Severe obesity (BMI 35.0-39.9) with comorbidity  Body mass index is 34.99 kg/m². Morbid obesity complicates all aspects of disease management from diagnostic modalities to treatment.     Final Active Diagnoses:    Diagnosis Date Noted POA    PRINCIPAL PROBLEM:  Acute  exacerbation of idiopathic pulmonary fibrosis [J84.112] 04/10/2025 Yes    Chronic pain [G89.29] 04/10/2025 Yes    Diabetes [E11.9] 02/26/2024 Yes    Severe obesity (BMI 35.0-39.9) with comorbidity [E66.01] 08/03/2023 Yes    Obstructive sleep apnea syndrome [G47.33] 04/15/2021 Yes      Problems Resolved During this Admission:       Discharged Condition: stable    Disposition: Home or Self Care    Follow Up:   Follow-up Information       Wenceslao Deras NP. Go on 4/21/2025.    Specialty: Family Medicine  Why: 11am for your hospital follow up.  Contact information:  [address] [place] [address] [place] [address]  [phone]               Darek Bowen MD. Schedule an appointment as soon as possible for a visit in 1 week(s).    Specialty: Pulmonary Disease  Why: [employer] message sent to Dr. Bowen's office.  Contact information:  [address] [id number] [phone]                           Patient Instructions:   No discharge procedures on file.    Significant Diagnostic Studies: Labs: BMP:   Recent Labs   Lab 04/13/25  0544 04/14/25  0511    140   K 5.1 4.1   CO2 31* 31*   BUN 18 23   CREATININE 0.8 0.9   CALCIUM 9.0 9.1   MG 2.0 2.0    and CBC   Recent Labs   Lab 04/13/25  0544 04/14/25  0511   WBC 6.15 7.88   HGB 12.0 11.7*   HCT 37.8 36.6*    218       Pending Diagnostic Studies:       None           Medications:  Reconciled Home Medications:      Medication List        CHANGE how you take these medications      predniSONE 10 MG tablet  Commonly known as: DELTASONE  Take 4 tablets (40 mg total) by mouth once daily for 2 days, THEN 3 tablets (30 mg total) once daily for 4 days, THEN 2 tablets (20 mg total) once daily for 4 days, THEN 1 tablet (10 mg total) once daily for 4 days, THEN 0.5 tablets (5 mg total) once daily for 4 days.  Start taking on: April 15, 2025  What changed:   medication strength  See the new instructions.            CONTINUE taking these medications      aspirin 81  MG EC tablet  Commonly known as: ECOTRIN  Take 1 tablet (81 mg total) by mouth once daily.     baclofen 10 MG tablet  Commonly known as: LIORESAL  Take 1 tablet by mouth up to 2 times daily as needed for shoulder pain     DELROYTRI AEROSPHERE 160-9-4.8 mcg/actuation Hfaa  Generic drug: budesonide-glycopyr-formoterol  inhale 2 puffs into the lungs 2 times daily     chlorthalidone 25 MG Tab  Commonly known as: HYGROTEN  Take 1 tablet (25 mg total) by mouth once daily.     furosemide 20 MG tablet  Commonly known as: LASIX  1 tablet by mouth once a day as needed     GINSENG ORAL  Take 250 mg by mouth once daily.     GOODY'S EXTRA STRENGTH 500-325-65 mg Pwpk  Generic drug: aspirin-acetaminophen-caffeine  Take 1 Dose by mouth as needed (pain).     ibuprofen 200 MG tablet  Commonly known as: ADVIL,MOTRIN  Take 400 mg by mouth every 6 (six) hours as needed for Pain.     isosorbide mononitrate 30 MG 24 hr tablet  Commonly known as: IMDUR  Take 1 tablet (30 mg total) by mouth once daily.     levalbuterol 1.25 mg/3 mL nebulizer solution  Commonly known as: XOPENEX  Take 3 mLs (1.25 mg total) by nebulization every 4 (four) hours as needed for Wheezing or Shortness of Breath. Rescue     levalbuterol 45 mcg/actuation inhaler  Commonly known as: XOPENEX HFA  Inhale 1 puff into the lungs every 8 (eight) hours as needed for Wheezing.     losartan 25 MG tablet  Commonly known as: COZAAR  Take 1 tablet by mouth every day     metFORMIN 500 MG ER 24hr tablet  Commonly known as: GLUCOPHAGE-XR  Take 1 tablet by mouth every day     modafiniL 200 MG Tab  Commonly known as: PROVIGIL  Take 1 tablet (200 mg total) by mouth 2 (two) times daily as needed (excess sleepiness).     nitroGLYCERIN 0.2 mg/hr TD PT24 0.2 mg/hr  Commonly known as: NITRO-DUR  Place 1 patch onto the skin every 24 hours     oxyCODONE 15 MG Tab  Commonly known as: ROXICODONE  Take 1 tablet at 6 AM, Take 1 tablet at 12 Noon, Take 1 tablet at 6 PM, Take 1 tablet at 12  Midnight     REPATHA SYRINGE 140 mg/mL Syrg  Generic drug: evolocumab  INJECT 1 PEN INTO THE SKIN EVERY 2 WEEKS     TRUE METRIX GLUCOSE TEST STRIP Strp  Generic drug: blood sugar diagnostic  test blood sugar 2 times daily     TRUEPLUS LANCETS 30 gauge Misc  Generic drug: lancets  Check blood sugar 2 times daily     VITAMIN D2 1,250 mcg (50,000 unit) capsule  Generic drug: ergocalciferol  Take 1 tablet weekly by mouth for 8 weeks, then every other week thereafter.            STOP taking these medications      azithromycin 500 MG tablet  Commonly known as: ZITHROMAX     scopolamine 1.3-1.5 mg (1 mg over 3 days)  Commonly known as: TRANSDERM-SCOP              Indwelling Lines/Drains at time of discharge:   Lines/Drains/Airways       None                   Time spent on the discharge of patient: 35 minutes         Kenisha Mcmillan MD  Department of Hospital Medicine  [facility]

## 2025-04-14 NOTE — PT/OT/SLP EVAL
Physical Therapy Evaluation and Discharge Note    Patient Name:  Sakshi Hess   MRN:  [id number]    Recommendations:     Discharge Recommendations: No Therapy Indicated  Discharge Equipment Recommendations: none   Barriers to discharge:  medical status    Assessment:     Sakshi Hess is a 70 y.o. female admitted with a medical diagnosis of Acute exacerbation of idiopathic pulmonary fibrosis. .  At this time, patient is functioning at their prior level of function and does not require further acute PT services.     Recent Surgery: * No surgery found *      Plan:     During this hospitalization, patient does not require further acute PT services.  Please re-consult if situation changes.      Subjective     Chief Complaint: none, reports feeling much better  Patient/Family Comments/goals: go home  Pain/Comfort:  Pain Rating 1: 0/10    Patients cultural, spiritual, Synagogue conflicts given the current situation:      Living Environment:  Pt lives alone in a one story home with 5 RYAN with B HR. O2 PRN,  Prior to admission, patients level of function was MI occ AD use.  Equipment used at home: none.  DME owned (not currently used): none.  Upon discharge, patient will have assistance from family.    Objective:     Communicated with RN prior to session.  Patient found HOB elevated with peripheral IV, telemetry upon PT entry to room.    General Precautions: Standard, fall    Orthopedic Precautions:N/A   Braces: N/A  Respiratory Status: Room air    Exams:  RLE ROM: WFL  RLE Strength: WFL  LLE ROM: WFL  LLE Strength: WFL    Functional Mobility:  Bed Mobility:     Supine to Sit: independence  Sit to Supine: independence  Transfers:     Sit to Stand:  independence with no AD  Gait: 300 ft with no AD and supervision  SAO2 post ambulation 92% RA    AM-PAC 6 CLICK MOBILITY  Total Score:24       Treatment and Education:  Pt educated on POC, discharge recommendation, importance of time OOB, pacing/energy conservation need  for assist with mobility, use of call bell to seek assistance as needed and fall prevention      AM-PAC 6 CLICK MOBILITY  Total Score:24     Patient left HOB elevated with all lines intact, call button in reach, and daughter and granddaughter present.    GOALS:   Multidisciplinary Problems       Physical Therapy Goals       Not on file                    DME Justifications:  No DME recommended requiring DME justifications    History:     Past Medical History:   Diagnosis Date    Arthritis     COPD (chronic obstructive pulmonary disease)     Coronary artery disease     Hypertension     Sleep apnea     use CPAP at night        Past Surgical History:   Procedure Laterality Date    CORONARY ANGIOGRAPHY N/A 2/15/2021    Procedure: ANGIOGRAM, CORONARY ARTERY;  Surgeon: Aidan Buchanan MD;  Location: [facility] CATH/EP LAB;  Service: Cardiology;  Laterality: N/A;    CORONARY ARTERY BYPASS GRAFT      CORONARY BYPASS GRAFT ANGIOGRAPHY  2/15/2021    Procedure: Bypass graft study;  Surgeon: Aidan Buchanan MD;  Location: [facility] CATH/EP LAB;  Service: Cardiology;;    HYSTERECTOMY      JOINT REPLACEMENT      bilateral knee replacement     LEFT HEART CATHETERIZATION Left 2/15/2021    Procedure: Left heart cath;  Surgeon: Aidan Buchanan MD;  Location: [facility] CATH/EP LAB;  Service: Cardiology;  Laterality: Left;       Time Tracking:     PT Received On: 04/14/25  PT Start Time: 1118     PT Stop Time: 1124  PT Total Time (min): 6 min     Billable Minutes: Evaluation 6      04/14/2025

## 2025-04-14 NOTE — PLAN OF CARE
04/14/25 1208   Medicare Message   Important Message from Medicare regarding Discharge Appeal Rights Given to patient/caregiver;Explained to patient/caregiver;Signed/date by patient/caregiver   Date IMM was signed 04/14/25   Time IMM was signed 1203

## 2025-04-14 NOTE — PLAN OF CARE
Problem: Adult Inpatient Plan of Care  Goal: Plan of Care Review  Outcome: Adequate for Care Transition  Goal: Patient-Specific Goal (Individualized)  Outcome: Adequate for Care Transition  Goal: Absence of Hospital-Acquired Illness or Injury  Outcome: Adequate for Care Transition  Goal: Optimal Comfort and Wellbeing  Outcome: Adequate for Care Transition  Goal: Readiness for Transition of Care  Outcome: Adequate for Care Transition     Problem: Diabetes Comorbidity  Goal: Blood Glucose Level Within Targeted Range  Outcome: Adequate for Care Transition     Problem: Gas Exchange Impaired  Goal: Optimal Gas Exchange  Outcome: Adequate for Care Transition

## 2025-04-14 NOTE — PT/OT/SLP EVAL
Occupational Therapy   Evaluation and Discharge Note    Name: Sakshi Hess  MRN: [id number]  Admitting Diagnosis: Acute exacerbation of idiopathic pulmonary fibrosis  Recent Surgery: * No surgery found *      Recommendations:     Discharge Recommendations: No Therapy Indicated  Discharge Equipment Recommendations: none  Barriers to discharge:  None    Assessment:     Sakshi Hess is a 70 y.o. female with a medical diagnosis of Acute exacerbation of idiopathic pulmonary fibrosis. At this time, patient is functioning at their prior level of function and does not require further acute OT services.     Plan:     During this hospitalization, patient does not require further acute OT services.  Please re-consult if situation changes.    Plan of Care Reviewed with: patient    Subjective     Chief Complaint: cough  Patient/Family Comments/goals: to go home    Occupational Profile:  Living Environment: patient lives alone in a mobile home 5 RYAN with handrails, walk in shower with no seat  Previous level of function: independent with all ADL's  Roles and Routines: homemaker  Equipment Used at home: none  Assistance upon Discharge: patient will have assistance from family upon discharge    Pain/Comfort:  Pain Rating 1: 2/10  Location 1: chest  Pain Addressed 1: Reposition, Distraction    Patients cultural, spiritual, [employer] conflicts given the current situation:      Objective:     Communicated with: nurse prior to session.  Patient found HOB elevated with peripheral IV, telemetry upon OT entry to room.    General Precautions: Standard, fall  Orthopedic Precautions: N/A  Braces: N/A  Respiratory Status: Room air     Occupational Performance:    Bed Mobility:    Patient completed Supine to Sit with independence  Patient completed Sit to Supine with independence    Functional Mobility/Transfers:  Patient completed Sit <> Stand Transfer with modified independence  with  rolling walker   Functional Mobility: ambulated  to bathroom with RW Elisabeth    Activities of Daily Living:  Lower Body Dressing: independence for donning and doffing socks    Cognitive/Visual Perceptual:  Cognitive/Psychosocial Skills:     -       Oriented to: Person, Place, Time, and Situation   -       Follows Commands/attention:Follows multistep  commands  -       Communication: clear/fluent  -       Memory: No Deficits noted  -       Safety awareness/insight to disability: intact   -       Mood/Affect/Coping skills/emotional control: Appropriate to situation      Physical Exam:  Upper Extremity Range of Motion:     -       Right Upper Extremity: WFL  -       Left Upper Extremity: WFL  Upper Extremity Strength:    -       Right Upper Extremity: WFL  -       Left Upper Extremity: WFL   Strength:    -       Right Upper Extremity: WFL  -       Left Upper Extremity: WFL  Fine Motor Coordination:    -       Intact     AMPAC 6 Click ADL:  AMPAC Total Score: 24    Treatment & Education:  Therapist provided facilitation and instruction of proper body mechanics and fall prevention strategies during tasks listed above.   Instructed patient to sit in bedside chair as tolerated daily to increase OOB/activity tolerance.   Instructed patient to use call light to have nursing staff assist with needs/transfers.   Discussed OT POC and answered all questions within OT scope of practice.        Patient left HOB elevated with call button in reach and bed alarm on    GOALS:   Multidisciplinary Problems       Occupational Therapy Goals       Not on file                    DME Justifications:  No DME recommended requiring DME justifications    History:     Past Medical History:   Diagnosis Date    Arthritis     COPD (chronic obstructive pulmonary disease)     Coronary artery disease     Hypertension     Sleep apnea     use CPAP at night          Past Surgical History:   Procedure Laterality Date    CORONARY ANGIOGRAPHY N/A 2/15/2021    Procedure: ANGIOGRAM, CORONARY ARTERY;   Surgeon: Aidan Buchanan MD;  Location: [facility] CATH/EP LAB;  Service: Cardiology;  Laterality: N/A;    CORONARY ARTERY BYPASS GRAFT      CORONARY BYPASS GRAFT ANGIOGRAPHY  2/15/2021    Procedure: Bypass graft study;  Surgeon: Aidan Buchanan MD;  Location: [facility] CATH/EP LAB;  Service: Cardiology;;    HYSTERECTOMY      JOINT REPLACEMENT      bilateral knee replacement     LEFT HEART CATHETERIZATION Left 2/15/2021    Procedure: Left heart cath;  Surgeon: Aidan Buchanan MD;  Location: [facility] CATH/EP LAB;  Service: Cardiology;  Laterality: Left;       Time Tracking:     OT Date of Treatment: 04/14/25  OT Start Time: 1400  OT Stop Time: 1421  OT Total Time (min): 21 min    Billable Minutes:Evaluation 10  Self Care/Home Management 11    4/14/2025

## 2025-04-14 NOTE — PLAN OF CARE
Pt clear for DC from case management standpoint. Discharging to home with family.  PCP appt scheduled and added to AVS. Inbasket message sent to Dr. Bowen staff.          04/14/25 1238   Final Note   Assessment Type Final Discharge Note   Anticipated Discharge Disposition Home   Hospital Resources/Appts/Education Provided Appointments scheduled and added to AVS

## 2025-04-14 NOTE — DISCHARGE INSTRUCTIONS
Our goal at [facility] is to always give you outstanding care and exceptional service. You may receive a survey from [employer] by mail, text or e-mail in the next 24-48 hours asking about the care you received with us. The survey should only take 5-10 minutes to complete and is very important to us.     Your feedback provides us with a way to recognize our staff who work tirelessly to provide the best care! Also, your responses help us learn how to improve when your experience was below our aspiration of excellence. We are always looking for ways to improve your stay. We WILL use your feedback to continue making improvements to help us provide the highest quality care. We keep your personal information and feedback confidential. We appreciate your time completing this survey and can't wait to hear from you!!!    We look forward to your continued care with us! Thanks so much for choosing [facility] for your healthcare needs!

## 2025-04-23 ENCOUNTER — TELEPHONE (OUTPATIENT)
Dept: PULMONOLOGY | Facility: CLINIC | Age: 70
End: 2025-04-23
Payer: MEDICARE

## 2025-04-23 NOTE — TELEPHONE ENCOUNTER
----- Message from  Coretta sent at 4/14/2025  8:59 AM CDT -----  Regarding: hospital follow up  Good morning- patient is discharging from [facility] today-  she needs a hospital follow up.

## 2025-04-29 ENCOUNTER — RESULTS FOLLOW-UP (OUTPATIENT)
Dept: PULMONOLOGY | Facility: CLINIC | Age: 70
End: 2025-04-29

## 2025-04-29 ENCOUNTER — OFFICE VISIT (OUTPATIENT)
Dept: PULMONOLOGY | Facility: CLINIC | Age: 70
End: 2025-04-29
Payer: MEDICARE

## 2025-04-29 ENCOUNTER — LAB VISIT (OUTPATIENT)
Dept: LAB | Facility: HOSPITAL | Age: 70
End: 2025-04-29
Attending: INTERNAL MEDICINE
Payer: MEDICARE

## 2025-04-29 VITALS
WEIGHT: 183.63 LBS | OXYGEN SATURATION: 99 % | BODY MASS INDEX: 34.67 KG/M2 | DIASTOLIC BLOOD PRESSURE: 65 MMHG | HEIGHT: 61 IN | SYSTOLIC BLOOD PRESSURE: 140 MMHG | HEART RATE: 61 BPM

## 2025-04-29 DIAGNOSIS — G47.10 HYPERSOMNOLENCE DISORDER: ICD-10-CM

## 2025-04-29 DIAGNOSIS — G47.33 OBSTRUCTIVE SLEEP APNEA SYNDROME: ICD-10-CM

## 2025-04-29 DIAGNOSIS — I50.32 CHRONIC HEART FAILURE WITH PRESERVED EJECTION FRACTION: ICD-10-CM

## 2025-04-29 DIAGNOSIS — J44.9 CHRONIC OBSTRUCTIVE PULMONARY DISEASE, UNSPECIFIED COPD TYPE: ICD-10-CM

## 2025-04-29 DIAGNOSIS — J96.11 CHRONIC HYPOXEMIC RESPIRATORY FAILURE: ICD-10-CM

## 2025-04-29 DIAGNOSIS — J84.9 ILD (INTERSTITIAL LUNG DISEASE): Primary | ICD-10-CM

## 2025-04-29 DIAGNOSIS — J45.20 MILD INTERMITTENT ASTHMA WITHOUT COMPLICATION: ICD-10-CM

## 2025-04-29 PROBLEM — J84.112 ACUTE EXACERBATION OF IDIOPATHIC PULMONARY FIBROSIS: Status: RESOLVED | Noted: 2025-04-10 | Resolved: 2025-04-29

## 2025-04-29 LAB
ANION GAP (SMH): 10 MMOL/L (ref 8–16)
BUN SERPL-MCNC: 27 MG/DL (ref 8–23)
CALCIUM SERPL-MCNC: 8.7 MG/DL (ref 8.7–10.5)
CHLORIDE SERPL-SCNC: 105 MMOL/L (ref 95–110)
CO2 SERPL-SCNC: 25 MMOL/L (ref 23–29)
CREAT SERPL-MCNC: 1 MG/DL (ref 0.5–1.4)
GFR SERPLBLD CREATININE-BSD FMLA CKD-EPI: >60 ML/MIN/1.73/M2
GLUCOSE SERPL-MCNC: 107 MG/DL (ref 70–110)
POTASSIUM SERPL-SCNC: 3.9 MMOL/L (ref 3.5–5.1)
SODIUM SERPL-SCNC: 140 MMOL/L (ref 136–145)

## 2025-04-29 PROCEDURE — 4010F ACE/ARB THERAPY RXD/TAKEN: CPT | Mod: CPTII,S$GLB,, | Performed by: INTERNAL MEDICINE

## 2025-04-29 PROCEDURE — 1101F PT FALLS ASSESS-DOCD LE1/YR: CPT | Mod: CPTII,S$GLB,, | Performed by: INTERNAL MEDICINE

## 2025-04-29 PROCEDURE — 36415 COLL VENOUS BLD VENIPUNCTURE: CPT

## 2025-04-29 PROCEDURE — 3288F FALL RISK ASSESSMENT DOCD: CPT | Mod: CPTII,S$GLB,, | Performed by: INTERNAL MEDICINE

## 2025-04-29 PROCEDURE — 99215 OFFICE O/P EST HI 40 MIN: CPT | Mod: S$GLB,,, | Performed by: INTERNAL MEDICINE

## 2025-04-29 PROCEDURE — 99999 PR PBB SHADOW E&M-EST. PATIENT-LVL III: CPT | Mod: PBBFAC,,, | Performed by: INTERNAL MEDICINE

## 2025-04-29 PROCEDURE — 3077F SYST BP >= 140 MM HG: CPT | Mod: CPTII,S$GLB,, | Performed by: INTERNAL MEDICINE

## 2025-04-29 PROCEDURE — 3008F BODY MASS INDEX DOCD: CPT | Mod: CPTII,S$GLB,, | Performed by: INTERNAL MEDICINE

## 2025-04-29 PROCEDURE — 3044F HG A1C LEVEL LT 7.0%: CPT | Mod: CPTII,S$GLB,, | Performed by: INTERNAL MEDICINE

## 2025-04-29 PROCEDURE — 80048 BASIC METABOLIC PNL TOTAL CA: CPT

## 2025-04-29 PROCEDURE — 1111F DSCHRG MED/CURRENT MED MERGE: CPT | Mod: CPTII,S$GLB,, | Performed by: INTERNAL MEDICINE

## 2025-04-29 PROCEDURE — 3078F DIAST BP <80 MM HG: CPT | Mod: CPTII,S$GLB,, | Performed by: INTERNAL MEDICINE

## 2025-04-29 RX ORDER — TRIAMTERENE/HYDROCHLOROTHIAZID 37.5-25 MG
1 TABLET ORAL DAILY
Qty: 30 TABLET | Refills: 11 | Status: SHIPPED | OUTPATIENT
Start: 2025-04-29 | End: 2026-04-29

## 2025-04-29 NOTE — PATIENT INSTRUCTIONS
Diagnosis for chest discomfort not clear -- I see no good evidence for fibrosis exacerbation.   Ct now would be low yield     Lasix abruptly improved your discomfort in hospital.  You have had prior fluid retention with sob improve with lasix.  You have preserved ejection heart failure.      Use maxzide daily (or so) and stop chlorthalidone as causes cramps.  Check blood in a month for potassium.    Ct and chest xray viewed and were good.    Stop prednisone    Should repeat studies for lungs in September and f/u then,    Continue cpap, you have component of copd and need new nebulizer      Notify zoltan of inogen issue

## 2025-04-29 NOTE — PROGRESS NOTES
4/29/2025    Sakshi Hess  New Patient Consult    Chief Complaint   Patient presents with    Follow-up    Interstitial Lung Disease    COPD        4/29/2025 pt developed left upper outer chest discomfort for 3 weeks-- no pleuritic pain -- pain would wax and wane with no ppt factors/relieving factors.      Echo 4/11/2025 pulm pressure 30 est.  --  Left Ventricle: The left ventricle is normal in size. There is mild concentric hypertrophy. There is normal systolic function. Quantitated ejection fraction is 62%.    Right Ventricle: The right ventricle is normal in size. Systolic function is normal.    Left Atrium: Mildly dilated    Aortic Valve: Not well visualized due to poor acoustic window. There is probable mild stenosis. Aortic valve area by VTI is 1.4 cm². Aortic valve peak velocity is 2.2 m/s. Mean gradient is 10 mmHg. The dimensionless index is 0.40. There is no significant regurgitation.    Mitral Valve: There is moderate mitral annular calcification.    IVC/SVC: Normal venous pressure at 3 mmHg.    Pt had some fluid in legs, had h/o chf prior (?).  Pt had prompt relief of chest discomfort with lasix);/    Pt had stress test and called ifp exacerbation with no clear worsening of cxr (no ct done) and breathing stable?    Not on ipf medications..   not jose to tolerate ipf rx in past.    Doing well with cpap, uses provigil with good results.  Has home ox with min use but uses with benefit..    3/10/2025 breathing doing well.  Needs abx, no flu vacc ine  Uses cpap nightly, does well on nuvigil  Uses prednisone occ, uses azityhr    Had vag yeast c/o    Not using buspar  Use ox for sleep --    Patient Instructions   Last ct and pft done 9/2024 -- breathing stable-- check ct/pft 9/2025 -- may go to every yr if stable?     Flu vaccine recommended, use  tamiflu if flu    Use diflucan for yeast    Use ox and cpap for sleep     Provigil renewed  9/9/2024 pt had f/u pft with no sign change from 2022,  ctchest 9/5/2024  felt to be unchanged.    Pt never smoker  Not using vest as may trigger angina    Last 2 months had achy and felt not right -  pt gained 9 lbs in 3 wks --- pt was seeing Dr Bourne, follows DR Buchanan's office.   Pt said to have good looking heart but was in heart failure    Pt had htn episode in June as was out of pain meds..    Pt still having eds, naps daily,uses 200 mg twcie daily    Patient Instructions   Use oxygen -- you benefit greatly    Use cpap as you have nighlty    Ct chest viewed and compared to 2022      Fibrosis is stable by breathing test and ct--- repeat studies if more sob or in 1+ yrs....    If progress fibrosis--- may re consider anti fibrotic medication      Use inhalers if any cough, use inhalers more-- you have freq wheezes    Will reoder provigil but residual sleepiness is present.....      We discuss vest -- will discontinue.  3/5/2024 pt was admitted to [facility] 2/26-29th with copd/angina.     Uses vest but feels aerobika effective..  Pt took pred/abx with hosp stay  Breathing stable and mucous clear.  Pt under stress as daughter having difficulties with smoking/antidepressant rx.  Cannot tolerate ofev -- stopped around October 2024-- was dosing 100/d...      No more chest pain  Uses provigil with benefit.  Echo was good 2/27/2024  Patient Instructions   We discuss using nuvigil over provigil as provigil losing effectiness late day on 400 mg daily split dose.  Will start 150 in morning nuvigil -- call or use portal if higher dose needed as can increase to 200, then may consider 250 max dose.        Would try breztri 2 twice daily to prevent lungs from worsening    Use prednisone if more cough or wheeze, use azithromycin or doxycycline (avoid sunshine on doxy) if yellow mucous-- azithro not always effective for 3 days?     Would check ct chest and pft and walk test in 6 months..    Continue cpap with ox nightly     Ct chest and cxr viewed from 6/2023 and 2/2024--- looked stable    8/3/2023    [facility]   Provigil helps and needs    Copd stable  Needs to f/u weight.    Uses ofev bid with occ pastey stools -- uses immodium-- doing reasonbe.  Breathing seems stable with aeorbika.        Pt still having lingering secretino--- used abx  last wk.  And used abx twice in last 12 months,  Uses provigil with good results   Patient Instructions   You have bronchiectasis seen on June 2023 viewed by me, Dr Bowen,  and use aerobika , you needed antibiotics twice last 12 months for lung infection.  Will order vest-- you should do nebulizer with aerobika with vest daily.    Provigil renewed    Use azithromycin for lung infections     Continue ofev    Recheck 6 months.  Call if problem    Bmi is up but weight not done today-- will follow up in future    Copd present.  Bronchiectasis treatment will help.    Ct chest viewed with patient today..  3/14/2923- uses cpap well, uses nasal mask, breathing stable, no ofev re try -fearful of emesis.  Had poison ivy 3 wks ago with lingering rash.      Patient Instructions   Ct chest viewed, pattern fibrosis seen-- would repeat    Not on medications for fibrosis as had severe reaction to ofev with projectile vomiting.    Would repeat ct and try ofev 100  -- start once daily and dose twice daily if no problem.      Aviod poison ivy-- may use medrol dose pack for rash or lungs if severe.     Use levalbuterol for cough,zpak    Call for sooner follow up if needed.    Provigil renewed.  11/30/2022 was seen by Dr Macias for 2nd opinion.    Pt ambulates nearly daily with ox monitoring -- upper 90's.  Anxious.  Has used proveigil with good results  Took ofev with severe n/v 1 st pill and recurred with subsequtent events.  May consider esbriet or lower dose ofev.   Becka,rf, ccp not done.    spirometry bronchodilator, lung volumes by gas dilution, diffusion capacity measured April 7, 2022. Spirometry was normal. There was no airflow obstruction. The forced  vital capacity was 92% predicted.   Lung volume showed total lung capacity to be 77% predicted and low. Diffusion was low also at 65% predicted but did improve when corrected for lung volume.   Patient has a mild restrictive component. There is no airflow obstruction. The bronchodilator response was not significant. Diffusion was decreased somewhat. Clinical correlation necessary.       6 minute walk study was accomplished April 7, 2022. Baseline room air saturation was 93%. With ambulation O2 sat fell to 88% by 2 minutes. Patient needed 2 L of oxygen maintain sat low 90s subsequently. Patient walked 66% of the reference distance of 255            Patient Instructions   Will renew provigil -- you have had great benefit with alertness with provigil.    Continue oxygen and cpap.  Would consider going to ofev 100 twice daily call in couple days    May consider stopping ofev and using esbriet  with titrating up as able.    Would check oxygen level walking weekly or so.  If levels fall over few days to weeks -- that pattern is not fibrosis but something else  Albuterol has caused jitteriness but xopenex has worked well with no adverse effect.  Will ask staff to do prior auth.  We review cxr 2013 to 2019 with no ild changes but cxr 2020 to current have ild seen.  Ct chest viewed again today.          8/8/2022 no ofev yet -- may be at local pharmacy.  Some ely usual degree. Uses ox prn.  Uses cpap.  Doing better with cpap and oxygen. Pt feels some excess sleepiness and loss focus despite good cpap compliance. Pt not using trazodone.     Had recent azithromycin. Uses xopenex as needed.        Not using buspar and wishes to stop .  Was seeing psyc in [place]-- psyc left town yrs ago.  Spangle to have some add?       Patient Instructions   Need to get ofev started.    Use prednisone and inhalers as needed.    Use cpap with oxygen for sleep    Trial modafinil for excessive lingering sleepiness to improve alertness and focus.   Try one daily in early morning.  Increase to 2 daily after 3 days if needed.  Call when medications getting low to explain benefit and order more if helpful.        8/8/2022 compliance report  From 3 months ago--  for cpap shows cpap used 72% of days, average use  on days used  5hr 20 min, pressure ranges were low from 4-10, breathing difficulties decreased from 36 with no cpap in past to about 3.  No issues identified -- no adjustment.      pulm fibrosis found on ct in April - ofev    4/8/2022 no abx and breathing stable.  cpap ok.  Has ahi 36 in 2020, cpap auto 4-20.  Notes high pressure with removal mask at night.  Breathing roughly same but good and bad days.  Vague am stiffness of hands attributed to prior shoulder surgery.    No cough now wheezes.  Uses xopenex bid last month-- no help     Pt had 1st feb acute sob with wheezes, considered hospital, ended up reviewed her Facebook, found had prior Feb with similar symptoms and took inhalers with good control.    Patient Instructions   Need compliance report  for cpap-- may need to adjust cpap pressure if average high.      Will set up oxygen.    You have honeycombing of lung and subpleural reticular change seen on ct abd seen 1/2/2022.  You have prominent lower lung rales bilaterally.     Pulmonary functions with tlc 77% with low difussion.    Your oxygen saturation falls to 88% after walking 2 minutes-- would recommend using oxygen 24/7.  Bleed in 2 lpm oxygen into cpap.     Would recommend treatment for pulmonary fibrosis with ofev, should have blood work monthly x 3 once starting ofev.  May have loose stools and need imodium.  Dose would be one twice daily.      If prednisone needed-- try one daily for 3 days.  May need inhaler if acute short breath/wheeze.  1/10/2022 uses cpap well -- uses nasal pillars.  Uses over 4 hrs /night.  Had [place] [facility]     Sinuses ok    Pt  Reports occasional low ox 90 or so.      Has boxes of inhalers at home-- took prednisone in  fall.  No fh fibrosis but mom  44 yo.   Patient Instructions   You have mild scarring of lungs seen on ct of abdomen.   Rales are heard on exam suggesting scarring of lungs.      Would check oxygen level walking and breathing test.    Ct chest would be good to check-- could do in 6-12 months.    Use amoxil for sinus/lung infection    Will screen joint disease that may be associated with lung disease.    4/15/2021 uses cpap - uses over 4 hrs nightly, got call from  [employer] saying needed to purchase or something?  Uses high compliance with great benefit.      10/1/2020 pt uses cpap 6 hrs nightly, has problems with mask, went to nasal with chin strap - needed tape to keep mouth closed.   Pt having good response when mask tolerated well.  No prednisone - stable  Patient Instructions   You are tolerating cpap well - you should do better with airfit f30 mask than nasal mask.    Continue symbicort as control lungs has been good.      You should use xopenex in place albuterol as you have palpitations with albuterol.  2020 - no prednisone last 3 wks, takes once every 3 wks, was not using symbicort less than daily. Brittaney's mom.  Sinuses good.      You had prior sleep apnea, up to 40/hr?  Used cpap for 3 yrs but stopped 10 yrs ago for multiple reasons.  Patient Instructions   symbicort is preventive - use regular 2 puffs twice daily,   singulair daily also.      Use xopenex inhaler, albuterol not as effective for you- as rescue therapy, use 2 puff up to every 4-6 as needed. May use nebulizer.    Use prednisone if needed.  20 mg daily for 2-3 should be adequate.  symbicort should decrease prednisone need.    Sleep apnea - up to 5 /hour is within normal range, study may apply cpap if can diagnose sleep apnea 1st part of night.   May need to see to discuss cpap tolerance post study.  10/15/2019pt had cough/wheezes as child, never outgrew.  Had cough all life- mucous clear. occ green mucous, uses combivent. Has neb  "with xopenex- albuterol ppt shakes.  Uses steroids once every 5 yrs- had to 3 wks prednisone.  Er visits once yr - may get steroid shot.  Lives [place],  No sinus, has dog and cat.  Breathing seems stable- breathing gets bad 4 month a yr    Uses oxycodone tid for last 1.5 yrs.      Has sleep apnea - cpap broke and not followed up.    Patient Instructions   Asthma, copd with chronic disease?    Preventive - use regular- sinuglair daily, symbicort 2 twice daily    Rescue medication - use albuterol inhaler or xopenex nebulizer up to every 4 hrs or so.    Action plan - prednisone one daily for 3 May be adequate, may need 3 for 3 and taper if severe.  Er if sickly    If yellow mucous - use azithromycin.    Spacer will help inhaled medication.    Breathing test and follow up a yr.  The chief compliant  problem is new to me",    PFSH:  Past Medical History:   Diagnosis Date    Arthritis     COPD (chronic obstructive pulmonary disease)     Coronary artery disease     Hypertension     Sleep apnea     use CPAP at night          Past Surgical History:   Procedure Laterality Date    CORONARY ANGIOGRAPHY N/A 2/15/2021    Procedure: ANGIOGRAM, CORONARY ARTERY;  Surgeon: iAdan Buchanan MD;  Location: [facility] CATH/EP LAB;  Service: Cardiology;  Laterality: N/A;    CORONARY ARTERY BYPASS GRAFT      CORONARY BYPASS GRAFT ANGIOGRAPHY  2/15/2021    Procedure: Bypass graft study;  Surgeon: Aidan Buchanan MD;  Location: [facility] CATH/EP LAB;  Service: Cardiology;;    HYSTERECTOMY      JOINT REPLACEMENT      bilateral knee replacement     LEFT HEART CATHETERIZATION Left 2/15/2021    Procedure: Left heart cath;  Surgeon: Aidan Buchanan MD;  Location: [facility] CATH/EP LAB;  Service: Cardiology;  Laterality: Left;     Social History     Tobacco Use    Smoking status: Never     Passive exposure: Past    Smokeless tobacco: Never   Substance Use Topics    Alcohol use: No    Drug use: No     Family History   Problem Relation Name Age of Onset    " "Pulmonary embolism Mother       Review of patient's allergies indicates:   Allergen Reactions    Acetaminophen Other (See Comments) and Nausea And Vomiting     Patient states allergic to TYLENOL brand name.  Blood pressure becomes hypertensive.    Levofloxacin Other (See Comments)     Pt states it causes tendon tears.    Metronidazole Other (See Comments)     DISORIENTED       Statins-hmg-coa reductase inhibitors Tinitus     Joint pain       Performance Status:The patient's activity level is no limits with regular activity.      Review of Systems:  a review of eleven systems covering constitutional, Eye, HEENT, Psych, Respiratory, Cardiac, GI, , Musculoskeletal, Endocrine, Dermatologic was negative except for pertinent findings as listed ABOVE and below: night sweats,   pertinent positive as above, rest is good      Exam:Comprehensive exam done. BP (!) 140/65 (BP Location: Right arm, Patient Position: Sitting)   Pulse 61   Ht 5' 1" (1.549 m)   Wt 83.3 kg (183 lb 10.3 oz)   LMP 09/01/1986   SpO2 99% Comment: on room air at rest  BMI 34.70 kg/m²   Exam included Vitals as listed, and patient's appearance and affect and alertness and mood, oral exam for yeast and hygiene and pharynx lesions and Mallapatti (M) score, neck with inspection for jvd and masses and thyroid abnormalities and lymph nodes (supraclavicular and infraclavicular nodes and axillary also examined and noted if abn), chest exam included symmetry and effort and fremitus and percussion and auscultation, cardiac exam included rhythm and gallops and murmur and rubs and jvd and edema, abdominal exam for mass and hepatosplenomegaly and tenderness and hernias and bowel sounds, Musculoskeletal exam with muscle tone and posture and mobility/gait and  strength, and skin for rashes and cyanosis and pallor and turgor, extremity for clubbing.  Findings were normal except for pertinent findings listed below:   M2, chest is symmetric, no distress, normal " percussion, normal fremitus and bilat slight rales lung bases  No clubbing nor edema    Radiographs (ct chest and cxr) reviewed: view by direct vision  March 15 , 2019 nad, post cabg    Ct abd 1/2/2022 ild seen lower lungs with ? Honeycombing.  Ct chest 4/12/2022 ild seen with honeycombing.     Labs reviewed            PFT   fvc 92%, tlc 77%, dlco 65% 4/7/2022  Six min walk 93% rest , 88% 2 minutes, and 94% end of walk.        Results for SAKSHI BALLARD (MRN [id number]) as of 10/15/2019 09:59   Ref. Range 6/11/2012 04:51   Eosinophils Relative Latest Ref Range: 0.0 - 3.0 % 5.5 (H)   Basophil% Latest Ref Range: 0.0 - 3.0 % 0.5       Plan:  Clinical impression is apparently straight forward and impression with management as below.     Sakshi was seen today for follow-up, interstitial lung disease and copd.    Diagnoses and all orders for this visit:    ILD (interstitial lung disease)    Obstructive sleep apnea syndrome    Mild intermittent asthma without complication  -     NEBULIZER FOR HOME USE    Chronic hypoxemic respiratory failure    Chronic heart failure with preserved ejection fraction  -     triamterene-hydrochlorothiazide 37.5-25 mg (MAXZIDE-25) 37.5-25 mg per tablet; Take 1 tablet by mouth once daily.  -     Basic Metabolic Panel; Future    Chronic obstructive pulmonary disease, unspecified COPD type  -     NEBULIZER FOR HOME USE    Hypersomnolence disorder                      Follow up in about 5 months (around 9/29/2025).    Discussed with patient above for education the following:      Patient Instructions   Diagnosis for chest discomfort not clear -- I see no good evidence for fibrosis exacerbation.   Ct now would be low yield     Lasix abruptly improved your discomfort in hospital.  You have had prior fluid retention with sob improve with lasix.  You have preserved ejection heart failure.      Use maxzide daily (or so) and stop chlorthalidone as causes cramps.  Check blood in a month for  potassium.    Ct and chest xray viewed and were good.    Stop prednisone    Should repeat studies for lungs in September and f/u then,    Continue cpap, you have component of copd and need new nebulizer      Notify zoltan of inogen issue          Eval took 42 min

## 2025-05-05 ENCOUNTER — OFFICE VISIT (OUTPATIENT)
Dept: CARDIOLOGY | Facility: CLINIC | Age: 70
End: 2025-05-05
Payer: MEDICARE

## 2025-05-05 ENCOUNTER — TELEPHONE (OUTPATIENT)
Dept: PULMONOLOGY | Facility: CLINIC | Age: 70
End: 2025-05-05
Payer: MEDICARE

## 2025-05-05 VITALS
DIASTOLIC BLOOD PRESSURE: 75 MMHG | SYSTOLIC BLOOD PRESSURE: 140 MMHG | BODY MASS INDEX: 35.33 KG/M2 | OXYGEN SATURATION: 94 % | HEART RATE: 61 BPM | WEIGHT: 187 LBS

## 2025-05-05 DIAGNOSIS — K21.9 GASTROESOPHAGEAL REFLUX DISEASE WITHOUT ESOPHAGITIS: Chronic | ICD-10-CM

## 2025-05-05 DIAGNOSIS — I25.810 CORONARY ARTERY DISEASE INVOLVING CORONARY BYPASS GRAFT OF NATIVE HEART, UNSPECIFIED WHETHER ANGINA PRESENT: Primary | ICD-10-CM

## 2025-05-05 DIAGNOSIS — N18.2 CKD (CHRONIC KIDNEY DISEASE), STAGE II: ICD-10-CM

## 2025-05-05 DIAGNOSIS — J84.9 ILD (INTERSTITIAL LUNG DISEASE): ICD-10-CM

## 2025-05-05 DIAGNOSIS — Z78.9 STATIN INTOLERANCE: ICD-10-CM

## 2025-05-05 DIAGNOSIS — E66.01 SEVERE OBESITY (BMI 35.0-39.9) WITH COMORBIDITY: ICD-10-CM

## 2025-05-05 DIAGNOSIS — I10 PRIMARY HYPERTENSION: ICD-10-CM

## 2025-05-05 DIAGNOSIS — Z95.1 HX OF CORONARY ARTERY BYPASS SURGERY: ICD-10-CM

## 2025-05-05 PROCEDURE — 99999 PR PBB SHADOW E&M-EST. PATIENT-LVL IV: CPT | Mod: PBBFAC,,, | Performed by: INTERNAL MEDICINE

## 2025-05-05 RX ORDER — LOSARTAN POTASSIUM 50 MG/1
50 TABLET ORAL DAILY
Qty: 90 TABLET | Refills: 3 | Status: SHIPPED | OUTPATIENT
Start: 2025-05-05 | End: 2026-05-05

## 2025-05-05 RX ORDER — EVOLOCUMAB 140 MG/ML
140 INJECTION, SOLUTION SUBCUTANEOUS
Qty: 6 ML | Refills: 3 | Status: ACTIVE | OUTPATIENT
Start: 2025-05-05 | End: 2026-05-05

## 2025-05-05 NOTE — ASSESSMENT & PLAN NOTE
History of interstitial lung disease and she is using oxygen intermittently as needed also to continue on present therapy with rescue inhalers.

## 2025-05-05 NOTE — ASSESSMENT & PLAN NOTE
BMI is 35.33 kilograms/meter squared calorie restricted diet increased physical activity as tolerated.

## 2025-05-05 NOTE — PROGRESS NOTES
Subjective:    Patient ID:  Sakshi Hess is a 70 y.o. female patient here for evaluation Follow-up (NEW PATIENT)      History of Present Illness:     70-YEAR-OLD WITH HISTORY OF KNOWN CORONARY ARTERY DISEASE PREVIOUS 4 VESSEL BYPASS SURGERY IN 2011 HISTORY OF ARTERIAL HYPERTENSION SEEKING TO ESTABLISH CARDIOVASCULAR CARE WITH ME.  PREVIOUSLY SHE HAS SEEN DR. FELDMAN  Patient denies having chest discomfort no arm neck or jaw pain no significant shortness of breath normal physical activity.  About a month ago she had some atypical chest symptoms and was evaluated in the hospital undergone noninvasive testing on 04/12/2025 and a that time EKG did not demonstrate any evidence of ischemia and myocardial perfusion study did not show any evidence of reversible ischemia.  Clinically she has been relatively stable on the present regimen     Patient denies having any palpitations dizziness lightheadedness.  She has remained reasonably active   she has a allergic to statin therapies    Details of recent hospitalization is as follows  HPI:   70-year-old female presented to ED for eval of chest pain. pMHx CAD s/p CABG, HTN, COPD, MIRA, chronic pain, DM.  Patient reported over the last several weeks she has been experiencing increased shortness of breath, for the last several days she has also been experiencing intermittent left-sided chest pressure that radiates to her left upper back with associated 6 lb weight gain and lower extremity edema.  Patient states chest pain occurred both at rest and with activity, nothing makes it better or worse.  Denies abdominal pain, nausea, vomiting, changes in bowel habits.  Denies syncope.  Denies fever, chills.  Patient states she took dose p.r.n. Lasix at home with mild improvement in lower extremity edema.  Patient does have history of COPD and MIRA, wears p.r.n. O2 via NC at home to maintain sats 94%, wears CPAP HS.  Denies current outpatient PO steroid or antibiotic treatment.  Patient  also with history CAD s/p CABG, had been following with Dr. Buchanan for Cardiology but states she has to switch 2/2 insurance coverage and has pending upcoming appointment with Dr. Griggs.  Echo February 2024 with EF 55-60%.  Patient did take 81 mg ASA prior to arrival.  In ED, initial troponin 21.7.  BNP unremarkable.  CXR impression with no acute abnormality.  Admit to hospital medicine for further eval.     * No surgery found *       Hospital Course:   Sakshi Hess is a 70 year old female with a past medical history of IPF, obesity, MIRA, and DM who presented with chest pain. Troponin was mildly elevated, yet TTE and stress testing were unremarkable. Cardiology , nuclear stress negative for reversible ischemia. Echo with EF 62% with no acute valvular abnormalities, mild AS. Cardiology signed off. She is being treated for an exacerbation of IPF with steroids and Duonebs.  Patient's respiratory status returned to baseline and she was discharged on extended steroid taper with instructions to follow up with her pulmonologist with whom she already follows closely.  She is seen and examined prior to discharge in appropriate condition to do so.        Review of patient's allergies indicates:   Allergen Reactions    Acetaminophen Other (See Comments) and Nausea And Vomiting     Patient states allergic to TYLENOL brand name.  Blood pressure becomes hypertensive.    Levofloxacin Other (See Comments)     Pt states it causes tendon tears.    Metronidazole Other (See Comments)     DISORIENTED       Statins-hmg-coa reductase inhibitors Tinitus     Joint pain       Past Medical History:   Diagnosis Date    Arthritis     COPD (chronic obstructive pulmonary disease)     Coronary artery disease     Hypertension     Sleep apnea     use CPAP at night      Past Surgical History:   Procedure Laterality Date    CORONARY ANGIOGRAPHY N/A 2/15/2021    Procedure: ANGIOGRAM, CORONARY ARTERY;  Surgeon: Aidan Buchanan MD;  Location:  [facility] CATH/EP LAB;  Service: Cardiology;  Laterality: N/A;    CORONARY ARTERY BYPASS GRAFT      CORONARY BYPASS GRAFT ANGIOGRAPHY  2/15/2021    Procedure: Bypass graft study;  Surgeon: Aidan Buchanan MD;  Location: [facility] CATH/EP LAB;  Service: Cardiology;;    HYSTERECTOMY      JOINT REPLACEMENT      bilateral knee replacement     LEFT HEART CATHETERIZATION Left 2/15/2021    Procedure: Left heart cath;  Surgeon: Aidan Buchanan MD;  Location: [facility] CATH/EP LAB;  Service: Cardiology;  Laterality: Left;     Social History[1]     Review of Systems   As noted in HPI in addition     Constitutional: Negative for chills, fatigue and fever.   Eyes: No double vision, No blurred vision  Neuro: No headaches, No dizziness  Respiratory: Negative for cough, shortness of breath and wheezing.    Cardiovascular: Negative for chest pain. Negative for palpitations and leg swelling.   Gastrointestinal: Negative for abdominal pain, No melena, diarrhea, nausea and vomiting.   Genitourinary: Negative for dysuria and frequency, Negative for hematuria  Skin: Negative for bruising, Negative for edema or discoloration noted.   Endocrine: Negative for polyphagia, Negative for heat intolerance, Negative for cold intolerance  Psychiatric: Negative for depression, Negative for anxiety, Negative for memory loss  Musculoskeletal: Negative for neck pain, Negative for muscle weakness, Negative for back pain          Objective        Vitals:    05/05/25 1419   BP: (!) 140/75   Pulse: 61       LIPIDS - LAST 2   Lab Results   Component Value Date    CHOL 148 02/27/2024    HDL 45 02/27/2024    HDL 35 (L) 06/11/2012    LDLCALC 83.4 02/27/2024    LDLCALC 134 06/11/2012    TRIG 98 02/27/2024    CHOLHDL 30.4 02/27/2024       CBC - LAST 2  Lab Results   Component Value Date    WBC 7.88 04/14/2025    WBC 6.15 04/13/2025    RBC 4.02 04/14/2025    RBC 4.19 04/13/2025    HGB 11.7 (L) 04/14/2025    HGB 12.0 04/13/2025    HCT 36.6 (L) 04/14/2025    HCT 37.8 04/13/2025     MCV 91 04/14/2025    MCV 90 04/13/2025    MCH 29.1 04/14/2025    MCH 28.6 04/13/2025    MCHC 32.0 04/14/2025    MCHC 31.7 (L) 04/13/2025    RDW 14.2 04/14/2025    RDW 14.0 04/13/2025     04/14/2025     04/13/2025    MPV 9.2 04/14/2025    MPV 9.2 04/13/2025    GRAN 3.6 06/13/2024    GRAN 49.7 06/13/2024    LYMPH 2.4 06/13/2024    LYMPH 32.7 06/13/2024    MONO 0.9 06/13/2024    MONO 12.3 06/13/2024    BASO 0.08 06/13/2024    BASO 0.07 06/12/2024    NRBC 0 04/14/2025    NRBC 0 04/13/2025       CHEMISTRY & LIVER FUNCTION - LAST 2  Lab Results   Component Value Date     04/29/2025     04/14/2025    K 3.9 04/29/2025    K 4.1 04/14/2025     04/29/2025     04/14/2025    CO2 25 04/29/2025    CO2 31 (H) 04/14/2025    ANIONGAP 10 04/29/2025    ANIONGAP 6 (L) 04/14/2025    BUN 27 (H) 04/29/2025    BUN 23 04/14/2025    CREATININE 1.0 04/29/2025    CREATININE 0.9 04/14/2025     04/29/2025     04/14/2025    CALCIUM 8.7 04/29/2025    CALCIUM 9.1 04/14/2025    PH 7.348 (L) 02/26/2024    MG 2.0 04/14/2025    MG 2.0 04/13/2025    ALBUMIN 3.9 04/14/2025    ALBUMIN 3.8 04/13/2025    PROT 6.4 04/14/2025    PROT 6.3 04/13/2025    ALKPHOS 59 04/14/2025    ALKPHOS 61 04/13/2025    ALT 9 (L) 04/14/2025    ALT 7 (L) 04/13/2025    AST 14 04/14/2025    AST 12 04/13/2025    BILITOT 0.4 04/14/2025    BILITOT 0.3 04/13/2025        CARDIAC PROFILE - LAST 2  Lab Results   Component Value Date    BNP 52 04/10/2025     (H) 06/12/2024     (H) 12/31/2021    TROPONINI <0.030 02/15/2021    TROPONINI <0.030 02/15/2021    TROPONINIHS 50.5 (HH) 06/13/2024    TROPONINIHS 54.1 (HH) 06/12/2024        COAGULATION - LAST 2  Lab Results   Component Value Date    LABPT 12.8 02/14/2021    LABPT 12.2 01/03/2020    INR 1.0 02/14/2021    INR 0.9 01/03/2020    APTT 25.8 01/03/2020    APTT 25.7 06/09/2012       ENDOCRINE & PSA - LAST 2  Lab Results   Component Value Date    HGBA1C 6.3 (H) 04/14/2025     TSH 0.744 04/11/2025    PROCAL <0.050 02/26/2024    PROCAL 1.45 (H) 12/31/2021        ECHOCARDIOGRAM RESULTS  Results for orders placed during the hospital encounter of 04/10/25    Echo    Interpretation Summary    Left Ventricle: The left ventricle is normal in size. There is mild concentric hypertrophy. There is normal systolic function. Quantitated ejection fraction is 62%.    Right Ventricle: The right ventricle is normal in size. Systolic function is normal.    Left Atrium: Mildly dilated    Aortic Valve: Not well visualized due to poor acoustic window. There is probable mild stenosis. Aortic valve area by VTI is 1.4 cm². Aortic valve peak velocity is 2.2 m/s. Mean gradient is 10 mmHg. The dimensionless index is 0.40. There is no significant regurgitation.    Mitral Valve: There is moderate mitral annular calcification.    IVC/SVC: Normal venous pressure at 3 mmHg.      CURRENT/PREVIOUS VISIT EKG  Results for orders placed or performed during the hospital encounter of 04/10/25   EKG 12-lead    Collection Time: 04/10/25  5:54 PM   Result Value Ref Range    QRS Duration 86 ms    OHS QTC Calculation 397 ms    Narrative    Test Reason : R07.9,    Vent. Rate :  71 BPM     Atrial Rate :  71 BPM     P-R Int : 178 ms          QRS Dur :  86 ms      QT Int : 366 ms       P-R-T Axes :  40 -23 103 degrees    QTcB Int : 397 ms    Sinus rhythm with Premature atrial complexes with Aberrant conduction  Possible Left atrial enlargement  LVH with repolarization abnormality ( R in aVL , [place] product )  Cannot rule out Septal infarct (cited on or before 26-Feb-2024)  Abnormal ECG  When compared with ECG of 12-Jun-2024 17:39,  Fusion complexes are no longer Present  Questionable change in initial forces of Septal leads  Confirmed by Wale Charles ([id number]) on 4/13/2025 7:26:06 PM    Referred By: AAAREFERRAL SELF           Confirmed By: Wale Charles     No valid procedures specified.   Results for orders placed during the hospital  encounter of 04/10/25    Nuclear Stress Test    Interpretation Summary    The ECG portion of the study is negative for ischemia.    The patient reported no chest pain during the stress test.    During stress, frequent PACs are noted. , During stress, occasional PVCs are noted.    The nuclear portion of this study will be reported separately.    No valid procedures specified.          PREVIOUS STRESS TEST              PREVIOUS ANGIOGRAM        PHYSICAL EXAM    GENERAL: well built, well nourished, well-developed in no apparent distress alert and oriented.   HEENT: Normocephalic. Pupils normal and conjunctivae normal.  Mucous membranes normal, no cyanosis or icterus, trachea central,no pallor or icterus is noted..   NECK: No JVD. No bruit..   THYROID: Thyroid not enlarged. No nodules present..   CARDIAC: Regular rate and rhythm. S1 is normal.S2 is normal.No gallops, clicks or murmurs noted at this time.  CHEST ANATOMY: normal.   LUNGS: Clear to auscultation. No wheezing or rhonchi..   ABDOMEN: Soft no masses or organomegaly.  No abdomen pulsations or bruits.  Normal bowel sounds. No pulsations and no masses felt, No guarding or rebound.   EXTREMITIES: No cyanosis, clubbing or edema noted at this time., no calf tenderness bilaterally.   PERIPHERAL VASCULAR SYSTEM: Good palpable distal pulses.   CENTRAL NERVOUS SYSTEM: No focal motor or sensory deficits noted.   SKIN: Skin without lesions, moist, well perfused.   MUSCLE STRENGTH & TONE: No noteable weakness, atrophy or abnormal movement.     I HAVE REVIEWED :    The vital signs, nurses notes, and all the pertinent radiology and labs.    05/05/2025 EKG shows sinus bradycardia rate of 55 beats per minute left ventricular hypertrophy with some repolarization changes and poor R-wave progression to suggest septal infarct pattern.      Current Outpatient Medications   Medication Instructions    aspirin (ECOTRIN) 81 mg, Oral, Daily    aspirin-acetaminophen-caffeine (GOODY'S  EXTRA STRENGTH) 500-325-65 mg PwPk 1 Dose, Oral, As needed (PRN)    baclofen (LIORESAL) 10 MG tablet Take 1 tablet by mouth up to 2 times daily as needed for shoulder pain    blood sugar diagnostic (TRUE METRIX GLUCOSE TEST STRIP) Strp test blood sugar 2 times daily    budesonide-glycopyr-formoterol (BREZTRI AEROSPHERE) 160-9-4.8 mcg/actuation HFAA inhale 2 puffs into the lungs 2 times daily    ergocalciferol (VITAMIN D2) 50,000 unit Cap Take 1 tablet weekly by mouth for 8 weeks, then every other week thereafter.    furosemide (LASIX) 20 MG tablet 1 tablet by mouth once a day as needed    GINSENG ORAL 250 mg, Daily    ibuprofen (ADVIL,MOTRIN) 400 mg, Every 6 hours PRN    isosorbide mononitrate (IMDUR) 30 mg, Oral, Daily    lancets (TRUEPLUS LANCETS) 30 gauge Misc Check blood sugar 2 times daily    levalbuterol (XOPENEX HFA) 45 mcg/actuation inhaler Inhale 1 puff into the lungs every 8 (eight) hours as needed for Wheezing.    levalbuterol (XOPENEX) 1.25 mg, Nebulization, Every 4 hours PRN, Rescue    losartan (COZAAR) 50 mg, Oral, Daily    metFORMIN (GLUCOPHAGE-XR) 500 MG ER 24hr tablet Take 1 tablet by mouth every day    modafiniL (PROVIGIL) 200 mg, Oral, 2 times daily PRN    nitroGLYCERIN 0.2 mg/hr TD PT24 (NITRO-DUR) 0.2 mg/hr Place 1 patch onto the skin every 24 hours    oxyCODONE (ROXICODONE) 15 MG Tab Take 1 tablet at 6 AM, Take 1 tablet at 12 Noon, Take 1 tablet at 6 PM, Take 1 tablet at 12 Midnight    [START ON 5/7/2025] oxyCODONE (ROXICODONE) 15 MG Tab Take 1 tablet by mouth at 6 AM, Take 1 tablet at 12 Noon, Take 1 tablet at 6 PM, Take 1 tablet at 12 Midnight    potassium chloride SA (K-DUR,KLOR-CON M) 10 MEQ tablet Take 1 tablet (10 mEq total) by mouth daily as needed. When taking Lasix    REPATHA SYRINGE 140 mg, Subcutaneous, Every 14 days    triamterene-hydrochlorothiazide 37.5-25 mg (MAXZIDE-25) 37.5-25 mg per tablet 1 tablet, Oral, Daily          Assessment & Plan     1. Coronary artery disease  involving coronary bypass graft of native heart, unspecified whether angina present  Assessment & Plan:  Coronary artery disease status post bypass surgery aggressive risk factor modification.  Continue on Repatha 140 mg every 2 weeks continue on aspirin therapy 81 daily.  And adequate blood pressure control    Orders:  -     IN OFFICE EKG 12-LEAD (to Muse)  -     evolocumab (REPATHA SYRINGE) 140 mg/mL Syrg; Inject 1 mL (140 mg total) into the skin every 14 (fourteen) days.  Dispense: 6 mL; Refill: 3    2. Hx of coronary artery bypass surgery  Assessment & Plan:  History of CABG clinically stable and continue on risk factor modification.  Maintain on isosorbide mononitrate 30 mg daily along with Repatha and aspirin    Orders:  -     IN OFFICE EKG 12-LEAD (to Muse)  -     evolocumab (REPATHA SYRINGE) 140 mg/mL Syrg; Inject 1 mL (140 mg total) into the skin every 14 (fourteen) days.  Dispense: 6 mL; Refill: 3    3. Primary hypertension  Assessment & Plan:  Blood pressure is 140/75 mm Hg currently she is on Lasix 20 mg a day isosorbide mononitrate 30 mg daily losartan has a 25 recommend to increase it to 50 mg daily and also to continue on triamterene hydrochlorothiazide 37.5/25 mg daily with potassium supplements    Orders:  -     IN OFFICE EKG 12-LEAD (to Muse)  -     evolocumab (REPATHA SYRINGE) 140 mg/mL Syrg; Inject 1 mL (140 mg total) into the skin every 14 (fourteen) days.  Dispense: 6 mL; Refill: 3    4. Severe obesity (BMI 35.0-39.9) with comorbidity  Assessment & Plan:  BMI is 35.33 kilograms/meter squared calorie restricted diet increased physical activity as tolerated.      5. CKD (chronic kidney disease), stage II  Assessment & Plan:  Clinically stable continue to monitor renal function periodically      6. ILD (interstitial lung disease)  Assessment & Plan:  History of interstitial lung disease and she is using oxygen intermittently as needed also to continue on present therapy with rescue  inhalers.      7. Gastroesophageal reflux disease without esophagitis  Assessment & Plan:  Continue on Prevacid 30 mg daily      8. Statin intolerance  Overview:  Patient is a profound statin intolerance and maintain on Repatha    Assessment & Plan:  Patient is a profound statin intolerance and maintain on Repatha      Other orders  -     losartan (COZAAR) 50 MG tablet; Take 1 tablet (50 mg total) by mouth once daily.  Dispense: 90 tablet; Refill: 3           Follow up in about 6 months (around 11/5/2025).              [1]   Social History  Tobacco Use    Smoking status: Never     Passive exposure: Past    Smokeless tobacco: Never   Substance Use Topics    Alcohol use: No    Drug use: No

## 2025-05-05 NOTE — ASSESSMENT & PLAN NOTE
Coronary artery disease status post bypass surgery aggressive risk factor modification.  Continue on Repatha 140 mg every 2 weeks continue on aspirin therapy 81 daily.  And adequate blood pressure control

## 2025-05-05 NOTE — ASSESSMENT & PLAN NOTE
History of CABG clinically stable and continue on risk factor modification.  Maintain on isosorbide mononitrate 30 mg daily along with Repatha and aspirin

## 2025-05-05 NOTE — TELEPHONE ENCOUNTER
Attempted to contact patient, no answer, lvm for a return call.    ----- Message from Darek Bowen MD sent at 4/29/2025 11:36 AM CDT -----  Notify kidney and potassium are good. No change in plan  ----- Message -----  From: Lab, Background User  Sent: 4/29/2025  10:35 AM CDT  To: Darek Bowen MD

## 2025-05-05 NOTE — ASSESSMENT & PLAN NOTE
Blood pressure is 140/75 mm Hg currently she is on Lasix 20 mg a day isosorbide mononitrate 30 mg daily losartan has a 25 recommend to increase it to 50 mg daily and also to continue on triamterene hydrochlorothiazide 37.5/25 mg daily with potassium supplements

## (undated) DEVICE — CATHETER DIAGNOSTIC DXTERITY 6FR LCB

## (undated) DEVICE — CATHETER EXPO MLTPK 6FR 100X110CM

## (undated) DEVICE — SHEATH PINNACLE 6FRX10CM W/GUIDEWIRE

## (undated) DEVICE — CATHETER DIAGNOSTIC DXTERITY 6FR IMA

## (undated) DEVICE — GUIDEWIRE DOUBLE ENDED .035 DIA. 150CML

## (undated) DEVICE — GUIDEWIRE J TIP EXCHANGE FIXED CORE 260

## (undated) DEVICE — SHEATH INTRODUCER PRECISION ACCESS 6FX10